# Patient Record
Sex: FEMALE | Race: WHITE | NOT HISPANIC OR LATINO | Employment: OTHER | URBAN - METROPOLITAN AREA
[De-identification: names, ages, dates, MRNs, and addresses within clinical notes are randomized per-mention and may not be internally consistent; named-entity substitution may affect disease eponyms.]

---

## 2024-01-05 ENCOUNTER — HOSPITAL ENCOUNTER (EMERGENCY)
Facility: HOSPITAL | Age: 84
Discharge: HOME | End: 2024-01-05
Attending: EMERGENCY MEDICINE
Payer: MEDICARE

## 2024-01-05 ENCOUNTER — APPOINTMENT (OUTPATIENT)
Dept: CARDIOLOGY | Facility: HOSPITAL | Age: 84
End: 2024-01-05
Payer: MEDICARE

## 2024-01-05 ENCOUNTER — APPOINTMENT (OUTPATIENT)
Dept: RADIOLOGY | Facility: HOSPITAL | Age: 84
End: 2024-01-05
Payer: MEDICARE

## 2024-01-05 VITALS
WEIGHT: 111.88 LBS | HEART RATE: 84 BPM | HEIGHT: 67 IN | TEMPERATURE: 97.7 F | BODY MASS INDEX: 17.56 KG/M2 | SYSTOLIC BLOOD PRESSURE: 158 MMHG | OXYGEN SATURATION: 100 % | RESPIRATION RATE: 18 BRPM | DIASTOLIC BLOOD PRESSURE: 89 MMHG

## 2024-01-05 DIAGNOSIS — R07.81 RIB PAIN: Primary | ICD-10-CM

## 2024-01-05 LAB
ALBUMIN SERPL BCP-MCNC: 4.1 G/DL (ref 3.4–5)
ALP SERPL-CCNC: 83 U/L (ref 33–136)
ALT SERPL W P-5'-P-CCNC: 21 U/L (ref 7–45)
ANION GAP SERPL CALC-SCNC: 14 MMOL/L (ref 10–20)
AST SERPL W P-5'-P-CCNC: 22 U/L (ref 9–39)
BILIRUB SERPL-MCNC: 0.9 MG/DL (ref 0–1.2)
BUN SERPL-MCNC: 27 MG/DL (ref 6–23)
CALCIUM SERPL-MCNC: 9.3 MG/DL (ref 8.6–10.3)
CARDIAC TROPONIN I PNL SERPL HS: 6 NG/L (ref 0–13)
CHLORIDE SERPL-SCNC: 109 MMOL/L (ref 98–107)
CO2 SERPL-SCNC: 23 MMOL/L (ref 21–32)
CREAT SERPL-MCNC: 1.05 MG/DL (ref 0.5–1.05)
GFR SERPL CREATININE-BSD FRML MDRD: 52 ML/MIN/1.73M*2
GLUCOSE SERPL-MCNC: 88 MG/DL (ref 74–99)
INR PPP: 0.9 (ref 0.9–1.1)
POTASSIUM SERPL-SCNC: 3.9 MMOL/L (ref 3.5–5.3)
PROT SERPL-MCNC: 6.4 G/DL (ref 6.4–8.2)
PROTHROMBIN TIME: 10.5 SECONDS (ref 9.8–12.8)
SODIUM SERPL-SCNC: 142 MMOL/L (ref 136–145)

## 2024-01-05 PROCEDURE — 99284 EMERGENCY DEPT VISIT MOD MDM: CPT | Performed by: EMERGENCY MEDICINE

## 2024-01-05 PROCEDURE — 99283 EMERGENCY DEPT VISIT LOW MDM: CPT

## 2024-01-05 PROCEDURE — 71101 X-RAY EXAM UNILAT RIBS/CHEST: CPT | Mod: RT,FY

## 2024-01-05 PROCEDURE — 36415 COLL VENOUS BLD VENIPUNCTURE: CPT | Performed by: STUDENT IN AN ORGANIZED HEALTH CARE EDUCATION/TRAINING PROGRAM

## 2024-01-05 PROCEDURE — 2500000004 HC RX 250 GENERAL PHARMACY W/ HCPCS (ALT 636 FOR OP/ED): Performed by: STUDENT IN AN ORGANIZED HEALTH CARE EDUCATION/TRAINING PROGRAM

## 2024-01-05 PROCEDURE — 96372 THER/PROPH/DIAG INJ SC/IM: CPT | Performed by: STUDENT IN AN ORGANIZED HEALTH CARE EDUCATION/TRAINING PROGRAM

## 2024-01-05 PROCEDURE — 85610 PROTHROMBIN TIME: CPT | Performed by: STUDENT IN AN ORGANIZED HEALTH CARE EDUCATION/TRAINING PROGRAM

## 2024-01-05 PROCEDURE — 71101 X-RAY EXAM UNILAT RIBS/CHEST: CPT | Mod: RIGHT SIDE | Performed by: RADIOLOGY

## 2024-01-05 PROCEDURE — 80053 COMPREHEN METABOLIC PANEL: CPT | Performed by: STUDENT IN AN ORGANIZED HEALTH CARE EDUCATION/TRAINING PROGRAM

## 2024-01-05 PROCEDURE — 99285 EMERGENCY DEPT VISIT HI MDM: CPT | Performed by: EMERGENCY MEDICINE

## 2024-01-05 PROCEDURE — 93005 ELECTROCARDIOGRAM TRACING: CPT

## 2024-01-05 PROCEDURE — 84484 ASSAY OF TROPONIN QUANT: CPT | Performed by: STUDENT IN AN ORGANIZED HEALTH CARE EDUCATION/TRAINING PROGRAM

## 2024-01-05 PROCEDURE — 2500000005 HC RX 250 GENERAL PHARMACY W/O HCPCS: Performed by: STUDENT IN AN ORGANIZED HEALTH CARE EDUCATION/TRAINING PROGRAM

## 2024-01-05 RX ORDER — LIDOCAINE 50 MG/G
1 PATCH TOPICAL DAILY
Qty: 11 PATCH | Refills: 11 | Status: SHIPPED | OUTPATIENT
Start: 2024-01-05 | End: 2024-02-10 | Stop reason: HOSPADM

## 2024-01-05 RX ORDER — LIDOCAINE 560 MG/1
1 PATCH PERCUTANEOUS; TOPICAL; TRANSDERMAL DAILY
Status: DISCONTINUED | OUTPATIENT
Start: 2024-01-05 | End: 2024-01-05 | Stop reason: HOSPADM

## 2024-01-05 RX ORDER — KETOROLAC TROMETHAMINE 15 MG/ML
15 INJECTION, SOLUTION INTRAMUSCULAR; INTRAVENOUS ONCE
Status: COMPLETED | OUTPATIENT
Start: 2024-01-05 | End: 2024-01-05

## 2024-01-05 RX ADMIN — KETOROLAC TROMETHAMINE 15 MG: 15 INJECTION, SOLUTION INTRAMUSCULAR; INTRAVENOUS at 13:30

## 2024-01-05 RX ADMIN — LIDOCAINE 1 PATCH: 4 PATCH TOPICAL at 14:38

## 2024-01-05 ASSESSMENT — PAIN DESCRIPTION - ORIENTATION: ORIENTATION: RIGHT

## 2024-01-05 ASSESSMENT — PAIN - FUNCTIONAL ASSESSMENT
PAIN_FUNCTIONAL_ASSESSMENT: 0-10
PAIN_FUNCTIONAL_ASSESSMENT: 0-10

## 2024-01-05 ASSESSMENT — COLUMBIA-SUICIDE SEVERITY RATING SCALE - C-SSRS
1. IN THE PAST MONTH, HAVE YOU WISHED YOU WERE DEAD OR WISHED YOU COULD GO TO SLEEP AND NOT WAKE UP?: NO
2. HAVE YOU ACTUALLY HAD ANY THOUGHTS OF KILLING YOURSELF?: NO
6. HAVE YOU EVER DONE ANYTHING, STARTED TO DO ANYTHING, OR PREPARED TO DO ANYTHING TO END YOUR LIFE?: NO

## 2024-01-05 ASSESSMENT — PAIN DESCRIPTION - LOCATION
LOCATION: CHEST
LOCATION: RIB CAGE

## 2024-01-05 ASSESSMENT — PAIN SCALES - GENERAL
PAINLEVEL_OUTOF10: 8
PAINLEVEL_OUTOF10: 7

## 2024-01-05 ASSESSMENT — PAIN DESCRIPTION - FREQUENCY: FREQUENCY: CONSTANT/CONTINUOUS

## 2024-01-05 ASSESSMENT — LIFESTYLE VARIABLES
HAVE YOU EVER FELT YOU SHOULD CUT DOWN ON YOUR DRINKING: NO
EVER HAD A DRINK FIRST THING IN THE MORNING TO STEADY YOUR NERVES TO GET RID OF A HANGOVER: NO
HAVE PEOPLE ANNOYED YOU BY CRITICIZING YOUR DRINKING: NO
EVER FELT BAD OR GUILTY ABOUT YOUR DRINKING: NO
REASON UNABLE TO ASSESS: NO

## 2024-01-05 ASSESSMENT — PAIN DESCRIPTION - DESCRIPTORS: DESCRIPTORS: SHARP

## 2024-01-05 NOTE — ED PROVIDER NOTES
HPI   Chief Complaint   Patient presents with    Rib Injury     Right rib pain       This is a 83-year-old female no past medical history presents to the ED for right-sided rib pain, she reports that she bent over to adjust her TV, and hit her right rib on a object that was in front of the TV, this happened 4 days ago and has had pain from that area since then, lower white rib area, no dysuria, no chest pain or shortness of breath, there is pain on inspiration.  She denies any fever/chills or sore throat or rhinorrhea cough                          [place] Coma Scale Score: 15                  Patient History   Past Medical History:   Diagnosis Date    Anemia, unspecified 12/16/2020    Anemia    Anemia, unspecified 12/16/2020    Anemia    Essential (primary) hypertension 12/16/2020    Essential hypertension    Essential (primary) hypertension 12/16/2020    Essential hypertension    Gastro-esophageal reflux disease without esophagitis 12/09/2020    GERD (gastroesophageal reflux disease)    Personal history of other diseases of the nervous system and sense organs 10/26/2021    History of glaucoma     Past Surgical History:   Procedure Laterality Date    OTHER SURGICAL HISTORY  10/26/2021    Cataract surgery     No family history on file.  Social History     Tobacco Use    Smoking status: Not on file    Smokeless tobacco: Not on file   Substance Use Topics    Alcohol use: Not on file    Drug use: Not on file       Physical Exam   ED Triage Vitals [01/05/24 1122]   Temp Heart Rate Resp BP   36.5 °C (97.7 °F) 95 20 167/70      SpO2 Temp src Heart Rate Source Patient Position   100 % -- -- --      BP Location FiO2 (%)     -- --       Physical Exam  Constitutional:       Appearance: Normal appearance.   HENT:      Head: Normocephalic and atraumatic.      Mouth/Throat:      Mouth: Mucous membranes are moist.   Eyes:      Extraocular Movements: Extraocular movements intact.   Cardiovascular:      Rate and Rhythm: Normal rate  and regular rhythm.      Heart sounds: Normal heart sounds. No murmur heard.  Pulmonary:      Effort: Pulmonary effort is normal. No respiratory distress.      Breath sounds: Normal breath sounds. No wheezing.   Abdominal:      General: There is no distension.      Palpations: Abdomen is soft.      Tenderness: There is no abdominal tenderness. There is no guarding.   Musculoskeletal:      Right lower leg: No edema.      Left lower leg: No edema.      Comments: Significant tenderness on palpation of the right rib region around the 12th rib on the lateral side of the right chest.   Skin:     General: Skin is warm and dry.   Neurological:      General: No focal deficit present.      Mental Status: She is alert and oriented to person, place, and time.   Psychiatric:         Mood and Affect: Mood normal.         Behavior: Behavior normal.         ED Course & MDM   ED Course as of 01/05/24 1450   Fri Jan 05, 2024   1446 Nurses having difficulty getting CBC, patient does not want CBC done, believe that this is reasonable [VH]      ED Course User Index  [VH] Louis Rizo DO         Diagnoses as of 01/05/24 1450   Rib pain       Medical Decision Making  EKG interpreted by myself: Normal sinus rhythm ventricular rate 76, normal axis, QTc 436 ms, no acute injury pattern noted.    Patient presents to the ED brought in by self due to right-sided thoracic rib pain after trauma.  This was 4 days ago.  On palpation, patient does haveTenderness on the right chest.  Will obtain a cardiac workup, rib series x-ray, control pain with Toradol, Lidoderm patches.  There abuse x-ray not demonstrate any fractures.  The troponin is within normal limits, CMP grossly unremarkable.  Patient had some improvement of her pain, after this she will be discharged home with symptomatic management, NSAIDs, Lidoderm patches outpatient PCP follow-up    Discussed with the attending  Louis Rizo DO PGY-4  Emergency  Medicine        Procedure  Procedures     Louis Rizo DO  Resident  01/05/24 [id number]

## 2024-01-27 LAB
ATRIAL RATE: 76 BPM
P AXIS: 63 DEGREES
P OFFSET: 199 MS
P ONSET: 153 MS
PR INTERVAL: 128 MS
Q ONSET: 217 MS
QRS COUNT: 12 BEATS
QRS DURATION: 84 MS
QT INTERVAL: 388 MS
QTC CALCULATION(BAZETT): 436 MS
QTC FREDERICIA: 419 MS
R AXIS: 67 DEGREES
T AXIS: 69 DEGREES
T OFFSET: 411 MS
VENTRICULAR RATE: 76 BPM

## 2024-01-30 ENCOUNTER — HOSPITAL ENCOUNTER (INPATIENT)
Facility: HOSPITAL | Age: 84
LOS: 11 days | Discharge: NURSING, REHAB OR LONG-TERM CARE | DRG: 125 | End: 2024-02-10
Attending: EMERGENCY MEDICINE | Admitting: INTERNAL MEDICINE
Payer: MEDICARE

## 2024-01-30 ENCOUNTER — APPOINTMENT (OUTPATIENT)
Dept: RADIOLOGY | Facility: HOSPITAL | Age: 84
DRG: 125 | End: 2024-01-30
Payer: MEDICARE

## 2024-01-30 ENCOUNTER — APPOINTMENT (OUTPATIENT)
Dept: CARDIOLOGY | Facility: HOSPITAL | Age: 84
DRG: 125 | End: 2024-01-30
Payer: MEDICARE

## 2024-01-30 DIAGNOSIS — Z86.73 STROKE OCCURRING WITHIN LAST MONTH: ICD-10-CM

## 2024-01-30 DIAGNOSIS — R63.6 UNDERWEIGHT: ICD-10-CM

## 2024-01-30 DIAGNOSIS — H53.9 VISION CHANGES: Primary | ICD-10-CM

## 2024-01-30 DIAGNOSIS — I63.89 OTHER CEREBRAL INFARCTION (MULTI): ICD-10-CM

## 2024-01-30 DIAGNOSIS — R56.9 SEIZURE (MULTI): ICD-10-CM

## 2024-01-30 LAB
ALBUMIN SERPL BCP-MCNC: 3.9 G/DL (ref 3.4–5)
ALP SERPL-CCNC: 78 U/L (ref 33–136)
ALT SERPL W P-5'-P-CCNC: 17 U/L (ref 7–45)
ANION GAP SERPL CALC-SCNC: 12 MMOL/L (ref 10–20)
APTT PPP: 30 SECONDS (ref 27–38)
AST SERPL W P-5'-P-CCNC: 18 U/L (ref 9–39)
BASOPHILS # BLD AUTO: 0.05 X10*3/UL (ref 0–0.1)
BASOPHILS NFR BLD AUTO: 0.6 %
BILIRUB SERPL-MCNC: 0.9 MG/DL (ref 0–1.2)
BUN SERPL-MCNC: 21 MG/DL (ref 6–23)
CALCIUM SERPL-MCNC: 9.2 MG/DL (ref 8.6–10.3)
CARDIAC TROPONIN I PNL SERPL HS: 9 NG/L (ref 0–13)
CHLORIDE SERPL-SCNC: 110 MMOL/L (ref 98–107)
CHOLEST SERPL-MCNC: 136 MG/DL (ref 0–199)
CHOLESTEROL/HDL RATIO: 2.5
CO2 SERPL-SCNC: 23 MMOL/L (ref 21–32)
CREAT SERPL-MCNC: 1.16 MG/DL (ref 0.5–1.05)
EGFRCR SERPLBLD CKD-EPI 2021: 46 ML/MIN/1.73M*2
EOSINOPHIL # BLD AUTO: 0.19 X10*3/UL (ref 0–0.4)
EOSINOPHIL NFR BLD AUTO: 2.4 %
ERYTHROCYTE [DISTWIDTH] IN BLOOD BY AUTOMATED COUNT: 13.5 % (ref 11.5–14.5)
ERYTHROCYTE [SEDIMENTATION RATE] IN BLOOD BY WESTERGREN METHOD: 2 MM/H (ref 0–30)
GLUCOSE BLD MANUAL STRIP-MCNC: 90 MG/DL (ref 74–99)
GLUCOSE SERPL-MCNC: 100 MG/DL (ref 74–99)
HCT VFR BLD AUTO: 39.3 % (ref 36–46)
HDLC SERPL-MCNC: 54.7 MG/DL
HGB BLD-MCNC: 13.2 G/DL (ref 12–16)
IMM GRANULOCYTES # BLD AUTO: 0.02 X10*3/UL (ref 0–0.5)
IMM GRANULOCYTES NFR BLD AUTO: 0.2 % (ref 0–0.9)
INR PPP: 1 (ref 0.9–1.1)
LDLC SERPL CALC-MCNC: 61 MG/DL
LYMPHOCYTES # BLD AUTO: 1.17 X10*3/UL (ref 0.8–3)
LYMPHOCYTES NFR BLD AUTO: 14.5 %
MCH RBC QN AUTO: 29.3 PG (ref 26–34)
MCHC RBC AUTO-ENTMCNC: 33.6 G/DL (ref 32–36)
MCV RBC AUTO: 87 FL (ref 80–100)
MONOCYTES # BLD AUTO: 0.67 X10*3/UL (ref 0.05–0.8)
MONOCYTES NFR BLD AUTO: 8.3 %
NEUTROPHILS # BLD AUTO: 5.95 X10*3/UL (ref 1.6–5.5)
NEUTROPHILS NFR BLD AUTO: 74 %
NON HDL CHOLESTEROL: 81 MG/DL (ref 0–149)
NRBC BLD-RTO: 0 /100 WBCS (ref 0–0)
PLATELET # BLD AUTO: 263 X10*3/UL (ref 150–450)
POTASSIUM SERPL-SCNC: 3.8 MMOL/L (ref 3.5–5.3)
PROT SERPL-MCNC: 6.3 G/DL (ref 6.4–8.2)
PROTHROMBIN TIME: 11.3 SECONDS (ref 9.8–12.8)
RBC # BLD AUTO: 4.5 X10*6/UL (ref 4–5.2)
SODIUM SERPL-SCNC: 141 MMOL/L (ref 136–145)
TRIGL SERPL-MCNC: 101 MG/DL (ref 0–149)
VLDL: 20 MG/DL (ref 0–40)
WBC # BLD AUTO: 8.1 X10*3/UL (ref 4.4–11.3)

## 2024-01-30 PROCEDURE — 2500000004 HC RX 250 GENERAL PHARMACY W/ HCPCS (ALT 636 FOR OP/ED): Performed by: PHYSICIAN ASSISTANT

## 2024-01-30 PROCEDURE — 70450 CT HEAD/BRAIN W/O DYE: CPT

## 2024-01-30 PROCEDURE — 1100000001 HC PRIVATE ROOM DAILY

## 2024-01-30 PROCEDURE — 93010 ELECTROCARDIOGRAM REPORT: CPT | Performed by: EMERGENCY MEDICINE

## 2024-01-30 PROCEDURE — 99222 1ST HOSP IP/OBS MODERATE 55: CPT | Performed by: PSYCHIATRY & NEUROLOGY

## 2024-01-30 PROCEDURE — 70450 CT HEAD/BRAIN W/O DYE: CPT | Performed by: RADIOLOGY

## 2024-01-30 PROCEDURE — 84484 ASSAY OF TROPONIN QUANT: CPT | Performed by: EMERGENCY MEDICINE

## 2024-01-30 PROCEDURE — 82947 ASSAY GLUCOSE BLOOD QUANT: CPT

## 2024-01-30 PROCEDURE — 85730 THROMBOPLASTIN TIME PARTIAL: CPT | Performed by: EMERGENCY MEDICINE

## 2024-01-30 PROCEDURE — 84075 ASSAY ALKALINE PHOSPHATASE: CPT | Performed by: EMERGENCY MEDICINE

## 2024-01-30 PROCEDURE — 99285 EMERGENCY DEPT VISIT HI MDM: CPT | Performed by: EMERGENCY MEDICINE

## 2024-01-30 PROCEDURE — 36415 COLL VENOUS BLD VENIPUNCTURE: CPT | Performed by: EMERGENCY MEDICINE

## 2024-01-30 PROCEDURE — 85025 COMPLETE CBC W/AUTO DIFF WBC: CPT | Performed by: EMERGENCY MEDICINE

## 2024-01-30 PROCEDURE — 93005 ELECTROCARDIOGRAM TRACING: CPT

## 2024-01-30 PROCEDURE — 85610 PROTHROMBIN TIME: CPT | Performed by: EMERGENCY MEDICINE

## 2024-01-30 PROCEDURE — 85652 RBC SED RATE AUTOMATED: CPT | Performed by: EMERGENCY MEDICINE

## 2024-01-30 PROCEDURE — 80061 LIPID PANEL: CPT | Performed by: PHYSICIAN ASSISTANT

## 2024-01-30 PROCEDURE — 99222 1ST HOSP IP/OBS MODERATE 55: CPT | Performed by: PHYSICIAN ASSISTANT

## 2024-01-30 RX ORDER — HEPARIN SODIUM 5000 [USP'U]/ML
5000 INJECTION, SOLUTION INTRAVENOUS; SUBCUTANEOUS EVERY 8 HOURS
Status: DISCONTINUED | OUTPATIENT
Start: 2024-01-30 | End: 2024-02-10 | Stop reason: HOSPADM

## 2024-01-30 RX ORDER — ACETAMINOPHEN 500 MG
2000 TABLET ORAL DAILY
COMMUNITY

## 2024-01-30 RX ORDER — BISMUTH SUBSALICYLATE 262 MG
1 TABLET,CHEWABLE ORAL DAILY
COMMUNITY

## 2024-01-30 RX ORDER — POLYETHYLENE GLYCOL 3350 17 G/17G
17 POWDER, FOR SOLUTION ORAL DAILY
Status: DISCONTINUED | OUTPATIENT
Start: 2024-01-30 | End: 2024-02-10 | Stop reason: HOSPADM

## 2024-01-30 RX ORDER — SODIUM CHLORIDE 9 MG/ML
75 INJECTION, SOLUTION INTRAVENOUS ONCE
Status: COMPLETED | OUTPATIENT
Start: 2024-01-30 | End: 2024-01-30

## 2024-01-30 RX ORDER — MULTIVITAMIN/IRON/FOLIC ACID 18MG-0.4MG
1 TABLET ORAL DAILY
COMMUNITY

## 2024-01-30 RX ADMIN — SODIUM CHLORIDE 75 ML/HR: 9 INJECTION, SOLUTION INTRAVENOUS at 13:46

## 2024-01-30 RX ADMIN — HEPARIN SODIUM 5000 UNITS: 5000 INJECTION INTRAVENOUS; SUBCUTANEOUS at 22:24

## 2024-01-30 SDOH — SOCIAL STABILITY: SOCIAL INSECURITY: WERE YOU ABLE TO COMPLETE ALL THE BEHAVIORAL HEALTH SCREENINGS?: YES

## 2024-01-30 SDOH — SOCIAL STABILITY: SOCIAL INSECURITY: ABUSE: ADULT

## 2024-01-30 SDOH — SOCIAL STABILITY: SOCIAL INSECURITY: HAVE YOU HAD THOUGHTS OF HARMING ANYONE ELSE?: NO

## 2024-01-30 SDOH — SOCIAL STABILITY: SOCIAL INSECURITY: DO YOU FEEL UNSAFE GOING BACK TO THE PLACE WHERE YOU ARE LIVING?: NO

## 2024-01-30 SDOH — SOCIAL STABILITY: SOCIAL INSECURITY: ARE YOU OR HAVE YOU BEEN THREATENED OR ABUSED PHYSICALLY, EMOTIONALLY, OR SEXUALLY BY ANYONE?: NO

## 2024-01-30 SDOH — SOCIAL STABILITY: SOCIAL INSECURITY: DOES ANYONE TRY TO KEEP YOU FROM HAVING/CONTACTING OTHER FRIENDS OR DOING THINGS OUTSIDE YOUR HOME?: NO

## 2024-01-30 SDOH — SOCIAL STABILITY: SOCIAL INSECURITY: DO YOU FEEL ANYONE HAS EXPLOITED OR TAKEN ADVANTAGE OF YOU FINANCIALLY OR OF YOUR PERSONAL PROPERTY?: NO

## 2024-01-30 SDOH — SOCIAL STABILITY: SOCIAL INSECURITY: ARE THERE ANY APPARENT SIGNS OF INJURIES/BEHAVIORS THAT COULD BE RELATED TO ABUSE/NEGLECT?: NO

## 2024-01-30 SDOH — SOCIAL STABILITY: SOCIAL INSECURITY: HAS ANYONE EVER THREATENED TO HURT YOUR FAMILY OR YOUR PETS?: NO

## 2024-01-30 ASSESSMENT — ACTIVITIES OF DAILY LIVING (ADL)
HEARING - LEFT EAR: FUNCTIONAL
GROOMING: INDEPENDENT
ADEQUATE_TO_COMPLETE_ADL: YES
PATIENT'S MEMORY ADEQUATE TO SAFELY COMPLETE DAILY ACTIVITIES?: YES
BATHING: INDEPENDENT
TOILETING: INDEPENDENT
FEEDING YOURSELF: INDEPENDENT
JUDGMENT_ADEQUATE_SAFELY_COMPLETE_DAILY_ACTIVITIES: YES
HEARING - RIGHT EAR: FUNCTIONAL
WALKS IN HOME: INDEPENDENT
LACK_OF_TRANSPORTATION: NO
DRESSING YOURSELF: INDEPENDENT

## 2024-01-30 ASSESSMENT — LIFESTYLE VARIABLES
REASON UNABLE TO ASSESS: NO
EVER FELT BAD OR GUILTY ABOUT YOUR DRINKING: NO
HAVE PEOPLE ANNOYED YOU BY CRITICIZING YOUR DRINKING: NO
AUDIT-C TOTAL SCORE: 0
HOW OFTEN DO YOU HAVE 6 OR MORE DRINKS ON ONE OCCASION: NEVER
SKIP TO QUESTIONS 9-10: 1
EVER HAD A DRINK FIRST THING IN THE MORNING TO STEADY YOUR NERVES TO GET RID OF A HANGOVER: NO
AUDIT-C TOTAL SCORE: 0
HOW MANY STANDARD DRINKS CONTAINING ALCOHOL DO YOU HAVE ON A TYPICAL DAY: PATIENT DOES NOT DRINK
HAVE YOU EVER FELT YOU SHOULD CUT DOWN ON YOUR DRINKING: NO
HOW OFTEN DO YOU HAVE A DRINK CONTAINING ALCOHOL: NEVER

## 2024-01-30 ASSESSMENT — COGNITIVE AND FUNCTIONAL STATUS - GENERAL
PATIENT BASELINE BEDBOUND: NO
MOBILITY SCORE: 24
MOBILITY SCORE: 21
DAILY ACTIVITIY SCORE: 24
DAILY ACTIVITIY SCORE: 24
WALKING IN HOSPITAL ROOM: A LITTLE
CLIMB 3 TO 5 STEPS WITH RAILING: A LITTLE
MOVING TO AND FROM BED TO CHAIR: A LITTLE

## 2024-01-30 ASSESSMENT — ENCOUNTER SYMPTOMS
WEAKNESS: 0
JOINT SWELLING: 0
NUMBNESS: 0
ABDOMINAL PAIN: 0
HEADACHES: 0
WHEEZING: 0
NECK PAIN: 0
WOUND: 0
EYE REDNESS: 0
VOMITING: 0
SHORTNESS OF BREATH: 0
PALPITATIONS: 0
SINUS PRESSURE: 0
ABDOMINAL DISTENTION: 0
LIGHT-HEADEDNESS: 0
HEMATURIA: 0
NAUSEA: 0
ARTHRALGIAS: 0
DYSURIA: 0
EYE ITCHING: 0
FREQUENCY: 0
DIARRHEA: 0
COUGH: 0
SORE THROAT: 0
EYE PAIN: 0
DIZZINESS: 0
PHOTOPHOBIA: 0
FEVER: 0
CHILLS: 0
DIAPHORESIS: 0
CONFUSION: 0
CONSTIPATION: 0

## 2024-01-30 ASSESSMENT — COLUMBIA-SUICIDE SEVERITY RATING SCALE - C-SSRS
6. HAVE YOU EVER DONE ANYTHING, STARTED TO DO ANYTHING, OR PREPARED TO DO ANYTHING TO END YOUR LIFE?: NO
1. IN THE PAST MONTH, HAVE YOU WISHED YOU WERE DEAD OR WISHED YOU COULD GO TO SLEEP AND NOT WAKE UP?: NO
2. HAVE YOU ACTUALLY HAD ANY THOUGHTS OF KILLING YOURSELF?: NO

## 2024-01-30 ASSESSMENT — PATIENT HEALTH QUESTIONNAIRE - PHQ9
1. LITTLE INTEREST OR PLEASURE IN DOING THINGS: SEVERAL DAYS
2. FEELING DOWN, DEPRESSED OR HOPELESS: NOT AT ALL
SUM OF ALL RESPONSES TO PHQ9 QUESTIONS 1 & 2: 1

## 2024-01-30 ASSESSMENT — PAIN SCALES - GENERAL
PAINLEVEL_OUTOF10: 0 - NO PAIN
PAINLEVEL_OUTOF10: 0 - NO PAIN

## 2024-01-30 ASSESSMENT — PAIN - FUNCTIONAL ASSESSMENT
PAIN_FUNCTIONAL_ASSESSMENT: 0-10
PAIN_FUNCTIONAL_ASSESSMENT: 0-10

## 2024-01-30 NOTE — CONSULTS
Inpatient consult to Neurology  Consult performed by: Noemy Kumar DO  Consult ordered by: Amanda Méndez PA-C          Reason For Consult  Blurry vision    History Of Present Illness  Fernanda Hughes is a 83 y.o. 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 She is presenting with chief concern of blurry vision. Patient states this began around 1 week ago. She has noticed difficulty walking long distances due to imbalance. She states she lives alone and has no one to help with medical visits or medications. Of note, she was admitted last January twice to [facility] with similar symptoms of blurry vision, unsteady gate, dizziness while walking. During those admissions she was found to have a subtle focus in the L. Parietal lobe on MRI which could represent infarct, although did not explain her symptoms. They also did MRI of her orbits which showed subtle enhancement of the optic nerves which could represent optic neuritis. Paraneoplastic  panel, syphilis, NÉSTOR, ESR, CRP, B12, TSH were all negative. Her last ophthalmology office visit was on 2/14/23, she was seen by an optometrist Tamara Gamboa who recommend continued latanoprost, dorzolamide/timolol, brimonidine to treat glaucoma. Unclear exactly what tests were performed during that visit.     On this admission, vitals signs stable. She is afebrile at 36.1, hr 70-80 bpm, RR 18, -150, saturating 100% on room air. Labs showed CBC WNL, CMP with slightly elevated creatinine of 1.16 (baseline 1), otherwise unremarkable. ESR 2. LDL 61, , HDL 54.7. Trop 9. EKG NSR. Neurology was consulted for further stroke workup.          Past Medical History  She has a past medical history of Anemia, unspecified (12/16/2020), Anemia, unspecified (12/16/2020), Essential (primary) hypertension (12/16/2020), Essential (primary) hypertension (12/16/2020),  Gastro-esophageal reflux disease without esophagitis (12/09/2020), and Personal history of other diseases of the nervous system and sense organs (10/26/2021).    Surgical History  She has a past surgical history that includes Other surgical history (10/26/2021).     Social History  She reports that she has never smoked. She has never used smokeless tobacco. No history on file for alcohol use and drug use.    Family History  No family history on file.     Allergies  Patient has no known allergies.    Review of Systems  ROS negative aside from above HPI.      Physical Exam  Constitutional:       General: She is not in acute distress.     Appearance: Normal appearance. She is not ill-appearing.   HENT:      Head: Normocephalic and atraumatic. No contusion or laceration.      Mouth/Throat:      Mouth: Mucous membranes are moist.   Eyes:      General: Visual field deficit present.      Extraocular Movements: Extraocular movements intact.      Pupils: Pupils are equal, round, and reactive to light.   Cardiovascular:      Rate and Rhythm: Normal rate and regular rhythm.   Musculoskeletal:         General: No tenderness or signs of injury.      Cervical back: Normal range of motion. No tenderness.      Right lower leg: No edema.      Left lower leg: No edema.   Skin:     Coloration: Skin is not jaundiced or pale.      Findings: No rash or wound.   Neurological:      Mental Status: She is alert and oriented to person, place, and time. Mental status is at baseline.      Cranial Nerves: No dysarthria or facial asymmetry. CN II-XII intact.      Sensory: No sensory deficit.      Motor: No weakness.      Coordination: Finger-Nose-Finger Test and Heel to Shin Test normal.      Comments:  Visual field deficit superior bilateral symmetric   Psychiatric:         Speech: Speech normal.         Behavior: Behavior normal.            Judgment: Judgment normal.        Last Recorded Vitals  /74   Pulse 76   Temp 36.7 °C (98.1 °F)  (Temporal)   Resp 16   Wt 49 kg (108 lb)   SpO2 100%     Relevant Results  Results for orders placed or performed during the hospital encounter of 01/30/24 (from the past 24 hour(s))   ECG 12 lead   Result Value Ref Range    Ventricular Rate 91 BPM    Atrial Rate 91 BPM    MT Interval 276 ms    QRS Duration 86 ms    QT Interval 368 ms    QTC Calculation(Bazett) 452 ms    P Axis 103 degrees    R Axis 74 degrees    T Axis 73 degrees    QRS Count 15 beats    Q Onset 218 ms    P Onset 80 ms    P Offset 190 ms    T Offset 402 ms    QTC Fredericia 423 ms   CBC and Auto Differential   Result Value Ref Range    WBC 8.1 4.4 - 11.3 x10*3/uL    nRBC 0.0 0.0 - 0.0 /100 WBCs    RBC 4.50 4.00 - 5.20 x10*6/uL    Hemoglobin 13.2 12.0 - 16.0 g/dL    Hematocrit 39.3 36.0 - 46.0 %    MCV 87 80 - 100 fL    MCH 29.3 26.0 - 34.0 pg    MCHC 33.6 32.0 - 36.0 g/dL    RDW 13.5 11.5 - 14.5 %    Platelets 263 150 - 450 x10*3/uL    Neutrophils % 74.0 40.0 - 80.0 %    Immature Granulocytes %, Automated 0.2 0.0 - 0.9 %    Lymphocytes % 14.5 13.0 - 44.0 %    Monocytes % 8.3 2.0 - 10.0 %    Eosinophils % 2.4 0.0 - 6.0 %    Basophils % 0.6 0.0 - 2.0 %    Neutrophils Absolute 5.95 (H) 1.60 - 5.50 x10*3/uL    Immature Granulocytes Absolute, Automated 0.02 0.00 - 0.50 x10*3/uL    Lymphocytes Absolute 1.17 0.80 - 3.00 x10*3/uL    Monocytes Absolute 0.67 0.05 - 0.80 x10*3/uL    Eosinophils Absolute 0.19 0.00 - 0.40 x10*3/uL    Basophils Absolute 0.05 0.00 - 0.10 x10*3/uL   Comprehensive metabolic panel   Result Value Ref Range    Glucose 100 (H) 74 - 99 mg/dL    Sodium 141 136 - 145 mmol/L    Potassium 3.8 3.5 - 5.3 mmol/L    Chloride 110 (H) 98 - 107 mmol/L    Bicarbonate 23 21 - 32 mmol/L    Anion Gap 12 10 - 20 mmol/L    Urea Nitrogen 21 6 - 23 mg/dL    Creatinine 1.16 (H) 0.50 - 1.05 mg/dL    eGFR 46 (L) >60 mL/min/1.73m*2    Calcium 9.2 8.6 - 10.3 mg/dL    Albumin 3.9 3.4 - 5.0 g/dL    Alkaline Phosphatase 78 33 - 136 U/L    Total Protein 6.3 (L)  6.4 - 8.2 g/dL    AST 18 9 - 39 U/L    Bilirubin, Total 0.9 0.0 - 1.2 mg/dL    ALT 17 7 - 45 U/L   Troponin I, High Sensitivity   Result Value Ref Range    Troponin I, High Sensitivity 9 0 - 13 ng/L   Protime-INR   Result Value Ref Range    Protime 11.3 9.8 - 12.8 seconds    INR 1.0 0.9 - 1.1   APTT   Result Value Ref Range    aPTT 30 27 - 38 seconds   Sedimentation rate, automated   Result Value Ref Range    Sedimentation Rate 2 0 - 30 mm/h   Lipid panel   Result Value Ref Range    Cholesterol 136 0 - 199 mg/dL    HDL-Cholesterol 54.7 mg/dL    Cholesterol/HDL Ratio 2.5     LDL Calculated 61 <=99 mg/dL    VLDL 20 0 - 40 mg/dL    Triglycerides 101 0 - 149 mg/dL    Non HDL Cholesterol 81 0 - 149 mg/dL   POCT GLUCOSE   Result Value Ref Range    POCT Glucose 90 74 - 99 mg/dL     CT head wo IV contrast    Result Date: 1/30/2024  Interpreted By:  Michi Jackson, STUDY: CT HEAD WO IV CONTRAST;  1/30/2024 10:27 am   INDICATION: Signs/Symptoms:acute bilateral visual field defecits.   COMPARISON: CT head dated 09/16/2019.   ACCESSION NUMBER(S): [id number]   ORDERING CLINICIAN: LAILA GALLOWAY   TECHNIQUE: Noncontrast axial CT scan of head was performed.   FINDINGS: Parenchyma: There is no acute intracranial hemorrhage. The grey-white differentiation is intact. There is no mass effect or midline shift. Mild chronic small vessel ischemic disease suggested. Atherosclerotic calcification of the carotid siphons bilaterally. No CT evidence of sellar/suprasellar mass.   CSF Spaces: The ventricles, sulci and basal cisterns are within normal limits for age with generalized brain atrophy.   Extra-Axial Fluid: There is no extraaxial fluid collection.   Calvarium: The calvarium is unremarkable.   Paranasal sinuses: Secretions within a posterior right ethmoidal air cell.   Mastoids: Clear.   Orbits: Bilateral native lens extractions. The visualized orbits are otherwise unremarkable.   Soft tissues: Unremarkable.       No acute  intracranial hemorrhage, mass effect, or CT apparent acute infarct. No evidence of sellar/suprasellar mass on this routine CT assessment.   Mild chronic small vessel ischemic disease with generalized brain atrophy.   MACRO: None   Signed by: Michi Jackson 1/30/2024 10:58 AM Dictation workstation:   [id number]    ECG 12 lead    Result Date: 1/30/2024  Sinus rhythm with 1st degree AV block ST abnormality, possible digitalis effect Abnormal ECG When compared with ECG of 05-JAN-2024 13:18, MO interval has increased    ECG 12 lead    Result Date: 1/27/2024  Normal sinus rhythm Normal ECG When compared with ECG of 04-DEC-2020 09:07, No significant change was found Confirmed by Delta Brunner ([id number]) on 1/27/2024 8:47:20 PM    XR ribs right 2 views w chest pa or ap    Result Date: 1/5/2024  STUDY: Right Rib and Chest Radiographs; 01/05/2024 11:54 AM INDICATION: Right rib pain. COMPARISON: 12/4/2020 XR Chest. ACCESSION NUMBER(S): [id number] ORDERING CLINICIAN: OBED SANDERS TECHNIQUE:  Frontal chest and two view(s) (four images) of the right ribs. FINDINGS:  CARDIOMEDIASTINAL SILHOUETTE: Cardiomediastinal silhouette is normal in size and configuration.  LUNGS: Lungs are clear.  ABDOMEN: No remarkable upper abdominal findings. RIGHT RIBS: There is no acute rib fracture.  OTHER VISUALIZED BONES: No acute osseous changes.    No acute process. 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 She presents with chief complaint of blurry vision, difficulty ambulating due to unsteady gait. Physical exam reveals visual field changes in bilateral upper peripheral vision.     Bilateral upper visual field  "deficit  Unsteady gait  Hx glaucoma  ?Hx CVA L. Parietal lobe  Hx mild cognitive impairment  -continue telemetry  -MRI brain without contrast, MRI orbits to r/o stroke  -neurological assessment q4 hr  -PT/OT  -patient was prescribed multiple eyedrops for glaucoma within the past year. These do not appear on her medication reconciliation on admission and she states she takes no home medications. She does endorse poor follow up with ophthalmology. She will benefit from follow up    Above plan discussed with attending,    Noemy Kumar DO, PGY3    I saw and evaluated the patient.  I obtained the key portions of the history and examination.  I reviewed the residents/APNs note, discussed the patient and supervised treatment plan formulation.    Briefly, this is an 83 year old female presenting for evaluation of visual disturbances.  About 1 week ago, she developed blurry vision.  Specifically, she describes blurriness in the top half of her vision.  She denies any double vision, slurred speech, facial droop, weakness, or numbness.  She does note that her balance has been off over the past week.  She denies any falls.    Objective:  /79   Pulse 78   Temp 36.7 °C (98.1 °F) (Temporal)   Resp 15   Ht 1.702 m (5' 7\")   Wt 49 kg (108 lb)   SpO2 100%   BMI 16.92 kg/m²     Gen: NAD  Neuro:  --HIF: A&O X 3, repetition and naming intact  --CN:  PERRLA, EOMI, Bilateral superior VF cut, no visible facial asymmetry, facial sensation intact, no tongue or palatal deviation, SCM intact  --Motor: Moves all 4 extremities equally; no focal deficits  --Sensory: Intact to light touch, intact to pinprick  --Reflex: 2+ symmetric, toes down  --Cerebellum: FTN and HTS intact  --Gait: Deferred     CT Brain (I personally reviewed the images/tracings with the following interpretation)  No acute changes    ESR 2    Assessment:   Bilateral Superior Altitudinal Visual Field Defect  - ddx includes bioccipital infarct, PRES  - MRI Brain  - " if negative, recommend evaluation by Ophthalmology    Pedro Green MD  [facility]  Department of Neurology

## 2024-01-30 NOTE — ED PROVIDER NOTES
EMERGENCY DEPARTMENT ENCOUNTER      Pt Name: Fernanda Hughes  MRN: [id number]  Birthdate 1940  Date of evaluation: 1/30/2024  Provider: James Capone DO    CHIEF COMPLAINT       Chief Complaint   Patient presents with    Dizziness     Pt came from home due to dizziness. Pt states dizziness and vision changes started a week ago. Pt denies nausea, vomiting diarrhea or fevers. Pt denies chest pain at this time. Pt visible dizziness and unstable walking. Notfied charge RN and attending. No brain attack called at this time      HISTORY OF PRESENT ILLNESS    Fernanda Hughes is a 83 y.o. year old female who presents to the ER for vision changes.  She states that 1 week ago she had acute onset bilateral vision changes.  She is not able to see the top half of her vision in either eye.  The bottom half of her vision is blurry.  Does endorse gait changes, denies trauma, headache, presyncope or syncope, chest pain, dyspnea, hearing changes, smell changes, taste changes.  PMH none, PSH none, NKDA, denies history of alcohol tobacco or drug use, states she is vaccinated however she is not able to recall the last time she has seen a primary care physician, reports she has never been screened for anything.     PAST MEDICAL HISTORY     Past Medical History:   Diagnosis Date    Anemia, unspecified 12/16/2020    Anemia    Anemia, unspecified 12/16/2020    Anemia    Essential (primary) hypertension 12/16/2020    Essential hypertension    Essential (primary) hypertension 12/16/2020    Essential hypertension    Gastro-esophageal reflux disease without esophagitis 12/09/2020    GERD (gastroesophageal reflux disease)    Personal history of other diseases of the nervous system and sense organs 10/26/2021    History of glaucoma     CURRENT MEDICATIONS       Previous Medications    ASCORBIC ACID (VITAMIN C) 500 MG ER CAPSULE    Take 500 mg by mouth once daily.    B COMPLEX 0.4 MG TABLET    Take 1 tablet by mouth once daily.    CHOLECALCIFEROL  (VITAMIN D3) 50 MCG (2,000 UNIT) CAPSULE    Take 1 capsule (50 mcg) by mouth early in the morning..    LIDOCAINE (LIDODERM) 5 % PATCH    Place 1 patch over 12 hours on the skin once daily. Remove & discard patch within 12 hours or as directed by MD.    MULTIVITAMIN TABLET    Take 1 tablet by mouth once daily.     SURGICAL HISTORY       Past Surgical History:   Procedure Laterality Date    OTHER SURGICAL HISTORY  10/26/2021    Cataract surgery     ALLERGIES     Patient has no known allergies.  FAMILY HISTORY     No family history on file.  SOCIAL HISTORY       Social History     Tobacco Use    Smoking status: Never    Smokeless tobacco: Never   Substance Use Topics    Alcohol use: Not on file    Drug use: Not on file     PHYSICAL EXAM  (up to 7 for level 4, 8 or more for level 5)     ED Triage Vitals [01/30/24 0923]   Temperature Heart Rate Respirations BP   36.7 °C (98.1 °F) (!) 108 18 --      Pulse Ox Temp Source Heart Rate Source Patient Position   99 % Temporal Monitor Sitting      BP Location FiO2 (%)     Right arm --       Physical Exam  Vitals and nursing note reviewed.   Constitutional:       General: She is not in acute distress.     Appearance: Normal appearance. She is not ill-appearing.   HENT:      Head: Normocephalic and atraumatic. No contusion or laceration.      Jaw: No trismus or malocclusion.      Right Ear: External ear normal.      Left Ear: External ear normal.      Mouth/Throat:      Mouth: Mucous membranes are moist.      Pharynx: Oropharynx is clear.   Eyes:      General: Visual field deficit present.      Extraocular Movements: Extraocular movements intact.      Pupils: Pupils are equal, round, and reactive to light.   Neck:      Trachea: No tracheal deviation.   Cardiovascular:      Rate and Rhythm: Normal rate and regular rhythm.      Pulses: Normal pulses.   Pulmonary:      Effort: No respiratory distress.      Breath sounds: No wheezing, rhonchi or rales.   Chest:      Chest wall: No  tenderness.   Abdominal:      General: Abdomen is flat. There is no distension.      Palpations: Abdomen is soft. There is no mass.      Tenderness: There is no abdominal tenderness. There is no right CVA tenderness or left CVA tenderness.   Musculoskeletal:         General: No tenderness or signs of injury.      Cervical back: Normal range of motion. No tenderness.      Right lower leg: No edema.      Left lower leg: No edema.   Skin:     Coloration: Skin is not jaundiced or pale.      Findings: No rash or wound.   Neurological:      General: No focal deficit present.      Mental Status: She is alert and oriented to person, place, and time. Mental status is at baseline.      GCS: GCS eye subscore is 4. GCS verbal subscore is 5. GCS motor subscore is 6.      Cranial Nerves: No dysarthria or facial asymmetry.      Sensory: No sensory deficit.      Motor: No weakness.      Coordination: Romberg sign negative. Coordination normal. Finger-Nose-Finger Test and Heel to Shin Test normal. Rapid alternating movements normal.      Gait: Gait abnormal.      Comments: - dyscalculia, - dysmetria, - prosopagnosia, able to identify 3 objects, CN 3-12 intact. Visual field deficit superior bilateral symmetric   Psychiatric:         Speech: Speech normal.         Behavior: Behavior normal.         Thought Content: Thought content does not include homicidal or suicidal ideation.         Judgment: Judgment normal.        DIAGNOSTIC RESULTS   LABS:  Labs Reviewed   CBC WITH AUTO DIFFERENTIAL - Abnormal       Result Value    WBC 8.1      nRBC 0.0      RBC 4.50      Hemoglobin 13.2      Hematocrit 39.3      MCV 87      MCH 29.3      MCHC 33.6      RDW 13.5      Platelets 263      Neutrophils % 74.0      Immature Granulocytes %, Automated 0.2      Lymphocytes % 14.5      Monocytes % 8.3      Eosinophils % 2.4      Basophils % 0.6      Neutrophils Absolute 5.95 (*)     Immature Granulocytes Absolute, Automated 0.02      Lymphocytes Absolute  1.17      Monocytes Absolute 0.67      Eosinophils Absolute 0.19      Basophils Absolute 0.05     COMPREHENSIVE METABOLIC PANEL - Abnormal    Glucose 100 (*)     Sodium 141      Potassium 3.8      Chloride 110 (*)     Bicarbonate 23      Anion Gap 12      Urea Nitrogen 21      Creatinine 1.16 (*)     eGFR 46 (*)     Calcium 9.2      Albumin 3.9      Alkaline Phosphatase 78      Total Protein 6.3 (*)     AST 18      Bilirubin, Total 0.9      ALT 17     TROPONIN I, HIGH SENSITIVITY - Normal    Troponin I, High Sensitivity 9      Narrative:     Less than 99th percentile of normal range cutoff-  Female and children under 18 years old <14 ng/L; Male <21 ng/L: Negative  Repeat testing should be performed if clinically indicated.     Female and children under 18 years old 14-50 ng/L; Male 21-50 ng/L:  Consistent with possible cardiac damage and possible increased clinical   risk. Serial measurements may help to assess extent of myocardial damage.     >50 ng/L: Consistent with cardiac damage, increased clinical risk and  myocardial infarction. Serial measurements may help assess extent of   myocardial damage.      NOTE: Children less than 1 year old may have higher baseline troponin   levels and results should be interpreted in conjunction with the overall   clinical context.     NOTE: Troponin I testing is performed using a different   testing methodology at [facility] than at other   [facility]. Direct result comparisons should only   be made within the same method.   PROTIME-INR - Normal    Protime 11.3      INR 1.0     APTT - Normal    aPTT 30      Narrative:     The APTT is no longer used for monitoring Unfractionated Heparin Therapy. For monitoring Heparin Therapy, use the Heparin Assay.   SEDIMENTATION RATE, AUTOMATED - Normal    Sedimentation Rate 2     POCT GLUCOSE - Normal    POCT Glucose 90     LIPID PANEL    Cholesterol 136      HDL-Cholesterol 54.7      Cholesterol/HDL Ratio 2.5      LDL  Calculated 61      VLDL 20      Triglycerides 101      Non HDL Cholesterol 81     POCT GLUCOSE METER     All other labs were within normal range or not returned as of this dictation.  Imaging  CT head wo IV contrast   Final Result   No acute intracranial hemorrhage, mass effect, or CT apparent acute   infarct. No evidence of sellar/suprasellar mass on this routine CT   assessment.        Mild chronic small vessel ischemic disease with generalized brain   atrophy.        MACRO:   None        Signed by: Michi Jackson 1/30/2024 10:58 AM   Dictation workstation:   [id number]      MR brain wo IV contrast    (Results Pending)   MR orbit wo IV contrast    (Results Pending)      Procedure  Procedures  EMERGENCY DEPARTMENT COURSE/MDM:   Medical Decision Making  =================Attending note===============    The patient was seen by the resident/fellow.  I have personally performed a substantive portion of the encounter.  I have seen and examined the patient; agree with the workup, evaluation, MDM,   management and diagnosis.  The care plan has been discussed with the resident; I have reviewed the resident's note and agree with the documented findings.      This is a 83 y.o. female who presents to ER with 1 week of upper visual field loss and blurred vision in the lower visual fields.  She is also had some disequilibrium issues.  Is not a spinning dizziness.  She states when she stands up she feels like she cannot fall back.  No head injury.  No history of similar issues.  She denies any diabetes or hypertension or hyperlipidemia.  She has complete loss of her left upper visual field.  There is partial loss of her right upper visual field.  Peripheral visual fields are intact.  No ataxia with finger-to-nose or heel-to-shin.  When she stands up she stumbles backwards slightly.  Her NIH stroke score is only positive for the visual field issues.  Heart is regular.  Lungs are clear.  No carotid bruit.  No  distress.    Troponin negative.  Chemistry shows mildly elevated creatinine.  Chloride slightly elevated.  White count normal.  No anemia.  CT of the head had no acute findings.    Case is discussed with neurology and the patient is admitted for further evaluation.    EKG: Sinus rhythm with a ventricular rate of 91.  First-degree AV block [place] interval of 276.  QTc 452.  No ST changes.            ==========================================       Vitals:    Vitals:    01/30/24 1430 01/30/24 1500 01/30/24 1545 01/30/24 1600   BP:   173/79 160/82   Pulse: 76 78 78 80   Resp: 15 17 15 14   Temp:       TempSrc:       SpO2: 100% 100% 100% 99%   Weight:       Height:         Fernanda Hughes is a female 83 y.o. who presents to the ER for acute bilateral visual deficits. On arrival the patients vital signs were: Afebrile, Tachycardic, Normotensive, Regular RR, and Oxygenating well on room air. History obtained from: patient and Spouse.  Given acute visual field deficit, started 1 week ago stroke workup initiated.  Timeframe not consistent with brain attack, brain attack not called.    My independent interpretation of Labs:   CBC: No acute pathological leukocytosis, leukopenia, thrombocytosis, thrombocytopenia, or anemia  CMP: No acute electrolyte or hepatorenal abnormality  Troponin/BNP: No elevation of cardiac markers of stress or strain  PT/PTT/INR: No acute coagulopathy    My independent interpretation of Imaging: CT head shows no acute intracranial pathology    Diagnoses as of 01/30/24 1655   Vision changes       Consultant discussions: Discussed with Dr. Green, neurology, who recommended admission for stroke workup  Diagnostic testing considered but not performed: CT angio not indicated, NIH 2 for complete hemianopia Van negative   External Records Reviewed: I reviewed recent and relevant outside records including inpatient notes, outpatient records  Social Determinants Affecting Care: no PCP per patient  Prescription Drug  Consideration: None    Shared decision making for disposition  Patient and/or patient´s representative was counseled regarding labs, imaging, likely diagnosis. All questions were answered. Recommendation was made   for Admission given the need for further escalation of care to inpatient management. Patient agreed and was admitted in stable condition. Admitting team was notified of any pending labs or imaging to ensure continuity of care.     ED Medications administered this visit:    Medications   polyethylene glycol (Glycolax, Miralax) packet 17 g (17 g oral Not Given 1/30/24 1245)   heparin (porcine) injection 5,000 Units (5,000 Units subcutaneous Not Given 1/30/24 1346)   sodium chloride 0.9% infusion (75 mL/hr intravenous New Bag 1/30/24 1346)        Final Impression:   1. Vision changes          I reviewed the case with the attending ED physician. The attending ED physician agrees with the plan.   James Capone DO  PGY-2  Emergency Medicine      Please excuse any misspellings or unintended errors related to the Dragon speech recognition software used to dictate this note.       James Capone DO  Resident  01/30/24 4877

## 2024-01-30 NOTE — H&P
"History Of Present Illness  Fernanda Hughes is a 83 y.o. female with medical history of R breast cancer, mild vascular dementia, glaucoma, CVA (1/16/23) presenting to [facility] on 1/30/2024 from home with complaint of vision changes and dizziness. She stated it started about 1 week ago and the vision changes she describes as\" mist over my eyes\" and its constant and dizziness associated with it that comes and goes. She has also had feelings of being off balance stating when she's not moving it feels like she is falling backwards or moving to the side. 1 year ago she had a CVA and then was supposed to follow-up with neuro and ophthalmology due to MRI findings but she stated she never did that because she didn't know about it. She still states she drives but she is very cautious.  Denies recent fever/chills, cough, cold symptoms, chest pain, palpitations, lightheadedness, shortness of breath, abdominal pain, N/V/D, urinary symptoms or leg swelling      -1/16/23 to 1/18/23 admitted to [facility] under observation: acute CVA.   -Readmitted to [facility] on 1/19/23 to 1/23/23: unsteady gait/vision difficulties: CT of the brain completed which was negative for a stroke. Neurology saw you and ordered an MRI of the brain/orbit (eye). The MRI was negative for a stroke but did reveal changes to the optic nerve sheaths (membranes). Neurology recommended outpatient follow up (eye exam) with ophthalmology. Neurology also recommended follow up with vestibular therapy.    Past Medical History  Past Medical History:   Diagnosis Date    Anemia, unspecified 12/16/2020    Anemia    Anemia, unspecified 12/16/2020    Anemia    Essential (primary) hypertension 12/16/2020    Essential hypertension    Essential (primary) hypertension 12/16/2020    Essential hypertension    Gastro-esophageal reflux disease without esophagitis 12/09/2020    GERD (gastroesophageal reflux disease)    Personal history of other diseases of the nervous system " and sense organs 10/26/2021    History of glaucoma       Surgical History  Past Surgical History:   Procedure Laterality Date    OTHER SURGICAL HISTORY  10/26/2021    Cataract surgery        Social History  Social History     Tobacco Use    Smoking status: Never    Smokeless tobacco: Never        Family History  No family history on file.     Allergies  Patient has no known allergies.    Review of Systems   Constitutional:  Negative for chills, diaphoresis and fever.   HENT:  Negative for congestion, postnasal drip, sinus pressure and sore throat.    Eyes:  Positive for visual disturbance. Negative for photophobia, pain, redness and itching.   Respiratory:  Negative for cough, shortness of breath and wheezing.    Cardiovascular:  Negative for chest pain and palpitations.   Gastrointestinal:  Negative for abdominal distention, abdominal pain, constipation, diarrhea, nausea and vomiting.   Genitourinary:  Negative for dysuria, frequency, hematuria and urgency.   Musculoskeletal:  Negative for arthralgias, joint swelling and neck pain.   Skin:  Negative for rash and wound.   Neurological:  Negative for dizziness, syncope, weakness, light-headedness, numbness and headaches.   Psychiatric/Behavioral:  Negative for behavioral problems and confusion.      Physical Exam  Constitutional:       General: She is not in acute distress.  HENT:      Head: Normocephalic.      Nose: Nose normal.      Mouth/Throat:      Mouth: Mucous membranes are moist.   Eyes:      Extraocular Movements: Extraocular movements intact.      Conjunctiva/sclera: Conjunctivae normal.      Pupils: Pupils are equal, round, and reactive to light.      Comments: Bilateral visual field is blurry, more blurry looking up    Cardiovascular:      Rate and Rhythm: Normal rate and regular rhythm.      Pulses: Normal pulses.      Heart sounds: Normal heart sounds. No murmur heard.  Pulmonary:      Effort: Pulmonary effort is normal. No respiratory distress.       "Breath sounds: Normal breath sounds. No wheezing.   Abdominal:      General: Bowel sounds are normal. There is no distension.      Palpations: Abdomen is soft.      Tenderness: There is no abdominal tenderness.   Musculoskeletal:         General: Normal range of motion.      Right lower leg: No edema.      Left lower leg: No edema.   Skin:     General: Skin is warm and dry.      Findings: No rash.   Neurological:      General: No focal deficit present.      Mental Status: She is alert.   Psychiatric:         Mood and Affect: Mood normal.         Behavior: Behavior normal.       Last Recorded Vitals  Visit Vitals  Pulse (!) 108   Temp 36.7 °C (98.1 °F) (Temporal)   Resp 18   Ht 1.702 m (5' 7\")   Wt 49 kg (108 lb)   SpO2 99%   BMI 16.92 kg/m²   Smoking Status Never   BSA 1.52 m²        Relevant Results  Results for orders placed or performed during the hospital encounter of 01/30/24 (from the past 24 hour(s))   ECG 12 lead   Result Value Ref Range    Ventricular Rate 91 BPM    Atrial Rate 91 BPM    CT Interval 276 ms    QRS Duration 86 ms    QT Interval 368 ms    QTC Calculation(Bazett) 452 ms    P Axis 103 degrees    R Axis 74 degrees    T Axis 73 degrees    QRS Count 15 beats    Q Onset 218 ms    P Onset 80 ms    P Offset 190 ms    T Offset 402 ms    QTC Fredericia 423 ms   CBC and Auto Differential   Result Value Ref Range    WBC 8.1 4.4 - 11.3 x10*3/uL    nRBC 0.0 0.0 - 0.0 /100 WBCs    RBC 4.50 4.00 - 5.20 x10*6/uL    Hemoglobin 13.2 12.0 - 16.0 g/dL    Hematocrit 39.3 36.0 - 46.0 %    MCV 87 80 - 100 fL    MCH 29.3 26.0 - 34.0 pg    MCHC 33.6 32.0 - 36.0 g/dL    RDW 13.5 11.5 - 14.5 %    Platelets 263 150 - 450 x10*3/uL    Neutrophils % 74.0 40.0 - 80.0 %    Immature Granulocytes %, Automated 0.2 0.0 - 0.9 %    Lymphocytes % 14.5 13.0 - 44.0 %    Monocytes % 8.3 2.0 - 10.0 %    Eosinophils % 2.4 0.0 - 6.0 %    Basophils % 0.6 0.0 - 2.0 %    Neutrophils Absolute 5.95 (H) 1.60 - 5.50 x10*3/uL    Immature " Granulocytes Absolute, Automated 0.02 0.00 - 0.50 x10*3/uL    Lymphocytes Absolute 1.17 0.80 - 3.00 x10*3/uL    Monocytes Absolute 0.67 0.05 - 0.80 x10*3/uL    Eosinophils Absolute 0.19 0.00 - 0.40 x10*3/uL    Basophils Absolute 0.05 0.00 - 0.10 x10*3/uL   Comprehensive metabolic panel   Result Value Ref Range    Glucose 100 (H) 74 - 99 mg/dL    Sodium 141 136 - 145 mmol/L    Potassium 3.8 3.5 - 5.3 mmol/L    Chloride 110 (H) 98 - 107 mmol/L    Bicarbonate 23 21 - 32 mmol/L    Anion Gap 12 10 - 20 mmol/L    Urea Nitrogen 21 6 - 23 mg/dL    Creatinine 1.16 (H) 0.50 - 1.05 mg/dL    eGFR 46 (L) >60 mL/min/1.73m*2    Calcium 9.2 8.6 - 10.3 mg/dL    Albumin 3.9 3.4 - 5.0 g/dL    Alkaline Phosphatase 78 33 - 136 U/L    Total Protein 6.3 (L) 6.4 - 8.2 g/dL    AST 18 9 - 39 U/L    Bilirubin, Total 0.9 0.0 - 1.2 mg/dL    ALT 17 7 - 45 U/L   Troponin I, High Sensitivity   Result Value Ref Range    Troponin I, High Sensitivity 9 0 - 13 ng/L   Protime-INR   Result Value Ref Range    Protime 11.3 9.8 - 12.8 seconds    INR 1.0 0.9 - 1.1   APTT   Result Value Ref Range    aPTT 30 27 - 38 seconds   Sedimentation rate, automated   Result Value Ref Range    Sedimentation Rate 2 0 - 30 mm/h   POCT GLUCOSE   Result Value Ref Range    POCT Glucose 90 74 - 99 mg/dL      EKG:  Encounter Date: 01/30/24   ECG 12 lead   Result Value    Ventricular Rate 91    Atrial Rate 91    AK Interval 276    QRS Duration 86    QT Interval 368    QTC Calculation(Bazett) 452    P Axis 103    R Axis 74    T Axis 73    QRS Count 15    Q Onset 218    P Onset 80    P Offset 190    T Offset 402    QTC Fredericia 423    Narrative    Sinus rhythm with 1st degree AV block  ST abnormality, possible digitalis effect  Abnormal ECG  When compared with ECG of 05-JAN-2024 13:18,  AK interval has increased     Echo:  No results found for this or any previous visit.      Home Medications  Prior to Admission medications    Medication Sig Start Date End Date Taking?  Authorizing Provider   ascorbic acid (Vitamin C) 500 mg ER capsule Take 500 mg by mouth once daily.    Historical Provider, MD   b complex 0.4 mg tablet Take 1 tablet by mouth once daily.    Historical Provider, MD   cholecalciferol (Vitamin D3) 50 mcg (2,000 unit) capsule Take 1 capsule (50 mcg) by mouth early in the morning..    Historical Provider, MD   lidocaine (Lidoderm) 5 % patch Place 1 patch over 12 hours on the skin once daily. Remove & discard patch within 12 hours or as directed by MD.  Patient not taking: Reported on 1/30/2024 1/5/24   Louis Rizo,    multivitamin tablet Take 1 tablet by mouth once daily.    Historical Provider, MD       Medications  Scheduled medications    Continuous medications    PRN medications          Assessment/Plan   Principal Problem:    Vision changes  History of CVA  History of glaucoma    Plan:  - Admitted on tele with neuro on consult  - neuro checks  - MRI brain and orbits pending, see HPI for previous imaging results  - lipid panel pending  - concern about living alone and still driving; PT/OT for discharge planning; states no family or friends  - gentle IVF for Cr: 1.16  - no current home meds, only takes vitamins    VTE prophylaxis:   DVT prophylaxis: subcutaneous Heparin    Medication reconciliation to be completed when home medications are verified by pharmacy.   See additional orders for further plan of care.   Further evaluation and management per attending and consulting physicians.      Code Status  Full code    I spent 60 minutes in the professional and overall care of this patient.      Amanda Méndez PA-C  [facility]  Pager [phone]

## 2024-01-31 ENCOUNTER — APPOINTMENT (OUTPATIENT)
Dept: RADIOLOGY | Facility: HOSPITAL | Age: 84
DRG: 125 | End: 2024-01-31
Payer: MEDICARE

## 2024-01-31 LAB
ALBUMIN SERPL BCP-MCNC: 3.6 G/DL (ref 3.4–5)
ALP SERPL-CCNC: 77 U/L (ref 33–136)
ALT SERPL W P-5'-P-CCNC: 16 U/L (ref 7–45)
ANION GAP SERPL CALC-SCNC: 11 MMOL/L (ref 10–20)
ANION GAP SERPL CALC-SCNC: 12 MMOL/L (ref 10–20)
AST SERPL W P-5'-P-CCNC: 17 U/L (ref 9–39)
BILIRUB SERPL-MCNC: 0.8 MG/DL (ref 0–1.2)
BUN SERPL-MCNC: 17 MG/DL (ref 6–23)
BUN SERPL-MCNC: 20 MG/DL (ref 6–23)
CALCIUM SERPL-MCNC: 8.9 MG/DL (ref 8.6–10.3)
CALCIUM SERPL-MCNC: 9.2 MG/DL (ref 8.6–10.3)
CHLORIDE SERPL-SCNC: 109 MMOL/L (ref 98–107)
CHLORIDE SERPL-SCNC: 110 MMOL/L (ref 98–107)
CO2 SERPL-SCNC: 22 MMOL/L (ref 21–32)
CO2 SERPL-SCNC: 22 MMOL/L (ref 21–32)
CREAT SERPL-MCNC: 1.01 MG/DL (ref 0.5–1.05)
CREAT SERPL-MCNC: 1.02 MG/DL (ref 0.5–1.05)
EGFRCR SERPLBLD CKD-EPI 2021: 54 ML/MIN/1.73M*2
EGFRCR SERPLBLD CKD-EPI 2021: 55 ML/MIN/1.73M*2
ERYTHROCYTE [DISTWIDTH] IN BLOOD BY AUTOMATED COUNT: 13.5 % (ref 11.5–14.5)
ERYTHROCYTE [DISTWIDTH] IN BLOOD BY AUTOMATED COUNT: 13.5 % (ref 11.5–14.5)
GLUCOSE BLD MANUAL STRIP-MCNC: 111 MG/DL (ref 74–99)
GLUCOSE SERPL-MCNC: 103 MG/DL (ref 74–99)
GLUCOSE SERPL-MCNC: 97 MG/DL (ref 74–99)
HCT VFR BLD AUTO: 37.8 % (ref 36–46)
HCT VFR BLD AUTO: 38.3 % (ref 36–46)
HGB BLD-MCNC: 12.1 G/DL (ref 12–16)
HGB BLD-MCNC: 12.2 G/DL (ref 12–16)
HOLD SPECIMEN: NORMAL
HOLD SPECIMEN: NORMAL
LACTATE SERPL-SCNC: 1.3 MMOL/L (ref 0.4–2)
MCH RBC QN AUTO: 28.8 PG (ref 26–34)
MCH RBC QN AUTO: 29 PG (ref 26–34)
MCHC RBC AUTO-ENTMCNC: 31.6 G/DL (ref 32–36)
MCHC RBC AUTO-ENTMCNC: 32.3 G/DL (ref 32–36)
MCV RBC AUTO: 90 FL (ref 80–100)
MCV RBC AUTO: 91 FL (ref 80–100)
NRBC BLD-RTO: 0 /100 WBCS (ref 0–0)
NRBC BLD-RTO: 0 /100 WBCS (ref 0–0)
PLATELET # BLD AUTO: 235 X10*3/UL (ref 150–450)
PLATELET # BLD AUTO: 238 X10*3/UL (ref 150–450)
POTASSIUM SERPL-SCNC: 3.7 MMOL/L (ref 3.5–5.3)
POTASSIUM SERPL-SCNC: 4 MMOL/L (ref 3.5–5.3)
PROT SERPL-MCNC: 5.8 G/DL (ref 6.4–8.2)
RBC # BLD AUTO: 4.2 X10*6/UL (ref 4–5.2)
RBC # BLD AUTO: 4.21 X10*6/UL (ref 4–5.2)
SODIUM SERPL-SCNC: 139 MMOL/L (ref 136–145)
SODIUM SERPL-SCNC: 139 MMOL/L (ref 136–145)
WBC # BLD AUTO: 7.1 X10*3/UL (ref 4.4–11.3)
WBC # BLD AUTO: 8 X10*3/UL (ref 4.4–11.3)

## 2024-01-31 PROCEDURE — 70498 CT ANGIOGRAPHY NECK: CPT

## 2024-01-31 PROCEDURE — 70551 MRI BRAIN STEM W/O DYE: CPT

## 2024-01-31 PROCEDURE — 97165 OT EVAL LOW COMPLEX 30 MIN: CPT | Mod: GO

## 2024-01-31 PROCEDURE — 80048 BASIC METABOLIC PNL TOTAL CA: CPT | Mod: CCI

## 2024-01-31 PROCEDURE — 97161 PT EVAL LOW COMPLEX 20 MIN: CPT | Mod: GP

## 2024-01-31 PROCEDURE — 80053 COMPREHEN METABOLIC PANEL: CPT | Performed by: PHYSICIAN ASSISTANT

## 2024-01-31 PROCEDURE — 36415 COLL VENOUS BLD VENIPUNCTURE: CPT | Performed by: PHYSICIAN ASSISTANT

## 2024-01-31 PROCEDURE — 83605 ASSAY OF LACTIC ACID: CPT

## 2024-01-31 PROCEDURE — 85027 COMPLETE CBC AUTOMATED: CPT | Performed by: PHYSICIAN ASSISTANT

## 2024-01-31 PROCEDURE — 70540 MRI ORBIT/FACE/NECK W/O DYE: CPT | Performed by: RADIOLOGY

## 2024-01-31 PROCEDURE — 2500000004 HC RX 250 GENERAL PHARMACY W/ HCPCS (ALT 636 FOR OP/ED): Performed by: PHYSICIAN ASSISTANT

## 2024-01-31 PROCEDURE — 36415 COLL VENOUS BLD VENIPUNCTURE: CPT

## 2024-01-31 PROCEDURE — 70496 CT ANGIOGRAPHY HEAD: CPT | Performed by: RADIOLOGY

## 2024-01-31 PROCEDURE — 70551 MRI BRAIN STEM W/O DYE: CPT | Performed by: RADIOLOGY

## 2024-01-31 PROCEDURE — 85027 COMPLETE CBC AUTOMATED: CPT | Mod: 91

## 2024-01-31 PROCEDURE — 1200000002 HC GENERAL ROOM WITH TELEMETRY DAILY

## 2024-01-31 PROCEDURE — 70450 CT HEAD/BRAIN W/O DYE: CPT

## 2024-01-31 PROCEDURE — 70540 MRI ORBIT/FACE/NECK W/O DYE: CPT

## 2024-01-31 PROCEDURE — 82947 ASSAY GLUCOSE BLOOD QUANT: CPT

## 2024-01-31 PROCEDURE — 99232 SBSQ HOSP IP/OBS MODERATE 35: CPT | Performed by: PSYCHIATRY & NEUROLOGY

## 2024-01-31 PROCEDURE — 70450 CT HEAD/BRAIN W/O DYE: CPT | Performed by: RADIOLOGY

## 2024-01-31 PROCEDURE — 2550000001 HC RX 255 CONTRASTS: Performed by: INTERNAL MEDICINE

## 2024-01-31 PROCEDURE — 70498 CT ANGIOGRAPHY NECK: CPT | Performed by: RADIOLOGY

## 2024-01-31 RX ADMIN — IOHEXOL 70 ML: 350 INJECTION, SOLUTION INTRAVENOUS at 18:31

## 2024-01-31 RX ADMIN — HEPARIN SODIUM 5000 UNITS: 5000 INJECTION INTRAVENOUS; SUBCUTANEOUS at 05:59

## 2024-01-31 RX ADMIN — HEPARIN SODIUM 5000 UNITS: 5000 INJECTION INTRAVENOUS; SUBCUTANEOUS at 22:09

## 2024-01-31 RX ADMIN — POLYETHYLENE GLYCOL 3350 17 G: 17 POWDER, FOR SOLUTION ORAL at 09:37

## 2024-01-31 RX ADMIN — HEPARIN SODIUM 5000 UNITS: 5000 INJECTION INTRAVENOUS; SUBCUTANEOUS at 14:30

## 2024-01-31 SDOH — ECONOMIC STABILITY: FOOD INSECURITY: WITHIN THE PAST 12 MONTHS, YOU WORRIED THAT YOUR FOOD WOULD RUN OUT BEFORE YOU GOT MONEY TO BUY MORE.: NEVER TRUE

## 2024-01-31 SDOH — ECONOMIC STABILITY: INCOME INSECURITY: IN THE PAST 12 MONTHS, HAS THE ELECTRIC, GAS, OIL, OR WATER COMPANY THREATENED TO SHUT OFF SERVICE IN YOUR HOME?: NO

## 2024-01-31 SDOH — ECONOMIC STABILITY: FOOD INSECURITY: WITHIN THE PAST 12 MONTHS, THE FOOD YOU BOUGHT JUST DIDN'T LAST AND YOU DIDN'T HAVE MONEY TO GET MORE.: NEVER TRUE

## 2024-01-31 ASSESSMENT — COGNITIVE AND FUNCTIONAL STATUS - GENERAL
DRESSING REGULAR LOWER BODY CLOTHING: A LITTLE
WALKING IN HOSPITAL ROOM: A LITTLE
MOBILITY SCORE: 21
CLIMB 3 TO 5 STEPS WITH RAILING: A LITTLE
MOVING TO AND FROM BED TO CHAIR: A LITTLE
MOVING FROM LYING ON BACK TO SITTING ON SIDE OF FLAT BED WITH BEDRAILS: A LITTLE
STANDING UP FROM CHAIR USING ARMS: A LITTLE
TURNING FROM BACK TO SIDE WHILE IN FLAT BAD: A LITTLE
EATING MEALS: A LITTLE
HELP NEEDED FOR BATHING: A LITTLE
MOVING TO AND FROM BED TO CHAIR: A LITTLE
DRESSING REGULAR UPPER BODY CLOTHING: A LITTLE
TOILETING: A LITTLE
CLIMB 3 TO 5 STEPS WITH RAILING: A LITTLE
DAILY ACTIVITIY SCORE: 24
PERSONAL GROOMING: A LITTLE
WALKING IN HOSPITAL ROOM: A LITTLE
DAILY ACTIVITIY SCORE: 18
MOBILITY SCORE: 18

## 2024-01-31 ASSESSMENT — PAIN - FUNCTIONAL ASSESSMENT
PAIN_FUNCTIONAL_ASSESSMENT: 0-10

## 2024-01-31 ASSESSMENT — PAIN SCALES - GENERAL
PAINLEVEL_OUTOF10: 0 - NO PAIN

## 2024-01-31 NOTE — NURSING NOTE
1800- Brain attack called for altered mental status. Patient has been A+Ox3-4 during the shift with confusion to month only. Currently confused as to why she is here and how she got her. She thought that she came here because her boyfriend was here. She is also complaining of visual changes different from this morning but unable to explain. She just feels funny. Vitals done, blood sugar checked, and EKG obtained. Doctors at bedside.

## 2024-01-31 NOTE — PROGRESS NOTES
01/31/24 1518   Discharge Planning   Living Arrangements Alone   Support Systems Friends/neighbors   Assistance Needed n/a   Type of Residence Private residence   Home or Post Acute Services In home services   Type of Home Care Services Home OT;Home PT   Patient expects to be discharged to: Home with [facility]   Does the patient need discharge transport arranged? Yes   RoundTrip coordination needed? Yes   Patient Choice   Provider Choice list and CMS website (https://medicare.gov/care-compare#search) for post-acute Quality and Resource Measure Data were provided and reviewed with: Patient   Patient / Family choosing to utilize agency / facility established prior to hospitalization Yes     Met with patient at bedside. Patient lives at home alone in [place]. Patient states she does not have any family, but has friends and neighbors who are supportive. PCP is Michelle Klein. Patient states she is independent, no use of DME and drives. Therapy is recommending acute rehab for patient at d/c. List provided. Patient declines acute rehab and states she prefers to return home at d/c. Patient states she feels safe to return home. Patient is agreeable to [facility] and agency preference is [facility]. Will need referral/orders. Patient will need transport arranged at d/c.

## 2024-01-31 NOTE — CARE PLAN
The patient's goals for the shift include      The clinical goals for the shift include Patient to remain HDS during shift    No acute changes overnight. Patient rested/slept comfortably during shift. Patient did ambulate several times using a walker and stand-by assistance. Patient is forgetful at times, has blurry vision as well as dizziness. Bed alarm is on and patient educated on the importance of using the call light when needing assistance. Patient is currently on room air and has no current complaints of pain.

## 2024-01-31 NOTE — PROGRESS NOTES
Physical Therapy    Physical Therapy    Physical Therapy Evaluation & Treatment    Patient Name: Fernanda Hughes  MRN: [id number]  Today's Date: 1/31/2024   Time Calculation  Start Time: 1206  Stop Time: 1217  Time Calculation (min): 11 min    Assessment/Plan   PT Assessment  PT Assessment Results: Decreased strength, Decreased endurance, Impaired balance, Decreased mobility, Decreased safety awareness, Impaired vision  Rehab Prognosis: Good  End of Session Communication: Bedside nurse  Assessment Comment: Pt presents with decreased strength, balance, bed mobility, transfers and gait.  Pt requires 24 hour hands on assist for bed mobility, transfers and gait.  Pt would benefit from continued, high intensity physical therapy to maximize functional independence.  Pt is motivated and would be able to tolerate 3 hours of therapy, 5 days/wk.  PT will continue to follow pt during this hospital stay.  End of Session Patient Position: Up in chair, Alarm on  IP OR SWING BED PT PLAN  Inpatient or Swing Bed: Inpatient  PT Plan  Treatment/Interventions: Bed mobility, Transfer training, Gait training, Balance training, Strengthening, Endurance training, Therapeutic exercise, Therapeutic activity (Pt education)  PT Plan: Skilled PT  PT Frequency: 5 times per week  PT Discharge Recommendations: High intensity level of continued care  PT Recommended Transfer Status: Assist x1, Assistive device  PT - OK to Discharge:  OK to discharge from acute PT services to the next level of care when cleared by the medical team.    Current Problem:  Patient Active Problem List   Diagnosis    Vision changes       Subjective     General Visit Information:  General  Reason for Referral: Difficulty walking.  Pt admitted 1/30/24 with blurry vision and dizziness.  Referred By: Dr. Rosales  Co-Treatment: OT  Co-Treatment Reason: Co-treatment to maximize functional independence and for safety.  Prior to Session Communication: Bedside nurse  Patient Position  Received: Bed, 3 rail up  General Comment: Pt reports she gets dizzy and has blurry vision.    Home Living:  Home Living  Home Living Comments: Pt reports she lives alone in a house with no stairs to enter, ranch style home, tub/shower.    Prior Level of Function:  Prior Function Per Pt/Caregiver Report  Prior Function Comments: Pt was independent with gait, ADL's and IADL's.    Precautions:  Precautions  Medical Precautions: Fall precautions       Objective     Pain:  Pain Assessment  Pain Assessment: 0-10  Pain Score: 0 - No pain    Cognition:  Cognition  Overall Cognitive Status:  (Oriented to person, place and time)    General Assessments:         Sensation  Sensation Comment: Pt denies numbness and tingling  Strength  Strength Comments: B VAN's WFL           Static Sitting Balance  Static Sitting-Level of Assistance: Independent       Functional Assessments:     Bed Mobility  Bed Mobility:  (supine to sit supervision, pt dizzy during supine to sit. Dizziness persisted while seated the edge of the bed.)  Transfers  Transfer:  (suit<>stand mod assist with retropulsive loss of balance immediately upon standing, dizziness persiste.)  Ambulation/Gait Training  Ambulation/Gait Training Performed:  (20 feet with front wheeled walker, min assist, unsteady, intermittent mild ataxia.)          Extremity/Trunk Assessments:        RLE   RLE :  (R LE ROM WFL)  LLE   LLE :  (L LE ROM WFL)      Outcome Measures:  [facility] Basic Mobility  Turning from your back to your side while in a flat bed without using bedrails: A little  Moving from lying on your back to sitting on the side of a flat bed without using bedrails: A little  Moving to and from bed to chair (including a wheelchair): A little  Standing up from a chair using your arms (e.g. wheelchair or bedside chair): A little  To walk in hospital room: A little  Climbing 3-5 steps with railing: A little  Basic Mobility - Total Score: 18                             Goals:  Encounter Problems       Encounter Problems (Active)             PT Problem       PT Goal 1 (Progressing)       Start:  01/31/24    Expected End:  02/14/24       Pt able to perform bed mobility independently.           PT Goal 2 (Progressing)       Start:  01/31/24    Expected End:  02/14/24       Pt able to complete all transfers with CGA.            PT Goal 3 (Progressing)       Start:  01/31/24    Expected End:  02/14/24       Pt able to ambulate 100 feet with LRAD and CGA.                      Education Documentation  Precautions, taught by Marina Maya PT at 1/31/2024  2:26 PM.  Learner: Patient  Readiness: Acceptance  Method: Explanation  Response: Needs Reinforcement    Mobility Training, taught by Marina Maya, PT at 1/31/2024  2:26 PM.  Learner: Patient  Readiness: Acceptance  Method: Explanation  Response: Needs Reinforcement    Education Comments  No comments found.

## 2024-01-31 NOTE — PROGRESS NOTES
Occupational Therapy    Occupational Therapy    Evaluation    Patient Name: Fernanda Hughes  MRN: [id number]  Today's Date: 1/31/2024  Time Calculation  Start Time: 1205  Stop Time: 1216  Time Calculation (min): 11 min    Assessment  IP OT Assessment  OT Assessment:  (Pt. presents decreased balance impacting pt. ability to complete ADLs and mobility independently)  Prognosis: Good  Evaluation/Treatment Tolerance: Patient tolerated treatment well  End of Session Communication: Bedside nurse  End of Session Patient Position: Up in chair, Alarm on    Plan:  Treatment Interventions: ADL retraining, Functional transfer training, Visual perceptual retraining  OT Frequency: 5 times per week  OT Discharge Recommendations: High intensity level of continued care  OT Recommended Transfer Status: Minimal assist  OT - OK to Discharge: Yes (Next level of care when cleared by medical team)    Subjective   Per EMR: Fernanda Hughes is a 83 y.o. 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 She is presenting with chief concern of blurry vision. Patient states this began around 1 week ago. She has noticed difficulty walking long distances due to imbalance. She states she lives alone and has no one to help with medical visits or medications. Of note, she was admitted last January twice to [facility] with similar symptoms of blurry vision, unsteady gate, dizziness while walking. During those admissions she was found to have a subtle focus in the L. Parietal lobe on MRI which could represent infarct, although did not explain her symptoms. They also did MRI of her orbits which showed subtle enhancement of the optic nerves which could represent optic neuritis. Paraneoplastic  panel, syphilis, NÉSTOR, ESR, CRP, B12, TSH were all negative. Her last ophthalmology office visit was on 2/14/23, she was seen by an optometrist Tamara Gamboa who  recommend continued latanoprost, dorzolamide/timolol, brimonidine to treat glaucoma. Unclear exactly what tests were performed during that visit.      On this admission, vitals signs stable. She is afebrile at 36.1, hr 70-80 bpm, RR 18, -150, saturating 100% on room air. Labs showed CBC WNL, CMP with slightly elevated creatinine of 1.16 (baseline 1), otherwise unremarkable. ESR 2. LDL 61, , HDL 54.7. Trop 9. EKG NSR. Neurology was consulted for further stroke workup.            Past Medical History  She has a past medical history of Anemia, unspecified (12/16/2020), Anemia, unspecified (12/16/2020), Essential (primary) hypertension (12/16/2020), Essential (primary) hypertension (12/16/2020), Gastro-esophageal reflux disease without esophagitis (12/09/2020), and Personal history of other diseases of the nervous system and sense organs (10/26/2021).     Surgical History  She has a past surgical history that includes Other surgical history (10/26/2021).     Current Problem:  1. Vision changes            General:  General  Reason for Referral: ADLs, discharge planning  Referred By: Dr. Rosales  Family/Caregiver Present: No  Co-Treatment: PT  Co-Treatment Reason: Safety  Prior to Session Communication: Bedside nurse  Patient Position Received: Bed, 3 rail up, Alarm on    Precautions:  Medical Precautions: Fall precautions        Pain:  Pain Assessment  Pain Assessment: 0-10  Pain Score: 0 - No pain    Objective     Cognition:  Orientation Level: Oriented X4      Home Living:  Home Living Comments:  (Pt. lives alone in ranch home with tub shower. PLOF independent, drives. Owns ww, cane)     Prior Function:  Level of Tucker: Independent with ADLs and functional transfers        ADL:  Eating Assistance: Independent  Grooming Assistance: Stand by  LE Dressing Assistance: Stand by    Activity Tolerance:  Endurance: Endurance does not limit participation in activity    Bed Mobility/Transfers:   Bed  Mobility  Bed Mobility:  (Supine to sit SUP)  Transfers  Transfer:  (sit<>stand mod assist x 2 with LOB upon standing)    Ambulation/Gait Training:  Ambulation/Gait Training  Ambulation/Gait Training Performed:  (Pt. completes functional mobility with ww min assist for balance secondary to dizziness)    Sitting Balance:  Static Sitting Balance  Static Sitting-Balance Support: Bilateral upper extremity supported  Static Sitting-Level of Assistance: Close supervision  Static Sitting-Comment/Number of Minutes: Dizzy at EOB    Standing Balance:  Static Standing Balance  Static Standing-Balance Support: Bilateral upper extremity supported  Static Standing-Level of Assistance: Minimum assistance  Static Standing-Comment/Number of Minutes: Dizzy with standing    Vision: Vision - Basic Assessment  Current Vision:  (Pt. reports blurry vision bilateral eyes)   and Vision - Complex Assessment  Diplopia Assessment: WFL    Sensation:  Sensation Comment: Denies numbness      Perception:  Inattention/Neglect: Appears intact  Initiation: Appears intact  Motor Planning: Appears intact  Perseveration: Not present    Coordination:  Movements are Fluid and Coordinated: Yes     Hand Function:  Hand Function  Gross Grasp: Functional  Coordination: Functional    Extremities: RUE   RUE : Within Functional Limits and LUE   LUE: Within Functional Limits    Outcome Measures: [facility] Daily Activity  Putting on and taking off regular lower body clothing: A little  Bathing (including washing, rinsing, drying): A little  Putting on and taking off regular upper body clothing: A little  Toileting, which includes using toilet, bedpan or urinal: A little  Taking care of personal grooming such as brushing teeth: A little  Eating Meals: A little  Daily Activity - Total Score: 18         EDUCATION:  Education  Individual(s) Educated: Patient  Education Provided: Fall precautons, Risk and benefits of OT discussed with patient or other  Patient Response to  Education: Patient/Caregiver Verbalized Understanding of Information      Goals:   Encounter Problems       Encounter Problems (Active)       Balance       LTG - Patient will maintain balance to allow for safe mobility       Start:  01/31/24    Expected End:  02/14/24               Dressings Lower Extremities       STG - Patient to complete lower body dressing MOD I       Start:  01/31/24    Expected End:  02/14/24               Grooming       STG - Patient completes grooming MOD I       Start:  01/31/24    Expected End:  02/14/24            STG - Patient will tolerate standing 4-8 min       Start:  01/31/24    Expected End:  02/14/24               Transfers       STG - Patient will perform toilet transfer MOD I       Start:  01/31/24    Expected End:  02/14/24

## 2024-01-31 NOTE — PROGRESS NOTES
"Fernanda Hughes is a 83 y.o. female on day 1 of admission presenting with Vision changes.      Subjective   Patient seen and evaluated at bedside. She denies any new neurological complaints. However, she states she believes her vision has gotten worse. She describes her vision as \"more grainy\". She also endorses some discomfort when looking toward the ceiling, otherwise no eye pain. No acute events since admission.        Objective     Last Recorded Vitals  /79   Pulse 66   Temp 36.7 °C (98.1 °F)   Resp 18   Wt 54.2 kg (119 lb 6.4 oz)   SpO2 100%   Intake/Output last 3 Shifts:    Intake/Output Summary (Last 24 hours) at 1/31/2024 1435  Last data filed at 1/30/2024 1900  Gross per 24 hour   Intake 240 ml   Output --   Net 240 ml       Admission Weight  Weight: 49 kg (108 lb) (01/30/24 0923)    Daily Weight  01/31/24 : 54.2 kg (119 lb 6.4 oz)      Physical Exam:    Constitutional:       General: She is not in acute distress.     Appearance: Normal appearance. She is not ill-appearing.   HENT:      Head: Normocephalic and atraumatic. No contusion or laceration.      Mouth/Throat:      Mouth: Mucous membranes are moist.   Eyes:      General: Visual field deficit present. Normal conjunctiva.     Extraocular Movements: Extraocular movements intact.      Pupils: Pupils are equal, round, and reactive to light.   Neurological:      Mental Status: She is alert and oriented to person, place, and time. Mental status is at baseline.      Cranial Nerves: No dysarthria or facial asymmetry. CN II-XII intact.      Sensory: No sensory deficit.      Motor: No weakness.      Coordination: Finger-Nose-Finger Test and Heel to Shin Test normal.      Comments:  Visual field deficit superior bilateral symmetric   Psychiatric:         Speech: Speech normal.         Behavior: Behavior normal.            Judgment: Judgment normal.     Relevant Results  Scheduled medications  heparin (porcine), 5,000 Units, subcutaneous, " q8h  polyethylene glycol, 17 g, oral, Daily      Continuous medications     PRN medications       Results for orders placed or performed during the hospital encounter of 01/30/24 (from the past 24 hour(s))   CBC   Result Value Ref Range    WBC 7.1 4.4 - 11.3 x10*3/uL    nRBC 0.0 0.0 - 0.0 /100 WBCs    RBC 4.20 4.00 - 5.20 x10*6/uL    Hemoglobin 12.1 12.0 - 16.0 g/dL    Hematocrit 38.3 36.0 - 46.0 %    MCV 91 80 - 100 fL    MCH 28.8 26.0 - 34.0 pg    MCHC 31.6 (L) 32.0 - 36.0 g/dL    RDW 13.5 11.5 - 14.5 %    Platelets 235 150 - 450 x10*3/uL   Basic metabolic panel   Result Value Ref Range    Glucose 97 74 - 99 mg/dL    Sodium 139 136 - 145 mmol/L    Potassium 3.7 3.5 - 5.3 mmol/L    Chloride 109 (H) 98 - 107 mmol/L    Bicarbonate 22 21 - 32 mmol/L    Anion Gap 12 10 - 20 mmol/L    Urea Nitrogen 17 6 - 23 mg/dL    Creatinine 1.01 0.50 - 1.05 mg/dL    eGFR 55 (L) >60 mL/min/1.73m*2    Calcium 8.9 8.6 - 10.3 mg/dL             CT head wo IV contrast    Result Date: 1/30/2024  Interpreted By:  Michi Jackson, STUDY: CT HEAD WO IV CONTRAST;  1/30/2024 10:27 am   INDICATION: Signs/Symptoms:acute bilateral visual field defecits.   COMPARISON: CT head dated 09/16/2019.   ACCESSION NUMBER(S): [id number]   ORDERING CLINICIAN: LAILA GALLOWAY   TECHNIQUE: Noncontrast axial CT scan of head was performed.   FINDINGS: Parenchyma: There is no acute intracranial hemorrhage. The grey-white differentiation is intact. There is no mass effect or midline shift. Mild chronic small vessel ischemic disease suggested. Atherosclerotic calcification of the carotid siphons bilaterally. No CT evidence of sellar/suprasellar mass.   CSF Spaces: The ventricles, sulci and basal cisterns are within normal limits for age with generalized brain atrophy.   Extra-Axial Fluid: There is no extraaxial fluid collection.   Calvarium: The calvarium is unremarkable.   Paranasal sinuses: Secretions within a posterior right ethmoidal air cell.   Mastoids: Clear.    Orbits: Bilateral native lens extractions. The visualized orbits are otherwise unremarkable.   Soft tissues: Unremarkable.       No acute intracranial hemorrhage, mass effect, or CT apparent acute infarct. No evidence of sellar/suprasellar mass on this routine CT assessment.   Mild chronic small vessel ischemic disease with generalized brain atrophy.   MACRO: None   Signed by: Michi Jackson 1/30/2024 10:58 AM Dictation workstation:   [id number]    ECG 12 lead    Result Date: 1/30/2024  Sinus rhythm with 1st degree AV block ST abnormality, possible digitalis effect Abnormal ECG When compared with ECG of 05-JAN-2024 13:18, OK interval has increased    ECG 12 lead    Result Date: 1/27/2024  Normal sinus rhythm Normal ECG When compared with ECG of 04-DEC-2020 09:07, No significant change was found Confirmed by Delta Brunner ([id number]) on 1/27/2024 8:47:20 PM    XR ribs right 2 views w chest pa or ap    Result Date: 1/5/2024  STUDY: Right Rib and Chest Radiographs; 01/05/2024 11:54 AM INDICATION: Right rib pain. COMPARISON: 12/4/2020 XR Chest. ACCESSION NUMBER(S): [id number] ORDERING CLINICIAN: OBED SANDERS TECHNIQUE:  Frontal chest and two view(s) (four images) of the right ribs. FINDINGS:  CARDIOMEDIASTINAL SILHOUETTE: Cardiomediastinal silhouette is normal in size and configuration.  LUNGS: Lungs are clear.  ABDOMEN: No remarkable upper abdominal findings. RIGHT RIBS: There is no acute rib fracture.  OTHER VISUALIZED BONES: No acute osseous changes.    No acute process. Signed by Dank Morel MD       Fernanda Hughes is a 83 y.o. female on day 1 of admission presenting with Vision changes.  Assessment/Plan     Principal Problem:    Vision changes    This is a 82 yo female with past medical history of possible CVA L. Parietal lobe (1/2023), glaucoma, hypercholesterolemia, mild cognitive impairment. She presents with chief complaint of blurry vision, difficulty ambulating due to unsteady gait. Physical exam reveals  "visual field changes in bilateral superior peripheral vision.      Bilateral superior visual field deficit  Unsteady gait  Hx glaucoma  ?Hx CVA L. Parietal lobe  Hx mild cognitive impairment  -MRI brain without contrast, MRI orbits to r/o stroke pending  -continue telemetry  -neurological assessment q4 hr  -PT/OT  -if stroke is ruled out would benefit from ophthalmology     Above plan discussed with attending,    This note is created using voice recognition software. All efforts are made to minimize errors, if there are errors there due to transcription.    Noemy Kumar DO, PGY3    I saw and evaluated the patient.  I obtained the key portions of the history and examination.  I reviewed the residents/APNs note, discussed the patient and supervised treatment plan formulation.    Subjective:  No overnight events    Objective:  /79   Pulse 66   Temp 36.7 °C (98.1 °F)   Resp 18   Ht 1.702 m (5' 7\")   Wt 54.2 kg (119 lb 6.4 oz)   SpO2 100%   BMI 18.70 kg/m²     Gen: NAD  Neuro:  --HIF: A&O X 3, repetition and naming intact  --CN:  PERRLA, EOMI, Bilateral Superior VF cut - stable , no visible facial asymmetry, facial sensation intact, no tongue or palatal deviation, SCM intact  --Motor: Moves all 4 extremities equally; no focal deficits  --Sensory: Intact to light touch, intact to pinprick  --Reflex: 2+ symmetric, toes down  --Cerebellum: FTN and HTS intact  --Gait: Deferred     Assessment:   Bilateral Superior Altitudinal Visual Field Defect  - ddx includes strokes affecting both occipital lobes, PRES  - MRI Brain  - recommend Ophthalmology evaluation if MRI Brain is negative    Pedro Green MD  [facility]  Department of Neurology      "

## 2024-01-31 NOTE — NURSING NOTE
EOS- Patient had brain attack called for change in mental status and vision. Otherwise patient has had no complaints of pain. Patient anxious as she is confused as to what is going on. Patient calmed down when talked with about what the plan is. CT/CTA done waiting on results. Patient up in chair for a couple hours this afternoon. Safety maintained and call light within reach.

## 2024-01-31 NOTE — SIGNIFICANT EVENT
Rapid response was called at 1745 for altered mental status.  Earlier in the day patient was A&O x 4 however after a nap patient was disoriented did not know why she was in the hospital A&O x 1 (self).  Initial vital signs were temperature 36.6 °C, HR 96, RR 20, /68, SpO2 98% RA.  Patient denied CP, SOB, any notable changes, but just endorsed concern as to why she was disoriented.  Glucose was 111.  NIHSS 1 from visual field defects, present on admission (unchanged).  Labs drawn were CBC, CMP, lactate.  EKG was obtained showing normal sinus rhythm, without ischemic evidence.  CT head with and without contrast were ordered.  Rapid response was ended at 1800.  Dr. Gallardo was present for the entirety of the rapid response.    Solo Jurado M.D.  Internal Medicine PGY-1

## 2024-02-01 ENCOUNTER — APPOINTMENT (OUTPATIENT)
Dept: CARDIOLOGY | Facility: HOSPITAL | Age: 84
DRG: 125 | End: 2024-02-01
Payer: MEDICARE

## 2024-02-01 ENCOUNTER — APPOINTMENT (OUTPATIENT)
Dept: RADIOLOGY | Facility: HOSPITAL | Age: 84
DRG: 125 | End: 2024-02-01
Payer: MEDICARE

## 2024-02-01 LAB
ANION GAP SERPL CALC-SCNC: 11 MMOL/L (ref 10–20)
AORTIC VALVE PEAK VELOCITY: 1.61 M/S
ATRIAL RATE: 91 BPM
AV PEAK GRADIENT: 10.4 MMHG
AVA (PEAK VEL): 2.1 CM2
BUN SERPL-MCNC: 17 MG/DL (ref 6–23)
CALCIUM SERPL-MCNC: 9.2 MG/DL (ref 8.6–10.3)
CHLORIDE SERPL-SCNC: 108 MMOL/L (ref 98–107)
CO2 SERPL-SCNC: 25 MMOL/L (ref 21–32)
CREAT SERPL-MCNC: 0.96 MG/DL (ref 0.5–1.05)
EGFRCR SERPLBLD CKD-EPI 2021: 58 ML/MIN/1.73M*2
EJECTION FRACTION APICAL 4 CHAMBER: 62.5
EJECTION FRACTION: 69 %
ERYTHROCYTE [DISTWIDTH] IN BLOOD BY AUTOMATED COUNT: 13.5 % (ref 11.5–14.5)
GLUCOSE SERPL-MCNC: 92 MG/DL (ref 74–99)
HCT VFR BLD AUTO: 38.1 % (ref 36–46)
HGB BLD-MCNC: 12.4 G/DL (ref 12–16)
LEFT ATRIUM VOLUME AREA LENGTH INDEX BSA: 9.4 ML/M2
LEFT VENTRICLE INTERNAL DIMENSION DIASTOLE: 3.2 CM (ref 3.5–6)
LEFT VENTRICULAR OUTFLOW TRACT DIAMETER: 1.7 CM
MCH RBC QN AUTO: 29.4 PG (ref 26–34)
MCHC RBC AUTO-ENTMCNC: 32.5 G/DL (ref 32–36)
MCV RBC AUTO: 90 FL (ref 80–100)
MITRAL VALVE E/A RATIO: 0.57
NRBC BLD-RTO: 0 /100 WBCS (ref 0–0)
P AXIS: 103 DEGREES
P OFFSET: 190 MS
P ONSET: 80 MS
PLATELET # BLD AUTO: 235 X10*3/UL (ref 150–450)
POTASSIUM SERPL-SCNC: 3.8 MMOL/L (ref 3.5–5.3)
PR INTERVAL: 120 MS
Q ONSET: 218 MS
QRS COUNT: 15 BEATS
QRS DURATION: 86 MS
QT INTERVAL: 368 MS
QTC CALCULATION(BAZETT): 452 MS
QTC FREDERICIA: 423 MS
R AXIS: 74 DEGREES
RBC # BLD AUTO: 4.22 X10*6/UL (ref 4–5.2)
RIGHT VENTRICLE FREE WALL PEAK S': 10.1 CM/S
RIGHT VENTRICLE PEAK SYSTOLIC PRESSURE: 26.8 MMHG
SODIUM SERPL-SCNC: 140 MMOL/L (ref 136–145)
T AXIS: 73 DEGREES
T OFFSET: 402 MS
TRICUSPID ANNULAR PLANE SYSTOLIC EXCURSION: 1.8 CM
VENTRICULAR RATE: 91 BPM
WBC # BLD AUTO: 6.1 X10*3/UL (ref 4.4–11.3)

## 2024-02-01 PROCEDURE — 36415 COLL VENOUS BLD VENIPUNCTURE: CPT | Performed by: PHYSICIAN ASSISTANT

## 2024-02-01 PROCEDURE — 70542 MRI ORBIT/FACE/NECK W/DYE: CPT

## 2024-02-01 PROCEDURE — 2550000001 HC RX 255 CONTRASTS: Performed by: INTERNAL MEDICINE

## 2024-02-01 PROCEDURE — 85027 COMPLETE CBC AUTOMATED: CPT | Performed by: PHYSICIAN ASSISTANT

## 2024-02-01 PROCEDURE — 99233 SBSQ HOSP IP/OBS HIGH 50: CPT | Performed by: PSYCHIATRY & NEUROLOGY

## 2024-02-01 PROCEDURE — 1200000002 HC GENERAL ROOM WITH TELEMETRY DAILY

## 2024-02-01 PROCEDURE — 93306 TTE W/DOPPLER COMPLETE: CPT

## 2024-02-01 PROCEDURE — 2500000004 HC RX 250 GENERAL PHARMACY W/ HCPCS (ALT 636 FOR OP/ED): Performed by: PHYSICIAN ASSISTANT

## 2024-02-01 PROCEDURE — 97116 GAIT TRAINING THERAPY: CPT | Mod: GP,CQ

## 2024-02-01 PROCEDURE — 70552 MRI BRAIN STEM W/DYE: CPT

## 2024-02-01 PROCEDURE — A9575 INJ GADOTERATE MEGLUMI 0.1ML: HCPCS | Performed by: INTERNAL MEDICINE

## 2024-02-01 PROCEDURE — 97530 THERAPEUTIC ACTIVITIES: CPT | Mod: GP,CQ

## 2024-02-01 PROCEDURE — 80048 BASIC METABOLIC PNL TOTAL CA: CPT | Performed by: PHYSICIAN ASSISTANT

## 2024-02-01 RX ORDER — LATANOPROST 50 UG/ML
1 SOLUTION/ DROPS OPHTHALMIC NIGHTLY
Status: DISCONTINUED | OUTPATIENT
Start: 2024-02-01 | End: 2024-02-10 | Stop reason: HOSPADM

## 2024-02-01 RX ORDER — GADOTERATE MEGLUMINE 376.9 MG/ML
10 INJECTION INTRAVENOUS
Status: COMPLETED | OUTPATIENT
Start: 2024-02-01 | End: 2024-02-01

## 2024-02-01 RX ADMIN — GADOTERATE MEGLUMINE 10 ML: 376.9 INJECTION INTRAVENOUS at 12:49

## 2024-02-01 RX ADMIN — HEPARIN SODIUM 5000 UNITS: 5000 INJECTION INTRAVENOUS; SUBCUTANEOUS at 14:26

## 2024-02-01 RX ADMIN — HEPARIN SODIUM 5000 UNITS: 5000 INJECTION INTRAVENOUS; SUBCUTANEOUS at 05:26

## 2024-02-01 RX ADMIN — HEPARIN SODIUM 5000 UNITS: 5000 INJECTION INTRAVENOUS; SUBCUTANEOUS at 21:11

## 2024-02-01 ASSESSMENT — COGNITIVE AND FUNCTIONAL STATUS - GENERAL
WALKING IN HOSPITAL ROOM: A LITTLE
CLIMB 3 TO 5 STEPS WITH RAILING: A LITTLE
MOBILITY SCORE: 20
CLIMB 3 TO 5 STEPS WITH RAILING: A LITTLE
MOBILITY SCORE: 22
WALKING IN HOSPITAL ROOM: A LITTLE
WALKING IN HOSPITAL ROOM: A LITTLE
MOVING TO AND FROM BED TO CHAIR: A LITTLE
DAILY ACTIVITIY SCORE: 24
DAILY ACTIVITIY SCORE: 24
STANDING UP FROM CHAIR USING ARMS: A LITTLE
MOVING TO AND FROM BED TO CHAIR: A LITTLE
CLIMB 3 TO 5 STEPS WITH RAILING: A LITTLE
STANDING UP FROM CHAIR USING ARMS: A LITTLE
MOBILITY SCORE: 20

## 2024-02-01 ASSESSMENT — PAIN - FUNCTIONAL ASSESSMENT: PAIN_FUNCTIONAL_ASSESSMENT: 0-10

## 2024-02-01 ASSESSMENT — PAIN SCALES - GENERAL
PAINLEVEL_OUTOF10: 0 - NO PAIN

## 2024-02-01 NOTE — PROGRESS NOTES
"Internal Medicine Progress Note    Patient Name: Fernanda Hughes          MRN: [id number]  Today's Date: February 1, 2024          Attending: Stef Rosales MD    Subjective:  Patient was seen and examined at bedside.    Review Of Systems:  GENERAL: No malaise or fevers.  CARDIOVASCULAR: Negative for chest pain, leg swelling  RESPIRATORY: Negative for shortness of breath, cough, wheezing  GI: No nausea, vomiting, or diarrhea  MUSCULOSKELETAL: Negative for joint pain or swelling    Objective:  Vitals:    01/31/24 2000 02/01/24 0000 02/01/24 0400 02/01/24 0800   BP: 165/83 159/78 151/77 125/80   BP Location: Left arm Right arm Right arm    Patient Position: Lying Sitting Sitting    Pulse: 92 91 95 84   Resp: 18 18 18 16   Temp: 36.7 °C (98.1 °F) 36.2 °C (97.2 °F) 36.5 °C (97.7 °F) 36.5 °C (97.7 °F)   TempSrc: Temporal Temporal Temporal    SpO2: 97% 98% 98% 98%   Weight:    54 kg (119 lb)   Height:    1.702 m (5' 7\")           Physical Exam:   General appearance: Well-appearing alert, in no acute distress   Eyes: Anicteric sclera. Pupils are equally round and reactive to light, vision loss and lower temporal quarter of the left thigh and upper nasal, upper and lower temporal quarters of the right eye  Lungs: Clear to auscultation, no wheezing or rhonchi  Heart: RRR without murmur, gallop, or rubs.   Abdomen: Soft, non-tender. Bowel sounds normal.  Extremities: No deformity, no edema  Neuro: Alert oriented x3, no focal deficit, finger-to-nose signs negative    Labs:  Results for orders placed or performed during the hospital encounter of 01/30/24 (from the past 24 hour(s))   POCT GLUCOSE   Result Value Ref Range    POCT Glucose 111 (H) 74 - 99 mg/dL   CBC   Result Value Ref Range    WBC 8.0 4.4 - 11.3 x10*3/uL    nRBC 0.0 0.0 - 0.0 /100 WBCs    RBC 4.21 4.00 - 5.20 x10*6/uL    Hemoglobin 12.2 12.0 - 16.0 g/dL    Hematocrit 37.8 36.0 - 46.0 %    MCV 90 80 - 100 fL    MCH 29.0 26.0 - 34.0 pg    MCHC 32.3 32.0 - 36.0 g/dL    RDW " 13.5 11.5 - 14.5 %    Platelets 238 150 - 450 x10*3/uL   Lactate   Result Value Ref Range    Lactate 1.3 0.4 - 2.0 mmol/L   Comprehensive metabolic panel   Result Value Ref Range    Glucose 103 (H) 74 - 99 mg/dL    Sodium 139 136 - 145 mmol/L    Potassium 4.0 3.5 - 5.3 mmol/L    Chloride 110 (H) 98 - 107 mmol/L    Bicarbonate 22 21 - 32 mmol/L    Anion Gap 11 10 - 20 mmol/L    Urea Nitrogen 20 6 - 23 mg/dL    Creatinine 1.02 0.50 - 1.05 mg/dL    eGFR 54 (L) >60 mL/min/1.73m*2    Calcium 9.2 8.6 - 10.3 mg/dL    Albumin 3.6 3.4 - 5.0 g/dL    Alkaline Phosphatase 77 33 - 136 U/L    Total Protein 5.8 (L) 6.4 - 8.2 g/dL    AST 17 9 - 39 U/L    Bilirubin, Total 0.8 0.0 - 1.2 mg/dL    ALT 16 7 - 45 U/L   SST TOP   Result Value Ref Range    Extra Tube Hold for add-ons.    PST Top   Result Value Ref Range    Extra Tube Hold for add-ons.    CBC   Result Value Ref Range    WBC 6.1 4.4 - 11.3 x10*3/uL    nRBC 0.0 0.0 - 0.0 /100 WBCs    RBC 4.22 4.00 - 5.20 x10*6/uL    Hemoglobin 12.4 12.0 - 16.0 g/dL    Hematocrit 38.1 36.0 - 46.0 %    MCV 90 80 - 100 fL    MCH 29.4 26.0 - 34.0 pg    MCHC 32.5 32.0 - 36.0 g/dL    RDW 13.5 11.5 - 14.5 %    Platelets 235 150 - 450 x10*3/uL   Basic metabolic panel   Result Value Ref Range    Glucose 92 74 - 99 mg/dL    Sodium 140 136 - 145 mmol/L    Potassium 3.8 3.5 - 5.3 mmol/L    Chloride 108 (H) 98 - 107 mmol/L    Bicarbonate 25 21 - 32 mmol/L    Anion Gap 11 10 - 20 mmol/L    Urea Nitrogen 17 6 - 23 mg/dL    Creatinine 0.96 0.50 - 1.05 mg/dL    eGFR 58 (L) >60 mL/min/1.73m*2    Calcium 9.2 8.6 - 10.3 mg/dL   Transthoracic Echo (TTE) Complete   Result Value Ref Range    BSA 1.6 m2       Medications:  Scheduled medications  heparin (porcine), 5,000 Units, subcutaneous, q8h  polyethylene glycol, 17 g, oral, Daily      Continuous medications     PRN medications      Assessment/Plan:  Principal Problem:    Vision changes  Brain MRI and orbit MRI did not show any acute process  Continue current  "medication measures  Patient is still complaining of vision loss, we will work on getting patient evaluated by ophthalmology and she might be transferred to [facility].    Discussed with patient, SYEDA Rosales MD   Date: 02/01/24  Time: 10:12 AM    This note was partially created using voice recognition software and is inherently subject to errors including those of syntax and \"sound-alike\" substitutions which may escape proofreading. In such instances, original meaning may be extrapolated by contextual derivation  "

## 2024-02-01 NOTE — CARE PLAN
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 The pt is having abnormalities with her visual fields. She is denying current headache or dizziness. She reports no acute issues with her visual acuity. Her visual deficits are as charted below with the black areas being where she cannot see. I had to go through the visual field exam multiple times to get her to describe where he visual deficits are. She did change her response at times.    Acuity: right eye: 20/70; left eye: 20/200; Both 20/70.  PERRLA B/L; no purulent drainage; EOMI B/L.

## 2024-02-01 NOTE — NURSING NOTE
Patient alert and oriented    0500    Patient alert and oriented x2 with confusion. Patient weak and needs assistance with ADLs and reminders to remain safe. Patient still having blurry vision. Walker at bedside for ambulation support. No c/o pain. No acute changes this shift. Patient currently in bed watching tv

## 2024-02-01 NOTE — NURSING NOTE
"Pt has been resting in bed, sat in chair as well, alarms in use. States her vision is still \"cut in half\" she only \"sees half of everything.\" No other deficits, states she does get dizzy when up, uses walker, slow steady gait with assist. Ptt will need to see an eye dr gomes admit, Dr Rosales did request bed [place], in meantime, Dr Luciano Quiros has been contacted by  and will come see pt in mean time if transfer does not happen. Pt has been updated, Call light in reach, alarms on, safety maintained  "

## 2024-02-01 NOTE — PROGRESS NOTES
Internal Medicine Progress Note    Patient Name: Fernanda Hughes          MRN: [id number]  Today's Date: January 31, 2024          Attending: Stef Rosales MD    Subjective:  Patient was seen and examined at bedside.    Review Of Systems:  GENERAL: No malaise or fevers.  CARDIOVASCULAR: Negative for chest pain, leg swelling  RESPIRATORY: Negative for shortness of breath, cough, wheezing  GI: No nausea, vomiting, or diarrhea  MUSCULOSKELETAL: Negative for joint pain or swelling, back pain or muscle pain  Neuro: Blurry vision    Objective:  Vitals:    01/31/24 0800 01/31/24 1200 01/31/24 1600 01/31/24 1800   BP: 148/76 136/79 169/79 150/68   BP Location:       Patient Position:       Pulse: 82 66 103 96   Resp: 18 18 18 20   Temp: 36.2 °C (97.2 °F) 36.7 °C (98.1 °F) 36.7 °C (98.1 °F) 36.6 °C (97.9 °F)   TempSrc:    Temporal   SpO2: 99% 100% 98% 98%   Weight:       Height:               Physical Exam:   General appearance: Well-appearing alert, in no acute distress and well-hydrated, well nourished  Skin: Skin color, texture, turgor normal, no suspicious rashes or lesions  Head: normal  Eyes: Anicteric sclera. Pupils are equally round and reactive to light  Ears: external ears normal  Nose/Sinuses: Negative  Oropharynx: Lips, mucosa, and tongue normal  Neck: Supple, no adenopathy  Lungs: Clear to auscultation, no wheezing or rhonchi  Heart: RRR without murmur, gallop, or rubs.  No ectopy  Abdomen: Soft, non-tender. Bowel sounds normal.  Extremities: No deformity, no edema  Neuro: Alert oriented x3, no focal deficit, finger-to-nose signs negative    Labs:  Results for orders placed or performed during the hospital encounter of 01/30/24 (from the past 24 hour(s))   CBC   Result Value Ref Range    WBC 7.1 4.4 - 11.3 x10*3/uL    nRBC 0.0 0.0 - 0.0 /100 WBCs    RBC 4.20 4.00 - 5.20 x10*6/uL    Hemoglobin 12.1 12.0 - 16.0 g/dL    Hematocrit 38.3 36.0 - 46.0 %    MCV 91 80 - 100 fL    MCH 28.8 26.0 - 34.0 pg    MCHC 31.6 (L) 32.0 -  36.0 g/dL    RDW 13.5 11.5 - 14.5 %    Platelets 235 150 - 450 x10*3/uL   Basic metabolic panel   Result Value Ref Range    Glucose 97 74 - 99 mg/dL    Sodium 139 136 - 145 mmol/L    Potassium 3.7 3.5 - 5.3 mmol/L    Chloride 109 (H) 98 - 107 mmol/L    Bicarbonate 22 21 - 32 mmol/L    Anion Gap 12 10 - 20 mmol/L    Urea Nitrogen 17 6 - 23 mg/dL    Creatinine 1.01 0.50 - 1.05 mg/dL    eGFR 55 (L) >60 mL/min/1.73m*2    Calcium 8.9 8.6 - 10.3 mg/dL   POCT GLUCOSE   Result Value Ref Range    POCT Glucose 111 (H) 74 - 99 mg/dL   CBC   Result Value Ref Range    WBC 8.0 4.4 - 11.3 x10*3/uL    nRBC 0.0 0.0 - 0.0 /100 WBCs    RBC 4.21 4.00 - 5.20 x10*6/uL    Hemoglobin 12.2 12.0 - 16.0 g/dL    Hematocrit 37.8 36.0 - 46.0 %    MCV 90 80 - 100 fL    MCH 29.0 26.0 - 34.0 pg    MCHC 32.3 32.0 - 36.0 g/dL    RDW 13.5 11.5 - 14.5 %    Platelets 238 150 - 450 x10*3/uL   Lactate   Result Value Ref Range    Lactate 1.3 0.4 - 2.0 mmol/L   Comprehensive metabolic panel   Result Value Ref Range    Glucose 103 (H) 74 - 99 mg/dL    Sodium 139 136 - 145 mmol/L    Potassium 4.0 3.5 - 5.3 mmol/L    Chloride 110 (H) 98 - 107 mmol/L    Bicarbonate 22 21 - 32 mmol/L    Anion Gap 11 10 - 20 mmol/L    Urea Nitrogen 20 6 - 23 mg/dL    Creatinine 1.02 0.50 - 1.05 mg/dL    eGFR 54 (L) >60 mL/min/1.73m*2    Calcium 9.2 8.6 - 10.3 mg/dL    Albumin 3.6 3.4 - 5.0 g/dL    Alkaline Phosphatase 77 33 - 136 U/L    Total Protein 5.8 (L) 6.4 - 8.2 g/dL    AST 17 9 - 39 U/L    Bilirubin, Total 0.8 0.0 - 1.2 mg/dL    ALT 16 7 - 45 U/L   SST TOP   Result Value Ref Range    Extra Tube Hold for add-ons.    PST Top   Result Value Ref Range    Extra Tube Hold for add-ons.        Medications:  Scheduled medications  heparin (porcine), 5,000 Units, subcutaneous, q8h  polyethylene glycol, 17 g, oral, Daily      Continuous medications     PRN medications      Assessment/Plan:  Principal Problem:    Vision changes  Continue current medications and measures  Waiting for  "brain MRI and updated MRI    Possible Stroke  Waiting for brain MRI  Will obtain Echo     Patient was evaluated in the morning, of chart patient had an IV response called due to altered mental status, she had CT scan brain attack and CT angiogram of the brain.    Discussed with patient, SYEDA Rosales MD   Date: 01/31/24  Time: 9:29 PM    This note was partially created using voice recognition software and is inherently subject to errors including those of syntax and \"sound-alike\" substitutions which may escape proofreading. In such instances, original meaning may be extrapolated by contextual derivation    "

## 2024-02-01 NOTE — PROGRESS NOTES
Fernanda Hughes is a 83 y.o. female on day 2 of admission presenting with Vision changes.      Subjective   Patient seen and examined at bedside. States her vision has worsened since yesterday. Denies eye pain. Continues to state the vision is blurry in the upper visual fields. No other neurologic deficits. No acute events since admission.       Objective     Last Recorded Vitals  /80   Pulse 92   Temp 36.3 °C (97.3 °F)   Resp 16   Wt 54 kg (119 lb)   SpO2 98%   Intake/Output last 3 Shifts:    Intake/Output Summary (Last 24 hours) at 2/1/2024 1442  Last data filed at 2/1/2024 1005  Gross per 24 hour   Intake 1080 ml   Output --   Net 1080 ml       Admission Weight  Weight: 49 kg (108 lb) (01/30/24 0923)    Daily Weight  02/01/24 : 54 kg (119 lb)      Physical Exam:    Constitutional:       General: She is not in acute distress.     Appearance: Normal appearance. She is not ill-appearing.   Eyes:      General: Visual field deficit present. Normal conjunctiva.     Extraocular Movements: Extraocular movements intact.      Pupils: Pupils are equal, round, and reactive to light.   Neurological:      Mental Status: She is alert and oriented to person, place, and time. Mental status is at baseline.      Cranial Nerves: No dysarthria or facial asymmetry. CN II-XII intact.      Sensory: No sensory deficit.      Motor: No weakness.      Coordination: Finger-Nose-Finger Test and Heel to Shin Test normal.      Comments:  Visual field deficit superior bilateral symmetric   Psychiatric:         Speech: Speech normal.         Behavior: Behavior normal.            Judgment: Judgment normal.     Relevant Results  Scheduled medications  heparin (porcine), 5,000 Units, subcutaneous, q8h  polyethylene glycol, 17 g, oral, Daily      Continuous medications     PRN medications       Results for orders placed or performed during the hospital encounter of 01/30/24 (from the past 24 hour(s))   POCT GLUCOSE   Result Value Ref Range     POCT Glucose 111 (H) 74 - 99 mg/dL   CBC   Result Value Ref Range    WBC 8.0 4.4 - 11.3 x10*3/uL    nRBC 0.0 0.0 - 0.0 /100 WBCs    RBC 4.21 4.00 - 5.20 x10*6/uL    Hemoglobin 12.2 12.0 - 16.0 g/dL    Hematocrit 37.8 36.0 - 46.0 %    MCV 90 80 - 100 fL    MCH 29.0 26.0 - 34.0 pg    MCHC 32.3 32.0 - 36.0 g/dL    RDW 13.5 11.5 - 14.5 %    Platelets 238 150 - 450 x10*3/uL   Lactate   Result Value Ref Range    Lactate 1.3 0.4 - 2.0 mmol/L   Comprehensive metabolic panel   Result Value Ref Range    Glucose 103 (H) 74 - 99 mg/dL    Sodium 139 136 - 145 mmol/L    Potassium 4.0 3.5 - 5.3 mmol/L    Chloride 110 (H) 98 - 107 mmol/L    Bicarbonate 22 21 - 32 mmol/L    Anion Gap 11 10 - 20 mmol/L    Urea Nitrogen 20 6 - 23 mg/dL    Creatinine 1.02 0.50 - 1.05 mg/dL    eGFR 54 (L) >60 mL/min/1.73m*2    Calcium 9.2 8.6 - 10.3 mg/dL    Albumin 3.6 3.4 - 5.0 g/dL    Alkaline Phosphatase 77 33 - 136 U/L    Total Protein 5.8 (L) 6.4 - 8.2 g/dL    AST 17 9 - 39 U/L    Bilirubin, Total 0.8 0.0 - 1.2 mg/dL    ALT 16 7 - 45 U/L   SST TOP   Result Value Ref Range    Extra Tube Hold for add-ons.    PST Top   Result Value Ref Range    Extra Tube Hold for add-ons.    CBC   Result Value Ref Range    WBC 6.1 4.4 - 11.3 x10*3/uL    nRBC 0.0 0.0 - 0.0 /100 WBCs    RBC 4.22 4.00 - 5.20 x10*6/uL    Hemoglobin 12.4 12.0 - 16.0 g/dL    Hematocrit 38.1 36.0 - 46.0 %    MCV 90 80 - 100 fL    MCH 29.4 26.0 - 34.0 pg    MCHC 32.5 32.0 - 36.0 g/dL    RDW 13.5 11.5 - 14.5 %    Platelets 235 150 - 450 x10*3/uL   Basic metabolic panel   Result Value Ref Range    Glucose 92 74 - 99 mg/dL    Sodium 140 136 - 145 mmol/L    Potassium 3.8 3.5 - 5.3 mmol/L    Chloride 108 (H) 98 - 107 mmol/L    Bicarbonate 25 21 - 32 mmol/L    Anion Gap 11 10 - 20 mmol/L    Urea Nitrogen 17 6 - 23 mg/dL    Creatinine 0.96 0.50 - 1.05 mg/dL    eGFR 58 (L) >60 mL/min/1.73m*2    Calcium 9.2 8.6 - 10.3 mg/dL   Transthoracic Echo (TTE) Complete   Result Value Ref Range    BSA 1.6 m2              MR brain w IV contrast    Result Date: 2/1/2024  Interpreted By:  Mariluz Joseph and Hofer Lindsay STUDY: MR ORBIT W IV CONTRAST; MR BRAIN W IV CONTRAST;  2/1/2024 1:01 pm   INDICATION: Signs/Symptoms:visual field deficits.   COMPARISON: MRI brain and MRI orbits without contrast 01/31/2024   ACCESSION NUMBER(S): [id number]; [id number]   ORDERING CLINICIAN: DONNIE GOODWIN   TECHNIQUE: T1, T2, and postcontrast images of the brain were obtained. High resolution coronal T1 and STIR images of the orbits were obtained. Following administration of 10 mL Dotarem gadolinium based intravenous contrast, post contrast T1 images of the brain and high resolution fat-suppressed axial and coronal T1 images of the orbits were acquired.   FINDINGS: CSF Spaces: The ventricles, sulci and basal cisterns are slightly prominent likely due to parenchymal volume loss.   Parenchyma: Scattered T2 hyperintensities throughout the white matter, nonspecific, but most likely secondary to chronic small-vessel ischemic changes. There is no focal parenchymal signal abnormality.  No abnormal parenchymal enhancement is identified. There is no mass effect or midline shift.   Orbits: There is no intraorbital mass.  The optic nerves and optic chiasm are unremarkable.  The extraocular muscles are unremarkable. The lacrimal glands are within normal limits.  Bilateral lens replacements   Paranasal Sinuses and Mastoids: Visualized paranasal sinuses and mastoid air cells are unremarkable.   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 Questionable right supraclinoid ICA aneurysm versus infundibulum is better characterized on previous CTA. 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       1.  No acute intracranial pathology within the brain. No lesion along the expected course of the optic apparatus. Moderate nonspecific white matter changes and parenchymal volume loss. 2.  Unremarkable postcontrast MRI of the  orbits. 3. 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 Questionable aneurysm versus outpouching arising from the right supraclinoid ICA again noted.   MACRO: None   Signed by: Mariluz Joseph 2/1/2024 2:24 PM Dictation workstation:   [id number]    MR orbit w IV contrast    Result Date: 2/1/2024  Interpreted By:  Mariluz Joseph,  and Nahum Diaz STUDY: MR ORBIT W IV CONTRAST; MR BRAIN W IV CONTRAST;  2/1/2024 1:01 pm   INDICATION: Signs/Symptoms:visual field deficits.   COMPARISON: MRI brain and MRI orbits without contrast 01/31/2024   ACCESSION NUMBER(S): [id number]; [id number]   ORDERING CLINICIAN: DONNIE GOODWIN   TECHNIQUE: T1, T2, and postcontrast images of the brain were obtained. High resolution coronal T1 and STIR images of the orbits were obtained. Following administration of 10 mL Dotarem gadolinium based intravenous contrast, post contrast T1 images of the brain and high resolution fat-suppressed axial and coronal T1 images of the orbits were acquired.   FINDINGS: CSF Spaces: The ventricles, sulci and basal cisterns are slightly prominent likely due to parenchymal volume loss.   Parenchyma: Scattered T2 hyperintensities throughout the white matter, nonspecific, but most likely secondary to chronic small-vessel ischemic changes. There is no focal parenchymal signal abnormality.  No abnormal parenchymal enhancement is identified. There is no mass effect or midline shift.   Orbits: There is no intraorbital mass.  The optic nerves and optic chiasm are unremarkable.  The extraocular muscles are unremarkable. The lacrimal glands are within normal limits.  Bilateral lens replacements   Paranasal Sinuses and Mastoids: Visualized paranasal sinuses and mastoid air cells are unremarkable.   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 Questionable right supraclinoid ICA aneurysm versus infundibulum is better characterized on previous CTA.  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       1.  No acute intracranial pathology within the brain. No lesion along the expected course of the optic apparatus. Moderate nonspecific white matter changes and parenchymal volume loss. 2.  Unremarkable postcontrast MRI of the orbits. 3. 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 Questionable aneurysm versus outpouching arising from the right supraclinoid ICA again noted.   MACRO: None   Signed by: Mariluz Joseph 2/1/2024 2:24 PM Dictation workstation:   [id number]    ECG 12 lead    Result Date: 2/1/2024  Sinus rhythm ST abnormality, possible digitalis effect Abnormal ECG When compared with ECG of 05-JAN-2024 13:18, MD interval has increased Confirmed by Yg Robertson ([id number]) on 2/1/2024 8:37:49 AM    MR brain wo IV contrast    Result Date: 2/1/2024  Interpreted By:  Lashaun Nixon, STUDY: MR BRAIN WO IV CONTRAST; MR ORBIT WO IV CONTRAST;  1/31/2024 11:36 pm   INDICATION: Signs/Symptoms:vision changes.   COMPARISON: None.   ACCESSION NUMBER(S): [id number]; [id number]   ORDERING CLINICIAN: DUSTIN ARAGON   TECHNIQUE: Diffusion, T2, FLAIR, and T1 weighted MR images of the brain were obtained.  High resolution coronal T1 and STIR images of the orbits were obtained.   FINDINGS: CSF Spaces: The ventricles, sulci and basal cisterns enlarged, concordant with parenchymal volume loss.   Parenchyma: There is no diffusion restriction abnormality to suggest acute infarct. Mild-to-moderate generalized parenchymal volume loss. Mild nonspecific white matter changes probably. Ventricular and the fernandez. No abnormal parenchymal enhancement is identified.  There is no mass effect or midline shift.   Orbits: Postsurgical changes of lenses. There is no intraorbital mass.  The optic nerves and optic chiasm are unremarkable.  The extraocular muscles are unremarkable.  The lacrimal glands are within normal limits.   Paranasal Sinuses and Mastoids:  Mild mucosal thickening of the air cells. Visualized paranasal sinuses and mastoid air cells are unremarkable.       1.  No restricted diffusion, mass effect or midline shift. 2. Moderate non-specific white changes and parenchymal volume loss. 3.  Unremarkable MRI of the orbits without abnormal signal mass identified..   MACRO: None   Signed by: Lashaun Nixon 2/1/2024 1:04 AM Dictation workstation:   [id number]    MR orbit wo IV contrast    Result Date: 2/1/2024  Interpreted By:  Lashaun Nixon, STUDY: MR BRAIN WO IV CONTRAST; MR ORBIT WO IV CONTRAST;  1/31/2024 11:36 pm   INDICATION: Signs/Symptoms:vision changes.   COMPARISON: None.   ACCESSION NUMBER(S): [id number]; [id number]   ORDERING CLINICIAN: DUSTIN ARAGON   TECHNIQUE: Diffusion, T2, FLAIR, and T1 weighted MR images of the brain were obtained.  High resolution coronal T1 and STIR images of the orbits were obtained.   FINDINGS: CSF Spaces: The ventricles, sulci and basal cisterns enlarged, concordant with parenchymal volume loss.   Parenchyma: There is no diffusion restriction abnormality to suggest acute infarct. Mild-to-moderate generalized parenchymal volume loss. Mild nonspecific white matter changes probably. Ventricular and the fernandez. No abnormal parenchymal enhancement is identified.  There is no mass effect or midline shift.   Orbits: Postsurgical changes of lenses. There is no intraorbital mass.  The optic nerves and optic chiasm are unremarkable.  The extraocular muscles are unremarkable.  The lacrimal glands are within normal limits.   Paranasal Sinuses and Mastoids: Mild mucosal thickening of the air cells. Visualized paranasal sinuses and mastoid air cells are unremarkable.       1.  No restricted diffusion, mass effect or midline shift. 2. Moderate non-specific white changes and parenchymal volume loss. 3.  Unremarkable MRI of the orbits without abnormal signal mass identified..   MACRO: None   Signed by: Lashaun Nixon 2/1/2024 1:04 AM  Dictation workstation:   [id number]    CT angio head and neck w and wo IV contrast    Result Date: 1/31/2024  Interpreted By:  Leandra Ornelas, STUDY: CT ANGIO HEAD AND NECK W AND WO IV CONTRAST;  1/31/2024 6:43 pm   INDICATION: Signs/Symptoms:Vision changes r/o CVA.   COMPARISON: Noncontrast CT head from 01/31/2024. MR angiogram of head and neck from 01/10/2019   ACCESSION NUMBER(S): [id number]   ORDERING CLINICIAN: FELIPE ZAZUETA   TECHNIQUE: Unenhanced CT images of the head were obtained. Subsequently, contrast was administered intravenously and axial images of the head and neck were acquired.  Coronal, sagittal, and 3-D reconstructions were provided for review.   FINDINGS:     CTA HEAD FINDINGS:   Anterior circulation: The bilateral intracranial internal carotid arteries, bilateral carotid terminals, bilateral proximal anterior and middle cerebral arteries are normal. There is a 2 mm outpouching arising from the communicating segment of the right internal carotid artery which may reflect an infundibulum, a definite posterior communicating artery is not seen arising from this region.   Posterior circulation: Bilateral intracranial vertebral arteries, vertebrobasilar junction, basilar artery and proximal posterior cerebral arteries are normal.   CTA NECK FINDINGS:   Right carotid vessels: The common carotid artery is normal. The carotid bifurcation is normal. The internal carotid artery in the neck is normal. There is 0% stenosis  .   Left carotid vessels: The common carotid artery is normal. The carotid bifurcation is normal. The internal carotid artery in the neck is normal. There is 0% stenosis  .   Vertebral vessels:  The visualized segments of the cervical vertebral arteries are normal in caliber.   There is bilateral apical pleural thickening and scarring. There are multilevel degenerative changes of the cervical spine. A prominent posterior disc osteophyte complex is noted at C4-C5.       No evidence of  major vessel cut off or hemodynamically significant stenosis on CT angiogram of head and neck.   Tiny 2 mm outpouching arising from the posterior aspect of communicating segment of right internal carotid artery probably reflects an infundibulum although a definite posterior communicating artery is not seen arising from this region. The possibility of a small aneurysm can not be entirely excluded.   MACRO: None   Signed by: Leandra Ornelas 1/31/2024 7:07 PM Dictation workstation:   [id number]    CT head wo IV contrast    Result Date: 1/31/2024  Interpreted By:  Marina Mullins, STUDY: CT HEAD WO IV CONTRAST;  1/31/2024 6:18 pm   INDICATION: Signs/Symptoms:AMS.   COMPARISON: 01/30/2024   ACCESSION NUMBER(S): [id number]   ORDERING CLINICIAN: KEEGAN CROOK   TECHNIQUE: Axial noncontrast CT images of the head.   FINDINGS: BRAIN PARENCHYMA:  Gray-white matter interfaces are preserved. No mass effect or midline shift. There is fullness of the pituitary, which fills the sella, however, does not extend above the sella.   HEMORRHAGE: No acute intracranial hemorrhage. VENTRICLES and EXTRA-AXIAL SPACES: The ventricles and sulci are within normal limits in size for brain volume. No abnormal extraaxial fluid collection. EXTRACRANIAL SOFT TISSUES: Within normal limits. PARANASAL SINUSES/MASTOIDS:  The visualized paranasal sinuses and mastoid air cells are aerated. CALVARIUM: No depressed skull fracture. No destructive osseous lesion.   OTHER FINDINGS: None.       No acute intracranial abnormality.     MACRO: None   Signed by: Marina Mullins 1/31/2024 6:29 PM Dictation workstation:   [id number]    CT head wo IV contrast    Result Date: 1/30/2024  Interpreted By:  Michi Jackson, STUDY: CT HEAD WO IV CONTRAST;  1/30/2024 10:27 am   INDICATION: Signs/Symptoms:acute bilateral visual field defecits.   COMPARISON: CT head dated 09/16/2019.   ACCESSION NUMBER(S): [id number]   ORDERING CLINICIAN: LAILA GALLOWAY   TECHNIQUE: Noncontrast  axial CT scan of head was performed.   FINDINGS: Parenchyma: There is no acute intracranial hemorrhage. The grey-white differentiation is intact. There is no mass effect or midline shift. Mild chronic small vessel ischemic disease suggested. Atherosclerotic calcification of the carotid siphons bilaterally. No CT evidence of sellar/suprasellar mass.   CSF Spaces: The ventricles, sulci and basal cisterns are within normal limits for age with generalized brain atrophy.   Extra-Axial Fluid: There is no extraaxial fluid collection.   Calvarium: The calvarium is unremarkable.   Paranasal sinuses: Secretions within a posterior right ethmoidal air cell.   Mastoids: Clear.   Orbits: Bilateral native lens extractions. The visualized orbits are otherwise unremarkable.   Soft tissues: Unremarkable.       No acute intracranial hemorrhage, mass effect, or CT apparent acute infarct. No evidence of sellar/suprasellar mass on this routine CT assessment.   Mild chronic small vessel ischemic disease with generalized brain atrophy.   MACRO: None   Signed by: Michi Jackson 1/30/2024 10:58 AM Dictation workstation:   [id number]    ECG 12 lead    Result Date: 1/27/2024  Normal sinus rhythm Normal ECG When compared with ECG of 04-DEC-2020 09:07, No significant change was found Confirmed by Delta Brunner ([id number]) on 1/27/2024 8:47:20 PM    XR ribs right 2 views w chest pa or ap    Result Date: 1/5/2024  STUDY: Right Rib and Chest Radiographs; 01/05/2024 11:54 AM INDICATION: Right rib pain. COMPARISON: 12/4/2020 XR Chest. ACCESSION NUMBER(S): [id number] ORDERING CLINICIAN: OBED SANDERS TECHNIQUE:  Frontal chest and two view(s) (four images) of the right ribs. FINDINGS:  CARDIOMEDIASTINAL SILHOUETTE: Cardiomediastinal silhouette is normal in size and configuration.  LUNGS: Lungs are clear.  ABDOMEN: No remarkable upper abdominal findings. RIGHT RIBS: There is no acute rib fracture.  OTHER VISUALIZED BONES: No acute osseous  "changes.    No acute process. Signed by Dank Morel MD       Fernanda Hughes is a 83 y.o. female on day 2 of admission presenting with Vision changes.  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 She presents with chief complaint of blurry vision, difficulty ambulating due to unsteady gait. Physical exam reveals visual field changes in bilateral superior peripheral vision. MRI brain, MRI orbit without contrast showed no acute findings, moderate white matter changes/volume loss. MRI brain/orbit with contrast without acute findings. 3 mm aneurysm of L. 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     Bilateral superior visual field deficit  Unsteady gait  Hx glaucoma  ?Hx CVA L. Parietal lobe  Hx mild cognitive impairment  -continue telemetry  -neurological assessment q4 hr  -PT/OT  -TTE pending  -transfer to [facility] [place] for ophthalmology consultation.    Above plan was discussed with attending,    This note is created using voice recognition software. All efforts are made to minimize errors, if there are errors there due to transcription.    Noemy Kumar DO, PGY3    I saw and evaluated the patient.  I obtained the key portions of the history and examination.  I reviewed the residents/APNs note, discussed the patient and supervised treatment plan formulation.    Subjective:  No overnight events    Objective:  /80   Pulse 92   Temp 36.3 °C (97.3 °F)   Resp 16   Ht 1.702 m (5' 7\")   Wt 54 kg (119 lb)   SpO2 98%   BMI 18.64 kg/m²     Gen: NAD  Neuro:  --HIF: A&O X 3, repetition and naming intact  --CN:  PERRLA, EOMI, Bilateral Superior VF cut - stable , no visible facial asymmetry, facial sensation intact, no tongue or palatal deviation, SCM intact  --Motor: Moves all 4 extremities equally; no focal deficits  --Sensory: Intact to light touch, " intact to pinprick  --Reflex: 2+ symmetric, toes down  --Cerebellum: FTN and HTS intact  --Gait: Deferred     MRI Brain and Orbits with and without contrast (I personally reviewed the images/tracings with the following interpretation)  No evidence of acute stroke  No evidence of mass lesion  No evidence of PRES  No evidence of optic neuritis    Assessment   Bilateral Superior Altitudinal Visual Field Defect  - reviewed MRI brain and orbits - unremarkable; ESR nl  - recommend Ophthalmology evaluation    Pedro Green MD  [facility]  Department of Neurology

## 2024-02-01 NOTE — PROGRESS NOTES
Physical Therapy    Physical Therapy Treatment    Patient Name: Fernanda Hughes  MRN: [id number]  Today's Date: 2/1/2024  Time Calculation  Start Time: 1340  Stop Time: 1405  Time Calculation (min): 25 min       Assessment/Plan   PT Assessment  PT Assessment Results: Decreased strength, Decreased endurance, Impaired balance, Decreased mobility  Rehab Prognosis: Good  End of Session Communication: Bedside nurse  End of Session Patient Position: Up in chair, Alarm on  PT Plan  Inpatient/Swing Bed or Outpatient: Inpatient  PT Plan  Treatment/Interventions: Bed mobility  PT Plan: Skilled PT  PT Frequency: 5 times per week  PT Discharge Recommendations: High intensity level of continued care  PT Recommended Transfer Status: Contact guard, Assistive device       02/01/24 1340   PT  Visit   PT Received On 02/01/24   Response to Previous Treatment Patient with no complaints from previous session.   General   Prior to Session Communication Bedside nurse   Patient Position Received Bed, 3 rail up;Alarm on   Preferred Learning Style verbal;visual   General Comment patient reports dizziness with movement secondary to vision issues.  patient stayes with eyes both open she can see everything but its not clear snd she feels like she is contantly trying to focus.   Precautions   Medical Precautions Fall precautions   Pain Assessment   Pain Assessment 0-10   Pain Score 0 - No pain   Cognition   Overall Cognitive Status WFL   Orientation Level Oriented X4   Dynamic Sitting Balance   Dynamic Sitting-Balance Support No upper extremity supported   Dynamic Sitting-Balance Reaching across midline   Dynamic Sitting-Comments with both eyes open, able to see everything though jose nd out of focus.  when L eye closed, only able to see to R past midline though clear. (opposite when R closed)   Bed Mobility   Bed Mobility Yes   Bed Mobility 1   Bed Mobility 1 Supine to sitting   Level of Assistance 1 Close supervision   Bed Mobility 2   Bed Mobility   2 Sitting to supine   Level of Assistance 2 Close supervision   Ambulation/Gait Training   Ambulation/Gait Training Performed Yes   Ambulation/Gait Training 1   Surface 1 Level tile   Device 1 Rolling walker   Gait Support Devices Gait belt   Assistance 1 Contact guard   Quality of Gait 1 Decreased step length   Comments/Distance (ft) 1 250' slight LOB x1 though able to correct.   Transfers   Transfer Yes   Transfer 1   Transfer From 1 Bed to   Transfer to 1 Stand   Technique 1 Sit to stand;Stand to sit   Transfer Device 1 Walker;Gait belt   Transfer Level of Assistance 1 Close supervision   Activity Tolerance   Endurance Endurance does not limit participation in activity   PT Assessment   PT Assessment Results Decreased strength;Decreased endurance;Impaired balance;Decreased mobility   Rehab Prognosis Good   End of Session Communication Bedside nurse   End of Session Patient Position Up in chair;Alarm on   Outpatient Education   Individual(s) Educated Patient   Education Provided Fall Risk   PT Plan   Inpatient/Swing Bed or Outpatient Inpatient   PT Plan   Treatment/Interventions Bed mobility   PT Plan Skilled PT   PT Frequency 5 times per week   PT Discharge Recommendations High intensity level of continued care   PT Recommended Transfer Status Contact guard;Assistive device     Outcome Measures:  [facility] Basic Mobility  Turning from your back to your side while in a flat bed without using bedrails: None  Moving from lying on your back to sitting on the side of a flat bed without using bedrails: None  Moving to and from bed to chair (including a wheelchair): A little  Standing up from a chair using your arms (e.g. wheelchair or bedside chair): A little  To walk in hospital room: A little  Climbing 3-5 steps with railing: A little  Basic Mobility - Total Score: 20        EDUCATION:  Outpatient Education  Individual(s) Educated: Patient  Education Provided: Fall Risk  Education Documentation  Precautions, taught by  Dank Baez PTA at 2/1/2024  2:15 PM.  Learner: Patient  Readiness: Acceptance  Method: Explanation  Response: Verbalizes Understanding, Demonstrated Understanding    Mobility Training, taught by Dank Baez PTA at 2/1/2024  2:15 PM.  Learner: Patient  Readiness: Acceptance  Method: Explanation  Response: Verbalizes Understanding, Demonstrated Understanding    GOALS:  Encounter Problems       Encounter Problems (Active)       Balance       LTG - Patient will maintain balance to allow for safe mobility (Progressing)       Start:  01/31/24    Expected End:  02/14/24               PT Problem       PT Goal 1 (Progressing)       Start:  01/31/24    Expected End:  02/14/24       Pt able to perform bed mobility independently.           PT Goal 2 (Progressing)       Start:  01/31/24    Expected End:  02/14/24       Pt able to complete all transfers with CGA.            PT Goal 3 (Progressing)       Start:  01/31/24    Expected End:  02/14/24       Pt able to ambulate 100 feet with LRAD and CGA.              Transfers       STG - Patient will perform toilet transfer MOD I (Progressing)       Start:  01/31/24    Expected End:  02/14/24

## 2024-02-02 ENCOUNTER — HOSPITAL ENCOUNTER (INPATIENT)
Dept: NEUROLOGY | Facility: HOSPITAL | Age: 84
Discharge: HOME | DRG: 125 | End: 2024-02-02
Payer: MEDICARE

## 2024-02-02 ENCOUNTER — APPOINTMENT (OUTPATIENT)
Dept: CARDIOLOGY | Facility: HOSPITAL | Age: 84
DRG: 125 | End: 2024-02-02
Payer: MEDICARE

## 2024-02-02 ENCOUNTER — APPOINTMENT (OUTPATIENT)
Dept: RADIOLOGY | Facility: HOSPITAL | Age: 84
DRG: 125 | End: 2024-02-02
Payer: MEDICARE

## 2024-02-02 LAB
ALBUMIN SERPL BCP-MCNC: 3.3 G/DL (ref 3.4–5)
ALBUMIN SERPL BCP-MCNC: 3.9 G/DL (ref 3.4–5)
ALP SERPL-CCNC: 68 U/L (ref 33–136)
ALP SERPL-CCNC: 80 U/L (ref 33–136)
ALT SERPL W P-5'-P-CCNC: 17 U/L (ref 7–45)
ALT SERPL W P-5'-P-CCNC: 18 U/L (ref 7–45)
ANION GAP BLDA CALCULATED.4IONS-SCNC: 19 MMO/L (ref 10–25)
ANION GAP SERPL CALC-SCNC: 14 MMOL/L (ref 10–20)
ANION GAP SERPL CALC-SCNC: 23 MMOL/L (ref 10–20)
ANION GAP SERPL CALC-SCNC: NORMAL MMOL/L
APPARATUS: ABNORMAL
APPEARANCE UR: CLEAR
AST SERPL W P-5'-P-CCNC: 19 U/L (ref 9–39)
AST SERPL W P-5'-P-CCNC: 21 U/L (ref 9–39)
BASE EXCESS BLDA CALC-SCNC: -11.8 MMOL/L (ref -2–3)
BILIRUB SERPL-MCNC: 0.6 MG/DL (ref 0–1.2)
BILIRUB SERPL-MCNC: 0.6 MG/DL (ref 0–1.2)
BILIRUB UR STRIP.AUTO-MCNC: NEGATIVE MG/DL
BODY TEMPERATURE: ABNORMAL
BUN SERPL-MCNC: 21 MG/DL (ref 6–23)
BUN SERPL-MCNC: 24 MG/DL (ref 6–23)
BUN SERPL-MCNC: NORMAL MG/DL
CA-I BLDA-SCNC: 1.3 MMOL/L (ref 1.1–1.33)
CALCIUM SERPL-MCNC: 8.8 MG/DL (ref 8.6–10.3)
CALCIUM SERPL-MCNC: 9.2 MG/DL (ref 8.6–10.3)
CALCIUM SERPL-MCNC: NORMAL MG/DL
CARDIAC TROPONIN I PNL SERPL HS: 6 NG/L (ref 0–13)
CHLORIDE BLDA-SCNC: 108 MMOL/L (ref 98–107)
CHLORIDE SERPL-SCNC: 108 MMOL/L (ref 98–107)
CHLORIDE SERPL-SCNC: 109 MMOL/L (ref 98–107)
CHLORIDE SERPL-SCNC: NORMAL MMOL/L
CK SERPL-CCNC: 38 U/L (ref 0–215)
CO2 SERPL-SCNC: 14 MMOL/L (ref 21–32)
CO2 SERPL-SCNC: 20 MMOL/L (ref 21–32)
CO2 SERPL-SCNC: NORMAL MMOL/L
COLOR UR: ABNORMAL
CREAT SERPL-MCNC: 0.94 MG/DL (ref 0.5–1.05)
CREAT SERPL-MCNC: 1.08 MG/DL (ref 0.5–1.05)
CREAT SERPL-MCNC: NORMAL MG/DL
CRITICAL CALL TIME: 154
CRITICAL CALLED BY: ABNORMAL
CRITICAL CALLED TO: ABNORMAL
CRITICAL READ BACK: ABNORMAL
CRP SERPL-MCNC: 0.36 MG/DL
EGFRCR SERPLBLD CKD-EPI 2021: 50 ML/MIN/1.73M*2
EGFRCR SERPLBLD CKD-EPI 2021: 60 ML/MIN/1.73M*2
EGFRCR SERPLBLD CKD-EPI 2021: NORMAL ML/MIN/{1.73_M2}
ERYTHROCYTE [DISTWIDTH] IN BLOOD BY AUTOMATED COUNT: 13.5 % (ref 11.5–14.5)
ERYTHROCYTE [DISTWIDTH] IN BLOOD BY AUTOMATED COUNT: 13.5 % (ref 11.5–14.5)
ERYTHROCYTE [SEDIMENTATION RATE] IN BLOOD BY WESTERGREN METHOD: 5 MM/H (ref 0–30)
EST. AVERAGE GLUCOSE BLD GHB EST-MCNC: 105 MG/DL
FLOW: 3 LPM
GLUCOSE BLD MANUAL STRIP-MCNC: 121 MG/DL (ref 74–99)
GLUCOSE BLD MANUAL STRIP-MCNC: 130 MG/DL (ref 74–99)
GLUCOSE BLD MANUAL STRIP-MCNC: 84 MG/DL (ref 74–99)
GLUCOSE BLD MANUAL STRIP-MCNC: 94 MG/DL (ref 74–99)
GLUCOSE BLD MANUAL STRIP-MCNC: 96 MG/DL (ref 74–99)
GLUCOSE BLDA-MCNC: 123 MG/DL (ref 74–99)
GLUCOSE SERPL-MCNC: 100 MG/DL (ref 74–99)
GLUCOSE SERPL-MCNC: 124 MG/DL (ref 74–99)
GLUCOSE SERPL-MCNC: NORMAL MG/DL
GLUCOSE UR STRIP.AUTO-MCNC: NEGATIVE MG/DL
HBA1C MFR BLD: 5.3 %
HCO3 BLDA-SCNC: 14.6 MMOL/L (ref 22–26)
HCT VFR BLD AUTO: 36.9 % (ref 36–46)
HCT VFR BLD AUTO: 40.1 % (ref 36–46)
HCT VFR BLD EST: 40 % (ref 36–46)
HGB BLD-MCNC: 11.6 G/DL (ref 12–16)
HGB BLD-MCNC: 12.9 G/DL (ref 12–16)
HGB BLDA-MCNC: 13.2 G/DL (ref 12–16)
HOLD SPECIMEN: NORMAL
INHALED O2 CONCENTRATION: 32 %
INR PPP: 1 (ref 0.9–1.1)
KETONES UR STRIP.AUTO-MCNC: NEGATIVE MG/DL
LACTATE BLDA-SCNC: 9.8 MMOL/L (ref 0.4–2)
LACTATE SERPL-SCNC: 14.5 MMOL/L (ref 0.4–2)
LACTATE SERPL-SCNC: 2.2 MMOL/L (ref 0.4–2)
LACTATE SERPL-SCNC: NORMAL MMOL/L
LEUKOCYTE ESTERASE UR QL STRIP.AUTO: NEGATIVE
MAGNESIUM SERPL-MCNC: 1.98 MG/DL (ref 1.6–2.4)
MCH RBC QN AUTO: 29.1 PG (ref 26–34)
MCH RBC QN AUTO: 29.7 PG (ref 26–34)
MCHC RBC AUTO-ENTMCNC: 31.4 G/DL (ref 32–36)
MCHC RBC AUTO-ENTMCNC: 32.2 G/DL (ref 32–36)
MCV RBC AUTO: 92 FL (ref 80–100)
MCV RBC AUTO: 93 FL (ref 80–100)
NITRITE UR QL STRIP.AUTO: NEGATIVE
NRBC BLD-RTO: 0 /100 WBCS (ref 0–0)
NRBC BLD-RTO: 0 /100 WBCS (ref 0–0)
OXYHGB MFR BLDA: 96.9 % (ref 94–98)
PCO2 BLDA: 34 MM HG (ref 38–42)
PH BLDA: 7.24 PH (ref 7.38–7.42)
PH UR STRIP.AUTO: 5 [PH]
PLATELET # BLD AUTO: 188 X10*3/UL (ref 150–450)
PLATELET # BLD AUTO: 255 X10*3/UL (ref 150–450)
PO2 BLDA: 119 MM HG (ref 85–95)
POTASSIUM BLDA-SCNC: 3.7 MMOL/L (ref 3.5–5.3)
POTASSIUM SERPL-SCNC: 3.5 MMOL/L (ref 3.5–5.3)
POTASSIUM SERPL-SCNC: 3.8 MMOL/L (ref 3.5–5.3)
POTASSIUM SERPL-SCNC: NORMAL MMOL/L
PROLACTIN SERPL-MCNC: 12.8 UG/L (ref 3–20)
PROT SERPL-MCNC: 5.5 G/DL (ref 6.4–8.2)
PROT SERPL-MCNC: 5.9 G/DL (ref 6.4–8.2)
PROT UR STRIP.AUTO-MCNC: NEGATIVE MG/DL
PROTHROMBIN TIME: 11.1 SECONDS (ref 9.8–12.8)
RBC # BLD AUTO: 3.99 X10*6/UL (ref 4–5.2)
RBC # BLD AUTO: 4.34 X10*6/UL (ref 4–5.2)
RBC # UR STRIP.AUTO: ABNORMAL /UL
RBC #/AREA URNS AUTO: NORMAL /HPF
SAO2 % BLDA: 99 % (ref 94–100)
SODIUM BLDA-SCNC: 138 MMOL/L (ref 136–145)
SODIUM SERPL-SCNC: 139 MMOL/L (ref 136–145)
SODIUM SERPL-SCNC: 141 MMOL/L (ref 136–145)
SODIUM SERPL-SCNC: NORMAL MMOL/L
SP GR UR STRIP.AUTO: 1.01
T4 FREE SERPL-MCNC: 1.17 NG/DL (ref 0.61–1.12)
TSH SERPL-ACNC: 4.29 MIU/L (ref 0.44–3.98)
UROBILINOGEN UR STRIP.AUTO-MCNC: <2 MG/DL
WBC # BLD AUTO: 11.1 X10*3/UL (ref 4.4–11.3)
WBC # BLD AUTO: 9.7 X10*3/UL (ref 4.4–11.3)
WBC #/AREA URNS AUTO: NORMAL /HPF

## 2024-02-02 PROCEDURE — 36415 COLL VENOUS BLD VENIPUNCTURE: CPT

## 2024-02-02 PROCEDURE — 84132 ASSAY OF SERUM POTASSIUM: CPT

## 2024-02-02 PROCEDURE — 83605 ASSAY OF LACTIC ACID: CPT | Mod: 91,MUE

## 2024-02-02 PROCEDURE — 85027 COMPLETE CBC AUTOMATED: CPT

## 2024-02-02 PROCEDURE — 83735 ASSAY OF MAGNESIUM: CPT

## 2024-02-02 PROCEDURE — 2500000004 HC RX 250 GENERAL PHARMACY W/ HCPCS (ALT 636 FOR OP/ED)

## 2024-02-02 PROCEDURE — 99233 SBSQ HOSP IP/OBS HIGH 50: CPT | Performed by: PSYCHIATRY & NEUROLOGY

## 2024-02-02 PROCEDURE — 81001 URINALYSIS AUTO W/SCOPE: CPT

## 2024-02-02 PROCEDURE — 83605 ASSAY OF LACTIC ACID: CPT

## 2024-02-02 PROCEDURE — 71045 X-RAY EXAM CHEST 1 VIEW: CPT

## 2024-02-02 PROCEDURE — 84443 ASSAY THYROID STIM HORMONE: CPT

## 2024-02-02 PROCEDURE — 2060000001 HC INTERMEDIATE ICU ROOM DAILY

## 2024-02-02 PROCEDURE — 70450 CT HEAD/BRAIN W/O DYE: CPT | Performed by: RADIOLOGY

## 2024-02-02 PROCEDURE — 71045 X-RAY EXAM CHEST 1 VIEW: CPT | Performed by: RADIOLOGY

## 2024-02-02 PROCEDURE — 70450 CT HEAD/BRAIN W/O DYE: CPT

## 2024-02-02 PROCEDURE — 2500000001 HC RX 250 WO HCPCS SELF ADMINISTERED DRUGS (ALT 637 FOR MEDICARE OP)

## 2024-02-02 PROCEDURE — 93005 ELECTROCARDIOGRAM TRACING: CPT

## 2024-02-02 PROCEDURE — 85610 PROTHROMBIN TIME: CPT

## 2024-02-02 PROCEDURE — 2500000005 HC RX 250 GENERAL PHARMACY W/O HCPCS

## 2024-02-02 PROCEDURE — 84484 ASSAY OF TROPONIN QUANT: CPT

## 2024-02-02 PROCEDURE — 36600 WITHDRAWAL OF ARTERIAL BLOOD: CPT

## 2024-02-02 PROCEDURE — 82947 ASSAY GLUCOSE BLOOD QUANT: CPT | Mod: 91

## 2024-02-02 PROCEDURE — 84439 ASSAY OF FREE THYROXINE: CPT

## 2024-02-02 PROCEDURE — 86140 C-REACTIVE PROTEIN: CPT | Performed by: INTERNAL MEDICINE

## 2024-02-02 PROCEDURE — 84146 ASSAY OF PROLACTIN: CPT | Mod: STJLAB

## 2024-02-02 PROCEDURE — 82947 ASSAY GLUCOSE BLOOD QUANT: CPT

## 2024-02-02 PROCEDURE — 93010 ELECTROCARDIOGRAM REPORT: CPT | Performed by: INTERNAL MEDICINE

## 2024-02-02 PROCEDURE — 83036 HEMOGLOBIN GLYCOSYLATED A1C: CPT | Mod: STJLAB

## 2024-02-02 PROCEDURE — 85652 RBC SED RATE AUTOMATED: CPT | Performed by: INTERNAL MEDICINE

## 2024-02-02 PROCEDURE — 99291 CRITICAL CARE FIRST HOUR: CPT

## 2024-02-02 PROCEDURE — 82550 ASSAY OF CK (CPK): CPT

## 2024-02-02 PROCEDURE — 85027 COMPLETE CBC AUTOMATED: CPT | Mod: 91

## 2024-02-02 RX ORDER — LEVETIRACETAM 10 MG/ML
1000 INJECTION INTRAVASCULAR
Status: COMPLETED | OUTPATIENT
Start: 2024-02-02 | End: 2024-02-02

## 2024-02-02 RX ORDER — POTASSIUM CHLORIDE 1.5 G/1.58G
40 POWDER, FOR SOLUTION ORAL ONCE
Status: COMPLETED | OUTPATIENT
Start: 2024-02-02 | End: 2024-02-02

## 2024-02-02 RX ORDER — DEXTROSE MONOHYDRATE 100 MG/ML
0.3 INJECTION, SOLUTION INTRAVENOUS ONCE AS NEEDED
Status: DISCONTINUED | OUTPATIENT
Start: 2024-02-02 | End: 2024-02-10 | Stop reason: HOSPADM

## 2024-02-02 RX ORDER — LORAZEPAM 2 MG/ML
1 INJECTION INTRAMUSCULAR ONCE
Status: DISCONTINUED | OUTPATIENT
Start: 2024-02-02 | End: 2024-02-02

## 2024-02-02 RX ORDER — DEXTROSE 50 % IN WATER (D50W) INTRAVENOUS SYRINGE
25
Status: DISCONTINUED | OUTPATIENT
Start: 2024-02-02 | End: 2024-02-10 | Stop reason: HOSPADM

## 2024-02-02 RX ORDER — LABETALOL HYDROCHLORIDE 5 MG/ML
10 INJECTION, SOLUTION INTRAVENOUS ONCE
Status: COMPLETED | OUTPATIENT
Start: 2024-02-02 | End: 2024-02-02

## 2024-02-02 RX ORDER — LEVETIRACETAM 5 MG/ML
500 INJECTION INTRAVASCULAR EVERY 12 HOURS
Status: DISCONTINUED | OUTPATIENT
Start: 2024-02-02 | End: 2024-02-03

## 2024-02-02 RX ORDER — LEVETIRACETAM 500 MG/1
2000 TABLET ORAL ONCE
Status: DISCONTINUED | OUTPATIENT
Start: 2024-02-02 | End: 2024-02-02

## 2024-02-02 RX ADMIN — HEPARIN SODIUM 5000 UNITS: 5000 INJECTION INTRAVENOUS; SUBCUTANEOUS at 15:43

## 2024-02-02 RX ADMIN — LATANOPROST 1 DROP: 50 SOLUTION OPHTHALMIC at 22:29

## 2024-02-02 RX ADMIN — LABETALOL HYDROCHLORIDE 10 MG: 5 INJECTION, SOLUTION INTRAVENOUS at 03:11

## 2024-02-02 RX ADMIN — LEVETIRACETAM 500 MG: 5 INJECTION INTRAVENOUS at 15:43

## 2024-02-02 RX ADMIN — POLYETHYLENE GLYCOL 3350 17 G: 17 POWDER, FOR SOLUTION ORAL at 08:54

## 2024-02-02 RX ADMIN — HEPARIN SODIUM 5000 UNITS: 5000 INJECTION INTRAVENOUS; SUBCUTANEOUS at 05:18

## 2024-02-02 RX ADMIN — Medication: at 03:00

## 2024-02-02 RX ADMIN — HEPARIN SODIUM 5000 UNITS: 5000 INJECTION INTRAVENOUS; SUBCUTANEOUS at 21:18

## 2024-02-02 RX ADMIN — SODIUM CHLORIDE, POTASSIUM CHLORIDE, SODIUM LACTATE AND CALCIUM CHLORIDE 1000 ML: 600; 310; 30; 20 INJECTION, SOLUTION INTRAVENOUS at 03:15

## 2024-02-02 RX ADMIN — LEVETIRACETAM 1000 MG: 10 INJECTION INTRAVENOUS at 03:21

## 2024-02-02 RX ADMIN — LEVETIRACETAM 1000 MG: 10 INJECTION INTRAVENOUS at 02:45

## 2024-02-02 RX ADMIN — POTASSIUM CHLORIDE 40 MEQ: 1.5 POWDER, FOR SOLUTION ORAL at 08:54

## 2024-02-02 ASSESSMENT — PAIN SCALES - PAIN ASSESSMENT IN ADVANCED DEMENTIA (PAINAD)
TOTALSCORE: 2
CONSOLABILITY: DISTRACTED OR REASSURED BY VOICE/TOUCH
BODYLANGUAGE: TENSE, DISTRESSED PACING, FIDGETING
NEGVOCALIZATION: OCCASIONAL MOAN/GROAN, LOW SPEECH, NEGATIVE/DISAPPROVING QUALITY
NEGVOCALIZATION: OCCASIONAL MOAN/GROAN, LOW SPEECH, NEGATIVE/DISAPPROVING QUALITY
TOTALSCORE: 3
BREATHING: NORMAL
BREATHING: NORMAL
BODYLANGUAGE: RELAXED
FACIALEXPRESSION: SMILING OR INEXPRESSIVE
CONSOLABILITY: DISTRACTED OR REASSURED BY VOICE/TOUCH
FACIALEXPRESSION: SMILING OR INEXPRESSIVE

## 2024-02-02 ASSESSMENT — COGNITIVE AND FUNCTIONAL STATUS - GENERAL
MOVING FROM LYING ON BACK TO SITTING ON SIDE OF FLAT BED WITH BEDRAILS: A LITTLE
TOILETING: A LITTLE
HELP NEEDED FOR BATHING: A LITTLE
MOVING TO AND FROM BED TO CHAIR: A LITTLE
EATING MEALS: A LITTLE
CLIMB 3 TO 5 STEPS WITH RAILING: A LITTLE
WALKING IN HOSPITAL ROOM: A LITTLE
PERSONAL GROOMING: A LITTLE
DRESSING REGULAR LOWER BODY CLOTHING: A LITTLE
STANDING UP FROM CHAIR USING ARMS: A LITTLE
DRESSING REGULAR UPPER BODY CLOTHING: A LITTLE
TURNING FROM BACK TO SIDE WHILE IN FLAT BAD: A LITTLE
DAILY ACTIVITIY SCORE: 18
MOBILITY SCORE: 18

## 2024-02-02 ASSESSMENT — PAIN - FUNCTIONAL ASSESSMENT
PAIN_FUNCTIONAL_ASSESSMENT: PAINAD (PAIN ASSESSMENT IN ADVANCED DEMENTIA SCALE)
PAIN_FUNCTIONAL_ASSESSMENT: PAINAD (PAIN ASSESSMENT IN ADVANCED DEMENTIA SCALE)
PAIN_FUNCTIONAL_ASSESSMENT: 0-10
PAIN_FUNCTIONAL_ASSESSMENT: CPOT (CRITICAL CARE PAIN OBSERVATION TOOL)

## 2024-02-02 ASSESSMENT — PAIN SCALES - GENERAL
PAINLEVEL_OUTOF10: 0 - NO PAIN

## 2024-02-02 NOTE — SIGNIFICANT EVENT
"Preliminary EEG Report    The first 30 minutes of this vEEG are indicative of a mild diffuse encephalopathy.    This EEG was read up until 13:12 on 02/02/24.     The final impression will be available tomorrow under Chart Review in the Media tab.   To discontinue video EEG, place \"Discontinue Continuous VEEG\" order.     Aleja Head MD  Epilepsy Fellow [id number]     "

## 2024-02-02 NOTE — PROGRESS NOTES
"1230 Met with patient and \"POA\" friend Deepali Sotelo, [phone] in her room.  Friend and her son will care for her dog while she is not home. Deepali is purchasing a Life Alert. Patient is now agreeable to gong to an [facility]. She wants the  [facility]. [facility] notified to send referral. Will need an update PT eval after ICU stay.  EEG being done today.  Rapid called today for symptoms of a stroke. Transferred to ICU. Will continue to monitor home going needs.     Graciela Santo RN      "

## 2024-02-02 NOTE — PROGRESS NOTES
Physical Therapy                 Therapy Communication Note    Patient Name: Fernanda Hughes  MRN: [id number]  Today's Date: 2/2/2024     Discipline: Physical Therapy    Missed Visit Reason: Missed Visit Reason: Other (Comment) (patient transferred to the ICU for stroke like symptoms.  will confer with PT about the need for a RE=eval.)    Missed Time: Attempt    Comment:

## 2024-02-02 NOTE — CONSULTS
"Nutrition Assessment Note  Nutrition Assessment      Reason for Assessment  Reason for Assessment: Dietitian discretion  Nutrition Note:  Fernanda Hughes is a 83 y.o. female presenting from home on own, 1/30, with vision changes. Length of stay complicated by altered mental status and tachycardiac 2/2 with RRT and transfer to ICU; Neurology involved in pt care and EEG pending. Pt evaluated by Ophthalmology 2/1, finding end stage glaucoma.    Past Medical History   has a past medical history of Anemia, unspecified (12/16/2020), Anemia, unspecified (12/16/2020), Essential (primary) hypertension (12/16/2020), Essential (primary) hypertension (12/16/2020), Gastro-esophageal reflux disease without esophagitis (12/09/2020), and Personal history of other diseases of the nervous system and sense organs (10/26/2021).  Surgical History   has a past surgical history that includes Other surgical history (10/26/2021).     Food and Nutrient History  Energy Intake: Fair 50-75 %  Food and Nutrient History: Pt from home alone. Attempted to obtain diet history however pt very anxious \"I can't see anything!\"  Vitamin/Herbal Supplement Use: home meds include vit C, D3, MVI, B complex       Current Diet: Adult diet Regular  Food allergies: NKFA. has No Known Allergies.    GI assessment: Pt reports constipation. No BM though 1/30 admit.  Oral Problems: no  Mobility: due to vision losses    Pain Assessment: PAINAD  Pain Score: 0 - No pain    Vitals: Blood pressure 146/65, pulse 88, temperature 36.7 °C (98.1 °F), temperature source Temporal, resp. rate 17, height 1.702 m (5' 7\"), weight 50.2 kg (110 lb 10.7 oz), SpO2 98 %.    Recent Lab Results:  Lab Results   Component Value Date    GLUCOSE 100 (H) 02/02/2024    CALCIUM 8.8 02/02/2024     02/02/2024    K 3.5 02/02/2024    CO2 20 (L) 02/02/2024     (H) 02/02/2024    BUN 21 02/02/2024    CREATININE 0.94 02/02/2024     Lab Results   Component Value Date    HGBA1C 5.3 02/02/2024    " HGBA1C 4.9 01/18/2023     Results from last 7 days   Lab Units 02/02/24  1535 02/02/24  1157 02/02/24  0743 02/02/24  0450 02/02/24  0144 01/31/24  1743 01/30/24  1038   POCT GLUCOSE mg/dL 121* 84 94 96 130* 111* 90       Medications reviewed:  heparin (porcine), 5,000 Units, subcutaneous, q8h  latanoprost, 1 drop, Both Eyes, Nightly  levETIRAcetam, 500 mg, intravenous, q12h  oxygen, , inhalation, Continuous - 02/gases  polyethylene glycol, 17 g, oral, Daily             Height: 170.2cm IBW 61.4kg   Admission Weight 51.1kg  BMI 17.6kg/m2 indicative of underweight.  Weight history/ % weight change:   2/2023: 52.4kg    Significant Weight Loss: no  Interpretation of Weight Loss:  N/A                                                                   Estimated Energy Needs  Total Energy Estimated Needs (kCal):  (2373-0547 (25-30kcal/kg))    Estimated Protein Needs  Total Protein Estimated Needs (g):  (51-61g (1-1.2g/kg of 51.1kg))    Estimated Fluid Needs  Total Fluid Estimated Needs (mL):  (1mL/kcal/d or as per physician)         Nutrition Focused Physical Findings: deferred as pt anxious about vision changes.   Subcutaneous Fat Loss  Orbital Fat Pads: Defer    Muscle Wasting  Temporalis: Defer    Edema  Edema: none         Physical Findings (Nutrition Deficiency/Toxicity)  Skin: Negative (intact yet appears fragile)       Nutrition Diagnosis   Malnutrition Diagnosis  Patient has Malnutrition Diagnosis: No    Patient has Nutrition Diagnosis: Yes  Nutrition Diagnosis 1: Underweight  Diagnosis Status (1): New  Related to (1): baseline  As Evidenced by (1): BMI 17kg/m2 with weight relatively stable x1 yr.                                            Nutrition Interventions/Recommendations   Nutrition Prescription  Individualized Nutrition Prescription Provided for : healthful diet    Food and/or Nutrient Delivery Interventions  Meals and Snacks: General healthful diet  Goal: >50% of meals                 DIET: Continue with  regular diet. Please assist pt with meal ordering and tray set up. ECC milkshakes added PRN (247kcal and 5g pro/4 fl oz)     SUPPLEMENT: Magic Cup 2x/d for additional 580kcal and 18g pro.     MEDS: No BM through admit; consider addition of colace.         Medical Food Supplement: Modified food  Goal: Magic cup 2x/d                                       Coordination of Nutrition Care by a Nutrition Professional  Collaboration and Referral of Nutrition Care: Collaboration by nutrition professional with other providers  Goal: SYEDA Aguilera    Education Documentation:  2/2: Met with pt at bedside; pt very anxious and difficult time directing.       Nutrition Monitoring and Evaluation   Food and Nutrient Related History            Amount of Food: Estimated amout of food, Medical food intake  Criteria: >50% of meals and >75% of ONS                             Anthropometrics: Body Composition/Growth/Weight History  Weight: Measured weight  Criteria: daily weight                   Biochemical Data, Medical Tests and Procedures  Electrolyte and Renal Panel: Magnesium, Phosphorus, Potassium, Sodium  Criteria: lytes WNR                        Nutrition Focused Physical Findings                      Skin: Other (Comment)  Criteria: promote skin integrity              Follow Up  Time Spent (min): 45 minutes  Last Date of Nutrition Visit: 02/02/24  Nutrition Follow-Up Needed?: 3-8 days  Follow up Comment: ks, watch po

## 2024-02-02 NOTE — SIGNIFICANT EVENT
"Update from Dr. Quiros (ophthalmology)    Spoke with ophthalmologist Dr. Quiros at 14:00, he was unable to place formal consult note due to issues with epic access.  Per Dr. Quiros, patient has had a longstanding history of glaucoma for several years now and had been seeing an ophthalmologist for which she was supposed to be taking eyedrops for glaucoma regularly.  Patient had not been taking eyedrops, stating that \"she did not think that it was important\".  Patient was initially thought to be transferred to [facility] for ophthalmology evaluation, however Dr. Quiros was able to see the patient last night.  He notes that patient has end-stage glaucoma with likely no improvement.  He notes that per his examination, patient lost all vision except in his peripheral fields (right side of right eye, left side of left eye).  He noted that when he dilated the pupils, optic nerves both appear cupped bilaterally suggestive of end-stage bilateral glaucoma.  He notes that other differential could be possible giant cell arteritis, however he does not think that this is the case.  Patient has end-stage glaucoma and will likely have loss of vision from the rest of her life and will not be able to live independently per Dr. Quiros.  He recommends that patient be started on glaucoma eyedrops (latanoprost) and follow-up with  glaucoma specialist in the outpatient setting.  No need for ophthalmologic evaluation or transfer at this time.    Spoke with neurology, recommend every 2 hours neurochecks at this time and continue video EEG.  Will transfer to [facility] at this time for continued video EEG.    Addy Chang, DO  Internal Medicine, PGY-3   "

## 2024-02-02 NOTE — SIGNIFICANT EVENT
Patient's emergency contact Deepali Jaramillo (a friend as the patient does not have any family) was notified by this provider to updated her on the change in the patient's condition. All her questions were answered. We are to let her know if there is any further changes and if she needs to come into the hospital.

## 2024-02-02 NOTE — PROGRESS NOTES
Fernanda Hughes is a 83 y.o. female on day 3 of admission presenting with Vision changes.      Subjective   Patient seen and examined at bedside. Appears more somnolent and unable to open her eyes for examination initially. On reassessment, confused trying to get out of bed. Overnight, RR called for tachycardia to 130s and unresponsiveness. NIH 25 at the time. Labs remarkable for elevated lactate of 14.5. She treated for seizure wth 2g keppra. Today she states she feels off but is unsure how to explain. She does endorse some eye pain on opening.        Objective     Last Recorded Vitals  /64   Pulse 82   Temp 36.3 °C (97.3 °F) (Temporal)   Resp 14   Wt 50.2 kg (110 lb 10.7 oz)   SpO2 95%   Intake/Output last 3 Shifts:    Intake/Output Summary (Last 24 hours) at 2/2/2024 1006  Last data filed at 2/1/2024 1500  Gross per 24 hour   Intake 120 ml   Output --   Net 120 ml       Admission Weight  Weight: 49 kg (108 lb) (01/30/24 0923)    Daily Weight  02/02/24 : 50.2 kg (110 lb 10.7 oz)      Physical Exam:    Constitutional:       General: She is not in acute distress. Appears fatigued.   Eyes:      General: Visual field deficit present. Normal conjunctiva.     Extraocular Movements: Extraocular movements intact  Neurological:      Mental Status: She is alert and oriented to person, place. Not oriented to time. Not at baseline mental status.     Cranial Nerves: No dysarthria or facial asymmetry.      Sensory: No sensory deficit.      Motor: No weakness.      Coordination: Finger-Nose-Finger Test and Heel to Shin Test normal.      Comments:  Unable to accurately assess visual field as patient was not complying. Able to name objects and repeat phrases.        Relevant Results  Scheduled medications  heparin (porcine), 5,000 Units, subcutaneous, q8h  latanoprost, 1 drop, Both Eyes, Nightly  levETIRAcetam, 500 mg, intravenous, q12h  oxygen, , inhalation, Continuous - 02/gases  polyethylene glycol, 17 g, oral,  Daily  potassium chloride, 40 mEq, oral, Once      Continuous medications     PRN medications  PRN medications: dextrose 10 % in water (D10W), dextrose, glucagon     Results for orders placed or performed during the hospital encounter of 01/30/24 (from the past 24 hour(s))   POCT GLUCOSE   Result Value Ref Range    POCT Glucose 130 (H) 74 - 99 mg/dL   Blood Gas Arterial Full Panel   Result Value Ref Range    POCT pH, Arterial 7.24 (LL) 7.38 - 7.42 pH    POCT pCO2, Arterial 34 (L) 38 - 42 mm Hg    POCT pO2, Arterial 119 (H) 85 - 95 mm Hg    POCT SO2, Arterial 99 94 - 100 %    POCT Oxy Hemoglobin, Arterial 96.9 94.0 - 98.0 %    POCT Hematocrit Calculated, Arterial 40.0 36.0 - 46.0 %    POCT Sodium, Arterial 138 136 - 145 mmol/L    POCT Potassium, Arterial 3.7 3.5 - 5.3 mmol/L    POCT Chloride, Arterial 108 (H) 98 - 107 mmol/L    POCT Ionized Calcium, Arterial 1.30 1.10 - 1.33 mmol/L    POCT Glucose, Arterial 123 (H) 74 - 99 mg/dL    POCT Lactate, Arterial 9.8 (HH) 0.4 - 2.0 mmol/L    POCT Base Excess, Arterial -11.8 (L) -2.0 - 3.0 mmol/L    POCT HCO3 Calculated, Arterial 14.6 (L) 22.0 - 26.0 mmol/L    POCT Hemoglobin, Arterial 13.2 12.0 - 16.0 g/dL    POCT Anion Gap, Arterial 19 10 - 25 mmo/L    Patient Temperature      FiO2 32 %    Apparatus CANNULA     Flow 3.0 LPM    Critical Called By RT MT     Critical Called To NP RENZO     Critical Call Time 154.0000     Critical Read Back Y    CBC   Result Value Ref Range    WBC 11.1 4.4 - 11.3 x10*3/uL    nRBC 0.0 0.0 - 0.0 /100 WBCs    RBC 4.34 4.00 - 5.20 x10*6/uL    Hemoglobin 12.9 12.0 - 16.0 g/dL    Hematocrit 40.1 36.0 - 46.0 %    MCV 92 80 - 100 fL    MCH 29.7 26.0 - 34.0 pg    MCHC 32.2 32.0 - 36.0 g/dL    RDW 13.5 11.5 - 14.5 %    Platelets 255 150 - 450 x10*3/uL   Comprehensive Metabolic Panel   Result Value Ref Range    Glucose 124 (H) 74 - 99 mg/dL    Sodium 141 136 - 145 mmol/L    Potassium 3.8 3.5 - 5.3 mmol/L    Chloride 108 (H) 98 - 107 mmol/L    Bicarbonate 14  (L) 21 - 32 mmol/L    Anion Gap 23 (H) 10 - 20 mmol/L    Urea Nitrogen 24 (H) 6 - 23 mg/dL    Creatinine 1.08 (H) 0.50 - 1.05 mg/dL    eGFR 50 (L) >60 mL/min/1.73m*2    Calcium 9.2 8.6 - 10.3 mg/dL    Albumin 3.9 3.4 - 5.0 g/dL    Alkaline Phosphatase 80 33 - 136 U/L    Total Protein 5.9 (L) 6.4 - 8.2 g/dL    AST 19 9 - 39 U/L    Bilirubin, Total 0.6 0.0 - 1.2 mg/dL    ALT 18 7 - 45 U/L   Troponin I, High Sensitivity   Result Value Ref Range    Troponin I, High Sensitivity 6 0 - 13 ng/L   Protime-INR   Result Value Ref Range    Protime 11.1 9.8 - 12.8 seconds    INR 1.0 0.9 - 1.1   Magnesium   Result Value Ref Range    Magnesium 1.98 1.60 - 2.40 mg/dL   Lactate   Result Value Ref Range    Lactate     Creatine Kinase   Result Value Ref Range    Creatine Kinase 38 0 - 215 U/L   Hemoglobin A1c   Result Value Ref Range    Hemoglobin A1C 5.3 see below %    Estimated Average Glucose 105 Not Established mg/dL   TSH with reflex to Free T4 if abnormal   Result Value Ref Range    Thyroid Stimulating Hormone 4.29 (H) 0.44 - 3.98 mIU/L   Thyroxine, Free   Result Value Ref Range    Thyroxine, Free 1.17 (H) 0.61 - 1.12 ng/dL   Basic metabolic panel   Result Value Ref Range    Glucose      Sodium      Potassium      Chloride      Bicarbonate      Anion Gap      Urea Nitrogen      Creatinine      eGFR      Calcium     Lactate   Result Value Ref Range    Lactate 14.5 (HH) 0.4 - 2.0 mmol/L   ECG 12 Lead   Result Value Ref Range    Ventricular Rate 105 BPM    Atrial Rate 105 BPM    OK Interval 116 ms    QRS Duration 86 ms    QT Interval 328 ms    QTC Calculation(Bazett) 433 ms    P Axis 51 degrees    R Axis 70 degrees    T Axis 74 degrees    QRS Count 18 beats    Q Onset 216 ms    P Onset 158 ms    P Offset 205 ms    T Offset 380 ms    QTC Fredericia 395 ms   Prolactin   Result Value Ref Range    Prolactin 12.8 3.0 - 20.0 ug/L   POCT GLUCOSE   Result Value Ref Range    POCT Glucose 96 74 - 99 mg/dL   CBC   Result Value Ref Range     WBC 9.7 4.4 - 11.3 x10*3/uL    nRBC 0.0 0.0 - 0.0 /100 WBCs    RBC 3.99 (L) 4.00 - 5.20 x10*6/uL    Hemoglobin 11.6 (L) 12.0 - 16.0 g/dL    Hematocrit 36.9 36.0 - 46.0 %    MCV 93 80 - 100 fL    MCH 29.1 26.0 - 34.0 pg    MCHC 31.4 (L) 32.0 - 36.0 g/dL    RDW 13.5 11.5 - 14.5 %    Platelets 188 150 - 450 x10*3/uL   Lactate   Result Value Ref Range    Lactate 2.2 (H) 0.4 - 2.0 mmol/L   Comprehensive Metabolic Panel   Result Value Ref Range    Glucose 100 (H) 74 - 99 mg/dL    Sodium 139 136 - 145 mmol/L    Potassium 3.5 3.5 - 5.3 mmol/L    Chloride 109 (H) 98 - 107 mmol/L    Bicarbonate 20 (L) 21 - 32 mmol/L    Anion Gap 14 10 - 20 mmol/L    Urea Nitrogen 21 6 - 23 mg/dL    Creatinine 0.94 0.50 - 1.05 mg/dL    eGFR 60 (L) >60 mL/min/1.73m*2    Calcium 8.8 8.6 - 10.3 mg/dL    Albumin 3.3 (L) 3.4 - 5.0 g/dL    Alkaline Phosphatase 68 33 - 136 U/L    Total Protein 5.5 (L) 6.4 - 8.2 g/dL    AST 21 9 - 39 U/L    Bilirubin, Total 0.6 0.0 - 1.2 mg/dL    ALT 17 7 - 45 U/L   POCT GLUCOSE   Result Value Ref Range    POCT Glucose 94 74 - 99 mg/dL   Urinalysis with Reflex Culture and Microscopic   Result Value Ref Range    Color, Urine Straw Straw, Yellow    Appearance, Urine Clear Clear    Specific Gravity, Urine 1.010 1.005 - 1.035    pH, Urine 5.0 5.0, 5.5, 6.0, 6.5, 7.0, 7.5, 8.0    Protein, Urine NEGATIVE NEGATIVE mg/dL    Glucose, Urine NEGATIVE NEGATIVE mg/dL    Blood, Urine SMALL (1+) (A) NEGATIVE    Ketones, Urine NEGATIVE NEGATIVE mg/dL    Bilirubin, Urine NEGATIVE NEGATIVE    Urobilinogen, Urine <2.0 <2.0 mg/dL    Nitrite, Urine NEGATIVE NEGATIVE    Leukocyte Esterase, Urine NEGATIVE NEGATIVE   Urinalysis Microscopic   Result Value Ref Range    WBC, Urine NONE 1-5, NONE /HPF    RBC, Urine NONE NONE, 1-2, 3-5 /HPF             CT brain attack head wo IV contrast    Result Date: 2/2/2024  Interpreted By:  Marina Mullins, STUDY: CT BRAIN ATTACK HEAD WO IV CONTRAST;  2/2/2024 2:03 am   INDICATION: Signs/Symptoms:Change in  mental status.   COMPARISON: 01/31/2024 and MRI brain 02/01/2024   ACCESSION NUMBER(S): [id number]   ORDERING CLINICIAN: ROSARIO PAUL   TECHNIQUE: Axial noncontrast CT images of the head.   FINDINGS: BRAIN PARENCHYMA:  Gray-white matter interfaces are preserved. No mass effect or midline shift.   HEMORRHAGE: No acute intracranial hemorrhage. VENTRICLES and EXTRA-AXIAL SPACES: The ventricles and sulci are within normal limits in size for brain volume. No abnormal extraaxial fluid collection. EXTRACRANIAL SOFT TISSUES: Within normal limits. PARANASAL SINUSES/MASTOIDS:  The visualized paranasal sinuses and mastoid air cells are aerated. CALVARIUM: No depressed skull fracture. No destructive osseous lesion.   OTHER FINDINGS: None.       No acute intracranial abnormality.     MACRO: None   Signed by: Marnia Mullins 2/2/2024 2:08 AM Dictation workstation:   [id number]    Transthoracic Echo (TTE) Complete    Result Date: 2/1/2024            [facility] [address] [facility] [address]    Tel [phone] Fax [phone] TRANSTHORACIC ECHOCARDIOGRAM REPORT  Patient Name:      BABS KUMAR          Nicolette Physician:    [id number] Delta Brunner MD Study Date:        2/1/2024             Ordering Provider:    [id number] JAMEEL PYLE MRN/PID:           [id number]             Fellow: Accession#:        [id number]         Nurse:                Jie Srivastava RN Date of Birth/Age: 1940 / 83 years Sonographer:          Melba Caballero RDCS Gender:            F                    Additional Staff: Height:            170.18 cm            Admit Date:           1/30/2024 Weight:            53.98 kg             Admission Status:     Inpatient -                                                               Routine BSA:               1.62 m2              Department Location:  [facility] Echo Lab Blood Pressure: 165 /83 mmHg Study Type:    TRANSTHORACIC ECHO (TTE)  COMPLETE Diagnosis/ICD: Other cerebral infarction-I63.89 Indication:    Stroke CPT Codes:     Echo Complete w Full [id number] Patient History: Pertinent History: TIA. Vision changes. Previous echocardiogram 01- at                    [facility]. Study Detail: The following Echo studies were performed: 2D, M-Mode, Doppler and               color flow. Agitated saline used as a contrast agent for               intraseptal flow evaluation.  PHYSICIAN INTERPRETATION: Left Ventricle: Left ventricular systolic function is normal, with an estimated ejection fraction of 65-70%. There are no regional wall motion abnormalities. The left ventricular cavity size is decreased. There is left ventricular concentric remodeling. Spectral Doppler shows an impaired relaxation pattern of left ventricular diastolic filling. Left Atrium: The left atrium is normal in size. A bubble study using agitated saline was performed. Bubble study is negative. Right Ventricle: The right ventricle is normal in size. There is normal right ventricular global systolic function. Right Atrium: The right atrium is normal in size. Aortic Valve: The aortic valve is trileaflet. There is mild aortic valve cusp calcification. There is mild aortic valve thickening. There is mild to moderate aortic valve regurgitation. The peak instantaneous gradient of the aortic valve is 10.4 mmHg. Mitral Valve: The mitral valve is mildly thickened. There is trace mitral valve regurgitation. Tricuspid Valve: The tricuspid valve is abnormal. There is mild thickening of the tricuspid valve leaflets. There is trace to mild tricuspid regurgitation. The Doppler estimated RVSP is within normal limits at 26.8 mmHg. Pulmonic Valve: The pulmonic valve is not well visualized. There is trace pulmonic valve regurgitation. Pericardium: There is a trivial pericardial effusion. Aorta: The aortic root is normal. There is no dilatation of the ascending aorta. There is no dilatation of the  aortic root. There is plaque visualized in the ascending aorta. Systemic Veins: The inferior vena cava size appears small. There is IVC inspiratory collapse greater than 50%. In comparison to the previous echocardiogram(s): Prior examinations are available and were reviewed for comparison purposes. Compared to previous [facility] study from 1/17/2023, similar findings. Agitated saline study was reported to be negative at that time as well.  CONCLUSIONS:  1. Left ventricular systolic function is normal with a 65-70% estimated ejection fraction.  2. Spectral Doppler shows an impaired relaxation pattern of left ventricular diastolic filling.  3. Left ventricular cavity size is decreased.  4. Mild to moderate aortic valve regurgitation.  5. RVSP within normal limits.  6. There is plaque visualized in the ascending aorta.  7. Negative agitated saline study. QUANTITATIVE DATA SUMMARY: 2D MEASUREMENTS:                          Normal Ranges: LAs:           2.30 cm   (2.7-4.0cm) IVSd:          1.13 cm   (0.6-1.1cm) LVPWd:         1.10 cm   (0.6-1.1cm) LVIDd:         3.20 cm   (3.9-5.9cm) LVIDs:         1.90 cm LV Mass Index: 65.7 g/m2 LV % FS        40.6 % LA VOLUME:                               Normal Ranges: LA Vol A4C:        13.0 ml    (22+/-6mL/m2) LA Vol A2C:        17.0 ml LA Vol BP:         15.2 ml LA Vol Index A4C:  8.0ml/m2 LA Vol Index A2C:  10.5 ml/m2 LA Vol Index BP:   9.4 ml/m2 LA Area A4C:       8.4 cm2 LA Area A2C:       9.8 cm2 LA Major Axis A4C: 4.6 cm LA Major Axis A2C: 4.8 cm LA Volume Index:   10.5 ml/m2 M-MODE MEASUREMENTS:                  Normal Ranges: Ao Root: 2.90 cm (2.0-3.7cm) AORTA MEASUREMENTS:                      Normal Ranges: Ao Sinus, d: 2.90 cm (2.1-3.5cm) Ao STJ, d:   2.50 cm (1.7-3.4cm) Asc Ao, d:   3.20 cm (2.1-3.4cm) LV SYSTOLIC FUNCTION BY 2D PLANIMETRY (MOD):                     Normal Ranges: EF-A4C View: 62.5 % (>=55%) EF-A2C View: 70.6 % EF-Biplane:  68.8 % LV DIASTOLIC FUNCTION:                         Normal Ranges: MV Peak E:    0.53 m/s (0.7-1.2 m/s) MV Peak A:    0.92 m/s (0.42-0.7 m/s) E/A Ratio:    0.57     (1.0-2.2) MV lateral e' 0.05 m/s MV medial e'  0.05 m/s MITRAL VALVE:                      Normal Ranges: MV Vmax:    0.85 m/s (<=1.3m/s) MV peak P.9 mmHg (<5mmHg) MV mean P.0 mmHg (<48mmHg) MV DT:      195 msec (150-240msec) AORTIC VALVE:                          Normal Ranges: AoV Vmax:      1.61 m/s  (<=1.7m/s) AoV Peak PG:   10.4 mmHg (<20mmHg) LVOT Max Flaco:  1.49 m/s  (<=1.1m/s) LVOT VTI:      24.80 cm LVOT Diameter: 1.70 cm   (1.8-2.4cm) AoV Area,Vmax: 2.10 cm2  (2.5-4.5cm2)  RIGHT VENTRICLE: RV Basal 2.60 cm RV Mid   2.70 cm RV Major 6.2 cm TAPSE:   17.5 mm RV s'    0.10 m/s TRICUSPID VALVE/RVSP:                             Normal Ranges: Peak TR Velocity: 2.44 m/s RV Syst Pressure: 26.8 mmHg (< 30mmHg) PULMONIC VALVE:                         Normal Ranges: PV Accel Time: 87 msec  (>120ms) PV Max Flaco:    1.0 m/s  (0.6-0.9m/s) PV Max PG:     3.8 mmHg  [id number] Delta Brunner MD Electronically signed on 2024 at 7:04:41 PM  ** Final **     MR brain w IV contrast    Result Date: 2024  Interpreted By:  Mariluz Joseph and Hofer Lindsay STUDY: MR ORBIT W IV CONTRAST; MR BRAIN W IV CONTRAST;  2024 1:01 pm   INDICATION: Signs/Symptoms:visual field deficits.   COMPARISON: MRI brain and MRI orbits without contrast 2024   ACCESSION NUMBER(S): [id number]; [id number]   ORDERING CLINICIAN: DONNIE BUDDY   TECHNIQUE: T1, T2, and postcontrast images of the brain were obtained. High resolution coronal T1 and STIR images of the orbits were obtained. Following administration of 10 mL Dotarem gadolinium based intravenous contrast, post contrast T1 images of the brain and high resolution fat-suppressed axial and coronal T1 images of the orbits were acquired.   FINDINGS: CSF Spaces: The ventricles, sulci and basal cisterns are slightly prominent likely due to  parenchymal volume loss.   Parenchyma: Scattered T2 hyperintensities throughout the white matter, nonspecific, but most likely secondary to chronic small-vessel ischemic changes. There is no focal parenchymal signal abnormality.  No abnormal parenchymal enhancement is identified. There is no mass effect or midline shift.   Orbits: There is no intraorbital mass.  The optic nerves and optic chiasm are unremarkable.  The extraocular muscles are unremarkable. The lacrimal glands are within normal limits.  Bilateral lens replacements   Paranasal Sinuses and Mastoids: Visualized paranasal sinuses and mastoid air cells are unremarkable.   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 Questionable right supraclinoid ICA aneurysm versus infundibulum is better characterized on previous CTA. 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       1.  No acute intracranial pathology within the brain. No lesion along the expected course of the optic apparatus. Moderate nonspecific white matter changes and parenchymal volume loss. 2.  Unremarkable postcontrast MRI of the orbits. 3. 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 Questionable aneurysm versus outpouching arising from the right supraclinoid ICA again noted.   MACRO: None   Signed by: Mariluz Joseph 2/1/2024 2:24 PM Dictation workstation:   [id number]    MR orbit w IV contrast    Result Date: 2/1/2024  Interpreted By:  Mariluz Joseph and Hofer Lindsay STUDY: MR ORBIT W IV CONTRAST; MR BRAIN W IV CONTRAST;  2/1/2024 1:01 pm   INDICATION: Signs/Symptoms:visual field deficits.   COMPARISON: MRI brain and MRI orbits without contrast 01/31/2024   ACCESSION NUMBER(S): [id number]; [id number]   ORDERING CLINICIAN: DONNIE GOODWIN   TECHNIQUE: T1, T2, and postcontrast images of the brain were obtained. High resolution coronal T1 and STIR images of the orbits were obtained. Following administration of 10 mL Dotarem  gadolinium based intravenous contrast, post contrast T1 images of the brain and high resolution fat-suppressed axial and coronal T1 images of the orbits were acquired.   FINDINGS: CSF Spaces: The ventricles, sulci and basal cisterns are slightly prominent likely due to parenchymal volume loss.   Parenchyma: Scattered T2 hyperintensities throughout the white matter, nonspecific, but most likely secondary to chronic small-vessel ischemic changes. There is no focal parenchymal signal abnormality.  No abnormal parenchymal enhancement is identified. There is no mass effect or midline shift.   Orbits: There is no intraorbital mass.  The optic nerves and optic chiasm are unremarkable.  The extraocular muscles are unremarkable. The lacrimal glands are within normal limits.  Bilateral lens replacements   Paranasal Sinuses and Mastoids: Visualized paranasal sinuses and mastoid air cells are unremarkable.   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 Questionable right supraclinoid ICA aneurysm versus infundibulum is better characterized on previous CTA. 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       1.  No acute intracranial pathology within the brain. No lesion along the expected course of the optic apparatus. Moderate nonspecific white matter changes and parenchymal volume loss. 2.  Unremarkable postcontrast MRI of the orbits. 3. 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 Questionable aneurysm versus outpouching arising from the right supraclinoid ICA again noted.   MACRO: None   Signed by: Mariluz Joseph 2/1/2024 2:24 PM Dictation workstation:   [id number]        Fernanda Hughes is a 83 y.o. female on day 3 of admission presenting with Vision changes.  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 She presents with chief complaint of blurry vision, difficulty ambulating due to unsteady gait. Physical exam reveals visual field changes in bilateral superior peripheral vision. MRI brain, MRI orbit without contrast showed no acute findings, moderate white matter changes/volume loss. MRI brain/orbit with contrast without acute findings. 3 mm aneurysm of L. 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 On morning of 2/2 patient found to be unresponsive with tachycardia and hypertension. Concern for seizure given elevated lactate of 14.5.      New-onset seizure  Bilateral superior visual field deficit  Hx glaucoma  ?Hx CVA L. Parietal lobe  Hx mild cognitive impairment  -continue telemetry  -neurological assessment q2 hr  -seizure precautions  -continue keppra 500 mg BID  -vEEG  -PT/OT  -ophthalmology reportedly dilated patient's eyes yesterday, stating she has \"end stage glaucoma\". Awaiting recommendations on plan for transfer    Above plan discussed with attending,    This note is created using voice recognition software. All efforts are made to minimize errors, if there are errors there due to transcription.    Noemy Kumar DO, PGY3      I saw and evaluated the patient.  I obtained the key portions of the history and examination.  I reviewed the residents/APNs note, discussed the patient and supervised treatment plan formulation.    Subjective:  Reviewed overnight events  Episode of unresponsiveness last night followed by return to baseline  Elevated lactic acid noted on workup which downtrended on it's own.    Objective:  Gen: NAD  Neuro:  --HIF: A&O X 2, repetition and naming intact  --CN:  PERRLA, EOMI, Bilateral Superior VF cut - stable , no visible facial asymmetry, facial sensation intact, no tongue or palatal deviation, SCM intact  --Motor: Moves all 4 extremities equally; no focal deficits  --Sensory: Intact to light touch, intact to pinprick  --Reflex: 2+ symmetric, toes " down  --Cerebellum: FTN and HTS intact  --Gait: Deferred     CT Brain (I personally reviewed the images/tracings with the following interpretation)  Unchanged; no acute changes    Asssessment:   Episode of unresponsiveness  - concern for seizure  - continue Keppra 500 mg BID for now  - cEEG    2.  Bilateral superior altitudinal Visual Field Defect  - apparently, she was seen by Ophthalmology yesterday - no note was left  - discussed case with Dr. Bean - their team will follow up    Pedro Green MD  [facility]  Department of Neurology

## 2024-02-02 NOTE — NURSING NOTE
Patient heart monitor started reading as sinus tachycardia with her heart rate going into the 150's at this time. I went to check on the patient immediately and noticed she was unresponsive and called a brain attack. Patient BP was elevated as well. Patient had EKG, ABG's and lab work done and then she was taken directly to CT. Patient was taken directly from CT to ICU where bed-side report was given.

## 2024-02-02 NOTE — NURSING NOTE
Dr Quiros here examining pt, he dilated pt eyes to look at the back of her eyes. States he will contact Dr Rosales when he is done examining pt. Oncoming nurse Anya vazquez.

## 2024-02-02 NOTE — NURSING NOTE
Dr. Luciano Quiros just did and eye exam on this patient that included dilation of the eyes and when he finished the exam verbalized to me that she has end stage glaucoma and asked that I notify Dr. Rosales and or the NP covering. I did message Dr. Rosales on secure chat and I also put a message out to [employer] via [employer].

## 2024-02-02 NOTE — CONSULTS
Reason For Consult  Unresponsive, altered mentation at rapid response    History Of Present Illness  Fernanda Hughes is a 83 y.o. 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 She is presenting with chief concern of blurry vision. Patient states this began around 1 week ago. She has noticed difficulty walking long distances due to imbalance. She states she lives alone and has no one to help with medical visits or medications. On the side note, she was admitted last January twice to [facility] with similar symptoms of blurry vision, unsteady gate, and dizziness while walking. During those admissions she was found to have a subtle focus in the L. Parietal lobe on MRI which could represent infarct, although did not explain her symptoms. They also did MRI of her orbits which showed subtle enhancement of the optic nerves which could represent optic neuritis. Paraneoplastic panel, syphilis, NÉSTOR, ESR, CRP, B12, TSH were all negative. Her last ophthalmology office visit was on 2/14/23, she was seen by an optometrist Tamara Gamboa who recommend continued Latanoprost, Dorzolamide/Timolol, Brimonidine to treat glaucoma.     Rapid response called on this patient in the early morning of 2/2/24 due to unresponsiveness and altered mental status. Brain attack was called which showed no acute intracranial abnormality. Throughout this hospital admission, she has had multiple CT scans and MRIs, all of them showing no abnormalities. During the rapid, vitals showed /94, , glucose 111, breathing 95% oxygen on room air. There was concern for seizure so rainbow labs ordered, CXR, as well as prolactin level. Lactate 9.4. Neurology was contacted, advised for Keppra load of 2grams, followed by 500mg BID. Upon exam, she is more alert than she was at the rapid. AO x3, she can answer questions, however, it is difficult to  "understand what she is saying. Denies chest pain, abdominal pain, nausea, vomiting, cough, headache, or any recent sick contacts     Past Medical History  She has a past medical history of Anemia, unspecified (12/16/2020), Anemia, unspecified (12/16/2020), Essential (primary) hypertension (12/16/2020), Essential (primary) hypertension (12/16/2020), Gastro-esophageal reflux disease without esophagitis (12/09/2020), and Personal history of other diseases of the nervous system and sense organs (10/26/2021).    Surgical History  She has a past surgical history that includes Other surgical history (10/26/2021).     Social History  She reports that she has never smoked. She has never used smokeless tobacco. No history on file for alcohol use and drug use.    Family History  No family history on file.     Allergies  Patient has no known allergies.    Review of Systems  ROS negative unless stated otherwise in HPI     Physical Exam  General appearance: Well-appearing alert, in no acute distress and well-hydrated, well nourished  Skin: Skin color, texture, turgor normal, no suspicious rashes or lesions  Head: normal  Eyes: Anicteric sclera. Pupils are equally round and reactive to light  Ears: external ears normal  Nose/Sinuses: Negative  Oropharynx: Lips, mucosa, and tongue normal  Neck: Supple, no adenopathy  Lungs: Clear to auscultation, no wheezing or rhonchi  Heart: RRR without murmur, gallop, or rubs.  No ectopy  Abdomen: Soft, non-tender. Bowel sounds normal.  Extremities: No deformity, no edema  Neuro: Alert oriented x3, no focal deficit, finger-to-nose signs negative     Last Recorded Vitals  Blood pressure 170/75, pulse 87, temperature 37 °C (98.6 °F), temperature source Temporal, resp. rate 16, height 1.702 m (5' 7\"), weight 54 kg (119 lb), SpO2 98 %.    Relevant Results  Results for orders placed or performed during the hospital encounter of 01/30/24 (from the past 24 hour(s))   CBC   Result Value Ref Range    WBC " 6.1 4.4 - 11.3 x10*3/uL    nRBC 0.0 0.0 - 0.0 /100 WBCs    RBC 4.22 4.00 - 5.20 x10*6/uL    Hemoglobin 12.4 12.0 - 16.0 g/dL    Hematocrit 38.1 36.0 - 46.0 %    MCV 90 80 - 100 fL    MCH 29.4 26.0 - 34.0 pg    MCHC 32.5 32.0 - 36.0 g/dL    RDW 13.5 11.5 - 14.5 %    Platelets 235 150 - 450 x10*3/uL   Basic metabolic panel   Result Value Ref Range    Glucose 92 74 - 99 mg/dL    Sodium 140 136 - 145 mmol/L    Potassium 3.8 3.5 - 5.3 mmol/L    Chloride 108 (H) 98 - 107 mmol/L    Bicarbonate 25 21 - 32 mmol/L    Anion Gap 11 10 - 20 mmol/L    Urea Nitrogen 17 6 - 23 mg/dL    Creatinine 0.96 0.50 - 1.05 mg/dL    eGFR 58 (L) >60 mL/min/1.73m*2    Calcium 9.2 8.6 - 10.3 mg/dL   Transthoracic Echo (TTE) Complete   Result Value Ref Range    AV pk marquita 1.61 m/s    LV biplane EF 69 %    LVOT diam 1.70 cm    MV E/A ratio 0.57     Tricuspid annular plane systolic excursion 1.8 cm    LA vol index A/L 9.4 ml/m2    RV free wall pk S' 10.10 cm/s    RVSP 26.8 mmHg    LVIDd 3.20 cm    Aortic Valve Area by Continuity of Peak Velocity 2.10 cm2    AV pk grad 10.4 mmHg    LV A4C EF 62.5    POCT GLUCOSE   Result Value Ref Range    POCT Glucose 130 (H) 74 - 99 mg/dL   Blood Gas Arterial Full Panel   Result Value Ref Range    POCT pH, Arterial 7.24 (LL) 7.38 - 7.42 pH    POCT pCO2, Arterial 34 (L) 38 - 42 mm Hg    POCT pO2, Arterial 119 (H) 85 - 95 mm Hg    POCT SO2, Arterial 99 94 - 100 %    POCT Oxy Hemoglobin, Arterial 96.9 94.0 - 98.0 %    POCT Hematocrit Calculated, Arterial 40.0 36.0 - 46.0 %    POCT Sodium, Arterial 138 136 - 145 mmol/L    POCT Potassium, Arterial 3.7 3.5 - 5.3 mmol/L    POCT Chloride, Arterial 108 (H) 98 - 107 mmol/L    POCT Ionized Calcium, Arterial 1.30 1.10 - 1.33 mmol/L    POCT Glucose, Arterial 123 (H) 74 - 99 mg/dL    POCT Lactate, Arterial 9.8 (HH) 0.4 - 2.0 mmol/L    POCT Base Excess, Arterial -11.8 (L) -2.0 - 3.0 mmol/L    POCT HCO3 Calculated, Arterial 14.6 (L) 22.0 - 26.0 mmol/L    POCT Hemoglobin, Arterial  13.2 12.0 - 16.0 g/dL    POCT Anion Gap, Arterial 19 10 - 25 mmo/L    Patient Temperature      FiO2 32 %    Apparatus CANNULA     Flow 3.0 LPM    Critical Called By RT MT     Critical Called To NP RENZO     Critical Call Time 154.0000     Critical Read Back Y    CBC   Result Value Ref Range    WBC 11.1 4.4 - 11.3 x10*3/uL    nRBC 0.0 0.0 - 0.0 /100 WBCs    RBC 4.34 4.00 - 5.20 x10*6/uL    Hemoglobin 12.9 12.0 - 16.0 g/dL    Hematocrit 40.1 36.0 - 46.0 %    MCV 92 80 - 100 fL    MCH 29.7 26.0 - 34.0 pg    MCHC 32.2 32.0 - 36.0 g/dL    RDW 13.5 11.5 - 14.5 %    Platelets 255 150 - 450 x10*3/uL   Comprehensive Metabolic Panel   Result Value Ref Range    Glucose 124 (H) 74 - 99 mg/dL    Sodium 141 136 - 145 mmol/L    Potassium 3.8 3.5 - 5.3 mmol/L    Chloride 108 (H) 98 - 107 mmol/L    Bicarbonate 14 (L) 21 - 32 mmol/L    Anion Gap 23 (H) 10 - 20 mmol/L    Urea Nitrogen 24 (H) 6 - 23 mg/dL    Creatinine 1.08 (H) 0.50 - 1.05 mg/dL    eGFR 50 (L) >60 mL/min/1.73m*2    Calcium 9.2 8.6 - 10.3 mg/dL    Albumin 3.9 3.4 - 5.0 g/dL    Alkaline Phosphatase 80 33 - 136 U/L    Total Protein 5.9 (L) 6.4 - 8.2 g/dL    AST 19 9 - 39 U/L    Bilirubin, Total 0.6 0.0 - 1.2 mg/dL    ALT 18 7 - 45 U/L   Troponin I, High Sensitivity   Result Value Ref Range    Troponin I, High Sensitivity 6 0 - 13 ng/L   Protime-INR   Result Value Ref Range    Protime 11.1 9.8 - 12.8 seconds    INR 1.0 0.9 - 1.1   Magnesium   Result Value Ref Range    Magnesium 1.98 1.60 - 2.40 mg/dL   Lactate   Result Value Ref Range    Lactate 9.4 (HH) 0.4 - 2.0 mmol/L   Creatine Kinase   Result Value Ref Range    Creatine Kinase 38 0 - 215 U/L     XR chest 1 view    Result Date: 2/2/2024  Interpreted By:  Abi Varela, STUDY: XR CHEST 1 VIEW;  2/2/2024 2:23 am   INDICATION: Signs/Symptoms:sob   COMPARISON: Rib series 01/05/2024   ACCESSION NUMBER(S): [id number]   ORDERING CLINICIAN: AVA NEWSOME   TECHNIQUE: Portable upright frontal view of the chest was obtained .    FINDINGS: Monitoring wires and radiopaque densities are overlying the patient.   The cardiomediastinal silhouette is within normal limits.   No focal consolidation, pleural effusion or pneumothorax.         1.  No radiographic evidence of acute cardiopulmonary process.       MACRO: None.   Signed by: Abi Varela 2/2/2024 2:35 AM Dictation workstation:   [id number]    CT brain attack head wo IV contrast    Result Date: 2/2/2024  Interpreted By:  Marina Mullins, STUDY: CT BRAIN ATTACK HEAD WO IV CONTRAST;  2/2/2024 2:03 am   INDICATION: Signs/Symptoms:Change in mental status.   COMPARISON: 01/31/2024 and MRI brain 02/01/2024   ACCESSION NUMBER(S): [id number]   ORDERING CLINICIAN: ROSARIO PAUL   TECHNIQUE: Axial noncontrast CT images of the head.   FINDINGS: BRAIN PARENCHYMA:  Gray-white matter interfaces are preserved. No mass effect or midline shift.   HEMORRHAGE: No acute intracranial hemorrhage. VENTRICLES and EXTRA-AXIAL SPACES: The ventricles and sulci are within normal limits in size for brain volume. No abnormal extraaxial fluid collection. EXTRACRANIAL SOFT TISSUES: Within normal limits. PARANASAL SINUSES/MASTOIDS:  The visualized paranasal sinuses and mastoid air cells are aerated. CALVARIUM: No depressed skull fracture. No destructive osseous lesion.   OTHER FINDINGS: None.       No acute intracranial abnormality.     MACRO: None   Signed by: Marina Mullins 2/2/2024 2:08 AM Dictation workstation:   [id number]    Transthoracic Echo (TTE) Complete    Result Date: 2/1/2024            [facility] [place] [address] [address]    Tel [phone] Fax [phone] TRANSTHORACIC ECHOCARDIOGRAM REPORT  Patient Name:      BABS Will Physician:    [id number] Delta Brunner MD Study Date:        2/1/2024             Ordering Provider:    [id number] JAMEEL PYLE MRN/PID:           [id number]             Fellow:  Accession#:        [id number]         Nurse:                Jie Srivastava RN Date of Birth/Age: 1940 / 83 years Sonographer:          Melba Caballero RDCS Gender:            F                    Additional Staff: Height:            170.18 cm            Admit Date:           1/30/2024 Weight:            53.98 kg             Admission Status:     Inpatient -                                                               Routine BSA:               1.62 m2              Department Location:  [facility] Echo Lab Blood Pressure: 165 /83 mmHg Study Type:    TRANSTHORACIC ECHO (TTE) COMPLETE Diagnosis/ICD: Other cerebral infarction-I63.89 Indication:    Stroke CPT Codes:     Echo Complete w Full Doppler-[id number] Patient History: Pertinent History: TIA. Vision changes. Previous echocardiogram 01- at                    [facility]. Study Detail: The following Echo studies were performed: 2D, M-Mode, Doppler and               color flow. Agitated saline used as a contrast agent for               intraseptal flow evaluation.  PHYSICIAN INTERPRETATION: Left Ventricle: Left ventricular systolic function is normal, with an estimated ejection fraction of 65-70%. There are no regional wall motion abnormalities. The left ventricular cavity size is decreased. There is left ventricular concentric remodeling. Spectral Doppler shows an impaired relaxation pattern of left ventricular diastolic filling. Left Atrium: The left atrium is normal in size. A bubble study using agitated saline was performed. Bubble study is negative. Right Ventricle: The right ventricle is normal in size. There is normal right ventricular global systolic function. Right Atrium: The right atrium is normal in size. Aortic Valve: The aortic valve is trileaflet. There is mild aortic valve cusp calcification. There is mild aortic valve thickening. There is mild to moderate aortic valve regurgitation. The peak instantaneous gradient of the aortic valve is 10.4 mmHg. Mitral  Valve: The mitral valve is mildly thickened. There is trace mitral valve regurgitation. Tricuspid Valve: The tricuspid valve is abnormal. There is mild thickening of the tricuspid valve leaflets. There is trace to mild tricuspid regurgitation. The Doppler estimated RVSP is within normal limits at 26.8 mmHg. Pulmonic Valve: The pulmonic valve is not well visualized. There is trace pulmonic valve regurgitation. Pericardium: There is a trivial pericardial effusion. Aorta: The aortic root is normal. There is no dilatation of the ascending aorta. There is no dilatation of the aortic root. There is plaque visualized in the ascending aorta. Systemic Veins: The inferior vena cava size appears small. There is IVC inspiratory collapse greater than 50%. In comparison to the previous echocardiogram(s): Prior examinations are available and were reviewed for comparison purposes. Compared to previous OSH study from 1/17/2023, similar findings. Agitated saline study was reported to be negative at that time as well.  CONCLUSIONS:  1. Left ventricular systolic function is normal with a 65-70% estimated ejection fraction.  2. Spectral Doppler shows an impaired relaxation pattern of left ventricular diastolic filling.  3. Left ventricular cavity size is decreased.  4. Mild to moderate aortic valve regurgitation.  5. RVSP within normal limits.  6. There is plaque visualized in the ascending aorta.  7. Negative agitated saline study. QUANTITATIVE DATA SUMMARY: 2D MEASUREMENTS:                          Normal Ranges: LAs:           2.30 cm   (2.7-4.0cm) IVSd:          1.13 cm   (0.6-1.1cm) LVPWd:         1.10 cm   (0.6-1.1cm) LVIDd:         3.20 cm   (3.9-5.9cm) LVIDs:         1.90 cm LV Mass Index: 65.7 g/m2 LV % FS        40.6 % LA VOLUME:                               Normal Ranges: LA Vol A4C:        13.0 ml    (22+/-6mL/m2) LA Vol A2C:        17.0 ml LA Vol BP:         15.2 ml LA Vol Index A4C:  8.0ml/m2 LA Vol Index A2C:  10.5 ml/m2  LA Vol Index BP:   9.4 ml/m2 LA Area A4C:       8.4 cm2 LA Area A2C:       9.8 cm2 LA Major Axis A4C: 4.6 cm LA Major Axis A2C: 4.8 cm LA Volume Index:   10.5 ml/m2 M-MODE MEASUREMENTS:                  Normal Ranges: Ao Root: 2.90 cm (2.0-3.7cm) AORTA MEASUREMENTS:                      Normal Ranges: Ao Sinus, d: 2.90 cm (2.1-3.5cm) Ao STJ, d:   2.50 cm (1.7-3.4cm) Asc Ao, d:   3.20 cm (2.1-3.4cm) LV SYSTOLIC FUNCTION BY 2D PLANIMETRY (MOD):                     Normal Ranges: EF-A4C View: 62.5 % (>=55%) EF-A2C View: 70.6 % EF-Biplane:  68.8 % LV DIASTOLIC FUNCTION:                        Normal Ranges: MV Peak E:    0.53 m/s (0.7-1.2 m/s) MV Peak A:    0.92 m/s (0.42-0.7 m/s) E/A Ratio:    0.57     (1.0-2.2) MV lateral e' 0.05 m/s MV medial e'  0.05 m/s MITRAL VALVE:                      Normal Ranges: MV Vmax:    0.85 m/s (<=1.3m/s) MV peak P.9 mmHg (<5mmHg) MV mean P.0 mmHg (<48mmHg) MV DT:      195 msec (150-240msec) AORTIC VALVE:                          Normal Ranges: AoV Vmax:      1.61 m/s  (<=1.7m/s) AoV Peak PG:   10.4 mmHg (<20mmHg) LVOT Max Flaco:  1.49 m/s  (<=1.1m/s) LVOT VTI:      24.80 cm LVOT Diameter: 1.70 cm   (1.8-2.4cm) AoV Area,Vmax: 2.10 cm2  (2.5-4.5cm2)  RIGHT VENTRICLE: RV Basal 2.60 cm RV Mid   2.70 cm RV Major 6.2 cm TAPSE:   17.5 mm RV s'    0.10 m/s TRICUSPID VALVE/RVSP:                             Normal Ranges: Peak TR Velocity: 2.44 m/s RV Syst Pressure: 26.8 mmHg (< 30mmHg) PULMONIC VALVE:                         Normal Ranges: PV Accel Time: 87 msec  (>120ms) PV Max Flaco:    1.0 m/s  (0.6-0.9m/s) PV Max PG:     3.8 mmHg  [id number] Delta Brunner MD Electronically signed on 2024 at 7:04:41 PM  ** Final **     MR brain w IV contrast    Result Date: 2024  Interpreted By:  Mariluz Joseph,  and Nahum Diaz STUDY: MR ORBIT W IV CONTRAST; MR BRAIN W IV CONTRAST;  2024 1:01 pm   INDICATION: Signs/Symptoms:visual field deficits.   COMPARISON: MRI brain and MRI orbits without  contrast 01/31/2024   ACCESSION NUMBER(S): [id number]; [id number]   ORDERING CLINICIAN: DONNIE GOODWIN   TECHNIQUE: T1, T2, and postcontrast images of the brain were obtained. High resolution coronal T1 and STIR images of the orbits were obtained. Following administration of 10 mL Dotarem gadolinium based intravenous contrast, post contrast T1 images of the brain and high resolution fat-suppressed axial and coronal T1 images of the orbits were acquired.   FINDINGS: CSF Spaces: The ventricles, sulci and basal cisterns are slightly prominent likely due to parenchymal volume loss.   Parenchyma: Scattered T2 hyperintensities throughout the white matter, nonspecific, but most likely secondary to chronic small-vessel ischemic changes. There is no focal parenchymal signal abnormality.  No abnormal parenchymal enhancement is identified. There is no mass effect or midline shift.   Orbits: There is no intraorbital mass.  The optic nerves and optic chiasm are unremarkable.  The extraocular muscles are unremarkable. The lacrimal glands are within normal limits.  Bilateral lens replacements   Paranasal Sinuses and Mastoids: Visualized paranasal sinuses and mastoid air cells are unremarkable.   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 Questionable right supraclinoid ICA aneurysm versus infundibulum is better characterized on previous CTA. 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       1.  No acute intracranial pathology within the brain. No lesion along the expected course of the optic apparatus. Moderate nonspecific white matter changes and parenchymal volume loss. 2.  Unremarkable postcontrast MRI of the orbits. 3. 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 Questionable aneurysm versus outpouching arising from the right supraclinoid ICA again noted.   MACRO: None   Signed by: Mariluz Joseph 2/1/2024 2:24 PM Dictation workstation:    [id number]    MR orbit w IV contrast    Result Date: 2/1/2024  Interpreted By:  Mariluz Joseph and Hofer Lindsay STUDY: MR ORBIT W IV CONTRAST; MR BRAIN W IV CONTRAST;  2/1/2024 1:01 pm   INDICATION: Signs/Symptoms:visual field deficits.   COMPARISON: MRI brain and MRI orbits without contrast 01/31/2024   ACCESSION NUMBER(S): [id number]; [id number]   ORDERING CLINICIAN: DONNIE GOODWIN   TECHNIQUE: T1, T2, and postcontrast images of the brain were obtained. High resolution coronal T1 and STIR images of the orbits were obtained. Following administration of 10 mL Dotarem gadolinium based intravenous contrast, post contrast T1 images of the brain and high resolution fat-suppressed axial and coronal T1 images of the orbits were acquired.   FINDINGS: CSF Spaces: The ventricles, sulci and basal cisterns are slightly prominent likely due to parenchymal volume loss.   Parenchyma: Scattered T2 hyperintensities throughout the white matter, nonspecific, but most likely secondary to chronic small-vessel ischemic changes. There is no focal parenchymal signal abnormality.  No abnormal parenchymal enhancement is identified. There is no mass effect or midline shift.   Orbits: There is no intraorbital mass.  The optic nerves and optic chiasm are unremarkable.  The extraocular muscles are unremarkable. The lacrimal glands are within normal limits.  Bilateral lens replacements   Paranasal Sinuses and Mastoids: Visualized paranasal sinuses and mastoid air cells are unremarkable.   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 Questionable right supraclinoid ICA aneurysm versus infundibulum is better characterized on previous CTA. 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       1.  No acute intracranial pathology within the brain. No lesion along the expected course of the optic apparatus. Moderate nonspecific white matter changes and parenchymal volume loss. 2.  Unremarkable postcontrast MRI of  the orbits. 3. 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 Questionable aneurysm versus outpouching arising from the right supraclinoid ICA again noted.   MACRO: None   Signed by: Mariluz Joseph 2/1/2024 2:24 PM Dictation workstation:   [id number]    ECG 12 lead    Result Date: 2/1/2024  Sinus rhythm ST abnormality, possible digitalis effect Abnormal ECG When compared with ECG of 05-JAN-2024 13:18, OR interval has increased Confirmed by Yg Robertson ([id number]) on 2/1/2024 8:37:49 AM    MR brain wo IV contrast    Result Date: 2/1/2024  Interpreted By:  Lashaun Nixon, STUDY: MR BRAIN WO IV CONTRAST; MR ORBIT WO IV CONTRAST;  1/31/2024 11:36 pm   INDICATION: Signs/Symptoms:vision changes.   COMPARISON: None.   ACCESSION NUMBER(S): [id number]; [id number]   ORDERING CLINICIAN: DUSTIN ARAGON   TECHNIQUE: Diffusion, T2, FLAIR, and T1 weighted MR images of the brain were obtained.  High resolution coronal T1 and STIR images of the orbits were obtained.   FINDINGS: CSF Spaces: The ventricles, sulci and basal cisterns enlarged, concordant with parenchymal volume loss.   Parenchyma: There is no diffusion restriction abnormality to suggest acute infarct. Mild-to-moderate generalized parenchymal volume loss. Mild nonspecific white matter changes probably. Ventricular and the fernandez. No abnormal parenchymal enhancement is identified.  There is no mass effect or midline shift.   Orbits: Postsurgical changes of lenses. There is no intraorbital mass.  The optic nerves and optic chiasm are unremarkable.  The extraocular muscles are unremarkable.  The lacrimal glands are within normal limits.   Paranasal Sinuses and Mastoids: Mild mucosal thickening of the air cells. Visualized paranasal sinuses and mastoid air cells are unremarkable.       1.  No restricted diffusion, mass effect or midline shift. 2. Moderate non-specific white changes and parenchymal volume loss. 3.  Unremarkable MRI of the orbits  without abnormal signal mass identified..   MACRO: None   Signed by: Lashaun Nixon 2/1/2024 1:04 AM Dictation workstation:   [id number]    MR orbit wo IV contrast    Result Date: 2/1/2024  Interpreted By:  Lashaun Nixon, STUDY: MR BRAIN WO IV CONTRAST; MR ORBIT WO IV CONTRAST;  1/31/2024 11:36 pm   INDICATION: Signs/Symptoms:vision changes.   COMPARISON: None.   ACCESSION NUMBER(S): [id number]; [id number]   ORDERING CLINICIAN: DUSTIN ARAGON   TECHNIQUE: Diffusion, T2, FLAIR, and T1 weighted MR images of the brain were obtained.  High resolution coronal T1 and STIR images of the orbits were obtained.   FINDINGS: CSF Spaces: The ventricles, sulci and basal cisterns enlarged, concordant with parenchymal volume loss.   Parenchyma: There is no diffusion restriction abnormality to suggest acute infarct. Mild-to-moderate generalized parenchymal volume loss. Mild nonspecific white matter changes probably. Ventricular and the fernandez. No abnormal parenchymal enhancement is identified.  There is no mass effect or midline shift.   Orbits: Postsurgical changes of lenses. There is no intraorbital mass.  The optic nerves and optic chiasm are unremarkable.  The extraocular muscles are unremarkable.  The lacrimal glands are within normal limits.   Paranasal Sinuses and Mastoids: Mild mucosal thickening of the air cells. Visualized paranasal sinuses and mastoid air cells are unremarkable.       1.  No restricted diffusion, mass effect or midline shift. 2. Moderate non-specific white changes and parenchymal volume loss. 3.  Unremarkable MRI of the orbits without abnormal signal mass identified..   MACRO: None   Signed by: Lashaun Nixon 2/1/2024 1:04 AM Dictation workstation:   [id number]    CT angio head and neck w and wo IV contrast    Result Date: 1/31/2024  Interpreted By:  Leandra Ornelas, STUDY: CT ANGIO HEAD AND NECK W AND WO IV CONTRAST;  1/31/2024 6:43 pm   INDICATION: Signs/Symptoms:Vision changes r/o CVA.    COMPARISON: Noncontrast CT head from 01/31/2024. MR angiogram of head and neck from 01/10/2019   ACCESSION NUMBER(S): [id number]   ORDERING CLINICIAN: FELIPE ZAZUETA   TECHNIQUE: Unenhanced CT images of the head were obtained. Subsequently, contrast was administered intravenously and axial images of the head and neck were acquired.  Coronal, sagittal, and 3-D reconstructions were provided for review.   FINDINGS:     CTA HEAD FINDINGS:   Anterior circulation: The bilateral intracranial internal carotid arteries, bilateral carotid terminals, bilateral proximal anterior and middle cerebral arteries are normal. There is a 2 mm outpouching arising from the communicating segment of the right internal carotid artery which may reflect an infundibulum, a definite posterior communicating artery is not seen arising from this region.   Posterior circulation: Bilateral intracranial vertebral arteries, vertebrobasilar junction, basilar artery and proximal posterior cerebral arteries are normal.   CTA NECK FINDINGS:   Right carotid vessels: The common carotid artery is normal. The carotid bifurcation is normal. The internal carotid artery in the neck is normal. There is 0% stenosis  .   Left carotid vessels: The common carotid artery is normal. The carotid bifurcation is normal. The internal carotid artery in the neck is normal. There is 0% stenosis  .   Vertebral vessels:  The visualized segments of the cervical vertebral arteries are normal in caliber.   There is bilateral apical pleural thickening and scarring. There are multilevel degenerative changes of the cervical spine. A prominent posterior disc osteophyte complex is noted at C4-C5.       No evidence of major vessel cut off or hemodynamically significant stenosis on CT angiogram of head and neck.   Tiny 2 mm outpouching arising from the posterior aspect of communicating segment of right internal carotid artery probably reflects an infundibulum although a definite  posterior communicating artery is not seen arising from this region. The possibility of a small aneurysm can not be entirely excluded.   MACRO: None   Signed by: Leandra Ornelas 1/31/2024 7:07 PM Dictation workstation:   [id number]    CT head wo IV contrast    Result Date: 1/31/2024  Interpreted By:  Marina Mullins, STUDY: CT HEAD WO IV CONTRAST;  1/31/2024 6:18 pm   INDICATION: Signs/Symptoms:AMS.   COMPARISON: 01/30/2024   ACCESSION NUMBER(S): [id number]   ORDERING CLINICIAN: KEEGAN CROOK   TECHNIQUE: Axial noncontrast CT images of the head.   FINDINGS: BRAIN PARENCHYMA:  Gray-white matter interfaces are preserved. No mass effect or midline shift. There is fullness of the pituitary, which fills the sella, however, does not extend above the sella.   HEMORRHAGE: No acute intracranial hemorrhage. VENTRICLES and EXTRA-AXIAL SPACES: The ventricles and sulci are within normal limits in size for brain volume. No abnormal extraaxial fluid collection. EXTRACRANIAL SOFT TISSUES: Within normal limits. PARANASAL SINUSES/MASTOIDS:  The visualized paranasal sinuses and mastoid air cells are aerated. CALVARIUM: No depressed skull fracture. No destructive osseous lesion.   OTHER FINDINGS: None.       No acute intracranial abnormality.     MACRO: None   Signed by: Marina Mullins 1/31/2024 6:29 PM Dictation workstation:   [id number]    CT head wo IV contrast    Result Date: 1/30/2024  Interpreted By:  Michi Jackson, STUDY: CT HEAD WO IV CONTRAST;  1/30/2024 10:27 am   INDICATION: Signs/Symptoms:acute bilateral visual field defecits.   COMPARISON: CT head dated 09/16/2019.   ACCESSION NUMBER(S): [id number]   ORDERING CLINICIAN: LAILA GALLOWAY   TECHNIQUE: Noncontrast axial CT scan of head was performed.   FINDINGS: Parenchyma: There is no acute intracranial hemorrhage. The grey-white differentiation is intact. There is no mass effect or midline shift. Mild chronic small vessel ischemic disease suggested. Atherosclerotic  calcification of the carotid siphons bilaterally. No CT evidence of sellar/suprasellar mass.   CSF Spaces: The ventricles, sulci and basal cisterns are within normal limits for age with generalized brain atrophy.   Extra-Axial Fluid: There is no extraaxial fluid collection.   Calvarium: The calvarium is unremarkable.   Paranasal sinuses: Secretions within a posterior right ethmoidal air cell.   Mastoids: Clear.   Orbits: Bilateral native lens extractions. The visualized orbits are otherwise unremarkable.   Soft tissues: Unremarkable.       No acute intracranial hemorrhage, mass effect, or CT apparent acute infarct. No evidence of sellar/suprasellar mass on this routine CT assessment.   Mild chronic small vessel ischemic disease with generalized brain atrophy.   MACRO: None   Signed by: Michi Jackson 1/30/2024 10:58 AM Dictation workstation:   [id number]    ECG 12 lead    Result Date: 1/27/2024  Normal sinus rhythm Normal ECG When compared with ECG of 04-DEC-2020 09:07, No significant change was found Confirmed by Delta Brunner ([id number]) on 1/27/2024 8:47:20 PM    XR ribs right 2 views w chest pa or ap    Result Date: 1/5/2024  STUDY: Right Rib and Chest Radiographs; 01/05/2024 11:54 AM INDICATION: Right rib pain. COMPARISON: 12/4/2020 XR Chest. ACCESSION NUMBER(S): [id number] ORDERING CLINICIAN: OBED SANDERS TECHNIQUE:  Frontal chest and two view(s) (four images) of the right ribs. FINDINGS:  CARDIOMEDIASTINAL SILHOUETTE: Cardiomediastinal silhouette is normal in size and configuration.  LUNGS: Lungs are clear.  ABDOMEN: No remarkable upper abdominal findings. RIGHT RIBS: There is no acute rib fracture.  OTHER VISUALIZED BONES: No acute osseous changes.    No acute process. 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   CT Brain attack with no acute abnormality  Altered mentation during rapid, possible post-ictal  Keppra loaded with 2g, continue 500mg BID  vEEG ordered  Neuro checks every hour  History of End stage glaucoma  Continue home glaucoma medications    Cardiovascular:   Monitor hemodynamics, Keep MAP > 65   Echo 1/31/24 showing normal LVSF with EF 65-70%  EKG at rapid showing sinus tach with occasional PVCs  Labetalol 10mg given for tachycardia, one time dose    Respiratory:   CXR ordered during rapid response, negative  Keep goal SpO2 > 92%    Gastrointestinal:   No acute issues, no evidence of transaminitis  Continue Miralax 17g daily  Diet: Regular    Renal/Genitourinary:   ABG showing metabolic acidosis, likely from elevated lactate  Kidney function normal until rapid, now PEMA, likely pre-renal  Lactate 9.4  BUN 24/Cr 1.08  I L IVF LR given, anticipate improvement in both lactate and creatinine  I&O per ICU protocol.     FEN: Electrolytes: Replete per ICU protocol    Hematologic:   Hb/Hct stable.   Transfuse for Hb < 7  DVT ppx: Heparin    Endocrine:   Blood glucose goal <180.   No acute issues    Infectious Disease:   No signs of active infection, no leukocytosis  Not currently on antibiotics      Lines: PIV    Code Status: Full     Disposition: 83 year old female admitted to the ICU for altered mentation and unresponsiveness. Anticipate 1 midnight in the ICU      To be staffed with Dr. Bean,   Charles Muller MD  Internal Medicine, PGY-3

## 2024-02-02 NOTE — CARE PLAN
The patient's goals for the shift include  none stated    The clinical goals for the shift include See plan of care    Over the shift, the patient did not make progress toward the following goals. Barriers to progression include impulsiveness/anxious. Recommendations to address these barriers include frequent reminders to use call light for all assistance to prevent falls/injuries while inpatient and explain before performing interventions.

## 2024-02-02 NOTE — SIGNIFICANT EVENT
BAT/rapid response was called at approximately 0140 by bedside nurse due to lack of responsiveness and altered mental status.  The patient was up walking and talking at approximately midnight, but then there was a brief period of tachycardia with rates in the 130s for about 20-30 seconds.  Bedside nurse entered the room and the patient was difficult to arouse and called rapid response.  Initial vitals: /94, , SpO2 95% on room air.  The patient was placed on 3 L cannula.  On exam, the patient is not arousable to verbal or noxious stimuli, heart was tachycardic but without audible murmurs or rubs, lungs significant for coarse breath sounds throughout, abdomen was soft without guarding, no swelling in the lower extremities.  NIHSS score of 25, but difficult to assess. CBC, CMP, troponin, lactate, prolactin, CK, PT/INR, ABG, EKG, CT head, CXR all ordered.  CBC was unremarkable without leukocytosis or anemia.  CMP showed glucose 124, bicarb 14, anion gap 23, BUN 24, creatinine 1.08, total protein 5.9.  Troponin negative.  Lactate 9.4.  CK 38.  PT/INR WNL.  ABG on NC showed pH 7.24, pCO2 34, pO2 119.  EKG showed sinus tachycardia with occasional PVCs, rate 105, AR interval 116, QTc 423.  CT head showed no acute intracranial abnormality.  CXR showed no radiographic evidence of acute cardiopulmonary process.  1 L LR ordered.  Neurology, Dr. Green, was contacted and recommended Keppra loading.  Critical care, Dr. Bean, was contacted regarding possible transfer for the patient to ICU for every 1 hr neurochecks, and she accepted the patient.  After head scan, patient became more alert and responsive.  Patient remained lethargic and seemingly postictal, but was able to appropriate follow commands and answer orientation questions.  Repeat NIHSS 5.  Prolactin still pending.    Dr. Washington present for entirety of rapid response.  -Michi Ty, DO PGY-1

## 2024-02-02 NOTE — PROGRESS NOTES
Spiritual Care Visit    Clinical Encounter Type  Visited With: Patient  Routine Visit: Introduction  Continue Visiting: No                                            Taxonomy  Intended Effects: Promote sense of peace, Preserve dignity and respect, Helping someone feel comforted, Hilaria affirmation  Methods: Offer spiritual/Gnosticism support  Interventions: Share words of hope and inspiration, [place]     was a supportive presence.   prayed at the patient's request.

## 2024-02-03 LAB
ANION GAP SERPL CALC-SCNC: 12 MMOL/L (ref 10–20)
ATRIAL RATE: 80 BPM
BUN SERPL-MCNC: 17 MG/DL (ref 6–23)
CALCIUM SERPL-MCNC: 8.9 MG/DL (ref 8.6–10.3)
CHLORIDE SERPL-SCNC: 109 MMOL/L (ref 98–107)
CO2 SERPL-SCNC: 24 MMOL/L (ref 21–32)
CREAT SERPL-MCNC: 0.88 MG/DL (ref 0.5–1.05)
EGFRCR SERPLBLD CKD-EPI 2021: 64 ML/MIN/1.73M*2
ERYTHROCYTE [DISTWIDTH] IN BLOOD BY AUTOMATED COUNT: 13.9 % (ref 11.5–14.5)
GLUCOSE BLD MANUAL STRIP-MCNC: 108 MG/DL (ref 74–99)
GLUCOSE SERPL-MCNC: 90 MG/DL (ref 74–99)
HCT VFR BLD AUTO: 37.3 % (ref 36–46)
HGB BLD-MCNC: 12.4 G/DL (ref 12–16)
MCH RBC QN AUTO: 30.1 PG (ref 26–34)
MCHC RBC AUTO-ENTMCNC: 33.2 G/DL (ref 32–36)
MCV RBC AUTO: 91 FL (ref 80–100)
NRBC BLD-RTO: 0 /100 WBCS (ref 0–0)
P AXIS: 59 DEGREES
P OFFSET: 201 MS
P ONSET: 157 MS
PLATELET # BLD AUTO: 215 X10*3/UL (ref 150–450)
POTASSIUM SERPL-SCNC: 3.5 MMOL/L (ref 3.5–5.3)
PR INTERVAL: 120 MS
Q ONSET: 217 MS
QRS COUNT: 13 BEATS
QRS DURATION: 86 MS
QT INTERVAL: 382 MS
QTC CALCULATION(BAZETT): 440 MS
QTC FREDERICIA: 420 MS
R AXIS: 77 DEGREES
RBC # BLD AUTO: 4.12 X10*6/UL (ref 4–5.2)
SODIUM SERPL-SCNC: 141 MMOL/L (ref 136–145)
T AXIS: 74 DEGREES
T OFFSET: 408 MS
VENTRICULAR RATE: 80 BPM
WBC # BLD AUTO: 6.8 X10*3/UL (ref 4.4–11.3)

## 2024-02-03 PROCEDURE — 99232 SBSQ HOSP IP/OBS MODERATE 35: CPT | Performed by: PSYCHIATRY & NEUROLOGY

## 2024-02-03 PROCEDURE — 2500000001 HC RX 250 WO HCPCS SELF ADMINISTERED DRUGS (ALT 637 FOR MEDICARE OP): Performed by: NURSE PRACTITIONER

## 2024-02-03 PROCEDURE — 82947 ASSAY GLUCOSE BLOOD QUANT: CPT

## 2024-02-03 PROCEDURE — 80048 BASIC METABOLIC PNL TOTAL CA: CPT

## 2024-02-03 PROCEDURE — 2500000004 HC RX 250 GENERAL PHARMACY W/ HCPCS (ALT 636 FOR OP/ED)

## 2024-02-03 PROCEDURE — 85027 COMPLETE CBC AUTOMATED: CPT

## 2024-02-03 PROCEDURE — 36415 COLL VENOUS BLD VENIPUNCTURE: CPT

## 2024-02-03 PROCEDURE — 95720 EEG PHY/QHP EA INCR W/VEEG: CPT | Performed by: PSYCHIATRY & NEUROLOGY

## 2024-02-03 PROCEDURE — 95700 EEG CONT REC W/VID EEG TECH: CPT

## 2024-02-03 PROCEDURE — 95714 VEEG EA 12-26 HR UNMNTR: CPT

## 2024-02-03 PROCEDURE — 2500000001 HC RX 250 WO HCPCS SELF ADMINISTERED DRUGS (ALT 637 FOR MEDICARE OP)

## 2024-02-03 PROCEDURE — 2060000001 HC INTERMEDIATE ICU ROOM DAILY

## 2024-02-03 RX ORDER — LEVETIRACETAM 500 MG/1
500 TABLET ORAL 2 TIMES DAILY
Status: DISCONTINUED | OUTPATIENT
Start: 2024-02-03 | End: 2024-02-10 | Stop reason: HOSPADM

## 2024-02-03 RX ADMIN — HEPARIN SODIUM 5000 UNITS: 5000 INJECTION INTRAVENOUS; SUBCUTANEOUS at 15:38

## 2024-02-03 RX ADMIN — LEVETIRACETAM 500 MG: 5 INJECTION INTRAVENOUS at 15:38

## 2024-02-03 RX ADMIN — LEVETIRACETAM 500 MG: 5 INJECTION INTRAVENOUS at 01:55

## 2024-02-03 RX ADMIN — HEPARIN SODIUM 5000 UNITS: 5000 INJECTION INTRAVENOUS; SUBCUTANEOUS at 21:47

## 2024-02-03 RX ADMIN — LEVETIRACETAM 500 MG: 500 TABLET, FILM COATED ORAL at 21:45

## 2024-02-03 RX ADMIN — LATANOPROST 1 DROP: 50 SOLUTION OPHTHALMIC at 21:47

## 2024-02-03 RX ADMIN — HEPARIN SODIUM 5000 UNITS: 5000 INJECTION INTRAVENOUS; SUBCUTANEOUS at 05:59

## 2024-02-03 ASSESSMENT — PAIN SCALES - GENERAL
PAINLEVEL_OUTOF10: 0 - NO PAIN

## 2024-02-03 ASSESSMENT — PAIN - FUNCTIONAL ASSESSMENT
PAIN_FUNCTIONAL_ASSESSMENT: 0-10

## 2024-02-03 NOTE — PROGRESS NOTES
"Fernanda Hughes is a 83 y.o. female on day 4 of admission presenting with Vision changes.      Subjective   No overnight events    Denies headaches  No seizure like episodes       Objective     Last Recorded Vitals  Blood pressure 154/75, pulse 90, temperature 36.3 °C (97.3 °F), temperature source Temporal, resp. rate 20, height 1.702 m (5' 7\"), weight 51.5 kg (113 lb 8.6 oz), SpO2 99 %.    Physical Exam  Neurological Exam    Gen: NAD  Neuro:  --HIF: A&O X 3, repetition and naming intact  --CN:  PERRLA, EOMI, Bilaeral superior VF defect, no visible facial asymmetry, facial sensation intact, no tongue or palatal deviation, SCM intact  --Motor: Moves all 4 extremities equally; no focal deficits  --Sensory: Intact to light touch, intact to pinprick  --Reflex: 2+ symmetric, toes down  --Cerebellum: FTN and HTS intact  --Gait: Deferred     Relevant Results        NIH Stroke Scale  1A. Level of Consciousness: Alert, Keenly Responsive  1B. Ask Month and Age: Both Questions Right  1C. Blink Eyes & Squeeze Hands: Performs Both Tasks  2. Best Gaze: Normal  3. Visual: Partial Hemianopia  4. Facial Palsy: Normal Symmetrical Movements  5A. Motor - Left Arm: No Drift  5B. Motor - Right Arm: No Drift  6A. Motor - Left Leg: No Drift  6B. Motor - Right Leg: No Drift  7. Limb Ataxia: Absent  8. Sensory Loss: Normal  9. Best Language: No Aphasia  10. Dysarthria: Normal  11. Extinction and Inattention: No Abnormality  NIH Stroke Scale: 1           [place] Coma Scale  Best Eye Response: Spontaneous  Best Verbal Response: Confused  Best Motor Response: Follows commands  Nicolasa Coma Scale Score: 14           cEEG:  Mild encephalopathy; no epileptiform discharges                  Assessment/Plan    Unresponsive Episode  - ddx includes seizure  - will continue Keppra 500 mg BID for now  - if no further events, will wean off as an outpatient    2.  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

## 2024-02-03 NOTE — CARE PLAN
The patient's goals for the shift include  patient will be free from falls    The clinical goals for the shift include neuro status stays intact      Problem: Pain  Goal: My pain/discomfort is manageable  Outcome: Progressing     Problem: Psychosocial Needs  Goal: Demonstrates ability to cope with hospitalization/illness  Outcome: Progressing  Goal: Collaborate with me, my family, and caregiver to identify my specific goals  Outcome: Progressing     Problem: Discharge Barriers  Goal: My discharge needs are met  Outcome: Progressing     Problem: Fall/Injury  Goal: Verbalize understanding of personal risk factors for fall in the hospital  Outcome: Progressing  Goal: Verbalize understanding of risk factor reduction measures to prevent injury from fall in the home  Outcome: Progressing  Goal: Use assistive devices by end of the shift  Outcome: Progressing  Goal: Pace activities to prevent fatigue by end of the shift  Outcome: Progressing     Problem: Med Adherence  Goal: CONSISTENTLY TAKE MEDICATIONS AS PRESCRIBED  Outcome: Progressing     Problem: Dressings Lower Extremities  Goal: STG - Patient to complete lower body dressing MOD I  Outcome: Progressing     Problem: Grooming  Goal: STG - Patient completes grooming MOD I  Outcome: Progressing  Goal: STG - Patient will tolerate standing 4-8 min  Outcome: Progressing     Problem: Skin  Goal: Prevent/manage excess moisture  Outcome: Progressing  Flowsheets (Taken 2/3/2024 0601)  Prevent/manage excess moisture: Moisturize dry skin  Goal: Prevent/minimize sheer/friction injuries  Outcome: Progressing  Flowsheets (Taken 2/3/2024 0601)  Prevent/minimize sheer/friction injuries: Use pull sheet  Goal: Promote/optimize nutrition  Outcome: Progressing  Flowsheets (Taken 2/3/2024 0601)  Promote/optimize nutrition: Consume > 50% meals/supplements

## 2024-02-03 NOTE — NURSING NOTE
Patient pulled EEG wires off on night shift.  Night shift nurse Nicolasa placed call to on call EEG tech.

## 2024-02-03 NOTE — CARE PLAN
Spoke with patient's POA Deepali and updated on plan of care. She is aware  that we need PT/OT to reevaluate patient still.  all questions answered.  informed her that case management will be involved with discharge planning and will be speaking with her because she is the POA.

## 2024-02-03 NOTE — CARE PLAN
The patient's goals for the shift include  to go home    The clinical goals for the shift include To be free from seizures by 1900 on 2/3/24.

## 2024-02-04 LAB
ANION GAP SERPL CALC-SCNC: 11 MMOL/L (ref 10–20)
BUN SERPL-MCNC: 17 MG/DL (ref 6–23)
CALCIUM SERPL-MCNC: 9.3 MG/DL (ref 8.6–10.3)
CHLORIDE SERPL-SCNC: 107 MMOL/L (ref 98–107)
CO2 SERPL-SCNC: 26 MMOL/L (ref 21–32)
CREAT SERPL-MCNC: 0.85 MG/DL (ref 0.5–1.05)
EGFRCR SERPLBLD CKD-EPI 2021: 67 ML/MIN/1.73M*2
ERYTHROCYTE [DISTWIDTH] IN BLOOD BY AUTOMATED COUNT: 13.9 % (ref 11.5–14.5)
GLUCOSE SERPL-MCNC: 89 MG/DL (ref 74–99)
HCT VFR BLD AUTO: 36.8 % (ref 36–46)
HGB BLD-MCNC: 11.6 G/DL (ref 12–16)
MCH RBC QN AUTO: 29 PG (ref 26–34)
MCHC RBC AUTO-ENTMCNC: 31.5 G/DL (ref 32–36)
MCV RBC AUTO: 92 FL (ref 80–100)
NRBC BLD-RTO: 0 /100 WBCS (ref 0–0)
PLATELET # BLD AUTO: 200 X10*3/UL (ref 150–450)
POTASSIUM SERPL-SCNC: 3.8 MMOL/L (ref 3.5–5.3)
RBC # BLD AUTO: 4 X10*6/UL (ref 4–5.2)
SODIUM SERPL-SCNC: 140 MMOL/L (ref 136–145)
WBC # BLD AUTO: 6.4 X10*3/UL (ref 4.4–11.3)

## 2024-02-04 PROCEDURE — 85027 COMPLETE CBC AUTOMATED: CPT

## 2024-02-04 PROCEDURE — 2500000001 HC RX 250 WO HCPCS SELF ADMINISTERED DRUGS (ALT 637 FOR MEDICARE OP): Performed by: NURSE PRACTITIONER

## 2024-02-04 PROCEDURE — 2500000004 HC RX 250 GENERAL PHARMACY W/ HCPCS (ALT 636 FOR OP/ED)

## 2024-02-04 PROCEDURE — 80048 BASIC METABOLIC PNL TOTAL CA: CPT

## 2024-02-04 PROCEDURE — 36415 COLL VENOUS BLD VENIPUNCTURE: CPT

## 2024-02-04 PROCEDURE — 1200000002 HC GENERAL ROOM WITH TELEMETRY DAILY

## 2024-02-04 RX ADMIN — LEVETIRACETAM 500 MG: 500 TABLET, FILM COATED ORAL at 08:46

## 2024-02-04 RX ADMIN — POLYETHYLENE GLYCOL 3350 17 G: 17 POWDER, FOR SOLUTION ORAL at 08:46

## 2024-02-04 RX ADMIN — HEPARIN SODIUM 5000 UNITS: 5000 INJECTION INTRAVENOUS; SUBCUTANEOUS at 06:10

## 2024-02-04 RX ADMIN — HEPARIN SODIUM 5000 UNITS: 5000 INJECTION INTRAVENOUS; SUBCUTANEOUS at 23:11

## 2024-02-04 RX ADMIN — HEPARIN SODIUM 5000 UNITS: 5000 INJECTION INTRAVENOUS; SUBCUTANEOUS at 15:44

## 2024-02-04 RX ADMIN — LATANOPROST 1 DROP: 50 SOLUTION OPHTHALMIC at 20:00

## 2024-02-04 RX ADMIN — LEVETIRACETAM 500 MG: 500 TABLET, FILM COATED ORAL at 20:00

## 2024-02-04 ASSESSMENT — COGNITIVE AND FUNCTIONAL STATUS - GENERAL
MOBILITY SCORE: 24
DAILY ACTIVITIY SCORE: 24

## 2024-02-04 ASSESSMENT — PAIN SCALES - GENERAL
PAINLEVEL_OUTOF10: 0 - NO PAIN

## 2024-02-04 ASSESSMENT — PAIN - FUNCTIONAL ASSESSMENT
PAIN_FUNCTIONAL_ASSESSMENT: 0-10

## 2024-02-04 NOTE — CARE PLAN
The patient's goals for the shift include  'i dont know  The clinical goals for the shift include to be hds and not fall by 0700 0n 2/4/23

## 2024-02-04 NOTE — CARE PLAN
The patient's goals for the shift include      The clinical goals for the shift include To be free from seizures by 1900 on 2/3/24.    Problem: Pain  Goal: My pain/discomfort is manageable  Outcome: Progressing     Problem: Psychosocial Needs  Goal: Demonstrates ability to cope with hospitalization/illness  Outcome: Progressing  Goal: Collaborate with me, my family, and caregiver to identify my specific goals  Outcome: Progressing     Problem: Discharge Barriers  Goal: My discharge needs are met  Outcome: Progressing     Problem: Fall/Injury  Goal: Verbalize understanding of personal risk factors for fall in the hospital  Outcome: Progressing  Goal: Verbalize understanding of risk factor reduction measures to prevent injury from fall in the home  Outcome: Progressing  Goal: Use assistive devices by end of the shift  Outcome: Progressing  Goal: Pace activities to prevent fatigue by end of the shift  Outcome: Progressing

## 2024-02-04 NOTE — NURSING NOTE
Patient remains impulsive and non-compliant in ones care.  Patient removed EEG wires at change shift.  Patient does not use call light appropriately; Patient reeducated multiple times to use call light and wait for assistance for any oob transfers.  Patient remains in stable condition, VSS, NSR tele and is a good candidate for transfer to [facility].  Spoke to Dr. Hirsch at change of [facility] about downgrade, he agreed and will pass it on to [facility] team.  Safety measures in place, call light in reach, bed alarm activated.  POC followed to providers orders.

## 2024-02-04 NOTE — PROGRESS NOTES
"Fernanda Hughes is a 83 y.o. female on day 4 of admission presenting with Vision changes.      Subjective   No cp/sob       Objective         Current Facility-Administered Medications:     dextrose 10 % in water (D10W) infusion, 0.3 g/kg/hr, intravenous, Once PRN, ELLEN Doyle    dextrose 50 % injection 25 g, 25 g, intravenous, q15 min PRN, MORRIS Doyle-CNP    glucagon (Glucagen) injection 1 mg, 1 mg, intramuscular, q15 min PRN, MORRIS Doyle-CNP    heparin (porcine) injection 5,000 Units, 5,000 Units, subcutaneous, q8h, ELLEN Doyle, 5,000 Units at 02/03/24 1538    latanoprost (Xalatan) 0.005 % ophthalmic solution 1 drop, 1 drop, Both Eyes, Nightly, ELLEN Doyle, 1 drop at 02/02/24 2229    levETIRAcetam (Keppra) tablet 500 mg, 500 mg, oral, BID, ELLEN Zamarripa    oxygen (O2) therapy, , inhalation, Continuous - 02/gases, ELLEN Doyle, Start at 02/02/24 0300    polyethylene glycol (Glycolax, Miralax) packet 17 g, 17 g, oral, Daily, ELLEN Doyle, 17 g at 02/02/24 0854       Physical Exam  HENT:      Head: Normocephalic.      Mouth/Throat:      Pharynx: Oropharynx is clear.   Eyes:      Conjunctiva/sclera: Conjunctivae normal.   Cardiovascular:      Rate and Rhythm: Regular rhythm.   Pulmonary:      Breath sounds: Normal breath sounds.   Abdominal:      General: Bowel sounds are normal.      Palpations: Abdomen is soft.   Musculoskeletal:         General: Normal range of motion.   Skin:     General: Skin is warm and dry.   Neurological:      General: No focal deficit present.      Mental Status: She is alert.   Psychiatric:         Behavior: Behavior normal.           Last Recorded Vitals  Blood pressure 140/72, pulse 80, temperature 36.5 °C (97.7 °F), temperature source Temporal, resp. rate 21, height 1.702 m (5' 7\"), weight 51.5 kg (113 lb 8.6 oz), SpO2 98 %.  Intake/Output last 3 Shifts:  I/O last 3 completed shifts:  In: 540 " (10.5 mL/kg) [P.O.:440; IV Piggyback:100]  Out: 950 (18.4 mL/kg) [Urine:950 (0.5 mL/kg/hr)]  Weight: 51.5 kg     Labs:       Results for orders placed or performed during the hospital encounter of 01/30/24 (from the past 24 hour(s))   CBC   Result Value Ref Range    WBC 6.8 4.4 - 11.3 x10*3/uL    nRBC 0.0 0.0 - 0.0 /100 WBCs    RBC 4.12 4.00 - 5.20 x10*6/uL    Hemoglobin 12.4 12.0 - 16.0 g/dL    Hematocrit 37.3 36.0 - 46.0 %    MCV 91 80 - 100 fL    MCH 30.1 26.0 - 34.0 pg    MCHC 33.2 32.0 - 36.0 g/dL    RDW 13.9 11.5 - 14.5 %    Platelets 215 150 - 450 x10*3/uL   Basic metabolic panel   Result Value Ref Range    Glucose 90 74 - 99 mg/dL    Sodium 141 136 - 145 mmol/L    Potassium 3.5 3.5 - 5.3 mmol/L    Chloride 109 (H) 98 - 107 mmol/L    Bicarbonate 24 21 - 32 mmol/L    Anion Gap 12 10 - 20 mmol/L    Urea Nitrogen 17 6 - 23 mg/dL    Creatinine 0.88 0.50 - 1.05 mg/dL    eGFR 64 >60 mL/min/1.73m*2    Calcium 8.9 8.6 - 10.3 mg/dL              Assessment/Plan   Principal Problem:    Vision changes  Unresponsive episode        PLAN: Pt is slowly improving, med list and labs reviewed, for now c/w current Rx, follow closely.       Matt Hirsch MD

## 2024-02-04 NOTE — NURSING NOTE
THE PT IS AWAKE; VERBALLY ORIENTED X3; IMPULSIVE. ASKING FOR A PHONE NUMBER FOR HER BOYFRIIEND AND/OR . UNSURE . THOUGHTS ARE EVER CHANGING. FOLLOWS COMMANDS. HAS A POOR ATTENTION SPAN. IS FIDGETY AND RESTLESS. IMPULSIVE GETTING OOB FOR THE BSC.   VERY UNSTEADY ON HER FEET. BED ALARM ON. DENIES HAVING ANY PAIN OR DISCOMFORT. BECOMES ARGUMENTATIVE AT TIMES DENYING SHE'S GETTING UP WITHOUT USING HER CALL LIGHT. DENIES ANY PAIN OR BURNING UPON URINATION. C/O FREQUENCY. DENIES ANY CP, SOB, HA , NAUSEA OR N/T X4 EXT. MOVING ALL EXT PURPOSEFULLY. PICKING AT A SCAB ON HER RT SHIN. C/O SEROSANG DRAINAGE. A MEPILEX WAS APPLIED. AFTER CLEANSING WITH NS.   SHE IS NOTED TO TAKE OFF HER EKG LEADS. IS UNAWARE OF WHY SHE IS DOING IT  OR WHY SHE IS HERE. EKG-NSR. BP SL ELLEVATED WHEN AGITATED. SAFETY DISCUSSED AND THE PT WAS REMINDED TO USE HER CALL LIGHT.   0100- THE PT SLEPT FOR 1 1/2 HRS. CONTINUES TO BE IMPULSIVE CLIMBING OOB, TAKING HER EKG LEADS OFF OR GETTING TANGLED IN HER SCDS. ESCORTED TO THE BSC X1 ASSIST Q 2 HR MINIMUM. CONTINUES TO DENY ANY PAIN OR DISCOMFORT. REMAINS PLEASANTLY CONFUSED. FORGETFUL.  0700- the pt continues to impulsively get up  to void all night, sleeping for only short spurts of trime. Stated she felt good this am. Wants to get home as soon as possible.  No seizure activity or distress observed. observed. Disoriented to time, and situation t/o the night. Very forgetful which she readily admits to. From last nights behavior it will be very difficult for the pt to comply with continuous eeg monitoring restrictions.

## 2024-02-04 NOTE — PROGRESS NOTES
"Fernanda Hughes is a 83 y.o. female on day 5 of admission presenting with Vision changes.    Subjective   No cp       Objective         Current Facility-Administered Medications:     dextrose 10 % in water (D10W) infusion, 0.3 g/kg/hr, intravenous, Once PRN, MORRIS Doyle-CNP    dextrose 50 % injection 25 g, 25 g, intravenous, q15 min PRN, Shayy Jones, APRN-CNP    glucagon (Glucagen) injection 1 mg, 1 mg, intramuscular, q15 min PRN, Shayy Jones, APRN-CNP    heparin (porcine) injection 5,000 Units, 5,000 Units, subcutaneous, q8h, Shayy Jones, APRN-CNP, 5,000 Units at 02/04/24 0610    latanoprost (Xalatan) 0.005 % ophthalmic solution 1 drop, 1 drop, Both Eyes, Nightly, Shayy Jones, APRN-CNP, 1 drop at 02/03/24 2147    levETIRAcetam (Keppra) tablet 500 mg, 500 mg, oral, BID, Enid Escalera, APRN-CNP, 500 mg at 02/04/24 0846    oxygen (O2) therapy, , inhalation, Continuous - 02/gases, Shayy Jones APRN-CNP, Start at 02/02/24 0300    polyethylene glycol (Glycolax, Miralax) packet 17 g, 17 g, oral, Daily, Shayy Jones, APRN-CNP, 17 g at 02/04/24 0846       Physical Exam  HENT:      Head: Normocephalic.   Eyes:      Conjunctiva/sclera: Conjunctivae normal.   Cardiovascular:      Rate and Rhythm: Regular rhythm.   Pulmonary:      Breath sounds: Normal breath sounds.   Abdominal:      General: Bowel sounds are normal.      Palpations: Abdomen is soft.   Musculoskeletal:         General: Normal range of motion.   Skin:     General: Skin is warm and dry.   Neurological:      General: No focal deficit present.      Mental Status: She is alert.   Psychiatric:         Behavior: Behavior normal.           Last Recorded Vitals  Blood pressure 111/62, pulse 85, temperature 36.5 °C (97.7 °F), temperature source Temporal, resp. rate 20, height 1.702 m (5' 7\"), weight 50.8 kg (111 lb 15.9 oz), SpO2 96 %.  Intake/Output last 3 Shifts:  I/O last 3 completed shifts:  In: 270 (5.3 mL/kg) [P.O.:270]  Out: 1600 " (31.5 mL/kg) [Urine:1600 (0.9 mL/kg/hr)]  Weight: 50.8 kg     Labs:       Results for orders placed or performed during the hospital encounter of 01/30/24 (from the past 24 hour(s))   POCT GLUCOSE   Result Value Ref Range    POCT Glucose 108 (H) 74 - 99 mg/dL   CBC   Result Value Ref Range    WBC 6.4 4.4 - 11.3 x10*3/uL    nRBC 0.0 0.0 - 0.0 /100 WBCs    RBC 4.00 4.00 - 5.20 x10*6/uL    Hemoglobin 11.6 (L) 12.0 - 16.0 g/dL    Hematocrit 36.8 36.0 - 46.0 %    MCV 92 80 - 100 fL    MCH 29.0 26.0 - 34.0 pg    MCHC 31.5 (L) 32.0 - 36.0 g/dL    RDW 13.9 11.5 - 14.5 %    Platelets 200 150 - 450 x10*3/uL   Basic metabolic panel   Result Value Ref Range    Glucose 89 74 - 99 mg/dL    Sodium 140 136 - 145 mmol/L    Potassium 3.8 3.5 - 5.3 mmol/L    Chloride 107 98 - 107 mmol/L    Bicarbonate 26 21 - 32 mmol/L    Anion Gap 11 10 - 20 mmol/L    Urea Nitrogen 17 6 - 23 mg/dL    Creatinine 0.85 0.50 - 1.05 mg/dL    eGFR 67 >60 mL/min/1.73m*2    Calcium 9.3 8.6 - 10.3 mg/dL              Assessment/Plan   Principal Problem:    Vision changes  ? TIA  Mild cognitive decline  Anemia mild      PLAN: Pt was evaluated by Neurology and Ophthalmology, suspected to have end stage glaucoma, for now c/w supportive care, follow closely.            Matt Hirsch MD

## 2024-02-05 LAB
ANION GAP SERPL CALC-SCNC: 12 MMOL/L (ref 10–20)
BUN SERPL-MCNC: 16 MG/DL (ref 6–23)
CALCIUM SERPL-MCNC: 9.3 MG/DL (ref 8.6–10.3)
CHLORIDE SERPL-SCNC: 105 MMOL/L (ref 98–107)
CO2 SERPL-SCNC: 27 MMOL/L (ref 21–32)
CREAT SERPL-MCNC: 0.85 MG/DL (ref 0.5–1.05)
EGFRCR SERPLBLD CKD-EPI 2021: 67 ML/MIN/1.73M*2
ERYTHROCYTE [DISTWIDTH] IN BLOOD BY AUTOMATED COUNT: 13.8 % (ref 11.5–14.5)
GLUCOSE SERPL-MCNC: 88 MG/DL (ref 74–99)
HCT VFR BLD AUTO: 37 % (ref 36–46)
HGB BLD-MCNC: 12.1 G/DL (ref 12–16)
MCH RBC QN AUTO: 29.6 PG (ref 26–34)
MCHC RBC AUTO-ENTMCNC: 32.7 G/DL (ref 32–36)
MCV RBC AUTO: 91 FL (ref 80–100)
NRBC BLD-RTO: 0 /100 WBCS (ref 0–0)
PLATELET # BLD AUTO: 217 X10*3/UL (ref 150–450)
POTASSIUM SERPL-SCNC: 3.9 MMOL/L (ref 3.5–5.3)
RBC # BLD AUTO: 4.09 X10*6/UL (ref 4–5.2)
SODIUM SERPL-SCNC: 140 MMOL/L (ref 136–145)
WBC # BLD AUTO: 5.6 X10*3/UL (ref 4.4–11.3)

## 2024-02-05 PROCEDURE — 97110 THERAPEUTIC EXERCISES: CPT | Mod: GP,CQ

## 2024-02-05 PROCEDURE — 97168 OT RE-EVAL EST PLAN CARE: CPT | Mod: GO

## 2024-02-05 PROCEDURE — 97116 GAIT TRAINING THERAPY: CPT | Mod: GP,CQ

## 2024-02-05 PROCEDURE — 85027 COMPLETE CBC AUTOMATED: CPT

## 2024-02-05 PROCEDURE — 2500000004 HC RX 250 GENERAL PHARMACY W/ HCPCS (ALT 636 FOR OP/ED)

## 2024-02-05 PROCEDURE — 1200000002 HC GENERAL ROOM WITH TELEMETRY DAILY

## 2024-02-05 PROCEDURE — 36415 COLL VENOUS BLD VENIPUNCTURE: CPT

## 2024-02-05 PROCEDURE — 2500000001 HC RX 250 WO HCPCS SELF ADMINISTERED DRUGS (ALT 637 FOR MEDICARE OP): Performed by: NURSE PRACTITIONER

## 2024-02-05 PROCEDURE — 80048 BASIC METABOLIC PNL TOTAL CA: CPT

## 2024-02-05 PROCEDURE — 2500000005 HC RX 250 GENERAL PHARMACY W/O HCPCS

## 2024-02-05 RX ORDER — LEVETIRACETAM 500 MG/1
500 TABLET ORAL 2 TIMES DAILY
Qty: 60 TABLET | Refills: 0
Start: 2024-02-05 | End: 2024-03-06

## 2024-02-05 RX ADMIN — LATANOPROST 1 DROP: 50 SOLUTION OPHTHALMIC at 21:31

## 2024-02-05 RX ADMIN — LEVETIRACETAM 500 MG: 500 TABLET, FILM COATED ORAL at 08:45

## 2024-02-05 RX ADMIN — HEPARIN SODIUM 5000 UNITS: 5000 INJECTION INTRAVENOUS; SUBCUTANEOUS at 22:48

## 2024-02-05 RX ADMIN — HEPARIN SODIUM 5000 UNITS: 5000 INJECTION INTRAVENOUS; SUBCUTANEOUS at 13:33

## 2024-02-05 RX ADMIN — HEPARIN SODIUM 5000 UNITS: 5000 INJECTION INTRAVENOUS; SUBCUTANEOUS at 06:08

## 2024-02-05 RX ADMIN — Medication 2 L/MIN: at 08:00

## 2024-02-05 RX ADMIN — LEVETIRACETAM 500 MG: 500 TABLET, FILM COATED ORAL at 21:31

## 2024-02-05 ASSESSMENT — PAIN SCALES - GENERAL
PAINLEVEL_OUTOF10: 0 - NO PAIN

## 2024-02-05 ASSESSMENT — COGNITIVE AND FUNCTIONAL STATUS - GENERAL
DRESSING REGULAR LOWER BODY CLOTHING: A LITTLE
DAILY ACTIVITIY SCORE: 18
MOVING TO AND FROM BED TO CHAIR: A LITTLE
PERSONAL GROOMING: A LITTLE
DRESSING REGULAR LOWER BODY CLOTHING: A LITTLE
CLIMB 3 TO 5 STEPS WITH RAILING: A LOT
WALKING IN HOSPITAL ROOM: A LOT
MOVING FROM LYING ON BACK TO SITTING ON SIDE OF FLAT BED WITH BEDRAILS: A LITTLE
PERSONAL GROOMING: A LITTLE
EATING MEALS: A LITTLE
WALKING IN HOSPITAL ROOM: A LOT
HELP NEEDED FOR BATHING: A LITTLE
MOBILITY SCORE: 16
DRESSING REGULAR UPPER BODY CLOTHING: A LITTLE
CLIMB 3 TO 5 STEPS WITH RAILING: A LOT
TOILETING: A LITTLE
MOVING FROM LYING ON BACK TO SITTING ON SIDE OF FLAT BED WITH BEDRAILS: A LITTLE
TURNING FROM BACK TO SIDE WHILE IN FLAT BAD: A LITTLE
DAILY ACTIVITIY SCORE: 18
DRESSING REGULAR UPPER BODY CLOTHING: A LITTLE
DAILY ACTIVITIY SCORE: 24
CLIMB 3 TO 5 STEPS WITH RAILING: A LOT
MOVING TO AND FROM BED TO CHAIR: A LITTLE
STANDING UP FROM CHAIR USING ARMS: A LITTLE
MOBILITY SCORE: 16
STANDING UP FROM CHAIR USING ARMS: A LITTLE
WALKING IN HOSPITAL ROOM: A LOT
STANDING UP FROM CHAIR USING ARMS: A LITTLE
MOBILITY SCORE: 16
TOILETING: A LITTLE
EATING MEALS: A LITTLE
TURNING FROM BACK TO SIDE WHILE IN FLAT BAD: A LITTLE
MOVING TO AND FROM BED TO CHAIR: A LITTLE
MOVING FROM LYING ON BACK TO SITTING ON SIDE OF FLAT BED WITH BEDRAILS: A LITTLE
HELP NEEDED FOR BATHING: A LITTLE
TURNING FROM BACK TO SIDE WHILE IN FLAT BAD: A LITTLE

## 2024-02-05 ASSESSMENT — ACTIVITIES OF DAILY LIVING (ADL): ADL_ASSISTANCE: INDEPENDENT

## 2024-02-05 ASSESSMENT — PAIN - FUNCTIONAL ASSESSMENT
PAIN_FUNCTIONAL_ASSESSMENT: 0-10

## 2024-02-05 NOTE — CARE PLAN
Problem: Pain  Goal: My pain/discomfort is manageable  Outcome: Progressing     Problem: Psychosocial Needs  Goal: Demonstrates ability to cope with hospitalization/illness  Outcome: Progressing  Goal: Collaborate with me, my family, and caregiver to identify my specific goals  Outcome: Progressing     Problem: Discharge Barriers  Goal: My discharge needs are met  Outcome: Progressing     Problem: Fall/Injury  Goal: Verbalize understanding of personal risk factors for fall in the hospital  Outcome: Progressing  Goal: Verbalize understanding of risk factor reduction measures to prevent injury from fall in the home  Outcome: Progressing  Goal: Use assistive devices by end of the shift  Outcome: Progressing  Goal: Pace activities to prevent fatigue by end of the shift  Outcome: Progressing     Problem: Med Adherence  Goal: CONSISTENTLY TAKE MEDICATIONS AS PRESCRIBED  Outcome: Progressing     Problem: Med Adherence  Goal: CONSISTENTLY TAKE MEDICATIONS AS PRESCRIBED  Outcome: Progressing     Problem: Dressings Lower Extremities  Goal: STG - Patient to complete lower body dressing MOD I  Outcome: Progressing     Problem: Grooming  Goal: STG - Patient completes grooming MOD I  Outcome: Progressing  Goal: STG - Patient will tolerate standing 4-8 min  Outcome: Progressing     Problem: Skin  Goal: Prevent/manage excess moisture  Outcome: Progressing  Goal: Prevent/minimize sheer/friction injuries  Outcome: Progressing  Goal: Promote/optimize nutrition  Outcome: Progressing     Problem: Hypertension  Goal: I will maintain blood pressure under 120/80 with therapeutic intervention and maintain optimal cardiovascular and renal function  Outcome: Progressing

## 2024-02-05 NOTE — NURSING NOTE
0101 - Report called by Hiwot from CCU.  Will bring pt. In a bed.    0120 - Pt. Here from CCU.  Belongings at bedside.  Eye drops returned to [employer].  Tele applied.  VS obtained.  Pt. Denies pain.  Neuro assessment performed.  Only deficit is her vision changes.  Unable to assess pupils at this time as pt. Expressed sensitivity to light.  Sclera red.  Pt. Denies itching or rubbing of eyes.    EOS note:  No new neuro changes.  Pt. Up frequently to BR with assistance, however, does not call appropriately.  Bed alarm on/audible.   discharge pending PT-OT re-eval.

## 2024-02-05 NOTE — PROGRESS NOTES
Occupational Therapy    Occupational Therapy    Re-Evaluation    Patient Name: Fernanda Hughes  MRN: [id number]  Today's Date: 2/5/2024  Time Calculation  Start Time: 0933  Stop Time: 0948  Time Calculation (min): 15 min    Assessment  IP OT Assessment  OT Assessment:  (Pt. presents with decreased balance and vision impacting pt. ability to complete ADLs and mobility independently.)  Prognosis: Fair  Evaluation/Treatment Tolerance: Patient tolerated treatment well  End of Session Communication: Bedside nurse  End of Session Patient Position: Up in chair, Alarm on    Plan:  Treatment Interventions: ADL retraining, Functional transfer training, Visual perceptual retraining  OT Frequency: 5 times per week  OT Discharge Recommendations: High intensity level of continued care  OT Recommended Transfer Status: Assist of 1  OT - OK to Discharge: Yes (Next level of care when cleared by medical team)    Subjective     Per EMR: 2/2 Rapid response was called at approximately 0140 by bedside nurse due to lack of responsiveness and altered mental status.  The patient was up walking and talking at approximately midnight, but then there was a brief period of tachycardia with rates in the 130s for about 20-30 seconds.  Bedside nurse entered the room and the patient was difficult to arouse and called rapid response.  Initial vitals: /94, , SpO2 95% on room air.  The patient was placed on 3 L cannula.  On exam, the patient is not arousable to verbal or noxious stimuli, heart was tachycardic but without audible murmurs or rubs, lungs significant for coarse breath sounds throughout, abdomen was soft without guarding, no swelling in the lower extremities.  NIHSS score of 25, but difficult to assess. CBC, CMP, troponin, lactate, prolactin, CK, PT/INR, ABG, EKG, CT head, CXR all ordered.  CBC was unremarkable without leukocytosis or anemia.  CMP showed glucose 124, bicarb 14, anion gap 23, BUN 24, creatinine 1.08, total protein 5.9.   "Troponin negative.  Lactate 9.4.  CK 38.  PT/INR WNL.  ABG on NC showed pH 7.24, pCO2 34, pO2 119.  EKG showed sinus tachycardia with occasional PVCs, rate 105, TX interval 116, QTc 423.  CT head showed no acute intracranial abnormality.  CXR showed no radiographic evidence of acute cardiopulmonary process.  1 L LR ordered.  Neurology, Dr. Green, was contacted and recommended Keppra loading.  Critical care, Dr. Bean, was contacted regarding possible transfer for the patient to ICU for every 1 hr neurochecks, and she accepted the patient.  After head scan, patient became more alert and responsive.  Patient remained lethargic and seemingly postictal, but was able to appropriate follow commands and answer orientation questions.  Repeat NIHSS 5.  Prolactin still pending.    Update from Dr. Quiros (ophthalmology)     Spoke with ophthalmologist Dr. Quiros at 14:00, he was unable to place formal consult note due to issues with epic access.  Per Dr. Quiros, patient has had a longstanding history of glaucoma for several years now and had been seeing an ophthalmologist for which she was supposed to be taking eyedrops for glaucoma regularly.  Patient had not been taking eyedrops, stating that \"she did not think that it was important\".  Patient was initially thought to be transferred [place] [facility] [place], however Dr. Quiros was able to see the patient last night.  He notes that patient has end-stage glaucoma with likely no improvement.  He notes that per his examination, patient lost all vision except in his peripheral fields (right side of right eye, left side of left eye).  He noted that when he dilated the pupils, optic nerves both appear cupped bilaterally suggestive of end-stage bilateral glaucoma.  He notes that other differential could be possible giant cell arteritis, however he does not think that this is the case.  Patient has end-stage glaucoma and will likely have loss of vision from the rest of " her life and will not be able to live independently per Dr. Quiros.  He recommends that patient be started on glaucoma eyedrops (latanoprost) and follow-up with  glaucoma specialist in the outpatient setting.  No need for ophthalmologic evaluation or transfer at this time.     Current Problem:  1. Vision changes  Transthoracic Echo (TTE) Complete    Transthoracic Echo (TTE) Complete    EEG      2. Stroke occurring within last month  Transthoracic Echo (TTE) Complete    Transthoracic Echo (TTE) Complete      3. Other cerebral infarction (CMS/HCC)  Transthoracic Echo (TTE) Complete      4. Seizure (CMS/HCC)  levETIRAcetam (Keppra) 500 mg tablet      5. Underweight  Oral nutritional supplements    CANCELED: Oral nutritional supplements          General:  General  Reason for Referral: ADLs, discharge planning  Referred By: Dr. Rosales  Family/Caregiver Present: No  Co-Treatment: PT  Co-Treatment Reason: Safety  Prior to Session Communication: Bedside nurse  Patient Position Received: Bed, 3 rail up, Alarm on    Precautions:  Hearing/Visual Limitations:  (end stage glaucoma)  Medical Precautions: Fall precautions      Pain:  Pain Assessment  Pain Assessment: 0-10  Pain Score: 0 - No pain    Objective     Cognition:  Orientation Level: Oriented X4        Home Living:  Home Living Comments:  (Pt. lives alone in ranch home with tub shower. PLOF independent, drives. Owns ww, cane)     Prior Function:  Level of [place]: Independent with ADLs and functional transfers  ADL Assistance: Independent  Homemaking Assistance: Independent  Ambulatory Assistance: Independent    ADL:  Eating Assistance: Independent  Eating Deficit: Setup  Grooming Assistance: Minimal  Grooming Deficit: Steadying, Setup, Supervision/safety, Increased time to complete  LE Dressing Assistance: Minimal    Activity Tolerance:  Endurance: Endurance does not limit participation in activity    Bed Mobility/Transfers:   Bed Mobility  Bed Mobility:  (Supine  to sit SUP)  Bed Mobility 1  Bed Mobility 1:  (supine to sit CGA)  Transfers  Transfer:  (sit <>stand 2 trials; min assist with LOB upon first stance; pt. complains of dizziness with standing; pivot transfer from bed to chair min assist with gait belt in place)    Ambulation/Gait Training:  Ambulation/Gait Training  Ambulation/Gait Training Performed:  (Pt. completes functional mobility with ww min assist for balance secondary to dizziness)    Sitting Balance:  Static Sitting Balance  Static Sitting-Balance Support: Bilateral upper extremity supported  Static Sitting-Level of Assistance: Contact guard  Static Sitting-Comment/Number of Minutes: Dizzy at EOB    Standing Balance:  Static Standing Balance  Static Standing-Balance Support: Bilateral upper extremity supported  Static Standing-Level of Assistance: Minimum assistance  Static Standing-Comment/Number of Minutes: Dizzy with standing    Vision: Vision - Basic Assessment  Current Vision:  (end stage glaucoma; limited vision)   and Vision - Complex Assessment  Diplopia Assessment: WFL    Sensation:  Sensation Comment: denies numbness      Perception:  Inattention/Neglect: Appears intact  Initiation: Appears intact  Motor Planning: Appears intact  Perseveration: Not present    Coordination:  Movements are Fluid and Coordinated: Yes     Hand Function:  Hand Function  Gross Grasp: Functional  Coordination: Functional    Extremities: RUE   RUE : Within Functional Limits and LUE   LUE: Within Functional Limits    Outcome Measures: [facility] Daily Activity  Putting on and taking off regular lower body clothing: A little  Bathing (including washing, rinsing, drying): A little  Putting on and taking off regular upper body clothing: A little  Toileting, which includes using toilet, bedpan or urinal: A little  Taking care of personal grooming such as brushing teeth: A little  Eating Meals: A little  Daily Activity - Total Score: 18      EDUCATION:  Education  Individual(s)  Educated: Patient  Education Provided: Fall precautons, Risk and benefits of OT discussed with patient or other, POC discussed and agreed upon  Patient Response to Education: Patient/Caregiver Verbalized Understanding of Information      Goals:   Encounter Problems       Encounter Problems (Active)             Dressings Lower Extremities       STG - Patient to complete lower body dressing MOD I (Progressing)       Start:  01/31/24    Expected End:  02/06/24               Grooming       STG - Patient completes grooming MOD I (Progressing)       Start:  01/31/24    Expected End:  02/06/24            STG - Patient will tolerate standing 4-8 min (Progressing)       Start:  01/31/24    Expected End:  02/06/24                     Transfers       STG - Patient will perform toilet transfer MOD I (Progressing)       Start:  01/31/24    Expected End:  02/06/24

## 2024-02-05 NOTE — NURSING NOTE
Resumed care of patient from 3, RN. Patient in bed awake A/O Trip. Bed locked In lowest position, bed alarm active and audible, call bell and possesions within reach. Assessment and vitals completed and documented per order. Medications administered per order.            EOS: Pt turned independently this shift in bed and slept intermittently. Patient given medications per orders, safety maintained, call light and possessions within reach at all times, bed alarm active and audible throughout shift. Patient remained A/O 3 throughout shift.

## 2024-02-05 NOTE — PROGRESS NOTES
"Internal Medicine Progress Note    Patient Name: Fernanda Hughes          MRN: [id number]  Today's Date: February 5, 2024          Attending: Stef Rosales MD    Subjective:  Patient was seen and examined at bedside.    Review Of Systems:  GENERAL: No malaise or fevers.  CARDIOVASCULAR: Negative for chest pain  RESPIRATORY: Negative for shortness of breath  GI: No nausea, vomiting, or diarrhea    Objective:  Vitals:    02/05/24 0120 02/05/24 0400 02/05/24 0600 02/05/24 0800   BP: 144/87 168/83  163/87   BP Location:       Patient Position:       Pulse: 89 87  88   Resp: 17 16  18   Temp: 36.4 °C (97.5 °F) 36.2 °C (97.2 °F)  36.3 °C (97.3 °F)   TempSrc: Temporal Temporal     SpO2: 99% 94%  100%   Weight: 50.6 kg (111 lb 8.8 oz)  50.6 kg (111 lb 8.8 oz)    Height: 1.702 m (5' 7\")          Physical Exam:   General appearance: Well-appearing alert  Lungs: Clear to auscultation, no wheezing or rhonchi  Heart: RRR without murmur, gallop, or rubs.   Abdomen: Soft, non-tender. Bowel sounds normal.  Extremities: No deformity, no edema  Neuro: Alert oriented x3, no focal deficit    Labs:  Results for orders placed or performed during the hospital encounter of 01/30/24 (from the past 24 hour(s))   CBC   Result Value Ref Range    WBC 5.6 4.4 - 11.3 x10*3/uL    nRBC 0.0 0.0 - 0.0 /100 WBCs    RBC 4.09 4.00 - 5.20 x10*6/uL    Hemoglobin 12.1 12.0 - 16.0 g/dL    Hematocrit 37.0 36.0 - 46.0 %    MCV 91 80 - 100 fL    MCH 29.6 26.0 - 34.0 pg    MCHC 32.7 32.0 - 36.0 g/dL    RDW 13.8 11.5 - 14.5 %    Platelets 217 150 - 450 x10*3/uL   Basic metabolic panel   Result Value Ref Range    Glucose 88 74 - 99 mg/dL    Sodium 140 136 - 145 mmol/L    Potassium 3.9 3.5 - 5.3 mmol/L    Chloride 105 98 - 107 mmol/L    Bicarbonate 27 21 - 32 mmol/L    Anion Gap 12 10 - 20 mmol/L    Urea Nitrogen 16 6 - 23 mg/dL    Creatinine 0.85 0.50 - 1.05 mg/dL    eGFR 67 >60 mL/min/1.73m*2    Calcium 9.3 8.6 - 10.3 mg/dL       Medications:  Scheduled " "medications  heparin (porcine), 5,000 Units, subcutaneous, q8h  latanoprost, 1 drop, Both Eyes, Nightly  levETIRAcetam, 500 mg, oral, BID  oxygen, , inhalation, Continuous - 02/gases  polyethylene glycol, 17 g, oral, Daily      Continuous medications     PRN medications      Assessment/Plan:  Principal Problem:    Vision changes  End-stage Glaucoma  Seizure    Continue Keppra  Continue Latanoprost eye drop    Discussed with patient, RN    Stef Rosales MD   Date: 02/05/24  Time: 2:29 PM    This note was partially created using voice recognition software and is inherently subject to errors including those of syntax and \"sound-alike\" substitutions which may escape proofreading. In such instances, original meaning may be extrapolated by contextual derivation  "

## 2024-02-05 NOTE — PROGRESS NOTES
Spoke with patient's friend/POA Deepali. Deepali confirmed that  [facility] is [facility]. Updated by OT that recommendation is acute rehab. Awaiting updated therapy notes for [facility] to review. Pt will need precert if accepted.     1309-  [facility] can accept pending precert. Message sent to the direct precert team to start precert for  [facility].     Karen Gabriel RN

## 2024-02-05 NOTE — PROGRESS NOTES
Physical Therapy    Physical Therapy Treatment    Patient Name: Fernanda Hughes  MRN: [id number]  Today's Date: 2/5/2024  Time Calculation  Start Time: 1030  Stop Time: 1055  Time Calculation (min): 25 min       Assessment/Plan   PT Assessment  PT Assessment Results: Decreased strength, Decreased endurance, Impaired balance, Decreased mobility  Rehab Prognosis: Good  End of Session Communication: Bedside nurse  Assessment Comment: patient significantly more unsteady this date.  unable to safe;y walk short distance in a straight line with several LOB needing hands on assist to correct.  patient may be safer using a walker at this time for improved overall balance.  End of Session Patient Position: Up in chair, Alarm on  PT Plan  Inpatient/Swing Bed or Outpatient: Inpatient       02/05/24 1030   PT  Visit   PT Received On 02/05/24   Response to Previous Treatment Patient with no complaints from previous session.   General   Prior to Session Communication Bedside nurse   Patient Position Received Alarm on;Up in chair   Preferred Learning Style verbal;visual   General Comment patient reports no ctual dizziness though having difficulty processing what she thinks she sees.    focus is in and out and visial fielf is limited.   Precautions   Medical Precautions Fall precautions   Pain Assessment   Pain Assessment 0-10   Pain Score 0 - No pain   Cognition   Overall Cognitive Status WFL   Therapeutic Exercise   Therapeutic Exercise Performed Yes   Therapeutic Exercise Activity 1 ankle pump x15   Therapeutic Exercise Activity 2 resisted foot press x15   Therapeutic Exercise Activity 3 heel slides x15   Therapeutic Exercise Activity 4 hip ABD x15   Therapeutic Exercise Activity 5 pillow sqieeze x15   Ambulation/Gait Training   Ambulation/Gait Training Performed Yes   Ambulation/Gait Training 1   Surface 1 Level tile   Device 1 Rolling walker   Gait Support Devices Gait belt   Assistance 1 Minimum assistance;Loss of balance  (Comment);Hand held assistance;Moderate assistance  (x3 significant LOB with mod assist to correct.)   Quality of Gait 1 NBOS;Inconsistent stride length;Scissoring   Transfers   Transfer Yes   Transfer 1   Transfer From 1 Chair with arms to   Transfer to 1 Stand   Technique 1 Stand to sit;Sit to stand   Transfer Device 1 Gait belt   Transfer Level of Assistance 1 Contact guard   Activity Tolerance   Endurance Endurance does not limit participation in activity   Early Mobility/Exercise Safety Screen Proceed with mobilization - No exclusion criteria met   PT Assessment   PT Assessment Results Decreased strength;Decreased endurance;Impaired balance;Decreased mobility   Rehab Prognosis Good   End of Session Communication Bedside nurse   Assessment Comment patient significantly more unsteady this date.  unable to safe;y walk short distance in a straight line with several LOB needing hands on assist to correct.  patient may be safer using a walker at this time for improved overall balance.   End of Session Patient Position Up in chair;Alarm on   Outpatient Education   Individual(s) Educated Patient   Education Provided Fall Risk   PT Plan   Inpatient/Swing Bed or Outpatient Inpatient   PT Plan   Treatment/Interventions Bed mobility   PT Plan Skilled PT   PT Frequency 5 times per week   PT Discharge Recommendations High intensity level of continued care   PT Recommended Transfer Status Assist x1;Assistive device     Outcome Measures:  [facility] Basic Mobility  Turning from your back to your side while in a flat bed without using bedrails: A little  Moving from lying on your back to sitting on the side of a flat bed without using bedrails: A little  Moving to and from bed to chair (including a wheelchair): A little  Standing up from a chair using your arms (e.g. wheelchair or bedside chair): A little  To walk in hospital room: A lot  Climbing 3-5 steps with railing: A lot  Basic Mobility - Total Score: 16        ICU Mobility  Screen  Early Mobility/Exercise Safety Screen: Proceed with mobilization - No exclusion criteria met  E = Exercise and Early Mobility  Early Mobility/Exercise Safety Screen: Proceed with mobilization - No exclusion criteria met        EDUCATION:  Outpatient Education  Individual(s) Educated: Patient  Education Provided: Fall Risk  Education Documentation  Mobility Training, taught by Dank Baez PTA at 2/5/2024 11:03 AM.  Learner: Patient  Readiness: Acceptance  Method: Explanation  Response: Verbalizes Understanding, Needs Reinforcement    Education Comments  No comments found.        GOALS:  Encounter Problems       Encounter Problems (Active)       Balance       LTG - Patient will maintain balance to allow for safe mobility (Progressing)       Start:  01/31/24    Expected End:  02/06/24               PT Problem       PT Goal 1 (Progressing)       Start:  01/31/24    Expected End:  02/06/24       Pt able to perform bed mobility independently.           PT Goal 2 (Progressing)       Start:  01/31/24    Expected End:  02/06/24       Pt able to complete all transfers with CGA.            PT Goal 3 (Progressing)       Start:  01/31/24    Expected End:  02/06/24       Pt able to ambulate 100 feet with LRAD and CGA.              Safety       LTG - Patient will adhere to hip precautions during ADL's and transfers       Start:  02/04/24    Expected End:  02/06/24            LTG - Patient will demonstrate safety requirements appropriate to situation/environment       Start:  02/04/24    Expected End:  02/06/24            LTG - Patient will utilize safety techniques       Start:  02/04/24    Expected End:  02/06/24            STG - Patient uses call light consistently to request assistance with transfers       Start:  02/04/24    Expected End:  02/06/24            STG - Patient uses gait belt during all transfers       Start:  02/04/24    Expected End:  02/06/24               Transfers       STG - Patient will perform  toilet transfer MOD I (Progressing)       Start:  01/31/24    Expected End:  02/06/24

## 2024-02-06 LAB
ANION GAP SERPL CALC-SCNC: 11 MMOL/L (ref 10–20)
BUN SERPL-MCNC: 20 MG/DL (ref 6–23)
CALCIUM SERPL-MCNC: 9.9 MG/DL (ref 8.6–10.3)
CHLORIDE SERPL-SCNC: 104 MMOL/L (ref 98–107)
CO2 SERPL-SCNC: 28 MMOL/L (ref 21–32)
CREAT SERPL-MCNC: 0.94 MG/DL (ref 0.5–1.05)
EGFRCR SERPLBLD CKD-EPI 2021: 60 ML/MIN/1.73M*2
ERYTHROCYTE [DISTWIDTH] IN BLOOD BY AUTOMATED COUNT: 14 % (ref 11.5–14.5)
GLUCOSE SERPL-MCNC: 104 MG/DL (ref 74–99)
HCT VFR BLD AUTO: 41.4 % (ref 36–46)
HGB BLD-MCNC: 13.1 G/DL (ref 12–16)
MCH RBC QN AUTO: 28.9 PG (ref 26–34)
MCHC RBC AUTO-ENTMCNC: 31.6 G/DL (ref 32–36)
MCV RBC AUTO: 91 FL (ref 80–100)
NRBC BLD-RTO: 0 /100 WBCS (ref 0–0)
PLATELET # BLD AUTO: 270 X10*3/UL (ref 150–450)
POTASSIUM SERPL-SCNC: 4.2 MMOL/L (ref 3.5–5.3)
RBC # BLD AUTO: 4.53 X10*6/UL (ref 4–5.2)
SODIUM SERPL-SCNC: 139 MMOL/L (ref 136–145)
WBC # BLD AUTO: 6.4 X10*3/UL (ref 4.4–11.3)

## 2024-02-06 PROCEDURE — 80048 BASIC METABOLIC PNL TOTAL CA: CPT

## 2024-02-06 PROCEDURE — 2500000004 HC RX 250 GENERAL PHARMACY W/ HCPCS (ALT 636 FOR OP/ED)

## 2024-02-06 PROCEDURE — 97530 THERAPEUTIC ACTIVITIES: CPT | Mod: GP,CQ

## 2024-02-06 PROCEDURE — 97116 GAIT TRAINING THERAPY: CPT | Mod: GP,CQ

## 2024-02-06 PROCEDURE — 36415 COLL VENOUS BLD VENIPUNCTURE: CPT

## 2024-02-06 PROCEDURE — 2500000001 HC RX 250 WO HCPCS SELF ADMINISTERED DRUGS (ALT 637 FOR MEDICARE OP): Performed by: NURSE PRACTITIONER

## 2024-02-06 PROCEDURE — 85027 COMPLETE CBC AUTOMATED: CPT

## 2024-02-06 PROCEDURE — 1200000002 HC GENERAL ROOM WITH TELEMETRY DAILY

## 2024-02-06 RX ADMIN — LEVETIRACETAM 500 MG: 500 TABLET, FILM COATED ORAL at 08:56

## 2024-02-06 RX ADMIN — HEPARIN SODIUM 5000 UNITS: 5000 INJECTION INTRAVENOUS; SUBCUTANEOUS at 21:35

## 2024-02-06 RX ADMIN — HEPARIN SODIUM 5000 UNITS: 5000 INJECTION INTRAVENOUS; SUBCUTANEOUS at 13:50

## 2024-02-06 RX ADMIN — HEPARIN SODIUM 5000 UNITS: 5000 INJECTION INTRAVENOUS; SUBCUTANEOUS at 05:51

## 2024-02-06 RX ADMIN — LEVETIRACETAM 500 MG: 500 TABLET, FILM COATED ORAL at 21:35

## 2024-02-06 ASSESSMENT — COGNITIVE AND FUNCTIONAL STATUS - GENERAL
DRESSING REGULAR LOWER BODY CLOTHING: A LITTLE
EATING MEALS: A LITTLE
PERSONAL GROOMING: A LITTLE
STANDING UP FROM CHAIR USING ARMS: A LITTLE
STANDING UP FROM CHAIR USING ARMS: A LITTLE
DRESSING REGULAR UPPER BODY CLOTHING: A LITTLE
CLIMB 3 TO 5 STEPS WITH RAILING: A LOT
TURNING FROM BACK TO SIDE WHILE IN FLAT BAD: A LITTLE
MOBILITY SCORE: 17
CLIMB 3 TO 5 STEPS WITH RAILING: A LOT
MOVING TO AND FROM BED TO CHAIR: A LITTLE
MOVING TO AND FROM BED TO CHAIR: A LITTLE
MOVING FROM LYING ON BACK TO SITTING ON SIDE OF FLAT BED WITH BEDRAILS: A LITTLE
MOBILITY SCORE: 17
HELP NEEDED FOR BATHING: A LITTLE
TURNING FROM BACK TO SIDE WHILE IN FLAT BAD: A LITTLE
DAILY ACTIVITIY SCORE: 18
WALKING IN HOSPITAL ROOM: A LITTLE
TOILETING: A LITTLE
MOVING FROM LYING ON BACK TO SITTING ON SIDE OF FLAT BED WITH BEDRAILS: A LITTLE
WALKING IN HOSPITAL ROOM: A LITTLE

## 2024-02-06 ASSESSMENT — PAIN - FUNCTIONAL ASSESSMENT
PAIN_FUNCTIONAL_ASSESSMENT: 0-10

## 2024-02-06 ASSESSMENT — PAIN SCALES - GENERAL
PAINLEVEL_OUTOF10: 0 - NO PAIN

## 2024-02-06 ASSESSMENT — ACTIVITIES OF DAILY LIVING (ADL): EFFECT OF PAIN ON DAILY ACTIVITIES: .

## 2024-02-06 NOTE — PROGRESS NOTES
Physical Therapy    Physical Therapy    Physical Therapy Treatment    Patient Name: Fernanda Hughes  MRN: [id number]  Today's Date: 2/6/2024  Time Calculation  Start Time: 0804  Stop Time: 0829  Time Calculation (min): 25 min        02/06/24 0804   PT  Visit   PT Received On 02/06/24   Response to Previous Treatment Patient with no complaints from previous session.   General   Prior to Session Communication Bedside nurse   Patient Position Received Bed, 3 rail up;Alarm on   General Comment Pt agreeable to therapy and cleared for participation.   Precautions   Medical Precautions Fall precautions   Pain Assessment   Pain Assessment 0-10   Pain Score 0 - No pain   Effect of Pain on Daily Activities .   Cognition   Orientation Level Disoriented to situation;Disoriented to time   Therapeutic Exercise   Therapeutic Exercise Performed Yes   Therapeutic Exercise Activity 1 AP/LAQ x10   Balance/Neuromuscular Re-Education   Balance/Neuromuscular Re-Education Activity Performed Yes   Balance/Neuromuscular Re-Education Activity 1 static and dynamic standing balance activities. CGA during static stand, 2 retro LOB noted, Jose to steady. min>CGA during dynamic standing activities with pt leaning torso against sink. no LOB noted.   Bed Mobility   Bed Mobility Yes   Bed Mobility 1   Bed Mobility 1 Supine to sitting   Level of Assistance 1 Contact guard   Bed Mobility Comments 1 dizzy upon siting EOB, pt instructed in slow positional changes   Ambulation/Gait Training   Ambulation/Gait Training Performed Yes   Ambulation/Gait Training 1   Surface 1 Level tile   Device 1 Rolling walker   Gait Support Devices Gait belt   Assistance 1 Minimum assistance;Minimal verbal cues;Minimal tactile cues   Quality of Gait 1 NBOS;Inconsistent stride length   Comments/Distance (ft) 1 5', 12'x2, 15'x2- unsteady, pt keeps closing eyes despite cues to remain open, unsafe to continue ambulation at this time.   Transfers   Transfer Yes   Transfer 1    Transfer From 1 Bed to   Transfer to 1 Chair with arms   Technique 1 Sit to stand;Stand to sit   Transfer Device 1 Gait belt   Transfer Level of Assistance 1 Contact guard;Minimum assistance   Trials/Comments 1 CGA >Jose d/t multiple LOB's during 5' to chair. Pt declines/refuses WW at this time   Activity Tolerance   Endurance Endurance does not limit participation in activity   PT Assessment   PT Assessment Results Decreased strength;Decreased endurance;Impaired balance;Decreased mobility   Rehab Prognosis Good   End of Session Communication Bedside nurse   Assessment Comment Pt is very unsteady, unable to keep eyes open throughout decreasing safety. Unsafe to continue ambulation or standing activities at this time.   End of Session Patient Position Up in chair;Alarm on       Outcome Measures:  [facility] Basic Mobility  Turning from your back to your side while in a flat bed without using bedrails: A little  Moving from lying on your back to sitting on the side of a flat bed without using bedrails: A little  Moving to and from bed to chair (including a wheelchair): A little  Standing up from a chair using your arms (e.g. wheelchair or bedside chair): A little  To walk in hospital room: A little  Climbing 3-5 steps with railing: A lot  Basic Mobility - Total Score: 17                             EDUCATION:  Outpatient Education  Individual(s) Educated: Patient  Education Provided: Fall Risk  Education Documentation  Precautions, taught by Danette Obrien PTA at 2/6/2024  8:37 AM.  Learner: Patient  Readiness: Acceptance  Method: Explanation  Response: Verbalizes Understanding, Needs Reinforcement, Demonstrated Understanding    Mobility Training, taught by Danette Obrien PTA at 2/6/2024  8:37 AM.  Learner: Patient  Readiness: Acceptance  Method: Explanation  Response: Verbalizes Understanding, Needs Reinforcement, Demonstrated Understanding    Education Comments  No comments found.        GOALS:  Encounter Problems        Encounter Problems (Active)       Balance       LTG - Patient will maintain balance to allow for safe mobility (Progressing)       Start:  01/31/24    Expected End:  02/06/24               PT Problem       PT Goal 1 (Progressing)       Start:  01/31/24    Expected End:  02/06/24       Pt able to perform bed mobility independently.           PT Goal 2 (Progressing)       Start:  01/31/24    Expected End:  02/06/24       Pt able to complete all transfers with CGA.            PT Goal 3 (Progressing)       Start:  01/31/24    Expected End:  02/06/24       Pt able to ambulate 100 feet with LRAD and CGA.              Safety       LTG - Patient will adhere to hip precautions during ADL's and transfers       Start:  02/04/24    Expected End:  02/06/24            LTG - Patient will demonstrate safety requirements appropriate to situation/environment       Start:  02/04/24    Expected End:  02/06/24            LTG - Patient will utilize safety techniques       Start:  02/04/24    Expected End:  02/06/24            STG - Patient uses call light consistently to request assistance with transfers       Start:  02/04/24    Expected End:  02/06/24            STG - Patient uses gait belt during all transfers       Start:  02/04/24    Expected End:  02/06/24               Transfers       STG - Patient will perform toilet transfer MOD I (Progressing)       Start:  01/31/24    Expected End:  02/06/24

## 2024-02-06 NOTE — DOCUMENTATION CLARIFICATION NOTE
"    PATIENT:               BABS KUMAR  ACCT #:                  [id number]  MRN:                       [id number]  :                       1940  ADMIT DATE:       2024 9:16 AM  DISCH DATE:  RESPONDING PROVIDER #:        [id number]          PROVIDER RESPONSE TEXT:    Acute Kidney Injury is ruled out    CDI QUERY TEXT:    [facility]_CV PEMA    Instruction:    Based on your assessment of the patient and the clinical information, please provide the requested documentation by clicking on the appropriate radio button and enter any additional information if prompted.    Question: Please clarify the diagnosis of Acute Kidney Injury    When answering this query, please exercise your independent professional judgment. The fact that a question is being asked, does not imply that any particular answer is desired or expected.    The patient's clinical indicators include:  Clinical Information:  83 yr old female presenting w/visual changes and dizziness.  Admitted with end stage Glaucoma and s/p new onset Seizure.    Documented Diagnosis:  CCM note  0332 AM states \"Kidney function normal until rapid, now PEMA, likely pre-renal\".    Clinical Indicators and Documentation:  -Vital Signs: HR 92, RR 16 RA, /73 on  MN.  -Baseline Creatinine: 1.0  -SCr lab values: 1.16, 1.01, 1.02, 0.96, 1.08, 0.94, 0.88, 0.85, 0.85 from  to .  -Urine Output: 450 mL, total on  with some urinary incontinence  -Electrolyte lab values: Na 141, 139.  K 3.8, 3.5.  , 109.  Ca 9.2, 8.8 day of rapid response .  -Nephrology consult: NA    Treatment: Daily lab monitoring of kidney function.  I/Os.  LR IVF x1L.  NS IVF x1L.    Risk Factors: Elderly female w/hx of HTN, no CKD.  Options provided:  -- Acute Kidney Injury is ruled out  -- Acute Kidney Injury ruled in as evidenced by, Please specify additional information below  -- Other - I will add my own diagnosis  -- Refer to Clinical Documentation Reviewer    Query created by: Cornelius, " Kati on 2/5/2024 3:34 PM      Electronically signed by:  ANN MCDANIEL MD 2/6/2024 4:06 PM

## 2024-02-06 NOTE — CARE PLAN
The patient's goals for the shift include      The clinical goals for the shift include see poc      Problem: Pain  Goal: My pain/discomfort is manageable  Outcome: Progressing     Problem: Psychosocial Needs  Goal: Demonstrates ability to cope with hospitalization/illness  Outcome: Progressing  Goal: Collaborate with me, my family, and caregiver to identify my specific goals  Outcome: Progressing     Problem: Discharge Barriers  Goal: My discharge needs are met  Outcome: Progressing     Problem: Fall/Injury  Goal: Verbalize understanding of personal risk factors for fall in the hospital  Outcome: Progressing  Goal: Verbalize understanding of risk factor reduction measures to prevent injury from fall in the home  Outcome: Progressing  Goal: Use assistive devices by end of the shift  Outcome: Met  Goal: Pace activities to prevent fatigue by end of the shift  Outcome: Met     Problem: Med Adherence  Goal: CONSISTENTLY TAKE MEDICATIONS AS PRESCRIBED  Outcome: Progressing     Problem: Dressings Lower Extremities  Goal: STG - Patient to complete lower body dressing MOD I  Outcome: Progressing     Problem: Grooming  Goal: STG - Patient completes grooming MOD I  Outcome: Progressing  Goal: STG - Patient will tolerate standing 4-8 min  Outcome: Progressing     Problem: Skin  Goal: Prevent/manage excess moisture  Outcome: Progressing  Goal: Prevent/minimize sheer/friction injuries  Outcome: Progressing  Goal: Promote/optimize nutrition  Outcome: Progressing     Problem: Hypertension  Goal: I will maintain blood pressure under 120/80 with therapeutic intervention and maintain optimal cardiovascular and renal function  Outcome: Progressing

## 2024-02-07 PROCEDURE — 2500000001 HC RX 250 WO HCPCS SELF ADMINISTERED DRUGS (ALT 637 FOR MEDICARE OP)

## 2024-02-07 PROCEDURE — 2500000001 HC RX 250 WO HCPCS SELF ADMINISTERED DRUGS (ALT 637 FOR MEDICARE OP): Performed by: NURSE PRACTITIONER

## 2024-02-07 PROCEDURE — 97530 THERAPEUTIC ACTIVITIES: CPT | Mod: GO,CO

## 2024-02-07 PROCEDURE — 97112 NEUROMUSCULAR REEDUCATION: CPT | Mod: GP,CQ

## 2024-02-07 PROCEDURE — 97116 GAIT TRAINING THERAPY: CPT | Mod: GP,CQ

## 2024-02-07 PROCEDURE — 1200000002 HC GENERAL ROOM WITH TELEMETRY DAILY

## 2024-02-07 PROCEDURE — 2500000004 HC RX 250 GENERAL PHARMACY W/ HCPCS (ALT 636 FOR OP/ED)

## 2024-02-07 RX ADMIN — LATANOPROST 1 DROP: 50 SOLUTION OPHTHALMIC at 21:07

## 2024-02-07 RX ADMIN — LEVETIRACETAM 500 MG: 500 TABLET, FILM COATED ORAL at 09:09

## 2024-02-07 RX ADMIN — HEPARIN SODIUM 5000 UNITS: 5000 INJECTION INTRAVENOUS; SUBCUTANEOUS at 21:07

## 2024-02-07 RX ADMIN — LEVETIRACETAM 500 MG: 500 TABLET, FILM COATED ORAL at 21:07

## 2024-02-07 RX ADMIN — HEPARIN SODIUM 5000 UNITS: 5000 INJECTION INTRAVENOUS; SUBCUTANEOUS at 06:28

## 2024-02-07 RX ADMIN — HEPARIN SODIUM 5000 UNITS: 5000 INJECTION INTRAVENOUS; SUBCUTANEOUS at 14:31

## 2024-02-07 RX ADMIN — POLYETHYLENE GLYCOL 3350 17 G: 17 POWDER, FOR SOLUTION ORAL at 09:09

## 2024-02-07 ASSESSMENT — PAIN - FUNCTIONAL ASSESSMENT
PAIN_FUNCTIONAL_ASSESSMENT: 0-10

## 2024-02-07 ASSESSMENT — COGNITIVE AND FUNCTIONAL STATUS - GENERAL
CLIMB 3 TO 5 STEPS WITH RAILING: A LOT
MOVING TO AND FROM BED TO CHAIR: A LITTLE
WALKING IN HOSPITAL ROOM: A LITTLE
DRESSING REGULAR LOWER BODY CLOTHING: A LITTLE
HELP NEEDED FOR BATHING: A LITTLE
STANDING UP FROM CHAIR USING ARMS: A LITTLE
DRESSING REGULAR UPPER BODY CLOTHING: A LITTLE
PERSONAL GROOMING: A LITTLE
MOBILITY SCORE: 17
HELP NEEDED FOR BATHING: A LITTLE
TURNING FROM BACK TO SIDE WHILE IN FLAT BAD: A LITTLE
DAILY ACTIVITIY SCORE: 18
MOVING FROM LYING ON BACK TO SITTING ON SIDE OF FLAT BED WITH BEDRAILS: A LITTLE
PERSONAL GROOMING: A LITTLE
DRESSING REGULAR LOWER BODY CLOTHING: A LITTLE
EATING MEALS: A LITTLE
DRESSING REGULAR UPPER BODY CLOTHING: A LITTLE
EATING MEALS: A LITTLE
TOILETING: A LITTLE
TOILETING: A LITTLE
DAILY ACTIVITIY SCORE: 18

## 2024-02-07 ASSESSMENT — PAIN SCALES - GENERAL
PAINLEVEL_OUTOF10: 0 - NO PAIN

## 2024-02-07 ASSESSMENT — ACTIVITIES OF DAILY LIVING (ADL): EFFECT OF PAIN ON DAILY ACTIVITIES: .

## 2024-02-07 NOTE — CARE PLAN
The patient's goals for the shift include      The clinical goals for the shift include see care plan      Problem: Pain  Goal: My pain/discomfort is manageable  Outcome: Progressing     Problem: Psychosocial Needs  Goal: Demonstrates ability to cope with hospitalization/illness  Outcome: Progressing  Goal: Collaborate with me, my family, and caregiver to identify my specific goals  Outcome: Progressing     Problem: Discharge Barriers  Goal: My discharge needs are met  Outcome: Progressing     Problem: Fall/Injury  Goal: Verbalize understanding of personal risk factors for fall in the hospital  Outcome: Progressing  Goal: Verbalize understanding of risk factor reduction measures to prevent injury from fall in the home  Outcome: Progressing  Goal: Not fall by end of shift  Outcome: Met  Goal: Be free from injury by end of the shift  Outcome: Met  Goal: Use assistive devices by end of the shift  Outcome: Met  Goal: Pace activities to prevent fatigue by end of the shift  Outcome: Met     Problem: Med Adherence  Goal: CONSISTENTLY TAKE MEDICATIONS AS PRESCRIBED  Outcome: Progressing     Problem: Dressings Lower Extremities  Goal: STG - Patient to complete lower body dressing MOD I  Outcome: Progressing     Problem: Grooming  Goal: STG - Patient completes grooming MOD I  Outcome: Progressing  Goal: STG - Patient will tolerate standing 4-8 min  Outcome: Progressing     Problem: Skin  Goal: Prevent/manage excess moisture  Outcome: Progressing  Goal: Prevent/minimize sheer/friction injuries  Outcome: Progressing  Goal: Promote/optimize nutrition  Outcome: Progressing     Problem: Hypertension  Goal: I will maintain blood pressure under 120/80 with therapeutic intervention and maintain optimal cardiovascular and renal function  Outcome: Progressing     Problem: Pain - Adult  Goal: Verbalizes/displays adequate comfort level or baseline comfort level  Outcome: Progressing     Problem: Safety - Adult  Goal: Free from fall  injury  Outcome: Progressing     Problem: Discharge Planning  Goal: Discharge to home or other facility with appropriate resources  Outcome: Progressing     Problem: Chronic Conditions and Co-morbidities  Goal: Patient's chronic conditions and co-morbidity symptoms are monitored and maintained or improved  Outcome: Progressing

## 2024-02-07 NOTE — NURSING NOTE
Patient alert and oriented with confusion. Patient needs assistance with all ADLs. Patient has short term memory loss and blurry vision. Patient not aware of limitations and needs supervision to remain safe. Patient weak and ambulates with staff assist and walker. No c/o pain. No acute changes this shift

## 2024-02-07 NOTE — PROGRESS NOTES
"=Internal Medicine Progress Note    Patient Name: Fernanda Hughes          MRN: [id number]  Today's Date: February 7, 2024          Attending: Stef Rosales MD    Subjective:  Patient was seen and examined at bedside.    Review Of Systems:  GENERAL: No malaise or fevers.  CARDIOVASCULAR: Negative for chest pain  RESPIRATORY: Negative for shortness of breath  GI: No nausea, vomiting, or diarrhea  Musculoskeletal: no pain or edema in lower extremities    Objective:  Vitals:    02/07/24 0400 02/07/24 0600 02/07/24 0749 02/07/24 1600   BP: 145/82  139/82 121/75   BP Location: Right arm  Left arm    Patient Position: Sitting  Sitting    Pulse: 81  94 95   Resp: 18  18 16   Temp: 36.5 °C (97.7 °F)  36.5 °C (97.7 °F) 36.6 °C (97.9 °F)   TempSrc: Temporal  Temporal Temporal   SpO2: 97%  97% 98%   Weight:  50.4 kg (111 lb 1.8 oz)     Height:           Physical Exam:   General appearance: Well-appearing alert  Lungs: Clear to auscultation, no wheezing or rhonchi  Heart: RRR without murmur, gallop, or rubs.   Abdomen: Soft, non-tender. Bowel sounds normal.  Neuro: Alert, no focal deficit    Labs:  No results found for this or any previous visit (from the past 24 hour(s)).      Medications:  Scheduled medications  heparin (porcine), 5,000 Units, subcutaneous, q8h  latanoprost, 1 drop, Both Eyes, Nightly  levETIRAcetam, 500 mg, oral, BID  oxygen, , inhalation, Continuous - 02/gases  polyethylene glycol, 17 g, oral, Daily      Continuous medications     PRN medications      Assessment/Plan:  Principal Problem:  Vision changes  End-stage Glaucoma  Seizure    Continue Keppra  Continue Latanoprost eye drop  Discharge plan to acute rehab, still waiting for pre-CERT    Discussed with patient, RN    Stef Rosales MD   Date: 02/07/24  Time: 6:47 PM    This note was partially created using voice recognition software and is inherently subject to errors including those of syntax and \"sound-alike\" substitutions which may escape proofreading. In " such instances, original meaning may be extrapolated by contextual derivation

## 2024-02-07 NOTE — PROGRESS NOTES
Received message from direct precert team with P2P information for  [facility]. Relayed information to NP. Awaiting outcome of P2P- must be called by noon today. Notified  [facility] that patient may need an expedited appeal pending outcome of P2P and updated patient at bedside. Discussed backup plan with patient and provided SNF list. Patient asked this TCC to call her friend/POA Deepali. Spoke with Deepali over the phone and sent SNF list to Deepali's smart phone. Pending backup SNF choices.     1133- Per PA, physician from insurance company to call for P2P between 12-4pm today.     1157- Notified by PA that insurance still denying acute rehab after the P2P but MD at insurance says they will approve SNF. Notified  [facility] to start the expedited appeal. Patient and pt's friend/POA Deepali have SNF list, care transitions team to follow up for choices.     Karen Gabriel RN

## 2024-02-07 NOTE — NURSING NOTE
EOS: Pt. Stable throughout this shift and was able to have an uneventful day. Pt. Endorsed dizziness when ambulating this shift and blurred vision throughout this shift. Pt. Was able to ambulate to the bathroom this shift with staff assistance. Pt. Neuro exams remained unchanged throughout this shift. Pt. Was able to rest intermittently. Pt. Had no c/o pain and no acute changes. Pt. Remained on RA. Pt. Currently resting in bed at this time. Call light within reach. Bed alarm on. Bed In lowest position. Seizure pads and precautions maintained throughout this shift.

## 2024-02-07 NOTE — PROGRESS NOTES
=Internal Medicine Progress Note    Patient Name: Fernanda Hughes          MRN: [id number]  Today's Date: February 6, 2024          Attending: Stef Rosales MD    Subjective:  Patient was seen and examined at bedside.    Review Of Systems:  GENERAL: No malaise or fevers.  CARDIOVASCULAR: Negative for chest pain  RESPIRATORY: Negative for shortness of breath  GI: No nausea, vomiting, or diarrhea    Objective:  Vitals:    02/06/24 0400 02/06/24 0553 02/06/24 0800 02/06/24 1600   BP: 162/78  (!) 143/93 140/85   BP Location: Left arm  Left arm Left arm   Patient Position: Lying  Sitting Lying   Pulse: 99  93 90   Resp: 18  18 18   Temp: 36.8 °C (98.2 °F)  36.3 °C (97.3 °F) 37.1 °C (98.8 °F)   TempSrc: Temporal  Temporal Temporal   SpO2: 95%  99% 97%   Weight:  50.5 kg (111 lb 5.3 oz)     Height:           Physical Exam:   General appearance: Well-appearing alert  Lungs: Clear to auscultation, no wheezing or rhonchi  Heart: RRR without murmur, gallop, or rubs.   Abdomen: Soft, non-tender. Bowel sounds normal.  Neuro: Alert oriented x3, no focal deficit    Labs:  Results for orders placed or performed during the hospital encounter of 01/30/24 (from the past 24 hour(s))   CBC   Result Value Ref Range    WBC 6.4 4.4 - 11.3 x10*3/uL    nRBC 0.0 0.0 - 0.0 /100 WBCs    RBC 4.53 4.00 - 5.20 x10*6/uL    Hemoglobin 13.1 12.0 - 16.0 g/dL    Hematocrit 41.4 36.0 - 46.0 %    MCV 91 80 - 100 fL    MCH 28.9 26.0 - 34.0 pg    MCHC 31.6 (L) 32.0 - 36.0 g/dL    RDW 14.0 11.5 - 14.5 %    Platelets 270 150 - 450 x10*3/uL   Basic metabolic panel   Result Value Ref Range    Glucose 104 (H) 74 - 99 mg/dL    Sodium 139 136 - 145 mmol/L    Potassium 4.2 3.5 - 5.3 mmol/L    Chloride 104 98 - 107 mmol/L    Bicarbonate 28 21 - 32 mmol/L    Anion Gap 11 10 - 20 mmol/L    Urea Nitrogen 20 6 - 23 mg/dL    Creatinine 0.94 0.50 - 1.05 mg/dL    eGFR 60 (L) >60 mL/min/1.73m*2    Calcium 9.9 8.6 - 10.3 mg/dL       Medications:  Scheduled medications  heparin  "(porcine), 5,000 Units, subcutaneous, q8h  latanoprost, 1 drop, Both Eyes, Nightly  levETIRAcetam, 500 mg, oral, BID  oxygen, , inhalation, Continuous - 02/gases  polyethylene glycol, 17 g, oral, Daily      Continuous medications     PRN medications      Assessment/Plan:  Principal Problem:    Vision changes  End-stage Glaucoma  Seizure    Continue Keppra  Continue Latanoprost eye drop  Discharge plan to acute rehab, waiting for pre-CERT    Discussed with patient, RN    Stef Rosales MD   Date: 02/06/24  Time: 7:55 PM    This note was partially created using voice recognition software and is inherently subject to errors including those of syntax and \"sound-alike\" substitutions which may escape proofreading. In such instances, original meaning may be extrapolated by contextual derivation  "

## 2024-02-07 NOTE — PROGRESS NOTES
Physical Therapy    Physical Therapy    Physical Therapy Treatment    Patient Name: Fernanda Hughes  MRN: [id number]  Today's Date: 2/7/2024  Time Calculation  Start Time: 0804  Stop Time: 0829  Time Calculation (min): 25 min        02/07/24 0804   PT  Visit   PT Received On 02/07/24   Response to Previous Treatment Patient with no complaints from previous session.   General   Prior to Session Communication Bedside nurse   Patient Position Received Bed, 3 rail up;Alarm on   General Comment Pt agreeable to therapy and cleared for participation   Precautions   Medical Precautions Fall precautions   Pain Assessment   Pain Assessment 0-10   Pain Score 0 - No pain   Effect of Pain on Daily Activities .   Cognition   Overall Cognitive Status WFL   Orientation Level Disoriented to place;Disoriented to situation   Balance/Neuromuscular Re-Education   Balance/Neuromuscular Re-Education Activity Performed Yes   Balance/Neuromuscular Re-Education Activity 1 static and dynamic standing balance activities. Intermittent unilateral/B UE support. initial CGA, 2 retro LOB noted, minAx1 to steady.   Bed Mobility   Bed Mobility Yes   Bed Mobility 1   Bed Mobility 1 Supine to sitting   Level of Assistance 1 Contact guard   Bed Mobility Comments 1 dizzy upon sitting EOB, resolves with increased time sitting   Ambulation/Gait Training   Ambulation/Gait Training Performed Yes   Ambulation/Gait Training 1   Surface 1 Level tile   Device 1 Rolling walker   Gait Support Devices Gait belt   Assistance 1 Contact guard;Minimum assistance   Quality of Gait 1 NBOS;Diminished heel strike;Inconsistent stride length   Comments/Distance (ft) 1 12'x2, 60'x2- Mild unsteadiness noted d/t NBOS, cues to increase step width with fair follow through. Pt c/o dizziness during ambulation, seated rest breaks to resolve. Unsafe to be up alone.   Transfers   Transfer Yes   Transfer 1   Transfer From 1 Bed to   Transfer to 1 Chair with arms   Technique 1 Sit to  stand;Stand to sit   Transfer Device 1 Walker;Gait belt   Transfer Level of Assistance 1 Contact guard   Trials/Comments 1 x5 consecutive STS for increased strength, stability and safety with functional transfers. x2 from bed level, 1 LOB noted, minAx1 to steady. Max cues for hand placement and sequencing with WW d/t not familiar with WW prior to hospital visit   Activity Tolerance   Endurance Endurance does not limit participation in activity   Early Mobility/Exercise Safety Screen Proceed with mobilization - No exclusion criteria met   PT Assessment   PT Assessment Results Decreased strength;Decreased endurance;Impaired balance;Decreased mobility   Rehab Prognosis Good   End of Session Communication Bedside nurse   Assessment Comment Mild unsteadiness this session. CGA/[id number] rest breaks to resolve. Unsafe to be up alone at this time.   End of Session Patient Position Up in chair;Alarm on       Outcome Measures:  [facility] Basic Mobility  Turning from your back to your side while in a flat bed without using bedrails: A little  Moving from lying on your back to sitting on the side of a flat bed without using bedrails: A little  Moving to and from bed to chair (including a wheelchair): A little  Standing up from a chair using your arms (e.g. wheelchair or bedside chair): A little  To walk in hospital room: A little  Climbing 3-5 steps with railing: A lot  Basic Mobility - Total Score: 17        ICU Mobility Screen  Early Mobility/Exercise Safety Screen: Proceed with mobilization - No exclusion criteria met  E = Exercise and Early Mobility  Early Mobility/Exercise Safety Screen: Proceed with mobilization - No exclusion criteria met                 EDUCATION:  Outpatient Education  Individual(s) Educated: Patient  Education Provided: Fall Risk  Education Documentation  Precautions, taught by Danette Obrien PTA at 2/7/2024  8:37 AM.  Learner: Patient  Readiness: Acceptance  Method: Explanation,  Demonstration  Response: Verbalizes Understanding, Demonstrated Understanding, Needs Reinforcement    Mobility Training, taught by Danette Obrien PTA at 2/7/2024  8:37 AM.  Learner: Patient  Readiness: Acceptance  Method: Explanation, Demonstration  Response: Verbalizes Understanding, Demonstrated Understanding, Needs Reinforcement    Education Comments  No comments found.        GOALS:  Encounter Problems       Encounter Problems (Active)       Balance       LTG - Patient will maintain balance to allow for safe mobility (Progressing)       Start:  01/31/24    Expected End:  02/06/24               PT Problem       PT Goal 1 (Progressing)       Start:  01/31/24    Expected End:  02/06/24       Pt able to perform bed mobility independently.           PT Goal 2 (Progressing)       Start:  01/31/24    Expected End:  02/06/24       Pt able to complete all transfers with CGA.            PT Goal 3 (Progressing)       Start:  01/31/24    Expected End:  02/06/24       Pt able to ambulate 100 feet with LRAD and CGA.              Pain - Adult          Safety       LTG - Patient will adhere to hip precautions during ADL's and transfers       Start:  02/04/24    Expected End:  02/06/24            LTG - Patient will demonstrate safety requirements appropriate to situation/environment       Start:  02/04/24    Expected End:  02/06/24            LTG - Patient will utilize safety techniques       Start:  02/04/24    Expected End:  02/06/24            STG - Patient uses call light consistently to request assistance with transfers       Start:  02/04/24    Expected End:  02/06/24            STG - Patient uses gait belt during all transfers       Start:  02/04/24    Expected End:  02/06/24               Transfers       STG - Patient will perform toilet transfer MOD I (Progressing)       Start:  01/31/24    Expected End:  02/06/24

## 2024-02-07 NOTE — PROGRESS NOTES
Occupational Therapy    OT Treatment    Patient Name: Fernanda Hughes  MRN: [id number]  Today's Date: 2/7/2024  Time Calculation  Start Time: 1445  Stop Time: 1458  Time Calculation (min): 13 min         Assessment:  OT Assessment: Pt. presents decreased balance impacting pt. ability to complete ADLs and mobility independently  Prognosis: Fair  Evaluation/Treatment Tolerance: Patient tolerated treatment well  End of Session Communication: Bedside nurse  End of Session Patient Position: Up in chair, Alarm on  OT Assessment Results: Decreased ADL status, Visual deficit, Decreased functional mobility  Prognosis: Fair  Evaluation/Treatment Tolerance: Patient tolerated treatment well    Plan:  Treatment Interventions: Endurance training, Functional transfer training  OT Frequency: 5 times per week  OT Discharge Recommendations: High intensity level of continued care  Treatment Interventions: Endurance training, Functional transfer training  Subjective     Current Problem:  Patient Active Problem List   Diagnosis    Vision changes       General:  OT Received On: 02/07/24  Reason for Referral: impaired ADL's, visual deficit  Missed Visit: Yes  Missed Visit Reason: Patient in a medical procedure  Prior to Session Communication: Bedside nurse  Patient Position Received: Bed, 3 rail up, Alarm on  Preferred Learning Style: verbal, visual  General Comment: Pt agreeable to therapy and cleared for participation    Vital Signs:       Pain:  Pain Assessment  Pain Assessment: 0-10  Pain Score: 0 - No pain  Objective      Activities of Daily Living:       UE Bathing  UE Bathing Comments: Pt declined ADL's having alreay washed up today and just went to the bathroom                Functional Standing Tolerance:  Functional Standing Tolerance Comments: Pt ambulates approximately 90 feet at CGA with ww support . Assist due to visual deficits    Bed Mobility/Transfers: Bed Mobility  Bed Mobility:  (SBA to get to EOB)  Transfers  Transfer:  (STS  at [facility]                     Outcome Measures:[facility] Daily Activity  Putting on and taking off regular lower body clothing: A little  Bathing (including washing, rinsing, drying): A little  Putting on and taking off regular upper body clothing: A little  Toileting, which includes using toilet, bedpan or urinal: A little  Taking care of personal grooming such as brushing teeth: A little  Eating Meals: A little  Daily Activity - Total Score: 18        EDUCATION:  Education  Individual(s) Educated: Patient  Education Provided: Fall precautons  Patient Response to Education: Patient/Caregiver Verbalized Understanding of Information  Education Comment: Pt w ould benefit from continued reinforcement    Goals:  Encounter Problems       Encounter Problems (Active)       Balance       LTG - Patient will maintain balance to allow for safe mobility (Progressing)       Start:  01/31/24    Expected End:  02/06/24               Dressings Lower Extremities       STG - Patient to complete lower body dressing MOD I (Progressing)       Start:  01/31/24    Expected End:  02/06/24               Grooming       STG - Patient completes grooming MOD I (Progressing)       Start:  01/31/24    Expected End:  02/06/24            STG - Patient will tolerate standing 4-8 min (Progressing)       Start:  01/31/24    Expected End:  02/06/24               Safety       LTG - Patient will adhere to hip precautions during ADL's and transfers       Start:  02/04/24    Expected End:  02/06/24            LTG - Patient will demonstrate safety requirements appropriate to situation/environment       Start:  02/04/24    Expected End:  02/06/24            LTG - Patient will utilize safety techniques       Start:  02/04/24    Expected End:  02/06/24            STG - Patient uses call light consistently to request assistance with transfers       Start:  02/04/24    Expected End:  02/06/24            STG - Patient uses gait belt during all transfers       Start:   02/04/24    Expected End:  02/06/24               Transfers       STG - Patient will perform toilet transfer MOD I (Progressing)       Start:  01/31/24    Expected End:  02/06/24

## 2024-02-08 PROCEDURE — 2500000005 HC RX 250 GENERAL PHARMACY W/O HCPCS

## 2024-02-08 PROCEDURE — 1200000002 HC GENERAL ROOM WITH TELEMETRY DAILY

## 2024-02-08 PROCEDURE — 2500000001 HC RX 250 WO HCPCS SELF ADMINISTERED DRUGS (ALT 637 FOR MEDICARE OP): Performed by: NURSE PRACTITIONER

## 2024-02-08 PROCEDURE — 2500000004 HC RX 250 GENERAL PHARMACY W/ HCPCS (ALT 636 FOR OP/ED)

## 2024-02-08 PROCEDURE — 97116 GAIT TRAINING THERAPY: CPT | Mod: GP,CQ

## 2024-02-08 PROCEDURE — 97112 NEUROMUSCULAR REEDUCATION: CPT | Mod: GP,CQ

## 2024-02-08 RX ADMIN — LEVETIRACETAM 500 MG: 500 TABLET, FILM COATED ORAL at 08:46

## 2024-02-08 RX ADMIN — LATANOPROST 1 DROP: 50 SOLUTION OPHTHALMIC at 20:05

## 2024-02-08 RX ADMIN — HEPARIN SODIUM 5000 UNITS: 5000 INJECTION INTRAVENOUS; SUBCUTANEOUS at 05:25

## 2024-02-08 RX ADMIN — Medication: at 08:00

## 2024-02-08 RX ADMIN — HEPARIN SODIUM 5000 UNITS: 5000 INJECTION INTRAVENOUS; SUBCUTANEOUS at 14:55

## 2024-02-08 RX ADMIN — HEPARIN SODIUM 5000 UNITS: 5000 INJECTION INTRAVENOUS; SUBCUTANEOUS at 21:55

## 2024-02-08 RX ADMIN — LEVETIRACETAM 500 MG: 500 TABLET, FILM COATED ORAL at 20:05

## 2024-02-08 ASSESSMENT — COGNITIVE AND FUNCTIONAL STATUS - GENERAL
DRESSING REGULAR UPPER BODY CLOTHING: A LITTLE
DAILY ACTIVITIY SCORE: 18
DRESSING REGULAR LOWER BODY CLOTHING: A LITTLE
TOILETING: A LITTLE
MOVING TO AND FROM BED TO CHAIR: A LITTLE
EATING MEALS: A LITTLE
CLIMB 3 TO 5 STEPS WITH RAILING: A LOT
TURNING FROM BACK TO SIDE WHILE IN FLAT BAD: A LITTLE
HELP NEEDED FOR BATHING: A LITTLE
MOBILITY SCORE: 17
MOVING FROM LYING ON BACK TO SITTING ON SIDE OF FLAT BED WITH BEDRAILS: A LITTLE
DRESSING REGULAR LOWER BODY CLOTHING: A LITTLE
DAILY ACTIVITIY SCORE: 18
PERSONAL GROOMING: A LITTLE
MOVING TO AND FROM BED TO CHAIR: A LITTLE
TURNING FROM BACK TO SIDE WHILE IN FLAT BAD: A LITTLE
CLIMB 3 TO 5 STEPS WITH RAILING: A LOT
TOILETING: A LITTLE
STANDING UP FROM CHAIR USING ARMS: A LITTLE
WALKING IN HOSPITAL ROOM: A LITTLE
WALKING IN HOSPITAL ROOM: A LITTLE
MOVING FROM LYING ON BACK TO SITTING ON SIDE OF FLAT BED WITH BEDRAILS: A LITTLE
WALKING IN HOSPITAL ROOM: A LITTLE
CLIMB 3 TO 5 STEPS WITH RAILING: A LOT
TURNING FROM BACK TO SIDE WHILE IN FLAT BAD: A LITTLE
HELP NEEDED FOR BATHING: A LITTLE
MOBILITY SCORE: 17
MOBILITY SCORE: 17
DRESSING REGULAR UPPER BODY CLOTHING: A LITTLE
STANDING UP FROM CHAIR USING ARMS: A LITTLE
MOVING FROM LYING ON BACK TO SITTING ON SIDE OF FLAT BED WITH BEDRAILS: A LITTLE
PERSONAL GROOMING: A LITTLE
MOVING TO AND FROM BED TO CHAIR: A LITTLE
EATING MEALS: A LITTLE
STANDING UP FROM CHAIR USING ARMS: A LITTLE

## 2024-02-08 ASSESSMENT — PAIN - FUNCTIONAL ASSESSMENT
PAIN_FUNCTIONAL_ASSESSMENT: 0-10
PAIN_FUNCTIONAL_ASSESSMENT: 0-10

## 2024-02-08 ASSESSMENT — PAIN SCALES - GENERAL
PAINLEVEL_OUTOF10: 0 - NO PAIN

## 2024-02-08 ASSESSMENT — ACTIVITIES OF DAILY LIVING (ADL): EFFECT OF PAIN ON DAILY ACTIVITIES: .

## 2024-02-08 NOTE — PROGRESS NOTES
"Physical Therapy    Physical Therapy    Physical Therapy Treatment    Patient Name: Fernanda Hughes  MRN: [id number]  Today's Date: 2/8/2024  Time Calculation  Start Time: 0804  Stop Time: 0829  Time Calculation (min): 25 min        02/08/24 0804   PT  Visit   PT Received On 02/08/24   Response to Previous Treatment Patient with no complaints from previous session.   General   Prior to Session Communication Bedside nurse   Patient Position Received Bed, 3 rail up;Alarm on   General Comment Pt cleared for participation and agreeable to tx.   Precautions   Medical Precautions Fall precautions   Precautions Comment end stage glaucoma   Pain Assessment   Pain Assessment 0-10   Pain Score 0 - No pain   Effect of Pain on Daily Activities .   Cognition   Orientation Level Disoriented to situation   Balance/Neuromuscular Re-Education   Balance/Neuromuscular Re-Education Activity Performed Yes   Balance/Neuromuscular Re-Education Activity 1 static sitting and standing balance, SBA/CGA when standing. Dynamic standing activites with B UE/unilateral and unsupported. Multiple LOB's noted with unilateral support and unsupported. CGA/Jose to steady.   Bed Mobility   Bed Mobility Yes   Bed Mobility 1   Bed Mobility 1 Supine to sitting   Level of Assistance 1 Contact guard;Close supervision   Bed Mobility Comments 1 lightheaded upin sitting EOB. resolves with increased time sitting.   Ambulation/Gait Training   Ambulation/Gait Training Performed Yes   Ambulation/Gait Training 1   Surface 1 Level tile   Device 1 Rolling walker   Gait Support Devices Gait belt   Assistance 1 Contact guard;Minimum assistance   Quality of Gait 1 NBOS   Comments/Distance (ft) 1 60'x2, 15'x2- declines further ambulation this date d/t not \"being able to see\"  Max cues to stay close to WW for increaseed safety and stability, fair follow through. Min LOB noted.   Transfers   Transfer Yes   Transfer 1   Transfer From 1 Bed to   Transfer to 1 Stand   Technique 1 Sit " to stand;Stand to sit   Transfer Device 1 Walker;Gait belt   Transfer Level of Assistance 1 Contact guard   Trials/Comments 1 Multiple STS transfers performed, mod cues for hand placement, sequencing and safety, poor>fair follow radha, pt continues to attempt to pull up on ww.  5 consecutive transfers for increased strength and stability with funcitonal mobility.   Activity Tolerance   Endurance Tolerates 30 min exercise with multiple rests   PT Assessment   PT Assessment Results Decreased strength;Decreased endurance;Impaired balance;Decreased mobility   Rehab Prognosis Good   End of Session Communication Bedside nurse   Assessment Comment Mild unsteadiness throughout, CGA/minAx1 d/t multiple LOB's. mod cues for safety throughout, fair follow through.   End of Session Patient Position Up in chair;Alarm on       Outcome Measures:  [facility] Basic Mobility  Turning from your back to your side while in a flat bed without using bedrails: A little  Moving from lying on your back to sitting on the side of a flat bed without using bedrails: A little  Moving to and from bed to chair (including a wheelchair): A little  Standing up from a chair using your arms (e.g. wheelchair or bedside chair): A little  To walk in hospital room: A little  Climbing 3-5 steps with railing: A lot  Basic Mobility - Total Score: 17                             EDUCATION:  Outpatient Education  Individual(s) Educated: Patient  Education Provided: Fall Risk  Education Documentation  Precautions, taught by Danette Obrien PTA at 2/8/2024  9:09 AM.  Learner: Patient  Readiness: Acceptance  Method: Explanation, Demonstration  Response: Verbalizes Understanding, Demonstrated Understanding, Needs Reinforcement    Mobility Training, taught by Danette Obrien PTA at 2/8/2024  9:09 AM.  Learner: Patient  Readiness: Acceptance  Method: Explanation, Demonstration  Response: Verbalizes Understanding, Demonstrated Understanding, Needs Reinforcement    Education  Comments  No comments found.        GOALS:  Encounter Problems       Encounter Problems (Active)       Balance       LTG - Patient will maintain balance to allow for safe mobility (Progressing)       Start:  01/31/24    Expected End:  02/06/24               PT Problem       PT Goal 1 (Progressing)       Start:  01/31/24    Expected End:  02/06/24       Pt able to perform bed mobility independently.           PT Goal 2 (Progressing)       Start:  01/31/24    Expected End:  02/06/24       Pt able to complete all transfers with CGA.            PT Goal 3 (Progressing)       Start:  01/31/24    Expected End:  02/06/24       Pt able to ambulate 100 feet with LRAD and CGA.              Pain - Adult          Safety       LTG - Patient will adhere to hip precautions during ADL's and transfers       Start:  02/04/24    Expected End:  02/06/24            LTG - Patient will demonstrate safety requirements appropriate to situation/environment       Start:  02/04/24    Expected End:  02/06/24            LTG - Patient will utilize safety techniques       Start:  02/04/24    Expected End:  02/06/24            STG - Patient uses call light consistently to request assistance with transfers       Start:  02/04/24    Expected End:  02/06/24            STG - Patient uses gait belt during all transfers       Start:  02/04/24    Expected End:  02/06/24               Transfers       STG - Patient will perform toilet transfer MOD I (Progressing)       Start:  01/31/24    Expected End:  02/06/24

## 2024-02-08 NOTE — NURSING NOTE
EOS: Pt. Stable throughout this shift and was able to have an uneventful day. Pt. Had no c/o pain and no acute changes. Pt. Neuros remained unchanged throughout this shift. Pt. Does still endorse blurred vision bilat. As charted. Pt. Still endorsing mild dizziness when standing and ambulating. Pt. Was able to ambulate to the bathroom this shift with staff assistance. Pt. Able to reposition independently for comfort this shift. Pt. Remained on RA with no c/o SOB. Seizure precautions in place throughout this shift. Pt. Currently awaiting insurance acceptance to placement facility on discharge. Pt. Resting in bed at this time. Call light within reach. Bed alarm on. Bed in lowest position.

## 2024-02-08 NOTE — NURSING NOTE
Patient asking this nurse why she is here, explained to patient reason why and patient does not remember the MD rounding this am. Patient eating dinner at this time and calm

## 2024-02-08 NOTE — PROGRESS NOTES
Pending precert still.   02/08/24 2826   Discharge Planning   Patient expects to be [facility]   Patient Choice   Provider Choice list and CMS website (https://medicare.gov/care-compare#search) for post-acute Quality and Resource Measure Data were provided and reviewed with: Patient   Patient / Family choosing to utilize agency / facility established prior to hospitalization No

## 2024-02-08 NOTE — PROGRESS NOTES
"=Internal Medicine Progress Note    Patient Name: Fernanda Hughes          MRN: [id number]  Today's Date: February 8, 2024          Attending: Stef Rosales MD    Subjective:  Patient was seen and examined at bedside.    Review Of Systems:  GENERAL: No malaise or fevers.  CARDIOVASCULAR: Negative for chest pain  RESPIRATORY: Negative for shortness of breath  GI: No nausea, vomiting, or diarrhea    Objective:  Vitals:    02/07/24 2000 02/08/24 0553 02/08/24 0603 02/08/24 0800   BP: 148/76   133/76   BP Location: Left arm   Right arm   Patient Position: Lying   Lying   Pulse: 96   91   Resp: 18   18   Temp: 37 °C (98.6 °F)   36.3 °C (97.3 °F)   TempSrc: Temporal   Temporal   SpO2: 98%   97%   Weight:  50.4 kg (111 lb 3.2 oz) 50.5 kg (111 lb 6.4 oz)    Height:           Physical Exam:   General appearance: Well-appearing alert  Lungs: Clear to auscultation, no wheezing or rhonchi  Heart: RRR without murmur  Abdomen: Soft, non-tender. Bowel sounds normal.  Neuro: Alert, no focal deficit    Labs:  No results found for this or any previous visit (from the past 24 hour(s)).      Medications:  Scheduled medications  heparin (porcine), 5,000 Units, subcutaneous, q8h  latanoprost, 1 drop, Both Eyes, Nightly  levETIRAcetam, 500 mg, oral, BID  oxygen, , inhalation, Continuous - 02/gases  polyethylene glycol, 17 g, oral, Daily      Continuous medications     PRN medications      Assessment/Plan:  Principal Problem:  Vision changes  End-stage Glaucoma  Seizure    Continue Keppra  Continue Latanoprost eye drop  Still waiting for pre-CERT    Discussed with patient, RN    Stef Rosales MD   Date: 02/08/24  Time: 5:08 PM    This note was partially created using voice recognition software and is inherently subject to errors including those of syntax and \"sound-alike\" substitutions which may escape proofreading. In such instances, original meaning may be extrapolated by contextual derivation  "

## 2024-02-09 PROCEDURE — 97535 SELF CARE MNGMENT TRAINING: CPT | Mod: GO | Performed by: OCCUPATIONAL THERAPIST

## 2024-02-09 PROCEDURE — 1100000001 HC PRIVATE ROOM DAILY

## 2024-02-09 PROCEDURE — 2500000005 HC RX 250 GENERAL PHARMACY W/O HCPCS

## 2024-02-09 PROCEDURE — 2500000001 HC RX 250 WO HCPCS SELF ADMINISTERED DRUGS (ALT 637 FOR MEDICARE OP): Performed by: NURSE PRACTITIONER

## 2024-02-09 PROCEDURE — 2500000004 HC RX 250 GENERAL PHARMACY W/ HCPCS (ALT 636 FOR OP/ED)

## 2024-02-09 RX ADMIN — HEPARIN SODIUM 5000 UNITS: 5000 INJECTION INTRAVENOUS; SUBCUTANEOUS at 05:34

## 2024-02-09 RX ADMIN — HEPARIN SODIUM 5000 UNITS: 5000 INJECTION INTRAVENOUS; SUBCUTANEOUS at 13:12

## 2024-02-09 RX ADMIN — LEVETIRACETAM 500 MG: 500 TABLET, FILM COATED ORAL at 21:20

## 2024-02-09 RX ADMIN — HEPARIN SODIUM 5000 UNITS: 5000 INJECTION INTRAVENOUS; SUBCUTANEOUS at 21:21

## 2024-02-09 RX ADMIN — LATANOPROST 1 DROP: 50 SOLUTION OPHTHALMIC at 21:21

## 2024-02-09 RX ADMIN — LEVETIRACETAM 500 MG: 500 TABLET, FILM COATED ORAL at 08:24

## 2024-02-09 RX ADMIN — Medication 2 L/MIN: at 08:00

## 2024-02-09 ASSESSMENT — COGNITIVE AND FUNCTIONAL STATUS - GENERAL
TURNING FROM BACK TO SIDE WHILE IN FLAT BAD: A LITTLE
DRESSING REGULAR UPPER BODY CLOTHING: A LITTLE
WALKING IN HOSPITAL ROOM: A LITTLE
HELP NEEDED FOR BATHING: A LITTLE
MOBILITY SCORE: 17
STANDING UP FROM CHAIR USING ARMS: A LITTLE
HELP NEEDED FOR BATHING: A LITTLE
DRESSING REGULAR UPPER BODY CLOTHING: A LITTLE
MOBILITY SCORE: 17
PERSONAL GROOMING: A LITTLE
TURNING FROM BACK TO SIDE WHILE IN FLAT BAD: A LITTLE
DAILY ACTIVITIY SCORE: 18
MOVING TO AND FROM BED TO CHAIR: A LITTLE
CLIMB 3 TO 5 STEPS WITH RAILING: A LOT
STANDING UP FROM CHAIR USING ARMS: A LITTLE
MOVING FROM LYING ON BACK TO SITTING ON SIDE OF FLAT BED WITH BEDRAILS: A LITTLE
WALKING IN HOSPITAL ROOM: A LITTLE
MOVING TO AND FROM BED TO CHAIR: A LITTLE
EATING MEALS: A LITTLE
DRESSING REGULAR LOWER BODY CLOTHING: A LITTLE
DAILY ACTIVITIY SCORE: 18
CLIMB 3 TO 5 STEPS WITH RAILING: A LITTLE
PERSONAL GROOMING: A LITTLE
TOILETING: A LITTLE
WALKING IN HOSPITAL ROOM: A LITTLE
MOVING FROM LYING ON BACK TO SITTING ON SIDE OF FLAT BED WITH BEDRAILS: A LITTLE
MOVING TO AND FROM BED TO CHAIR: A LITTLE
DRESSING REGULAR UPPER BODY CLOTHING: A LITTLE
DRESSING REGULAR LOWER BODY CLOTHING: A LITTLE
PERSONAL GROOMING: A LITTLE
MOVING FROM LYING ON BACK TO SITTING ON SIDE OF FLAT BED WITH BEDRAILS: A LITTLE
EATING MEALS: A LITTLE
TURNING FROM BACK TO SIDE WHILE IN FLAT BAD: A LITTLE
EATING MEALS: A LITTLE
CLIMB 3 TO 5 STEPS WITH RAILING: A LOT
HELP NEEDED FOR BATHING: A LITTLE
DRESSING REGULAR LOWER BODY CLOTHING: A LITTLE
TOILETING: A LITTLE
STANDING UP FROM CHAIR USING ARMS: A LITTLE
TOILETING: A LITTLE
DAILY ACTIVITIY SCORE: 18
MOBILITY SCORE: 18

## 2024-02-09 ASSESSMENT — PAIN SCALES - GENERAL
PAINLEVEL_OUTOF10: 0 - NO PAIN

## 2024-02-09 ASSESSMENT — PAIN - FUNCTIONAL ASSESSMENT
PAIN_FUNCTIONAL_ASSESSMENT: 0-10
PAIN_FUNCTIONAL_ASSESSMENT: 0-10

## 2024-02-09 ASSESSMENT — ACTIVITIES OF DAILY LIVING (ADL): HOME_MANAGEMENT_TIME_ENTRY: 18

## 2024-02-09 NOTE — PROGRESS NOTES
Spoke with Deepali about SNF choice as back up to IRF if expedited appeal for IRF is denied.  Deepali requests SNF lists to be sent to her for [facility] and also requests that SNF referral be sent to [facility] as she is considering that SNF.  Later rcvd notification from [facility] that they can accept pt for admission pending bed availability.      4:05PM Later met with [facility] who states that appeal has been approved and we have auth to admit to  Acute Rehab.  Unable to reach  Acute.  EVIE Benitez

## 2024-02-09 NOTE — PROGRESS NOTES
"=Internal Medicine Progress Note    Patient Name: Fernanda Hughes          MRN: [id number]  Today's Date: February 9, 2024          Attending: Stef Rosales MD    Subjective:  Patient was seen and examined at bedside.    Review Of Systems:  GENERAL: Patient still complaining of blurry vision  CARDIOVASCULAR: Negative for chest pain  RESPIRATORY: Negative for shortness of breath  GI: No nausea, vomiting, or diarrhea    Objective:  Vitals:    02/08/24 0603 02/08/24 0800 02/08/24 2000 02/09/24 0400   BP:  133/76 149/73 140/73   BP Location:  Right arm Right arm Right arm   Patient Position:  Lying Lying Lying   Pulse:  91 102 106   Resp:  18 16 18   Temp:  36.3 °C (97.3 °F) 37.3 °C (99.1 °F) 36.3 °C (97.3 °F)   TempSrc:  Temporal Temporal Temporal   SpO2:  97% 96% 95%   Weight: 50.5 kg (111 lb 6.4 oz)      Height:           Physical Exam:   General appearance: Well-appearing alert  Lungs: Clear to auscultation  Heart: RRR without murmur  Abdomen: Soft, non-tender. Bowel sounds normal.    Medications:  Scheduled medications  heparin (porcine), 5,000 Units, subcutaneous, q8h  latanoprost, 1 drop, Both Eyes, Nightly  levETIRAcetam, 500 mg, oral, BID  oxygen, , inhalation, Continuous - 02/gases  polyethylene glycol, 17 g, oral, Daily      Continuous medications     PRN medications      Assessment/Plan:  Principal Problem:  Vision changes  End-stage Glaucoma  Seizure    Continue current medications and measures  Still waiting for pre-CERT to discharge patient to SNF    Discussed with patient, RN    Stef Rosales MD   Date: 02/09/24  Time: 6:23 AM    This note was partially created using voice recognition software and is inherently subject to errors including those of syntax and \"sound-alike\" substitutions which may escape proofreading. In such instances, original meaning may be extrapolated by contextual derivation  "

## 2024-02-09 NOTE — PROGRESS NOTES
Occupational Therapy    OT Treatment    Patient Name: Fernanda Hughes  MRN: [id number]  Today's Date: 2/9/2024  Time Calculation  Start Time: 1418  Stop Time: 1436  Time Calculation (min): 18 min         Assessment:  OT Assessment: Patient limited with self care and mobility due to decreased vision.  Patient described this therapist as not being able to see head when standing in front of patient.  Patient not safe to return to prior level of living.  Will benefit from moderate to high level of intensity.  Prognosis: Fair  Evaluation/Treatment Tolerance: Patient tolerated treatment well  Medical Staff Made Aware: Yes  End of Session Communication: Bedside nurse  End of Session Patient Position: Bed, 3 rail up, Alarm on  OT Assessment Results: Decreased ADL status, Decreased endurance, Decreased functional mobility  Prognosis: Fair  Evaluation/Treatment Tolerance: Patient tolerated treatment well  Medical Staff Made Aware: Yes    Plan:  Treatment Interventions: ADL retraining, Functional transfer training, Endurance training, Patient/family training  OT Frequency: 5 times per week  OT Discharge Recommendations: Moderate intensity level of continued care, High intensity level of continued care  Equipment Recommended upon Discharge: Wheeled walker  OT Recommended Transfer Status: Assist of 1  OT - OK to Discharge: Yes (to next level of care when medically cleared by physician)  Treatment Interventions: ADL retraining, Functional transfer training, Endurance training, Patient/family training  Subjective     Current Problem:  Patient Active Problem List   Diagnosis    Vision changes       General:  OT Received On: 02/09/24  Reason for Referral: impaired ADLs  Referred By: Dr. Rosales  Prior to Session Communication: Bedside nurse  Patient Position Received: Bed, 3 rail up    Vital Signs:       Pain:  Pain Assessment  Pain Assessment: 0-10  Pain Score: 0 - No pain  Objective      Activities of Daily Living:                LE  Dressing  LE Dressing: Yes  Sock Level of Assistance: Setup  LE Dressing Where Assessed: Edge of bed  LE Dressing Comments: Patient used hands to feel to put sock on correctly due to low vision              Bed Mobility/Transfers: Bed Mobility  Bed Mobility: Yes  Bed Mobility 1  Bed Mobility 1: Supine to sitting  Level of Assistance 1: Distant supervision  Bed Mobility 2  Bed Mobility  2: Sitting to supine  Level of Assistance 2: Distant supervision  Transfers  Transfer: No              Outcome Measures:[facility] Daily Activity  Putting on and taking off regular lower body clothing: A little  Bathing (including washing, rinsing, drying): A little  Putting on and taking off regular upper body clothing: A little  Toileting, which includes using toilet, bedpan or urinal: A little  Taking care of personal grooming such as brushing teeth: A little  Eating Meals: A little  Daily Activity - Total Score: 18    EDUCATION:  Education  Individual(s) Educated: Patient  Education Provided: Fall precautons  Plan of Care Discussed and Agreed Upon: yes  Patient Response to Education: Patient/Caregiver Verbalized Understanding of Information  Education Comment: Pt w ould benefit from continued reinforcement    Goals:  Encounter Problems       Encounter Problems (Active)       Balance       LTG - Patient will maintain balance to allow for safe mobility (Progressing)       Start:  01/31/24    Expected End:  02/10/24               Dressings Lower Extremities       STG - Patient to complete lower body dressing MOD I (Progressing)       Start:  01/31/24    Expected End:  02/10/24               Grooming       STG - Patient completes grooming MOD I (Progressing)       Start:  01/31/24    Expected End:  02/10/24            STG - Patient will tolerate standing 4-8 min (Progressing)       Start:  01/31/24    Expected End:  02/10/24               Safety       LTG - Patient will adhere to hip precautions during ADL's and transfers       Start:   02/04/24    Expected End:  02/10/24            LTG - Patient will demonstrate safety requirements appropriate to situation/environment       Start:  02/04/24    Expected End:  02/10/24            LTG - Patient will utilize safety techniques       Start:  02/04/24    Expected End:  02/10/24            STG - Patient uses call light consistently to request assistance with transfers       Start:  02/04/24    Expected End:  02/10/24            STG - Patient uses gait belt during all transfers       Start:  02/04/24    Expected End:  02/10/24               Transfers       STG - Patient will perform toilet transfer MOD I (Progressing)       Start:  01/31/24    Expected End:  02/10/24

## 2024-02-09 NOTE — PROGRESS NOTES
02/09/24 1641   Discharge Planning   Home or Post Acute Services Post acute facilities (Rehab/SNF/etc)   Type of Post Acute Facility Services Rehab   Patient expects to be discharged to: [facility]   Does the patient need discharge transport arranged? Yes   RoundTrip coordination needed? Yes     Received message at 4:05pm from direct precert team that denial was overturned with expedited appeal for [facility]. Attempted to call liaison at [facility] to notify and to ask if patient can admit at that time. Received a call back from liaison at 4:15pm. [facility] can accept patient at noon tomorrow. Will need to arranged transport at notify patient's POA Deepali.

## 2024-02-09 NOTE — CARE PLAN
Problem: Pain  Goal: My pain/discomfort is manageable  Outcome: Progressing     Problem: Psychosocial Needs  Goal: Demonstrates ability to cope with hospitalization/illness  Outcome: Progressing  Goal: Collaborate with me, my family, and caregiver to identify my specific goals  Outcome: Progressing     Problem: Discharge Barriers  Goal: My discharge needs are met  Outcome: Progressing     Problem: Fall/Injury  Goal: Verbalize understanding of personal risk factors for fall in the hospital  Outcome: Progressing  Goal: Verbalize understanding of risk factor reduction measures to prevent injury from fall in the home  Outcome: Progressing     Problem: Med Adherence  Goal: CONSISTENTLY TAKE MEDICATIONS AS PRESCRIBED  Outcome: Progressing     Problem: Dressings Lower Extremities  Goal: STG - Patient to complete lower body dressing MOD I  Outcome: Progressing     Problem: Grooming  Goal: STG - Patient completes grooming MOD I  Outcome: Progressing  Goal: STG - Patient will tolerate standing 4-8 min  Outcome: Progressing     Problem: Skin  Goal: Prevent/manage excess moisture  Outcome: Progressing  Flowsheets (Taken 2/9/2024 0729)  Prevent/manage excess moisture: Cleanse incontinence/protect with barrier cream  Goal: Prevent/minimize sheer/friction injuries  Outcome: Progressing  Flowsheets (Taken 2/9/2024 0729)  Prevent/minimize sheer/friction injuries: Use pull sheet  Goal: Promote/optimize nutrition  Outcome: Progressing  Flowsheets (Taken 2/9/2024 0729)  Promote/optimize nutrition: Monitor/record intake including meals     Problem: Hypertension  Goal: I will maintain blood pressure under 120/80 with therapeutic intervention and maintain optimal cardiovascular and renal function  Outcome: Progressing     Problem: Pain - Adult  Goal: Verbalizes/displays adequate comfort level or baseline comfort level  Outcome: Progressing     Problem: Safety - Adult  Goal: Free from fall injury  Outcome: Progressing     Problem:  Discharge Planning  Goal: Discharge to home or other facility with appropriate resources  Outcome: Progressing     Problem: Chronic Conditions and Co-morbidities  Goal: Patient's chronic conditions and co-morbidity symptoms are monitored and maintained or improved  Outcome: Progressing

## 2024-02-09 NOTE — CARE PLAN
Pt had an uneventful night. Remained -115's while awake to NSR while asleep on tele. VSS. Pt got up to bathroom with 1 assist. [id number] remain unchanged. Pt c/o still have vision problems, received eye drops per MAR. Pt safety maintained and call light within reach.

## 2024-02-09 NOTE — PROGRESS NOTES
"Nutrition Assessment Note - follow up note.  Nutrition Assessment         Nutrition Note:  Fernanda Hughes is a 83 y.o. female presenting from home on own, 1/30, with vision changes. Length of stay complicated by altered mental status and tachycardiac 2/2 with RRT and transfer to ICU; Neurology involved in pt care and EEG pending. Pt evaluated by Ophthalmology 2/1, finding end stage glaucoma.    2/9/24 follow up note: Pt awaiting pre-cert. Pt eating % of meals per meal documentation; however, pt reported not much of an appetite.     Past Medical History   has a past medical history of Anemia, unspecified (12/16/2020), Anemia, unspecified (12/16/2020), Essential (primary) hypertension (12/16/2020), Essential (primary) hypertension (12/16/2020), Gastro-esophageal reflux disease without esophagitis (12/09/2020), and Personal history of other diseases of the nervous system and sense organs (10/26/2021).  Surgical History   has a past surgical history that includes Other surgical history (10/26/2021).     Food and Nutrient History  Energy Intake: Fair 50-75 %  Food and Nutrient History: 2/2: Pt from home alone. Attempted to obtain diet history however pt very anxious \"I can't see anything!\"  2/9: pt reports eating 3 meals/d at home and currently not much of an appetite. (Meal intakes: % per meal documentation. 100% B, L and D 2/7; 2/6 100% B and L, 75% D.)  Vitamin/Herbal Supplement Use: home meds include vit C, D3, MVI, B complex. Boost 1x/day  chocolate.       Current Diet: Adult diet Regular  Food allergies: NKFA. has No Known Allergies.    GI assessment: last BM 2/7  Oral Problems: no  Mobility: due to vision losses    Pain Assessment: 0-10  Pain Score: 0 - No pain    Vitals: Blood pressure 146/80, pulse 101, temperature 36.9 °C (98.4 °F), temperature source Temporal, resp. rate 18, height 1.702 m (5' 7\"), weight 50.7 kg (111 lb 12.4 oz), SpO2 98 %.    Recent Lab Results:  Lab Results   Component Value Date "    GLUCOSE 104 (H) 02/06/2024    CALCIUM 9.9 02/06/2024     02/06/2024    K 4.2 02/06/2024    CO2 28 02/06/2024     02/06/2024    BUN 20 02/06/2024    CREATININE 0.94 02/06/2024     Lab Results   Component Value Date    HGBA1C 5.3 02/02/2024    HGBA1C 4.9 01/18/2023     Results from last 7 days   Lab Units 02/03/24 2057 02/02/24  1535   POCT GLUCOSE mg/dL 108* 121*       Medications reviewed:  heparin (porcine), 5,000 Units, subcutaneous, q8h  latanoprost, 1 drop, Both Eyes, Nightly  levETIRAcetam, 500 mg, oral, BID  oxygen, , inhalation, Continuous - 02/gases  polyethylene glycol, 17 g, oral, Daily             Height: 170.2cm IBW 61.4kg   Admission Weight 51.1kg  BMI 17.6kg/m2 indicative of underweight.  Weight history/ % weight change:   2/2023: 52.4kg    Significant Weight Loss: no  Interpretation of Weight Loss:  N/A    Pt reports UBW lower 120s.                      Weight Change  Weight History / % Weight Change: 2/2023: 52.4kg; 2/9/24 50.7kg.  Significant Weight Loss: No                                          Estimated Energy Needs  Total Energy Estimated Needs (kCal):  (7622-6873 (25-30kcal/kg))    Estimated Protein Needs  Total Protein Estimated Needs (g):  (51-61g (1-1.2g/kg of 51.1kg))    Estimated Fluid Needs  Total Fluid Estimated Needs (mL):  (1mL/kcal/d or as per physician)         Nutrition Focused Physical Findings: deferred as pt anxious about vision changes.   Subcutaneous Fat Loss  Orbital Fat Pads: Mild-Moderate (slight dark circles and slight hollowing)  Triceps: Mild-moderate (less than ample fat tissue)    Muscle Wasting  Temporalis: Mild-Moderate (slight depression)  Pectoralis (Clavicular Region): Severe (protruding prominent clavicle)  Quadriceps: Well nourished (well developed, well rounded)  Gastrocnemius: Well nourished (well developed bulbous muscle)    Edema  Edema: none         Physical Findings (Nutrition Deficiency/Toxicity)  Skin:  (pale, dry, warm, bruising)        Nutrition Diagnosis   Malnutrition Diagnosis  Patient has Malnutrition Diagnosis: Yes  Diagnosis Status: New  Malnutrition Diagnosis: Moderate malnutrition related to starvation  As Evidenced by: moderate muscle wasting and moderate subcutaneous fat loss.    Patient has Nutrition Diagnosis: Yes  Nutrition Diagnosis 1: Underweight  Diagnosis Status (1): Ongoing  Related to (1): baseline  As Evidenced by (1): BMI 17kg/m2 with weight relatively stable x1 yr.                                            Nutrition Interventions/Recommendations   Nutrition Prescription  Individualized Nutrition Prescription Provided for : Diet: continue w/regular diet as ordered.    Food and/or Nutrient Delivery Interventions  Meals and Snacks: General healthful diet  Goal: >50% of meals.          Medical Food Supplement: Commercial beverage  Goal: Will provide ECC milkshakes at lunch for 247kcal, 5g pro per 4oz serving. Will provide Ensure Plus HP chocolate at breakfast and dinner (350kcal, 20g pro per 8oz serving).                             Additional Interventions: Pt prefers Ensure over Magic Cups. Will d/c Magic Cups.         Coordination of Nutrition Care by a Nutrition Professional  Collaboration and Referral of Nutrition Care: Collaboration by nutrition professional with other providers  Goal: SYEDA Hanson    Education Documentation:  2/2: Met with pt at bedside; pt very anxious and difficult time directing.       Nutrition Monitoring and Evaluation   Food and Nutrient Related History            Amount of Food: Estimated amout of food  Criteria: >50% of meals and ONS.                             Anthropometrics: Body Composition/Growth/Weight History  Weight: Measured weight  Criteria: daily wt                   Biochemical Data, Medical Tests and Procedures  Electrolyte and Renal Panel: Magnesium, Phosphorus, Sodium, Potassium  Criteria: WNR                        Nutrition Focused Physical Findings                      Skin:  Other (Comment)  Criteria: promote skin integrity              Follow Up  Time Spent (min): 45 minutes  Last Date of Nutrition Visit: 02/09/24  Nutrition Follow-Up Needed?: 3-8 days  Follow up Comment: MICHA

## 2024-02-09 NOTE — NURSING NOTE
Resumed care of patient from SYEDA Humphreys. Patient in bed awake A/O 3. Bed locked In lowest position, bed alarm active and audible, call bell and possesions within reach. Assessment and vitals completed and documented per order. Medications administered per order.            EOS: Pt turned independently this shift in bed and slept intermittently. Patient given medications per orders, safety maintained, call light and possessions within reach at all times, bed alarm active and audible throughout shift. Patient remained A/O 3 throughout shift.

## 2024-02-10 VITALS
RESPIRATION RATE: 18 BRPM | HEIGHT: 67 IN | DIASTOLIC BLOOD PRESSURE: 79 MMHG | SYSTOLIC BLOOD PRESSURE: 141 MMHG | WEIGHT: 110.45 LBS | HEART RATE: 56 BPM | BODY MASS INDEX: 17.34 KG/M2 | OXYGEN SATURATION: 95 % | TEMPERATURE: 97.5 F

## 2024-02-10 PROCEDURE — 2500000004 HC RX 250 GENERAL PHARMACY W/ HCPCS (ALT 636 FOR OP/ED)

## 2024-02-10 PROCEDURE — 2500000001 HC RX 250 WO HCPCS SELF ADMINISTERED DRUGS (ALT 637 FOR MEDICARE OP): Performed by: NURSE PRACTITIONER

## 2024-02-10 RX ADMIN — LEVETIRACETAM 500 MG: 500 TABLET, FILM COATED ORAL at 08:52

## 2024-02-10 RX ADMIN — HEPARIN SODIUM 5000 UNITS: 5000 INJECTION INTRAVENOUS; SUBCUTANEOUS at 07:08

## 2024-02-10 NOTE — PROGRESS NOTES
Transport confirmed for 12pm to  [facility]. Nursing and facility aware. Called to update pt's POA Deepali. Deepali requesting updates from nursing- nursing notified.     Karen Gabriel RN

## 2024-02-10 NOTE — CARE PLAN
The patient's goals for the shift include      The clinical goals for the shift include see poc87      Problem: Pain  Goal: My pain/discomfort is manageable  Outcome: Progressing     Problem: Psychosocial Needs  Goal: Demonstrates ability to cope with hospitalization/illness  Outcome: Progressing  Goal: Collaborate with me, my family, and caregiver to identify my specific goals  Outcome: Progressing     Problem: Discharge Barriers  Goal: My discharge needs are met  Outcome: Progressing     Problem: Fall/Injury  Goal: Verbalize understanding of personal risk factors for fall in the hospital  Outcome: Progressing  Goal: Verbalize understanding of risk factor reduction measures to prevent injury from fall in the home  Outcome: Progressing     Problem: Med Adherence  Goal: CONSISTENTLY TAKE MEDICATIONS AS PRESCRIBED  Outcome: Progressing     Problem: Dressings Lower Extremities  Goal: STG - Patient to complete lower body dressing MOD I  Outcome: Progressing     Problem: Grooming  Goal: STG - Patient completes grooming MOD I  Outcome: Progressing  Goal: STG - Patient will tolerate standing 4-8 min  Outcome: Progressing     Problem: Skin  Goal: Prevent/manage excess moisture  Outcome: Progressing  Goal: Prevent/minimize sheer/friction injuries  Outcome: Progressing  Goal: Promote/optimize nutrition  Outcome: Progressing     Problem: Hypertension  Goal: I will maintain blood pressure under 120/80 with therapeutic intervention and maintain optimal cardiovascular and renal function  Outcome: Progressing     Problem: Pain - Adult  Goal: Verbalizes/displays adequate comfort level or baseline comfort level  Outcome: Progressing     Problem: Safety - Adult  Goal: Free from fall injury  Outcome: Progressing     Problem: Discharge Planning  Goal: Discharge to home or other facility with appropriate resources  Outcome: Progressing     Problem: Chronic Conditions and Co-morbidities  Goal: Patient's chronic conditions and  co-morbidity symptoms are monitored and maintained or improved  Outcome: Progressing

## 2024-02-10 NOTE — NURSING NOTE
Patient alert and oriented with forgetfulness and confusion. Patient weak ambulating and needs staff assistance and walker to complete ADLs. Patient not aware of safety limits and is impulsive at times. Vision remains blurry. No c/o pain. Patient up to the bedside commode this shift. No acute changes this shift. No seizure activity noted

## 2024-02-14 ENCOUNTER — DOCUMENTATION (OUTPATIENT)
Dept: HOME HEALTH SERVICES | Facility: HOME HEALTH | Age: 84
End: 2024-02-14
Payer: MEDICARE

## 2024-02-14 ENCOUNTER — HOME HEALTH ADMISSION (OUTPATIENT)
Dept: HOME HEALTH SERVICES | Facility: HOME HEALTH | Age: 84
End: 2024-02-14
Payer: MEDICARE

## 2024-02-14 NOTE — HH CARE COORDINATION
Home Care received a Referral for Nursing, Physical Therapy, Occupational Therapy, Speech Language Pathology, and Medical Social Work. We have processed the referral for a Start of Care on 2/17-2/18.     If you have any questions or concerns, please feel free to contact us at [phone]. Follow the prompts, enter your five digit zip code, and you will be directed to your care team on [facility].

## 2024-02-18 ENCOUNTER — HOME CARE VISIT (OUTPATIENT)
Dept: HOME HEALTH SERVICES | Facility: HOME HEALTH | Age: 84
End: 2024-02-18
Payer: MEDICARE

## 2024-02-18 PROCEDURE — 0023 HH SOC

## 2024-02-18 PROCEDURE — 1090000002 HH PPS REVENUE DEBIT

## 2024-02-18 PROCEDURE — G0299 HHS/HOSPICE OF RN EA 15 MIN: HCPCS | Mod: HHH

## 2024-02-18 PROCEDURE — 169592 NO-PAY CLAIM PROCEDURE

## 2024-02-18 PROCEDURE — 1090000001 HH PPS REVENUE CREDIT

## 2024-02-19 ENCOUNTER — HOME CARE VISIT (OUTPATIENT)
Dept: HOME HEALTH SERVICES | Facility: HOME HEALTH | Age: 84
End: 2024-02-19
Payer: MEDICARE

## 2024-02-19 VITALS
SYSTOLIC BLOOD PRESSURE: 90 MMHG | BODY MASS INDEX: 18.68 KG/M2 | HEIGHT: 67 IN | HEART RATE: 100 BPM | TEMPERATURE: 97 F | WEIGHT: 119 LBS | DIASTOLIC BLOOD PRESSURE: 56 MMHG | OXYGEN SATURATION: 98 %

## 2024-02-19 PROCEDURE — 1090000002 HH PPS REVENUE DEBIT

## 2024-02-19 PROCEDURE — G0152 HHCP-SERV OF OT,EA 15 MIN: HCPCS | Mod: HHH

## 2024-02-19 PROCEDURE — G0151 HHCP-SERV OF PT,EA 15 MIN: HCPCS | Mod: HHH

## 2024-02-19 PROCEDURE — 1090000001 HH PPS REVENUE CREDIT

## 2024-02-19 ASSESSMENT — ENCOUNTER SYMPTOMS
LOSS OF VISUAL FIELD: 1
DEPRESSION: 1
OCCASIONAL FEELINGS OF UNSTEADINESS: 0
BLURRED VISION: 1
DENIES PAIN: 1
CHANGE IN APPETITE: DECREASED
LAST BOWEL MOVEMENT: 66889
APPETITE LEVEL: POOR
DESCRIPTION OF MEMORY LOSS: SHORT TERM
LOSS OF SENSATION IN FEET: 0

## 2024-02-19 ASSESSMENT — PAIN SCALES - PAIN ASSESSMENT IN ADVANCED DEMENTIA (PAINAD)
TOTALSCORE: 0
BREATHING: 0
FACIALEXPRESSION: 0 - SMILING OR INEXPRESSIVE.
BODYLANGUAGE: 0 - RELAXED.
FACIALEXPRESSION: 0
CONSOLABILITY: 0 - NO NEED TO CONSOLE.
NEGVOCALIZATION: 0 - NONE.
NEGVOCALIZATION: 0
BODYLANGUAGE: 0
CONSOLABILITY: 0

## 2024-02-19 ASSESSMENT — ACTIVITIES OF DAILY LIVING (ADL)
OASIS_M1830: 03
ENTERING_EXITING_HOME: TOTAL DEPENDENCE

## 2024-02-19 NOTE — HOME HEALTH
Patient lives alone; has for a number of years.  Drastic new change in vision due to end stage glaucoma.  Went to hospital due to lack of focus; patient had lost balance, wobbling.  Went to ER; then to [facility].    Boyfriend will be arriving today, Adriana; will be taking her to opthamology once she schedules appointment.     Confusion, memory issues; not sure what she ate. not sure if she has been using her glaucoma medication, found on counter. No evidence that Keppra was picked up; patient not taking.    Lantanoprost drops found on kitchen counter.  Patient states she stopped using it long ago.  Multiple bottles in the refrigerator as well.    Acetazolamide was stopped per patient 500 mg   Previous Opthamologist.  Dr. JOSE ANGEL Belle    Medical alert pendent found on dining room table; instructed patient to wear at all times due to her visual deficits; I placed on patient.  Patient agreeable to wear.  DC packet from SNF placed in [facility] folder.   needed for private duty assistance, community resources.      Patient difficulty remembering previous hospital stay for stroke in Jan. 2023.  Has doucmentation of stroke from CVA in the home.  Needs to see PCP and Opthamology asap.  Needs caregiver asap.

## 2024-02-20 ENCOUNTER — HOME CARE VISIT (OUTPATIENT)
Dept: HOME HEALTH SERVICES | Facility: HOME HEALTH | Age: 84
End: 2024-02-20
Payer: MEDICARE

## 2024-02-20 PROCEDURE — 1090000001 HH PPS REVENUE CREDIT

## 2024-02-20 PROCEDURE — 1090000002 HH PPS REVENUE DEBIT

## 2024-02-20 SDOH — HEALTH STABILITY: PHYSICAL HEALTH: EXERCISE TYPE: BLE PER HEP

## 2024-02-20 SDOH — ECONOMIC STABILITY: HOUSING INSECURITY
HOME SAFETY: LIVES ALONE, RANCH HOME, BATHROOM HAS TUB SHOWER, NO DME OR GRAB BARS. HAS SO WHO CAN ASSIST WITH DRIVING AND APPTS.

## 2024-02-20 ASSESSMENT — ACTIVITIES OF DAILY LIVING (ADL)
TOILETING: INDEPENDENT
FEEDING ASSESSED: 1
CURRENT_FUNCTION: INDEPENDENT
AMBULATION ASSISTANCE: INDEPENDENT
DRESSING_LB_CURRENT_FUNCTION: INDEPENDENT
TOILETING: 1
PHYSICAL TRANSFERS ASSESSED: 1
FEEDING: INDEPENDENT
GROOMING ASSESSED: 1
GROOMING_CURRENT_FUNCTION: INDEPENDENT
AMBULATION ASSISTANCE: 1

## 2024-02-20 ASSESSMENT — ENCOUNTER SYMPTOMS
DENIES PAIN: 1
PERSON REPORTING PAIN: PATIENT
DENIES PAIN: 1
PERSON REPORTING PAIN: PATIENT

## 2024-02-21 ENCOUNTER — APPOINTMENT (OUTPATIENT)
Dept: HOME HEALTH SERVICES | Facility: HOME HEALTH | Age: 84
End: 2024-02-21
Payer: MEDICARE

## 2024-02-21 PROCEDURE — 1090000002 HH PPS REVENUE DEBIT

## 2024-02-21 PROCEDURE — 1090000001 HH PPS REVENUE CREDIT

## 2024-02-22 PROCEDURE — 1090000001 HH PPS REVENUE CREDIT

## 2024-02-22 PROCEDURE — 1090000002 HH PPS REVENUE DEBIT

## 2024-02-23 ENCOUNTER — HOME CARE VISIT (OUTPATIENT)
Dept: HOME HEALTH SERVICES | Facility: HOME HEALTH | Age: 84
End: 2024-02-23
Payer: MEDICARE

## 2024-02-23 VITALS
RESPIRATION RATE: 18 BRPM | DIASTOLIC BLOOD PRESSURE: 68 MMHG | OXYGEN SATURATION: 97 % | SYSTOLIC BLOOD PRESSURE: 102 MMHG | HEART RATE: 76 BPM

## 2024-02-23 PROCEDURE — 1090000001 HH PPS REVENUE CREDIT

## 2024-02-23 PROCEDURE — 1090000002 HH PPS REVENUE DEBIT

## 2024-02-23 PROCEDURE — G0300 HHS/HOSPICE OF LPN EA 15 MIN: HCPCS | Mod: HHH

## 2024-02-23 SDOH — ECONOMIC STABILITY: GENERAL

## 2024-02-23 ASSESSMENT — ACTIVITIES OF DAILY LIVING (ADL)
FEEDING ASSESSED: 1
BATHING ASSESSED: 1
BATHING_CURRENT_FUNCTION: INDEPENDENT
DRESSING_UB_CURRENT_FUNCTION: INDEPENDENT
DRESSING_LB_CURRENT_FUNCTION: INDEPENDENT
AMBULATION ASSISTANCE: 1
MONEY MANAGEMENT (EXPENSES/BILLS): INDEPENDENT
TOILETING: INDEPENDENT
TOILETING: 1
AMBULATION ASSISTANCE: INDEPENDENT

## 2024-02-23 ASSESSMENT — ENCOUNTER SYMPTOMS
LIMITED RANGE OF MOTION: 1
DEPRESSION: 0
APPETITE LEVEL: GOOD
DESCRIPTION OF MEMORY LOSS: SHORT TERM
OCCASIONAL FEELINGS OF UNSTEADINESS: 0
CHANGE IN APPETITE: UNCHANGED
LOSS OF SENSATION IN FEET: 0
MUSCLE WEAKNESS: 1
DENIES PAIN: 1
PERSON REPORTING PAIN: PATIENT

## 2024-02-23 NOTE — HOME HEALTH
Patient was seen for routine nursing visit. Patient is having significant congnition issues. Contacted her primary care physician to schedule appt. Informed that patient has been missing appointments or hasn't shown up. Asked nurse to notate possible referal for neurologist. Patient is unaware of previous hospital stay. Some medications did not have labels as far as drops for glaucoma. Followed up with eye drLeny who also hasnt seen patient in a while but was able to pull notes from colleague of what patient should be taking. Labeled bottles and notated for patient. Follow up next after primary visit. Patient also has visit with opthamology.

## 2024-02-24 PROCEDURE — 1090000001 HH PPS REVENUE CREDIT

## 2024-02-24 PROCEDURE — 1090000002 HH PPS REVENUE DEBIT

## 2024-02-25 PROCEDURE — 1090000002 HH PPS REVENUE DEBIT

## 2024-02-25 PROCEDURE — 1090000001 HH PPS REVENUE CREDIT

## 2024-02-26 PROCEDURE — 1090000001 HH PPS REVENUE CREDIT

## 2024-02-26 PROCEDURE — 1090000002 HH PPS REVENUE DEBIT

## 2024-02-27 PROCEDURE — 1090000002 HH PPS REVENUE DEBIT

## 2024-02-27 PROCEDURE — 1090000001 HH PPS REVENUE CREDIT

## 2024-02-28 ENCOUNTER — HOME CARE VISIT (OUTPATIENT)
Dept: HOME HEALTH SERVICES | Facility: HOME HEALTH | Age: 84
End: 2024-02-28
Payer: MEDICARE

## 2024-02-28 VITALS
DIASTOLIC BLOOD PRESSURE: 68 MMHG | SYSTOLIC BLOOD PRESSURE: 110 MMHG | HEART RATE: 97 BPM | OXYGEN SATURATION: 98 % | RESPIRATION RATE: 18 BRPM

## 2024-02-28 LAB
ATRIAL RATE: 105 BPM
P AXIS: 51 DEGREES
P OFFSET: 205 MS
P ONSET: 158 MS
PR INTERVAL: 116 MS
Q ONSET: 216 MS
QRS COUNT: 18 BEATS
QRS DURATION: 86 MS
QT INTERVAL: 328 MS
QTC CALCULATION(BAZETT): 433 MS
QTC FREDERICIA: 395 MS
R AXIS: 70 DEGREES
T AXIS: 74 DEGREES
T OFFSET: 380 MS
VENTRICULAR RATE: 105 BPM

## 2024-02-28 PROCEDURE — 1090000001 HH PPS REVENUE CREDIT

## 2024-02-28 PROCEDURE — 1090000002 HH PPS REVENUE DEBIT

## 2024-02-28 PROCEDURE — G0300 HHS/HOSPICE OF LPN EA 15 MIN: HCPCS | Mod: HHH

## 2024-02-28 SDOH — ECONOMIC STABILITY: GENERAL

## 2024-02-28 ASSESSMENT — ENCOUNTER SYMPTOMS
LOSS OF SENSATION IN FEET: 0
DENIES PAIN: 1
OCCASIONAL FEELINGS OF UNSTEADINESS: 0
BLURRED VISION: 1
MUSCLE WEAKNESS: 1
APPETITE LEVEL: GOOD
LIMITED RANGE OF MOTION: 1
DEPRESSION: 0
PERSON REPORTING PAIN: PATIENT
CHANGE IN APPETITE: UNCHANGED

## 2024-02-28 ASSESSMENT — ACTIVITIES OF DAILY LIVING (ADL)
AMBULATION ASSISTANCE: 1
TOILETING: 1
MONEY MANAGEMENT (EXPENSES/BILLS): INDEPENDENT
BATHING ASSESSED: 1
DRESSING_LB_CURRENT_FUNCTION: INDEPENDENT
TOILETING: INDEPENDENT
BATHING_CURRENT_FUNCTION: INDEPENDENT
AMBULATION ASSISTANCE: NON-AMBULATORY
FEEDING: INDEPENDENT
FEEDING ASSESSED: 1
DRESSING_UB_CURRENT_FUNCTION: INDEPENDENT

## 2024-02-28 NOTE — HOME HEALTH
Patient was seen for routine nursing visit. Patient is still having memory issues. Nurse practitioner present for visit. Patient has follow up with primary physician. Patient is still off balance instructed to use walker at all times. Patient instructed to increase hydration and nutrition.

## 2024-02-29 PROCEDURE — 1090000002 HH PPS REVENUE DEBIT

## 2024-02-29 PROCEDURE — 1090000001 HH PPS REVENUE CREDIT

## 2024-03-01 PROCEDURE — 1090000002 HH PPS REVENUE DEBIT

## 2024-03-01 PROCEDURE — 1090000001 HH PPS REVENUE CREDIT

## 2024-03-02 PROCEDURE — 1090000001 HH PPS REVENUE CREDIT

## 2024-03-02 PROCEDURE — 1090000002 HH PPS REVENUE DEBIT

## 2024-03-03 PROCEDURE — 1090000001 HH PPS REVENUE CREDIT

## 2024-03-03 PROCEDURE — 1090000002 HH PPS REVENUE DEBIT

## 2024-03-04 PROCEDURE — 1090000002 HH PPS REVENUE DEBIT

## 2024-03-04 PROCEDURE — 1090000001 HH PPS REVENUE CREDIT

## 2024-03-05 PROCEDURE — 1090000001 HH PPS REVENUE CREDIT

## 2024-03-05 PROCEDURE — 1090000002 HH PPS REVENUE DEBIT

## 2024-03-06 PROCEDURE — 1090000002 HH PPS REVENUE DEBIT

## 2024-03-06 PROCEDURE — 1090000001 HH PPS REVENUE CREDIT

## 2024-03-06 NOTE — DISCHARGE SUMMARY
Discharge Diagnosis  Vision changes  End-stage glaucoma  Seizure disorder    Discharge Meds     Your medication list        START taking these medications        Instructions Last Dose Given Next Dose Due   levETIRAcetam 500 mg tablet  Commonly known as: Keppra      Take 1 tablet (500 mg) by mouth 2 times a day.              CONTINUE taking these medications        Instructions Last Dose Given Next Dose Due   ASCORBIC ACID (VITAMIN C) ORAL           b complex 0.4 mg tablet           multivitamin tablet           Vitamin D3 50 mcg (2,000 unit) capsule  Generic drug: cholecalciferol                  STOP taking these medications      lidocaine 5 % patch  Commonly known as: Lidoderm                  Where to Get Your Medications        Information about where to get these medications is not yet available    Ask your nurse or doctor about these medications  levETIRAcetam 500 mg tablet         Test Results Pending At Discharge  Pending Labs       Order Current Status    Extra Urine Gray Tube Collected (02/02/24 8508)    Urinalysis with Reflex Culture and Microscopic In process            Hospital Course  Patient is an 83-year-old female admitted to the hospital with visual changes, and vision loss, initially patient was evaluated by neurology, she had CT scan and brain MRI, no acute stroke was seen, patient was evaluated by ophthalmology, she was diagnosed with end-stage glaucoma, she had MRI of the orbit, while in the hospital she was transferred to ICU due to unresponsiveness, then she was transferred back to regular medical floor, patient was discharged to acute rehab.    Discharge time 35 minutes    Pertinent Physical Exam At Time of Discharge  Physical Exam  Constitutional:       Appearance: Normal appearance. She is normal weight.   HENT:      Head: Normocephalic and atraumatic.      Mouth/Throat:      Mouth: Mucous membranes are moist.      Pharynx: Oropharynx is clear.   Eyes:      Extraocular Movements:  Extraocular movements intact.      Conjunctiva/sclera: Conjunctivae normal.   Cardiovascular:      Rate and Rhythm: Normal rate and regular rhythm.   Abdominal:      General: Abdomen is flat. Bowel sounds are normal.      Palpations: Abdomen is soft.   Neurological:      General: No focal deficit present.      Mental Status: She is alert. Mental status is at baseline.         Outpatient Follow-Up  Future Appointments   Date Time Provider Department [place]   3/8/2024 To Be Determined Dank Romero LPN [facility]   3/12/2024 To Be Determined Dank Romero LANDY [facility]   3/22/2024 To Be Determined Dank Romero LANDY [facility]   3/26/2024 To Be Determined Tomeka Lehman RN [facility]   6/6/2024 11:00 AM Pedro Green MD [id number] [place]         Stef Rosales MD

## 2024-03-07 PROCEDURE — 1090000001 HH PPS REVENUE CREDIT

## 2024-03-07 PROCEDURE — 1090000002 HH PPS REVENUE DEBIT

## 2024-03-08 PROCEDURE — 1090000002 HH PPS REVENUE DEBIT

## 2024-03-08 PROCEDURE — 1090000001 HH PPS REVENUE CREDIT

## 2024-03-09 ENCOUNTER — APPOINTMENT (OUTPATIENT)
Dept: HOME HEALTH SERVICES | Facility: HOME HEALTH | Age: 84
End: 2024-03-09
Payer: MEDICARE

## 2024-03-09 PROCEDURE — 1090000001 HH PPS REVENUE CREDIT

## 2024-03-09 PROCEDURE — 1090000002 HH PPS REVENUE DEBIT

## 2024-03-10 PROCEDURE — 1090000002 HH PPS REVENUE DEBIT

## 2024-03-10 PROCEDURE — 1090000001 HH PPS REVENUE CREDIT

## 2024-03-11 PROCEDURE — 1090000002 HH PPS REVENUE DEBIT

## 2024-03-11 PROCEDURE — 1090000001 HH PPS REVENUE CREDIT

## 2024-03-12 ENCOUNTER — HOME CARE VISIT (OUTPATIENT)
Dept: HOME HEALTH SERVICES | Facility: HOME HEALTH | Age: 84
End: 2024-03-12

## 2024-03-12 VITALS
HEART RATE: 74 BPM | OXYGEN SATURATION: 98 % | SYSTOLIC BLOOD PRESSURE: 112 MMHG | RESPIRATION RATE: 18 BRPM | DIASTOLIC BLOOD PRESSURE: 72 MMHG

## 2024-03-12 PROCEDURE — 1090000001 HH PPS REVENUE CREDIT

## 2024-03-12 PROCEDURE — G0300 HHS/HOSPICE OF LPN EA 15 MIN: HCPCS | Mod: HHH

## 2024-03-12 PROCEDURE — 1090000002 HH PPS REVENUE DEBIT

## 2024-03-12 SDOH — ECONOMIC STABILITY: GENERAL

## 2024-03-12 ASSESSMENT — ENCOUNTER SYMPTOMS
MUSCLE WEAKNESS: 1
DENIES PAIN: 1
OCCASIONAL FEELINGS OF UNSTEADINESS: 1
PERSON REPORTING PAIN: PATIENT
LOSS OF SENSATION IN FEET: 0
DEPRESSION: 0
APPETITE LEVEL: GOOD
LIMITED RANGE OF MOTION: 1
CHANGE IN APPETITE: UNCHANGED

## 2024-03-12 ASSESSMENT — ACTIVITIES OF DAILY LIVING (ADL)
FEEDING: INDEPENDENT
TOILETING: 1
AMBULATION ASSISTANCE: 1
DRESSING_UB_CURRENT_FUNCTION: INDEPENDENT
TOILETING: INDEPENDENT
BATHING ASSESSED: 1
MONEY MANAGEMENT (EXPENSES/BILLS): INDEPENDENT
DRESSING_LB_CURRENT_FUNCTION: INDEPENDENT
BATHING_CURRENT_FUNCTION: INDEPENDENT
FEEDING ASSESSED: 1
AMBULATION ASSISTANCE: INDEPENDENT

## 2024-03-12 NOTE — HOME HEALTH
Patient was seen for routine nursing visit. Patient is doing well at this time. Has gained 1.5 lbs since she last weighed herself. Patient is still not using walker as much as she should, off balance due to eyes. Has a follow up this week with her eye doctor. Patient stated right eye is getting worse. Instructed patient not to drive until her eyes are looked at.

## 2024-03-13 PROCEDURE — 1090000001 HH PPS REVENUE CREDIT

## 2024-03-13 PROCEDURE — 1090000002 HH PPS REVENUE DEBIT

## 2024-03-14 PROCEDURE — 1090000002 HH PPS REVENUE DEBIT

## 2024-03-14 PROCEDURE — 1090000001 HH PPS REVENUE CREDIT

## 2024-03-15 PROCEDURE — 1090000002 HH PPS REVENUE DEBIT

## 2024-03-15 PROCEDURE — 1090000001 HH PPS REVENUE CREDIT

## 2024-03-16 PROCEDURE — 1090000001 HH PPS REVENUE CREDIT

## 2024-03-16 PROCEDURE — 1090000002 HH PPS REVENUE DEBIT

## 2024-03-17 PROCEDURE — 1090000002 HH PPS REVENUE DEBIT

## 2024-03-17 PROCEDURE — 1090000001 HH PPS REVENUE CREDIT

## 2024-03-18 PROCEDURE — 1090000002 HH PPS REVENUE DEBIT

## 2024-03-18 PROCEDURE — 1090000001 HH PPS REVENUE CREDIT

## 2024-03-19 PROCEDURE — 1090000001 HH PPS REVENUE CREDIT

## 2024-03-19 PROCEDURE — 1090000002 HH PPS REVENUE DEBIT

## 2024-03-20 PROCEDURE — 1090000002 HH PPS REVENUE DEBIT

## 2024-03-20 PROCEDURE — 1090000001 HH PPS REVENUE CREDIT

## 2024-03-21 PROCEDURE — 1090000002 HH PPS REVENUE DEBIT

## 2024-03-21 PROCEDURE — 1090000001 HH PPS REVENUE CREDIT

## 2024-03-22 PROCEDURE — 1090000001 HH PPS REVENUE CREDIT

## 2024-03-22 PROCEDURE — 1090000002 HH PPS REVENUE DEBIT

## 2024-03-23 ENCOUNTER — HOME CARE VISIT (OUTPATIENT)
Dept: HOME HEALTH SERVICES | Facility: HOME HEALTH | Age: 84
End: 2024-03-23
Payer: MEDICARE

## 2024-03-23 PROCEDURE — 1090000002 HH PPS REVENUE DEBIT

## 2024-03-23 PROCEDURE — 1090000001 HH PPS REVENUE CREDIT

## 2024-03-24 PROCEDURE — 1090000002 HH PPS REVENUE DEBIT

## 2024-03-24 PROCEDURE — 1090000001 HH PPS REVENUE CREDIT

## 2024-03-25 PROCEDURE — 1090000002 HH PPS REVENUE DEBIT

## 2024-03-25 PROCEDURE — 1090000001 HH PPS REVENUE CREDIT

## 2024-03-26 PROCEDURE — 1090000002 HH PPS REVENUE DEBIT

## 2024-03-26 PROCEDURE — 1090000001 HH PPS REVENUE CREDIT

## 2024-03-27 PROCEDURE — 1090000002 HH PPS REVENUE DEBIT

## 2024-03-27 PROCEDURE — 1090000001 HH PPS REVENUE CREDIT

## 2024-03-28 PROCEDURE — 1090000002 HH PPS REVENUE DEBIT

## 2024-03-28 PROCEDURE — 1090000001 HH PPS REVENUE CREDIT

## 2024-03-29 ENCOUNTER — HOME CARE VISIT (OUTPATIENT)
Dept: HOME HEALTH SERVICES | Facility: HOME HEALTH | Age: 84
End: 2024-03-29
Payer: MEDICARE

## 2024-03-29 VITALS
OXYGEN SATURATION: 99 % | TEMPERATURE: 97.4 F | DIASTOLIC BLOOD PRESSURE: 60 MMHG | HEART RATE: 88 BPM | SYSTOLIC BLOOD PRESSURE: 118 MMHG | WEIGHT: 112 LBS | RESPIRATION RATE: 16 BRPM | BODY MASS INDEX: 17.54 KG/M2

## 2024-03-29 PROCEDURE — 1090000001 HH PPS REVENUE CREDIT

## 2024-03-29 PROCEDURE — G0299 HHS/HOSPICE OF RN EA 15 MIN: HCPCS | Mod: HHH

## 2024-03-29 PROCEDURE — 1090000003 HH PPS REVENUE ADJ

## 2024-03-29 PROCEDURE — 0023 HH SOC

## 2024-03-29 PROCEDURE — 1090000002 HH PPS REVENUE DEBIT

## 2024-03-29 ASSESSMENT — ACTIVITIES OF DAILY LIVING (ADL)
HOME_HEALTH_OASIS: 00
OASIS_M1830: 00

## 2024-03-29 ASSESSMENT — ENCOUNTER SYMPTOMS: DENIES PAIN: 1

## 2024-03-30 PROCEDURE — 1090000001 HH PPS REVENUE CREDIT

## 2024-03-30 PROCEDURE — 1090000002 HH PPS REVENUE DEBIT

## 2024-03-31 PROCEDURE — 1090000001 HH PPS REVENUE CREDIT

## 2024-03-31 PROCEDURE — 1090000002 HH PPS REVENUE DEBIT

## 2024-09-02 ENCOUNTER — APPOINTMENT (OUTPATIENT)
Dept: CARDIOLOGY | Facility: HOSPITAL | Age: 84
End: 2024-09-02
Payer: MEDICARE

## 2024-09-02 ENCOUNTER — APPOINTMENT (OUTPATIENT)
Dept: RADIOLOGY | Facility: HOSPITAL | Age: 84
End: 2024-09-02
Payer: MEDICARE

## 2024-09-02 ENCOUNTER — HOSPITAL ENCOUNTER (EMERGENCY)
Facility: HOSPITAL | Age: 84
Discharge: HOME | End: 2024-09-02
Attending: EMERGENCY MEDICINE
Payer: MEDICARE

## 2024-09-02 ENCOUNTER — HOSPITAL ENCOUNTER (OUTPATIENT)
Dept: CARDIOLOGY | Facility: HOSPITAL | Age: 84
Discharge: HOME | End: 2024-09-02
Payer: MEDICARE

## 2024-09-02 VITALS
BODY MASS INDEX: 18.05 KG/M2 | TEMPERATURE: 96.8 F | OXYGEN SATURATION: 100 % | DIASTOLIC BLOOD PRESSURE: 75 MMHG | RESPIRATION RATE: 16 BRPM | HEART RATE: 60 BPM | HEIGHT: 67 IN | WEIGHT: 115 LBS | SYSTOLIC BLOOD PRESSURE: 175 MMHG

## 2024-09-02 DIAGNOSIS — R42 LIGHTHEADEDNESS: Primary | ICD-10-CM

## 2024-09-02 LAB
ALBUMIN SERPL BCP-MCNC: 3.8 G/DL (ref 3.4–5)
ALP SERPL-CCNC: 55 U/L (ref 33–136)
ALT SERPL W P-5'-P-CCNC: 11 U/L (ref 7–45)
ANION GAP SERPL CALC-SCNC: 13 MMOL/L (ref 10–20)
AST SERPL W P-5'-P-CCNC: 19 U/L (ref 9–39)
BASOPHILS # BLD AUTO: 0.04 X10*3/UL (ref 0–0.1)
BASOPHILS NFR BLD AUTO: 0.6 %
BILIRUB SERPL-MCNC: 0.7 MG/DL (ref 0–1.2)
BUN SERPL-MCNC: 17 MG/DL (ref 6–23)
CALCIUM SERPL-MCNC: 9.3 MG/DL (ref 8.6–10.3)
CARDIAC TROPONIN I PNL SERPL HS: 3 NG/L (ref 0–13)
CARDIAC TROPONIN I PNL SERPL HS: 5 NG/L (ref 0–13)
CHLORIDE SERPL-SCNC: 104 MMOL/L (ref 98–107)
CO2 SERPL-SCNC: 23 MMOL/L (ref 21–32)
CREAT SERPL-MCNC: 0.93 MG/DL (ref 0.5–1.05)
EGFRCR SERPLBLD CKD-EPI 2021: 60 ML/MIN/1.73M*2
EOSINOPHIL # BLD AUTO: 0.25 X10*3/UL (ref 0–0.4)
EOSINOPHIL NFR BLD AUTO: 4.1 %
ERYTHROCYTE [DISTWIDTH] IN BLOOD BY AUTOMATED COUNT: 13.5 % (ref 11.5–14.5)
GLUCOSE SERPL-MCNC: 85 MG/DL (ref 74–99)
HCT VFR BLD AUTO: 41.2 % (ref 36–46)
HGB BLD-MCNC: 13.4 G/DL (ref 12–16)
IMM GRANULOCYTES # BLD AUTO: 0.02 X10*3/UL (ref 0–0.5)
IMM GRANULOCYTES NFR BLD AUTO: 0.3 % (ref 0–0.9)
LYMPHOCYTES # BLD AUTO: 1.64 X10*3/UL (ref 0.8–3)
LYMPHOCYTES NFR BLD AUTO: 26.6 %
MCH RBC QN AUTO: 29.8 PG (ref 26–34)
MCHC RBC AUTO-ENTMCNC: 32.5 G/DL (ref 32–36)
MCV RBC AUTO: 92 FL (ref 80–100)
MONOCYTES # BLD AUTO: 0.58 X10*3/UL (ref 0.05–0.8)
MONOCYTES NFR BLD AUTO: 9.4 %
NEUTROPHILS # BLD AUTO: 3.64 X10*3/UL (ref 1.6–5.5)
NEUTROPHILS NFR BLD AUTO: 59 %
NRBC BLD-RTO: 0 /100 WBCS (ref 0–0)
PLATELET # BLD AUTO: 199 X10*3/UL (ref 150–450)
POTASSIUM SERPL-SCNC: 4.9 MMOL/L (ref 3.5–5.3)
PROT SERPL-MCNC: 6.4 G/DL (ref 6.4–8.2)
RBC # BLD AUTO: 4.5 X10*6/UL (ref 4–5.2)
SARS-COV-2 RNA RESP QL NAA+PROBE: NOT DETECTED
SODIUM SERPL-SCNC: 135 MMOL/L (ref 136–145)
WBC # BLD AUTO: 6.2 X10*3/UL (ref 4.4–11.3)

## 2024-09-02 PROCEDURE — 36415 COLL VENOUS BLD VENIPUNCTURE: CPT

## 2024-09-02 PROCEDURE — 85025 COMPLETE CBC W/AUTO DIFF WBC: CPT

## 2024-09-02 PROCEDURE — 71045 X-RAY EXAM CHEST 1 VIEW: CPT

## 2024-09-02 PROCEDURE — 99285 EMERGENCY DEPT VISIT HI MDM: CPT | Mod: 25

## 2024-09-02 PROCEDURE — 84484 ASSAY OF TROPONIN QUANT: CPT

## 2024-09-02 PROCEDURE — 96361 HYDRATE IV INFUSION ADD-ON: CPT

## 2024-09-02 PROCEDURE — 70450 CT HEAD/BRAIN W/O DYE: CPT

## 2024-09-02 PROCEDURE — 84075 ASSAY ALKALINE PHOSPHATASE: CPT

## 2024-09-02 PROCEDURE — 96360 HYDRATION IV INFUSION INIT: CPT

## 2024-09-02 PROCEDURE — 93005 ELECTROCARDIOGRAM TRACING: CPT

## 2024-09-02 PROCEDURE — 70450 CT HEAD/BRAIN W/O DYE: CPT | Performed by: RADIOLOGY

## 2024-09-02 PROCEDURE — 87635 SARS-COV-2 COVID-19 AMP PRB: CPT

## 2024-09-02 PROCEDURE — 71045 X-RAY EXAM CHEST 1 VIEW: CPT | Performed by: RADIOLOGY

## 2024-09-02 PROCEDURE — 2500000004 HC RX 250 GENERAL PHARMACY W/ HCPCS (ALT 636 FOR OP/ED)

## 2024-09-02 RX ORDER — MECLIZINE HYDROCHLORIDE 25 MG/1
25 TABLET ORAL 4 TIMES DAILY
Qty: 28 TABLET | Refills: 0 | Status: SHIPPED | OUTPATIENT
Start: 2024-09-02 | End: 2024-09-09

## 2024-09-02 ASSESSMENT — COLUMBIA-SUICIDE SEVERITY RATING SCALE - C-SSRS
2. HAVE YOU ACTUALLY HAD ANY THOUGHTS OF KILLING YOURSELF?: NO
1. IN THE PAST MONTH, HAVE YOU WISHED YOU WERE DEAD OR WISHED YOU COULD GO TO SLEEP AND NOT WAKE UP?: NO
6. HAVE YOU EVER DONE ANYTHING, STARTED TO DO ANYTHING, OR PREPARED TO DO ANYTHING TO END YOUR LIFE?: NO

## 2024-09-02 ASSESSMENT — LIFESTYLE VARIABLES
TOTAL SCORE: 0
EVER HAD A DRINK FIRST THING IN THE MORNING TO STEADY YOUR NERVES TO GET RID OF A HANGOVER: NO
EVER FELT BAD OR GUILTY ABOUT YOUR DRINKING: NO
HAVE YOU EVER FELT YOU SHOULD CUT DOWN ON YOUR DRINKING: NO
HAVE PEOPLE ANNOYED YOU BY CRITICIZING YOUR DRINKING: NO

## 2024-09-02 ASSESSMENT — PAIN SCALES - GENERAL
PAINLEVEL_OUTOF10: 0 - NO PAIN
PAINLEVEL_OUTOF10: 0 - NO PAIN

## 2024-09-02 ASSESSMENT — PAIN - FUNCTIONAL ASSESSMENT: PAIN_FUNCTIONAL_ASSESSMENT: 0-10

## 2024-09-02 NOTE — ED PROVIDER NOTES
HPI   Chief Complaint   Patient presents with    Dizziness     On going for several weeks pt lives alone and POA stated that she called last night and said she has been feeling Dizzy       84-year-old female comes emergency with dizziness, she is here with her POA at bedside, does not have any reported medical history that she takes any pills for currently, says she has been feeling dizzy and swaying when at rest, and worsened on walking going on since yesterday morning.  She called her POA last night and they grew concerned about it this morning as it was persistent.  Initially patient said this had been going on for weeks however she then modified her story to saying that this just started yesterday morning.  She does not describe room spinning but says she is feeling more lightheaded and wobbling.  No chest pain, abdominal pain, shortness of breath or any other symptoms.      History provided by:  Patient          Patient History   Past Medical History:   Diagnosis Date    Anemia, unspecified 12/16/2020    Anemia    Anemia, unspecified 12/16/2020    Anemia    Essential (primary) hypertension 12/16/2020    Essential hypertension    Essential (primary) hypertension 12/16/2020    Essential hypertension    Gastro-esophageal reflux disease without esophagitis 12/09/2020    GERD (gastroesophageal reflux disease)    Personal history of other diseases of the nervous system and sense organs 10/26/2021    History of glaucoma     Past Surgical History:   Procedure Laterality Date    OTHER SURGICAL HISTORY  10/26/2021    Cataract surgery     No family history on file.  Social History     Tobacco Use    Smoking status: Never    Smokeless tobacco: Never   Substance Use Topics    Alcohol use: Never    Drug use: Never       Physical Exam   ED Triage Vitals [09/02/24 1140]   Temperature Heart Rate Respirations BP   36 °C (96.8 °F) 73 14 131/77      Pulse Ox Temp Source Heart Rate Source Patient Position   99 % Temporal Monitor  Sitting      BP Location FiO2 (%)     Right arm --       Physical Exam  Vitals and nursing note reviewed.   Constitutional:       General: She is not in acute distress.  HENT:      Head: Normocephalic and atraumatic.   Eyes:      General: No scleral icterus.        Right eye: No discharge.         Left eye: No discharge.      Conjunctiva/sclera: Conjunctivae normal.      Comments: NO NYSTAGMUS   Cardiovascular:      Rate and Rhythm: Normal rate and regular rhythm.      Pulses: Normal pulses.   Pulmonary:      Effort: Pulmonary effort is normal.   Abdominal:      General: Abdomen is flat.      Palpations: Abdomen is soft.      Tenderness: There is no abdominal tenderness. There is no guarding or rebound.   Musculoskeletal:         General: No deformity.      Right lower leg: No edema.      Left lower leg: No edema.   Skin:     General: Skin is warm and dry.   Neurological:      Mental Status: She is alert and oriented to person, place, and time. Mental status is at baseline.      Comments: CN II-XII intact, strength 5/5, sensation intact throughout.  Normal finger-nose heel-to-shin testing bilaterally   Psychiatric:         Mood and Affect: Mood normal.         Behavior: Behavior normal.           ED Course & MDM   Diagnoses as of 09/02/24 1711   Lightheadedness                 No data recorded     Nicolasa Coma Scale Score: 15 (09/02/24 1135 : Azra Arroyo RN)                           Medical Decision Making  84-year-old female comes emergency room with dizziness.  She reports lightheaded sensation, been constant since yesterday morning.  Was not concerned based on exam that this was a vertiginous she had no nystagmus on exam, she was able to ambulate here in the emergency department with a straight line, had no deficits on finger-nose or heel-to-shin testing here today.  Did order cardiac workup and head CT to assess for other etiologies of her lightheadedness, administered a 1 L bolus.    CBC unremarkable,  "chemistry unremarkable on my independent interpretation.  COVID test was negative, troponins were negative x 2, CT head showed no acute pathology, no signs of pneumonia, pneumothorax on chest x-ray.  EKG shows no acute injury pattern.  Highly doubt ACS at this time given duration of symptoms, delta tropes being negative here today.    Patient was able to ambulate here in a straight line to the bathroom.  She was still having lightheadedness however on reassessment.  I did inform the patient that the neck step would likely be admission for possible further workup and or neurological standpoint to consider that this was a central etiology and this is actively vertiginous symptoms.  Patient was here with her POA, expressed a desire to go home.  I informed the patient that she lives alone, it would likely not be safe to go home given she would be a fall risk given her symptoms however she said that she is confident that she will not fall at home, her POA was here and agreed that they thought she would be fine at home and they would wish to be discharged with a prescription for a \"dizziness pill\".  So I did prescribe the meclizine.  I did inform the patient that it is unsafe and that she is a fall risk however she says she has a walker at home and is very confident she will not fall so at this time instead of admitting for stroke workup we did discharge the patient home to follow-up with her PCP, return for any new, concerning or worsening symptoms.    Amount and/or Complexity of Data Reviewed  Labs: ordered. Decision-making details documented in ED Course.  Radiology:  Decision-making details documented in ED Course.  ECG/medicine tests: ordered and independent interpretation performed.     Details: EKG I interpreted independently show 60 beats sinus rhythm, QTc 432, no acute injury pattern        Procedure  Procedures     Joaquin German DO  Resident  09/02/24 1711    "

## 2024-09-03 LAB
ATRIAL RATE: 60 BPM
ATRIAL RATE: 67 BPM
P AXIS: 54 DEGREES
P AXIS: 70 DEGREES
P OFFSET: 200 MS
P OFFSET: 203 MS
P ONSET: 154 MS
P ONSET: 157 MS
PR INTERVAL: 124 MS
PR INTERVAL: 132 MS
Q ONSET: 219 MS
Q ONSET: 220 MS
QRS COUNT: 10 BEATS
QRS COUNT: 11 BEATS
QRS DURATION: 82 MS
QRS DURATION: 86 MS
QT INTERVAL: 408 MS
QT INTERVAL: 432 MS
QTC CALCULATION(BAZETT): 431 MS
QTC CALCULATION(BAZETT): 432 MS
QTC FREDERICIA: 423 MS
QTC FREDERICIA: 432 MS
R AXIS: 74 DEGREES
R AXIS: 78 DEGREES
T AXIS: 78 DEGREES
T AXIS: 80 DEGREES
T OFFSET: 423 MS
T OFFSET: 436 MS
VENTRICULAR RATE: 60 BPM
VENTRICULAR RATE: 67 BPM

## 2024-12-02 ENCOUNTER — APPOINTMENT (OUTPATIENT)
Dept: CARDIOLOGY | Facility: HOSPITAL | Age: 84
End: 2024-12-02
Payer: MEDICARE

## 2024-12-02 ENCOUNTER — APPOINTMENT (OUTPATIENT)
Dept: RADIOLOGY | Facility: HOSPITAL | Age: 84
End: 2024-12-02
Payer: MEDICARE

## 2024-12-02 ENCOUNTER — HOSPITAL ENCOUNTER (OUTPATIENT)
Facility: HOSPITAL | Age: 84
Setting detail: OBSERVATION
Discharge: HOME HEALTH | End: 2024-12-04
Attending: EMERGENCY MEDICINE | Admitting: INTERNAL MEDICINE
Payer: MEDICARE
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
## 2024-12-02 DIAGNOSIS — S01.01XA SCALP LACERATION, INITIAL ENCOUNTER: ICD-10-CM

## 2024-12-02 DIAGNOSIS — S51.812A SKIN TEAR OF LEFT FOREARM WITHOUT COMPLICATION, INITIAL ENCOUNTER: ICD-10-CM

## 2024-12-02 DIAGNOSIS — S09.90XA INJURY OF HEAD, INITIAL ENCOUNTER: ICD-10-CM

## 2024-12-02 DIAGNOSIS — N39.0 URINARY TRACT INFECTION WITHOUT HEMATURIA, SITE UNSPECIFIED: ICD-10-CM

## 2024-12-02 DIAGNOSIS — S09.90XA CLOSED HEAD INJURY, INITIAL ENCOUNTER: Primary | ICD-10-CM

## 2024-12-02 PROBLEM — E83.42 HYPOMAGNESEMIA: Status: ACTIVE | Noted: 2024-12-02

## 2024-12-02 PROBLEM — S41.112A ARM LACERATION, LEFT, INITIAL ENCOUNTER: Status: ACTIVE | Noted: 2024-12-02

## 2024-12-02 LAB
ALBUMIN SERPL BCP-MCNC: 3.8 G/DL (ref 3.4–5)
ALP SERPL-CCNC: 59 U/L (ref 33–136)
ALT SERPL W P-5'-P-CCNC: 21 U/L (ref 7–45)
ANION GAP SERPL CALC-SCNC: 14 MMOL/L (ref 10–20)
APPEARANCE UR: CLEAR
APPEARANCE UR: CLEAR
AST SERPL W P-5'-P-CCNC: 21 U/L (ref 9–39)
BACTERIA #/AREA URNS AUTO: ABNORMAL /HPF
BASOPHILS # BLD AUTO: 0.04 X10*3/UL (ref 0–0.1)
BASOPHILS NFR BLD AUTO: 0.3 %
BILIRUB SERPL-MCNC: 1.5 MG/DL (ref 0–1.2)
BILIRUB UR STRIP.AUTO-MCNC: NEGATIVE MG/DL
BILIRUB UR STRIP.AUTO-MCNC: NEGATIVE MG/DL
BUN SERPL-MCNC: 14 MG/DL (ref 6–23)
CALCIUM SERPL-MCNC: 9.2 MG/DL (ref 8.6–10.3)
CARDIAC TROPONIN I PNL SERPL HS: 8 NG/L (ref 0–13)
CHLORIDE SERPL-SCNC: 101 MMOL/L (ref 98–107)
CO2 SERPL-SCNC: 23 MMOL/L (ref 21–32)
COLOR UR: ABNORMAL
COLOR UR: ABNORMAL
CREAT SERPL-MCNC: 0.98 MG/DL (ref 0.5–1.05)
EGFRCR SERPLBLD CKD-EPI 2021: 56 ML/MIN/1.73M*2
EOSINOPHIL # BLD AUTO: 0.14 X10*3/UL (ref 0–0.4)
EOSINOPHIL NFR BLD AUTO: 1.1 %
ERYTHROCYTE [DISTWIDTH] IN BLOOD BY AUTOMATED COUNT: 12.5 % (ref 11.5–14.5)
GLUCOSE SERPL-MCNC: 115 MG/DL (ref 74–99)
GLUCOSE UR STRIP.AUTO-MCNC: NORMAL MG/DL
GLUCOSE UR STRIP.AUTO-MCNC: NORMAL MG/DL
HCT VFR BLD AUTO: 40.3 % (ref 36–46)
HGB BLD-MCNC: 14 G/DL (ref 12–16)
IMM GRANULOCYTES # BLD AUTO: 0.05 X10*3/UL (ref 0–0.5)
IMM GRANULOCYTES NFR BLD AUTO: 0.4 % (ref 0–0.9)
KETONES UR STRIP.AUTO-MCNC: NEGATIVE MG/DL
KETONES UR STRIP.AUTO-MCNC: NEGATIVE MG/DL
LEUKOCYTE ESTERASE UR QL STRIP.AUTO: ABNORMAL
LEUKOCYTE ESTERASE UR QL STRIP.AUTO: NEGATIVE
LYMPHOCYTES # BLD AUTO: 1.58 X10*3/UL (ref 0.8–3)
LYMPHOCYTES NFR BLD AUTO: 12 %
MAGNESIUM SERPL-MCNC: 1.57 MG/DL (ref 1.6–2.4)
MCH RBC QN AUTO: 30.2 PG (ref 26–34)
MCHC RBC AUTO-ENTMCNC: 34.7 G/DL (ref 32–36)
MCV RBC AUTO: 87 FL (ref 80–100)
MONOCYTES # BLD AUTO: 0.85 X10*3/UL (ref 0.05–0.8)
MONOCYTES NFR BLD AUTO: 6.5 %
NEUTROPHILS # BLD AUTO: 10.5 X10*3/UL (ref 1.6–5.5)
NEUTROPHILS NFR BLD AUTO: 79.7 %
NITRITE UR QL STRIP.AUTO: NEGATIVE
NITRITE UR QL STRIP.AUTO: NEGATIVE
NRBC BLD-RTO: 0 /100 WBCS (ref 0–0)
PH UR STRIP.AUTO: 6.5 [PH]
PH UR STRIP.AUTO: 6.5 [PH]
PLATELET # BLD AUTO: 235 X10*3/UL (ref 150–450)
POTASSIUM SERPL-SCNC: 3.6 MMOL/L (ref 3.5–5.3)
PROT SERPL-MCNC: 6.2 G/DL (ref 6.4–8.2)
PROT UR STRIP.AUTO-MCNC: NEGATIVE MG/DL
PROT UR STRIP.AUTO-MCNC: NEGATIVE MG/DL
RBC # BLD AUTO: 4.64 X10*6/UL (ref 4–5.2)
RBC # UR STRIP.AUTO: ABNORMAL /UL
RBC # UR STRIP.AUTO: NEGATIVE /UL
RBC #/AREA URNS AUTO: ABNORMAL /HPF
RBC #/AREA URNS AUTO: NORMAL /HPF
SODIUM SERPL-SCNC: 134 MMOL/L (ref 136–145)
SP GR UR STRIP.AUTO: 1.01
SP GR UR STRIP.AUTO: 1.01
SQUAMOUS #/AREA URNS AUTO: ABNORMAL /HPF
UROBILINOGEN UR STRIP.AUTO-MCNC: NORMAL MG/DL
UROBILINOGEN UR STRIP.AUTO-MCNC: NORMAL MG/DL
WBC # BLD AUTO: 13.2 X10*3/UL (ref 4.4–11.3)
WBC #/AREA URNS AUTO: ABNORMAL /HPF
WBC #/AREA URNS AUTO: NORMAL /HPF

## 2024-12-02 PROCEDURE — 36415 COLL VENOUS BLD VENIPUNCTURE: CPT | Performed by: EMERGENCY MEDICINE

## 2024-12-02 PROCEDURE — 96372 THER/PROPH/DIAG INJ SC/IM: CPT | Performed by: NURSE PRACTITIONER

## 2024-12-02 PROCEDURE — 84484 ASSAY OF TROPONIN QUANT: CPT | Performed by: EMERGENCY MEDICINE

## 2024-12-02 PROCEDURE — 96365 THER/PROPH/DIAG IV INF INIT: CPT | Mod: 59

## 2024-12-02 PROCEDURE — 2500000001 HC RX 250 WO HCPCS SELF ADMINISTERED DRUGS (ALT 637 FOR MEDICARE OP): Performed by: NURSE PRACTITIONER

## 2024-12-02 PROCEDURE — G0378 HOSPITAL OBSERVATION PER HR: HCPCS

## 2024-12-02 PROCEDURE — 81001 URINALYSIS AUTO W/SCOPE: CPT | Performed by: EMERGENCY MEDICINE

## 2024-12-02 PROCEDURE — 93010 ELECTROCARDIOGRAM REPORT: CPT | Performed by: INTERNAL MEDICINE

## 2024-12-02 PROCEDURE — 96376 TX/PRO/DX INJ SAME DRUG ADON: CPT | Mod: 59

## 2024-12-02 PROCEDURE — 85025 COMPLETE CBC W/AUTO DIFF WBC: CPT | Performed by: EMERGENCY MEDICINE

## 2024-12-02 PROCEDURE — 73552 X-RAY EXAM OF FEMUR 2/>: CPT | Mod: LT

## 2024-12-02 PROCEDURE — 96366 THER/PROPH/DIAG IV INF ADDON: CPT | Mod: 59

## 2024-12-02 PROCEDURE — 73110 X-RAY EXAM OF WRIST: CPT | Mod: LEFT SIDE | Performed by: RADIOLOGY

## 2024-12-02 PROCEDURE — 70450 CT HEAD/BRAIN W/O DYE: CPT | Performed by: RADIOLOGY

## 2024-12-02 PROCEDURE — 84075 ASSAY ALKALINE PHOSPHATASE: CPT | Performed by: EMERGENCY MEDICINE

## 2024-12-02 PROCEDURE — 72170 X-RAY EXAM OF PELVIS: CPT

## 2024-12-02 PROCEDURE — 93005 ELECTROCARDIOGRAM TRACING: CPT

## 2024-12-02 PROCEDURE — 72125 CT NECK SPINE W/O DYE: CPT | Performed by: RADIOLOGY

## 2024-12-02 PROCEDURE — 81001 URINALYSIS AUTO W/SCOPE: CPT | Performed by: NURSE PRACTITIONER

## 2024-12-02 PROCEDURE — 72125 CT NECK SPINE W/O DYE: CPT

## 2024-12-02 PROCEDURE — 2500000005 HC RX 250 GENERAL PHARMACY W/O HCPCS: Performed by: NURSE PRACTITIONER

## 2024-12-02 PROCEDURE — 99222 1ST HOSP IP/OBS MODERATE 55: CPT | Performed by: NURSE PRACTITIONER

## 2024-12-02 PROCEDURE — 73552 X-RAY EXAM OF FEMUR 2/>: CPT | Mod: LEFT SIDE | Performed by: RADIOLOGY

## 2024-12-02 PROCEDURE — 73110 X-RAY EXAM OF WRIST: CPT | Mod: LT

## 2024-12-02 PROCEDURE — 2500000004 HC RX 250 GENERAL PHARMACY W/ HCPCS (ALT 636 FOR OP/ED): Performed by: EMERGENCY MEDICINE

## 2024-12-02 PROCEDURE — 70450 CT HEAD/BRAIN W/O DYE: CPT

## 2024-12-02 PROCEDURE — 2500000001 HC RX 250 WO HCPCS SELF ADMINISTERED DRUGS (ALT 637 FOR MEDICARE OP): Performed by: EMERGENCY MEDICINE

## 2024-12-02 PROCEDURE — 72170 X-RAY EXAM OF PELVIS: CPT | Performed by: RADIOLOGY

## 2024-12-02 PROCEDURE — 2500000004 HC RX 250 GENERAL PHARMACY W/ HCPCS (ALT 636 FOR OP/ED): Performed by: NURSE PRACTITIONER

## 2024-12-02 PROCEDURE — 12001 RPR S/N/AX/GEN/TRNK 2.5CM/<: CPT | Performed by: EMERGENCY MEDICINE

## 2024-12-02 PROCEDURE — 99285 EMERGENCY DEPT VISIT HI MDM: CPT | Mod: 25 | Performed by: EMERGENCY MEDICINE

## 2024-12-02 PROCEDURE — 83735 ASSAY OF MAGNESIUM: CPT | Performed by: EMERGENCY MEDICINE

## 2024-12-02 RX ORDER — ACETAMINOPHEN 325 MG/1
650 TABLET ORAL EVERY 6 HOURS PRN
Status: DISCONTINUED | OUTPATIENT
Start: 2024-12-02 | End: 2024-12-04 | Stop reason: HOSPADM

## 2024-12-02 RX ORDER — DORZOLAMIDE HYDROCHLORIDE AND TIMOLOL MALEATE 20; 5 MG/ML; MG/ML
1 SOLUTION/ DROPS OPHTHALMIC 2 TIMES DAILY
COMMUNITY

## 2024-12-02 RX ORDER — TALC
3 POWDER (GRAM) TOPICAL NIGHTLY PRN
Status: DISCONTINUED | OUTPATIENT
Start: 2024-12-02 | End: 2024-12-04 | Stop reason: HOSPADM

## 2024-12-02 RX ORDER — BRIMONIDINE TARTRATE 2 MG/ML
1 SOLUTION/ DROPS OPHTHALMIC 2 TIMES DAILY
COMMUNITY

## 2024-12-02 RX ORDER — ENOXAPARIN SODIUM 100 MG/ML
40 INJECTION SUBCUTANEOUS EVERY 24 HOURS
Status: DISCONTINUED | OUTPATIENT
Start: 2024-12-02 | End: 2024-12-04 | Stop reason: HOSPADM

## 2024-12-02 RX ORDER — ACETAMINOPHEN 650 MG/1
650 SUPPOSITORY RECTAL EVERY 6 HOURS PRN
Status: DISCONTINUED | OUTPATIENT
Start: 2024-12-02 | End: 2024-12-04 | Stop reason: HOSPADM

## 2024-12-02 RX ORDER — BRIMONIDINE TARTRATE 2 MG/ML
1 SOLUTION/ DROPS OPHTHALMIC 2 TIMES DAILY
Status: DISCONTINUED | OUTPATIENT
Start: 2024-12-02 | End: 2024-12-04 | Stop reason: HOSPADM

## 2024-12-02 RX ORDER — CEFTRIAXONE 1 G/50ML
1 INJECTION, SOLUTION INTRAVENOUS ONCE
Status: COMPLETED | OUTPATIENT
Start: 2024-12-02 | End: 2024-12-02

## 2024-12-02 RX ORDER — ACETAMINOPHEN 325 MG/1
975 TABLET ORAL ONCE
Status: COMPLETED | OUTPATIENT
Start: 2024-12-02 | End: 2024-12-02

## 2024-12-02 RX ORDER — POLYETHYLENE GLYCOL 3350 17 G/17G
17 POWDER, FOR SOLUTION ORAL DAILY PRN
Status: DISCONTINUED | OUTPATIENT
Start: 2024-12-02 | End: 2024-12-04 | Stop reason: HOSPADM

## 2024-12-02 RX ORDER — MAGNESIUM SULFATE HEPTAHYDRATE 40 MG/ML
2 INJECTION, SOLUTION INTRAVENOUS ONCE
Status: COMPLETED | OUTPATIENT
Start: 2024-12-02 | End: 2024-12-02

## 2024-12-02 RX ORDER — CEFTRIAXONE 1 G/50ML
1 INJECTION, SOLUTION INTRAVENOUS EVERY 24 HOURS
Status: DISCONTINUED | OUTPATIENT
Start: 2024-12-03 | End: 2024-12-04 | Stop reason: HOSPADM

## 2024-12-02 RX ORDER — DORZOLAMIDE HYDROCHLORIDE AND TIMOLOL MALEATE 20; 5 MG/ML; MG/ML
1 SOLUTION/ DROPS OPHTHALMIC 2 TIMES DAILY
Status: DISCONTINUED | OUTPATIENT
Start: 2024-12-02 | End: 2024-12-04 | Stop reason: HOSPADM

## 2024-12-02 RX ORDER — LATANOPROST 50 UG/ML
1 SOLUTION/ DROPS OPHTHALMIC NIGHTLY
Status: DISCONTINUED | OUTPATIENT
Start: 2024-12-02 | End: 2024-12-04 | Stop reason: HOSPADM

## 2024-12-02 RX ORDER — ACETAMINOPHEN 160 MG/5ML
650 SOLUTION ORAL EVERY 6 HOURS PRN
Status: DISCONTINUED | OUTPATIENT
Start: 2024-12-02 | End: 2024-12-04 | Stop reason: HOSPADM

## 2024-12-02 SDOH — SOCIAL STABILITY: SOCIAL INSECURITY: WITHIN THE LAST YEAR, HAVE YOU BEEN AFRAID OF YOUR PARTNER OR EX-PARTNER?: NO

## 2024-12-02 SDOH — ECONOMIC STABILITY: FOOD INSECURITY: HOW HARD IS IT FOR YOU TO PAY FOR THE VERY BASICS LIKE FOOD, HOUSING, MEDICAL CARE, AND HEATING?: NOT HARD AT ALL

## 2024-12-02 SDOH — ECONOMIC STABILITY: HOUSING INSECURITY: AT ANY TIME IN THE PAST 12 MONTHS, WERE YOU HOMELESS OR LIVING IN A SHELTER (INCLUDING NOW)?: NO

## 2024-12-02 SDOH — SOCIAL STABILITY: SOCIAL NETWORK: IN A TYPICAL WEEK, HOW MANY TIMES DO YOU TALK ON THE PHONE WITH FAMILY, FRIENDS, OR NEIGHBORS?: ONCE A WEEK

## 2024-12-02 SDOH — SOCIAL STABILITY: SOCIAL INSECURITY: ABUSE: ADULT

## 2024-12-02 SDOH — ECONOMIC STABILITY: FOOD INSECURITY: WITHIN THE PAST 12 MONTHS, YOU WORRIED THAT YOUR FOOD WOULD RUN OUT BEFORE YOU GOT THE MONEY TO BUY MORE.: NEVER TRUE

## 2024-12-02 SDOH — SOCIAL STABILITY: SOCIAL INSECURITY: WITHIN THE LAST YEAR, HAVE YOU BEEN HUMILIATED OR EMOTIONALLY ABUSED IN OTHER WAYS BY YOUR PARTNER OR EX-PARTNER?: NO

## 2024-12-02 SDOH — SOCIAL STABILITY: SOCIAL INSECURITY: DO YOU FEEL UNSAFE GOING BACK TO THE PLACE WHERE YOU ARE LIVING?: NO

## 2024-12-02 SDOH — SOCIAL STABILITY: SOCIAL INSECURITY: ARE YOU MARRIED, WIDOWED, DIVORCED, SEPARATED, NEVER MARRIED, OR LIVING WITH A PARTNER?: DIVORCED

## 2024-12-02 SDOH — HEALTH STABILITY: MENTAL HEALTH
DO YOU FEEL STRESS - TENSE, RESTLESS, NERVOUS, OR ANXIOUS, OR UNABLE TO SLEEP AT NIGHT BECAUSE YOUR MIND IS TROUBLED ALL THE TIME - THESE DAYS?: NOT AT ALL

## 2024-12-02 SDOH — SOCIAL STABILITY: SOCIAL NETWORK
DO YOU BELONG TO ANY CLUBS OR ORGANIZATIONS SUCH AS CHURCH GROUPS, UNIONS, FRATERNAL OR ATHLETIC GROUPS, OR SCHOOL GROUPS?: YES

## 2024-12-02 SDOH — HEALTH STABILITY: MENTAL HEALTH: HOW OFTEN DO YOU HAVE A DRINK CONTAINING ALCOHOL?: NEVER

## 2024-12-02 SDOH — ECONOMIC STABILITY: HOUSING INSECURITY: IN THE PAST 12 MONTHS, HOW MANY TIMES HAVE YOU MOVED WHERE YOU WERE LIVING?: 0

## 2024-12-02 SDOH — ECONOMIC STABILITY: HOUSING INSECURITY: IN THE LAST 12 MONTHS, WAS THERE A TIME WHEN YOU WERE NOT ABLE TO PAY THE MORTGAGE OR RENT ON TIME?: NO

## 2024-12-02 SDOH — HEALTH STABILITY: MENTAL HEALTH: HOW OFTEN DO YOU HAVE SIX OR MORE DRINKS ON ONE OCCASION?: NEVER

## 2024-12-02 SDOH — SOCIAL STABILITY: SOCIAL INSECURITY: HAS ANYONE EVER THREATENED TO HURT YOUR FAMILY OR YOUR PETS?: NO

## 2024-12-02 SDOH — SOCIAL STABILITY: SOCIAL INSECURITY
WITHIN THE LAST YEAR, HAVE YOU BEEN RAPED OR FORCED TO HAVE ANY KIND OF SEXUAL ACTIVITY BY YOUR PARTNER OR EX-PARTNER?: NO

## 2024-12-02 SDOH — ECONOMIC STABILITY: TRANSPORTATION INSECURITY: IN THE PAST 12 MONTHS, HAS LACK OF TRANSPORTATION KEPT YOU FROM MEDICAL APPOINTMENTS OR FROM GETTING MEDICATIONS?: NO

## 2024-12-02 SDOH — SOCIAL STABILITY: SOCIAL INSECURITY: HAVE YOU HAD THOUGHTS OF HARMING ANYONE ELSE?: NO

## 2024-12-02 SDOH — SOCIAL STABILITY: SOCIAL INSECURITY: DOES ANYONE TRY TO KEEP YOU FROM HAVING/CONTACTING OTHER FRIENDS OR DOING THINGS OUTSIDE YOUR HOME?: NO

## 2024-12-02 SDOH — SOCIAL STABILITY: SOCIAL INSECURITY
WITHIN THE LAST YEAR, HAVE YOU BEEN KICKED, HIT, SLAPPED, OR OTHERWISE PHYSICALLY HURT BY YOUR PARTNER OR EX-PARTNER?: NO

## 2024-12-02 SDOH — HEALTH STABILITY: PHYSICAL HEALTH: ON AVERAGE, HOW MANY DAYS PER WEEK DO YOU ENGAGE IN MODERATE TO STRENUOUS EXERCISE (LIKE A BRISK WALK)?: 0 DAYS

## 2024-12-02 SDOH — SOCIAL STABILITY: SOCIAL INSECURITY: DO YOU FEEL ANYONE HAS EXPLOITED OR TAKEN ADVANTAGE OF YOU FINANCIALLY OR OF YOUR PERSONAL PROPERTY?: NO

## 2024-12-02 SDOH — SOCIAL STABILITY: SOCIAL INSECURITY: ARE YOU OR HAVE YOU BEEN THREATENED OR ABUSED PHYSICALLY, EMOTIONALLY, OR SEXUALLY BY ANYONE?: NO

## 2024-12-02 SDOH — ECONOMIC STABILITY: FOOD INSECURITY: WITHIN THE PAST 12 MONTHS, THE FOOD YOU BOUGHT JUST DIDN'T LAST AND YOU DIDN'T HAVE MONEY TO GET MORE.: NEVER TRUE

## 2024-12-02 SDOH — SOCIAL STABILITY: SOCIAL INSECURITY: WERE YOU ABLE TO COMPLETE ALL THE BEHAVIORAL HEALTH SCREENINGS?: YES

## 2024-12-02 SDOH — HEALTH STABILITY: PHYSICAL HEALTH
HOW OFTEN DO YOU NEED TO HAVE SOMEONE HELP YOU WHEN YOU READ INSTRUCTIONS, PAMPHLETS, OR OTHER WRITTEN MATERIAL FROM YOUR DOCTOR OR PHARMACY?: NEVER

## 2024-12-02 SDOH — ECONOMIC STABILITY: INCOME INSECURITY: IN THE PAST 12 MONTHS HAS THE ELECTRIC, GAS, OIL, OR WATER COMPANY THREATENED TO SHUT OFF SERVICES IN YOUR HOME?: NO

## 2024-12-02 SDOH — SOCIAL STABILITY: SOCIAL INSECURITY: ARE THERE ANY APPARENT SIGNS OF INJURIES/BEHAVIORS THAT COULD BE RELATED TO ABUSE/NEGLECT?: NO

## 2024-12-02 SDOH — SOCIAL STABILITY: SOCIAL NETWORK: HOW OFTEN DO YOU ATTEND MEETINGS OF THE CLUBS OR ORGANIZATIONS YOU BELONG TO?: MORE THAN 4 TIMES PER YEAR

## 2024-12-02 SDOH — HEALTH STABILITY: PHYSICAL HEALTH: ON AVERAGE, HOW MANY MINUTES DO YOU ENGAGE IN EXERCISE AT THIS LEVEL?: 0 MIN

## 2024-12-02 SDOH — SOCIAL STABILITY: SOCIAL NETWORK: HOW OFTEN DO YOU GET TOGETHER WITH FRIENDS OR RELATIVES?: MORE THAN THREE TIMES A WEEK

## 2024-12-02 SDOH — HEALTH STABILITY: MENTAL HEALTH: HOW MANY DRINKS CONTAINING ALCOHOL DO YOU HAVE ON A TYPICAL DAY WHEN YOU ARE DRINKING?: PATIENT DOES NOT DRINK

## 2024-12-02 SDOH — SOCIAL STABILITY: SOCIAL NETWORK: HOW OFTEN DO YOU ATTEND CHURCH OR RELIGIOUS SERVICES?: NEVER

## 2024-12-02 SDOH — SOCIAL STABILITY: SOCIAL INSECURITY: HAVE YOU HAD ANY THOUGHTS OF HARMING ANYONE ELSE?: NO

## 2024-12-02 ASSESSMENT — COGNITIVE AND FUNCTIONAL STATUS - GENERAL
PATIENT BASELINE BEDBOUND: NO
DAILY ACTIVITIY SCORE: 24
MOBILITY SCORE: 24

## 2024-12-02 ASSESSMENT — PAIN DESCRIPTION - PAIN TYPE: TYPE: ACUTE PAIN

## 2024-12-02 ASSESSMENT — PAIN DESCRIPTION - ORIENTATION: ORIENTATION: LEFT

## 2024-12-02 ASSESSMENT — LIFESTYLE VARIABLES
TOTAL SCORE: 0
HAVE YOU EVER FELT YOU SHOULD CUT DOWN ON YOUR DRINKING: NO
EVER FELT BAD OR GUILTY ABOUT YOUR DRINKING: NO
PRESCIPTION_ABUSE_PAST_12_MONTHS: NO
EVER HAD A DRINK FIRST THING IN THE MORNING TO STEADY YOUR NERVES TO GET RID OF A HANGOVER: NO
HOW OFTEN DO YOU HAVE A DRINK CONTAINING ALCOHOL: NEVER
HAVE PEOPLE ANNOYED YOU BY CRITICIZING YOUR DRINKING: NO
SUBSTANCE_ABUSE_PAST_12_MONTHS: NO
AUDIT-C TOTAL SCORE: 0
SKIP TO QUESTIONS 9-10: 1
AUDIT-C TOTAL SCORE: 0
SKIP TO QUESTIONS 9-10: 1
HOW MANY STANDARD DRINKS CONTAINING ALCOHOL DO YOU HAVE ON A TYPICAL DAY: PATIENT DOES NOT DRINK
AUDIT-C TOTAL SCORE: 0
HOW OFTEN DO YOU HAVE 6 OR MORE DRINKS ON ONE OCCASION: NEVER

## 2024-12-02 ASSESSMENT — PAIN DESCRIPTION - PROGRESSION: CLINICAL_PROGRESSION: NOT CHANGED

## 2024-12-02 ASSESSMENT — ACTIVITIES OF DAILY LIVING (ADL)
HEARING - RIGHT EAR: FUNCTIONAL
PATIENT'S MEMORY ADEQUATE TO SAFELY COMPLETE DAILY ACTIVITIES?: YES
DRESSING YOURSELF: INDEPENDENT
BATHING: INDEPENDENT
JUDGMENT_ADEQUATE_SAFELY_COMPLETE_DAILY_ACTIVITIES: YES
TOILETING: INDEPENDENT
ADEQUATE_TO_COMPLETE_ADL: YES
GROOMING: INDEPENDENT
LACK_OF_TRANSPORTATION: NO
LACK_OF_TRANSPORTATION: NO
WALKS IN HOME: INDEPENDENT
FEEDING YOURSELF: INDEPENDENT
HEARING - LEFT EAR: FUNCTIONAL

## 2024-12-02 ASSESSMENT — PAIN - FUNCTIONAL ASSESSMENT: PAIN_FUNCTIONAL_ASSESSMENT: 0-10

## 2024-12-02 ASSESSMENT — PATIENT HEALTH QUESTIONNAIRE - PHQ9
2. FEELING DOWN, DEPRESSED OR HOPELESS: SEVERAL DAYS
SUM OF ALL RESPONSES TO PHQ9 QUESTIONS 1 & 2: 1
1. LITTLE INTEREST OR PLEASURE IN DOING THINGS: NOT AT ALL

## 2024-12-02 ASSESSMENT — PAIN SCALES - GENERAL
PAINLEVEL_OUTOF10: 5 - MODERATE PAIN
PAINLEVEL_OUTOF10: 9
PAINLEVEL_OUTOF10: 0 - NO PAIN

## 2024-12-02 ASSESSMENT — PAIN DESCRIPTION - LOCATION: LOCATION: HIP

## 2024-12-02 NOTE — ED PROVIDER NOTES
Emergency Department Provider Note        History of Present Illness     History provided by: Patient and Friend  Limitations to History: Dementia  External Records Reviewed with Brief Summary: None    HPI:  Fernanda Hughes is a 84 y.o. female presenting to the emergency department after a fall.  Patient does have dementia, she is here with her friend and POA.  Friend says that she is at her current mental baseline.  She had a fall out of her bed this morning and has been having left hip pain since the fall.  She did hit her head and had some bleeding from the back of the head as well.  She is not on blood thinners.  She denies loss of consciousness.  Since the fall she been having pain in the left hip and left wrist.  She has been able to ambulate on the hip.    The patient's POA states that she does have dementia and lives home alone.  She has been more unsteady recently and been having more frequent falls.  She is concerned about her safety at home.    Physical Exam   Triage vitals:  T 36.3 °C (97.3 °F)  HR 88  /71  RR 20  O2 95 % None (Room air)    Physical Exam  Vitals and nursing note reviewed.   Constitutional:       Appearance: Normal appearance.   HENT:      Head:      Comments: 1 cm left posterior parietal scalp laceration, no active bleeding currently.  No facial bone tenderness with palpation.  She has a superficial abrasion to the left cheek.     Right Ear: Tympanic membrane normal.      Left Ear: Tympanic membrane normal.      Nose: Nose normal.      Mouth/Throat:      Mouth: Mucous membranes are moist.   Eyes:      Extraocular Movements: Extraocular movements intact.      Pupils: Pupils are equal, round, and reactive to light.   Cardiovascular:      Rate and Rhythm: Normal rate and regular rhythm.      Pulses: Normal pulses.      Heart sounds: Normal heart sounds.   Pulmonary:      Effort: Pulmonary effort is normal.      Breath sounds: Normal breath sounds.   Abdominal:      General: Abdomen is  flat. There is no distension.      Palpations: Abdomen is soft.      Tenderness: There is no abdominal tenderness.   Musculoskeletal:      Cervical back: Normal range of motion.      Comments: Skin tear to the left distal forearm.  Otherwise no bony tenderness with palpation.   Skin:     Capillary Refill: Capillary refill takes less than 2 seconds.      Comments: Skin tear over the dorsal aspect of the left forearm.   Neurological:      General: No focal deficit present.      Mental Status: She is alert. Mental status is at baseline. She is disoriented.          Medical Decision Making & ED Course   Medical Decision Makin y.o. female with history of dementia presenting to the emergency department after fall.  She reports that she fell out of bed.  She did hit her head and has a 1 cm scalp laceration which was repaired.  She is not on anticoagulation.  Her friend and POA is concerned about her safety at home given her dementia and more frequent falls.    EKG reveals normal sinus rhythm with ventricular rate of 76 bpm.  Normal axis.  No acute ischemic changes or injury pattern is present.    Patient is afebrile and hemodynamically stable.  IV was established and labs performed.  She does have mild leukocytosis and a positive urinalysis.  Vitals are all normal, no evidence for sepsis or septic shock.  She is given dose of IV antibiotic here in the emergency department.    Given her urine infection and more frequent falls and concern for safety at home I feel she is most appropriate for admission to the hospital.  I spoke with Dr. Rosales who graciously agrees to admit the patient to her service.  Patient and POA understand and agree with stated plan.  ----     Social Determinants of Health which Significantly Impact Care: Problems related to living alone     EKG Independent Interpretation: EKG interpreted by myself. Please see ED Course for full interpretation.    Independent Result Review and Interpretation:  Relevant laboratory and radiographic results were reviewed and independently interpreted by myself.  As necessary, they are commented on in the ED Course.    Chronic conditions affecting the patient's care: As documented above in [facility]    The patient was discussed with the following consultants/services: Dr. Rosales    Care Considerations: As documented above in [facility]    ED Course:  Diagnoses as of 12/05/24 1356   Closed head injury, initial encounter   Urinary tract infection without hematuria, site unspecified     Disposition   As a result of their workup, the patient will require admission to the hospital.  The patient was informed of her diagnosis.  The patient was given the opportunity to ask questions and I answered them. The patient agreed to be admitted to the hospital.    Procedures   Laceration Repair    Performed by: Dank Peres DO  Authorized by: Dank Peres DO    Consent:     Consent obtained:  Verbal    Consent given by:  Patient  Universal protocol:     Test results available: yes      Imaging studies available: yes    Anesthesia:     Anesthesia method:  None  Laceration details:     Location:  Scalp    Scalp location:  L parietal    Length (cm):  1  Exploration:     Contaminated: no    Skin repair:     Repair method:  Staples    Number of staples:  1  Repair type:     Repair type:  Simple  Post-procedure details:     Procedure completion:  Tolerated      Patient was seen independently    Dank Peres DO  Emergency Medicine     Dank Peres DO  12/02/24 2003       Dank Peres DO  12/05/24 1356

## 2024-12-03 LAB
ANION GAP SERPL CALC-SCNC: 12 MMOL/L (ref 10–20)
ATRIAL RATE: 76 BPM
BUN SERPL-MCNC: 14 MG/DL (ref 6–23)
CALCIUM SERPL-MCNC: 9 MG/DL (ref 8.6–10.3)
CHLORIDE SERPL-SCNC: 103 MMOL/L (ref 98–107)
CO2 SERPL-SCNC: 24 MMOL/L (ref 21–32)
CREAT SERPL-MCNC: 0.97 MG/DL (ref 0.5–1.05)
EGFRCR SERPLBLD CKD-EPI 2021: 57 ML/MIN/1.73M*2
ERYTHROCYTE [DISTWIDTH] IN BLOOD BY AUTOMATED COUNT: 12.6 % (ref 11.5–14.5)
GLUCOSE SERPL-MCNC: 104 MG/DL (ref 74–99)
HCT VFR BLD AUTO: 41.8 % (ref 36–46)
HGB BLD-MCNC: 13.6 G/DL (ref 12–16)
HOLD SPECIMEN: NORMAL
MAGNESIUM SERPL-MCNC: 2.3 MG/DL (ref 1.6–2.4)
MCH RBC QN AUTO: 29.4 PG (ref 26–34)
MCHC RBC AUTO-ENTMCNC: 32.5 G/DL (ref 32–36)
MCV RBC AUTO: 90 FL (ref 80–100)
NRBC BLD-RTO: 0 /100 WBCS (ref 0–0)
P AXIS: 38 DEGREES
P OFFSET: 205 MS
P ONSET: 157 MS
PHOSPHATE SERPL-MCNC: 3.5 MG/DL (ref 2.5–4.9)
PLATELET # BLD AUTO: 242 X10*3/UL (ref 150–450)
POTASSIUM SERPL-SCNC: 3.8 MMOL/L (ref 3.5–5.3)
PR INTERVAL: 122 MS
Q ONSET: 218 MS
QRS COUNT: 13 BEATS
QRS DURATION: 86 MS
QT INTERVAL: 392 MS
QTC CALCULATION(BAZETT): 441 MS
QTC FREDERICIA: 424 MS
R AXIS: 59 DEGREES
RBC # BLD AUTO: 4.63 X10*6/UL (ref 4–5.2)
SODIUM SERPL-SCNC: 135 MMOL/L (ref 136–145)
T AXIS: 77 DEGREES
T OFFSET: 414 MS
VENTRICULAR RATE: 76 BPM
WBC # BLD AUTO: 9.1 X10*3/UL (ref 4.4–11.3)

## 2024-12-03 PROCEDURE — 2500000005 HC RX 250 GENERAL PHARMACY W/O HCPCS: Performed by: NURSE PRACTITIONER

## 2024-12-03 PROCEDURE — 84100 ASSAY OF PHOSPHORUS: CPT | Performed by: NURSE PRACTITIONER

## 2024-12-03 PROCEDURE — 2500000004 HC RX 250 GENERAL PHARMACY W/ HCPCS (ALT 636 FOR OP/ED): Performed by: NURSE PRACTITIONER

## 2024-12-03 PROCEDURE — 96367 TX/PROPH/DG ADDL SEQ IV INF: CPT

## 2024-12-03 PROCEDURE — 94760 N-INVAS EAR/PLS OXIMETRY 1: CPT

## 2024-12-03 PROCEDURE — G0378 HOSPITAL OBSERVATION PER HR: HCPCS

## 2024-12-03 PROCEDURE — 97165 OT EVAL LOW COMPLEX 30 MIN: CPT | Mod: GO

## 2024-12-03 PROCEDURE — 97535 SELF CARE MNGMENT TRAINING: CPT | Mod: GO

## 2024-12-03 PROCEDURE — 85027 COMPLETE CBC AUTOMATED: CPT | Performed by: NURSE PRACTITIONER

## 2024-12-03 PROCEDURE — 96372 THER/PROPH/DIAG INJ SC/IM: CPT | Performed by: NURSE PRACTITIONER

## 2024-12-03 PROCEDURE — 97161 PT EVAL LOW COMPLEX 20 MIN: CPT | Mod: GP

## 2024-12-03 PROCEDURE — 80048 BASIC METABOLIC PNL TOTAL CA: CPT | Performed by: NURSE PRACTITIONER

## 2024-12-03 PROCEDURE — 2500000001 HC RX 250 WO HCPCS SELF ADMINISTERED DRUGS (ALT 637 FOR MEDICARE OP): Performed by: NURSE PRACTITIONER

## 2024-12-03 PROCEDURE — 36415 COLL VENOUS BLD VENIPUNCTURE: CPT | Performed by: NURSE PRACTITIONER

## 2024-12-03 PROCEDURE — 83735 ASSAY OF MAGNESIUM: CPT | Performed by: NURSE PRACTITIONER

## 2024-12-03 PROCEDURE — 97116 GAIT TRAINING THERAPY: CPT | Mod: GP

## 2024-12-03 RX ORDER — BACITRACIN 500 [USP'U]/G
OINTMENT TOPICAL 3 TIMES DAILY
Qty: 14 G | Refills: 0 | Status: SHIPPED | OUTPATIENT
Start: 2024-12-03 | End: 2024-12-08

## 2024-12-03 RX ORDER — BACITRACIN 500 [USP'U]/G
OINTMENT TOPICAL 3 TIMES DAILY
Status: DISCONTINUED | OUTPATIENT
Start: 2024-12-03 | End: 2024-12-04 | Stop reason: HOSPADM

## 2024-12-03 ASSESSMENT — COGNITIVE AND FUNCTIONAL STATUS - GENERAL
DRESSING REGULAR LOWER BODY CLOTHING: A LITTLE
TOILETING: A LITTLE
MOBILITY SCORE: 18
MOVING TO AND FROM BED TO CHAIR: A LITTLE
WALKING IN HOSPITAL ROOM: A LOT
PERSONAL GROOMING: A LITTLE
MOBILITY SCORE: 16
STANDING UP FROM CHAIR USING ARMS: A LITTLE
HELP NEEDED FOR BATHING: A LOT
STANDING UP FROM CHAIR USING ARMS: A LITTLE
HELP NEEDED FOR BATHING: A LITTLE
DRESSING REGULAR UPPER BODY CLOTHING: A LITTLE
TURNING FROM BACK TO SIDE WHILE IN FLAT BAD: A LITTLE
WALKING IN HOSPITAL ROOM: A LITTLE
TURNING FROM BACK TO SIDE WHILE IN FLAT BAD: A LITTLE
PERSONAL GROOMING: A LITTLE
EATING MEALS: A LITTLE
DRESSING REGULAR UPPER BODY CLOTHING: A LITTLE
TOILETING: A LITTLE
CLIMB 3 TO 5 STEPS WITH RAILING: TOTAL
DRESSING REGULAR LOWER BODY CLOTHING: A LOT
DAILY ACTIVITIY SCORE: 17
CLIMB 3 TO 5 STEPS WITH RAILING: A LOT
DAILY ACTIVITIY SCORE: 18
MOVING TO AND FROM BED TO CHAIR: A LITTLE

## 2024-12-03 ASSESSMENT — ACTIVITIES OF DAILY LIVING (ADL)
HOME_MANAGEMENT_TIME_ENTRY: 14
LACK_OF_TRANSPORTATION: NO
BATHING_ASSISTANCE: MINIMAL

## 2024-12-03 ASSESSMENT — PAIN SCALES - GENERAL: PAINLEVEL_OUTOF10: 6

## 2024-12-03 ASSESSMENT — PAIN - FUNCTIONAL ASSESSMENT
PAIN_FUNCTIONAL_ASSESSMENT: 0-10

## 2024-12-03 ASSESSMENT — PAIN DESCRIPTION - ORIENTATION: ORIENTATION: LEFT

## 2024-12-03 ASSESSMENT — PAIN DESCRIPTION - LOCATION: LOCATION: HIP

## 2024-12-03 NOTE — PROGRESS NOTES
12/03/24 1250   Discharge Planning   Living Arrangements Alone   Support Systems Family members;Children   Assistance Needed none   Type of Residence Private residence   Home or Post Acute Services None   Expected Discharge Disposition Home   Does the patient need discharge transport arranged? Yes   RoundTrip coordination needed? Yes  (pay may need an uber set up for her)   Has discharge transport been arranged? No   Financial Resource Strain   How hard is it for you to pay for the very basics like food, housing, medical care, and heating? Not hard   Housing Stability   In the last 12 months, was there a time when you were not able to pay the mortgage or rent on time? N   At any time in the past 12 months, were you homeless or living in a shelter (including now)? N   Transportation Needs   In the past 12 months, has lack of transportation kept you from medical appointments or from getting medications? no   In the past 12 months, has lack of transportation kept you from meetings, work, or from getting things needed for daily living? No     Met with patient at the bedside, confirmed demographics insurance, pcp is  .  She lives alone, driving, and is self sufficient in her care needs. She denies any financial concerns, able to purchase her medications, received education, and takes as ordered. She wants to return home and go to outpatient pt/ot. Will continue to follow for discharge needs.     7125: Message sent to attending regarding discharge plan and therapy recommendations .

## 2024-12-03 NOTE — H&P
Internal Medicine History and Physical    PATIENT NAME:  Fernanda Hughes    MRN: [id number]  DATE of SERVICE: December 3, 2024    Primary Care Physician: Michelle Klein MD          Chief Complaint: Fall    HPI:  This is a 84 y.o. female who presents after she had a fall at home, patient states she fell off bed while she was reaching out to get up, patient hit her head and she has a laceration on the occipital part of the skull, patient denies loss of consciousness, she also complains of pain in the left hip area started after the fall.  ua was done on the patient which showed Leukocyte esterase, patient denies dysuria.    Past Medical History:  Past Medical History:   Diagnosis Date    Anemia     Breast cancer (Multi)     right    Dizziness     Essential (primary) hypertension 2020    Essential hypertension    Gastro-esophageal reflux disease without esophagitis 2020    GERD (gastroesophageal reflux disease)    Glaucoma     Stroke (Multi) 2023    Vascular dementia        Past Surgical History:  Past Surgical History:   Procedure Laterality Date    CATARACT EXTRACTION, BILATERAL      CHOLECYSTECTOMY      MASTECTOMY Right     ORIF FEMUR FRACTURE         Family History:  Family History   Problem Relation Name Age of Onset    Other ( of old age) Mother         Social History:    Social History     Socioeconomic History    Marital status:      Spouse name: Not on file    Number of children: Not on file    Years of education: Not on file    Highest education level: Not on file   Occupational History    Not on file   Tobacco Use    Smoking status: Never    Smokeless tobacco: Never   Vaping Use    Vaping status: Never Used   Substance and Sexual Activity    Alcohol use: Never    Drug use: Never    Sexual activity: Not on file   Other Topics Concern    Not on file   Social History Narrative    Not on file     Social Drivers of Health     Financial Resource Strain: Low Risk  (2024)    Overall  Financial Resource Strain (CARDIA)     Difficulty of Paying Living Expenses: Not hard at all   Food Insecurity: No Food Insecurity (12/2/2024)    Hunger Vital Sign     Worried About Running Out of Food in the Last Year: Never true     Ran Out of Food in the Last Year: Never true   Transportation Needs: No Transportation Needs (12/2/2024)    PRAPARE - Transportation     Lack of Transportation (Medical): No     Lack of Transportation (Non-Medical): No   Physical Activity: Inactive (12/2/2024)    Exercise Vital Sign     Days of Exercise per Week: 0 days     Minutes of Exercise per Session: 0 min   Stress: No Stress Concern Present (12/2/2024)    [employer] [place] [employer] - Occupational Stress Questionnaire     Feeling of Stress : Not at all   Social Connections: Moderately Isolated (12/2/2024)    Social Connection and Isolation Panel [NHANES]     Frequency of Communication with Friends and Family: Once a week     Frequency of Social Gatherings with Friends and Family: More than three times a week     Attends Latter-day Services: Never     Active Member of Clubs or Organizations: Yes     Attends Club or Organization Meetings: More than 4 times per year     Marital Status:    Intimate Partner Violence: Not At Risk (12/2/2024)    Humiliation, Afraid, Rape, and Kick questionnaire     Fear of Current or Ex-Partner: No     Emotionally Abused: No     Physically Abused: No     Sexually Abused: No   Housing Stability: Low Risk  (12/2/2024)    Housing Stability Vital Sign     Unable to Pay for Housing in the Last Year: No     Number of Times Moved in the Last Year: 0     Homeless in the Last Year: No         Prior to Admission Medications:  Medications Prior to Admission   Medication Sig Dispense Refill Last Dose/Taking    brimonidine (AlphaGAN) 0.2 % ophthalmic solution Administer 1 drop into both eyes 2 times a day.   12/2/2024 Morning    dorzolamide-timoloL (Cosopt) 22.3-6.8 mg/mL ophthalmic solution  Administer 1 drop into both eyes 2 times a day.   12/2/2024 Morning    latanoprost (Xalatan) 0.005 % ophthalmic solution Administer 1 drop into both eyes once daily at bedtime.   12/1/2024    levETIRAcetam (Keppra) 500 mg tablet Take 1 tablet (500 mg) by mouth 2 times a day. (Patient not taking: Reported on 12/2/2024) 60 tablet 0 Not Taking       Active orders:  Active Orders   Procedures    Wound Care     Frequency: Once     Number of Occurrences: 1 Occurrences     Order Comments: Tefla dressing left arm     Lab    Basic metabolic panel     Frequency: AM draw     Number of Occurrences: Until Specified    CBC     Frequency: AM draw     Number of Occurrences: Until Specified    Magnesium     Frequency: AM draw     Number of Occurrences: Until Specified   Diet    Adult diet Regular     Frequency: Effective now     Number of Occurrences: Until Specified   Nursing    Activity (specify) Out of Bed with Assistance     Frequency: Until discontinued     Number of Occurrences: Until Specified    Cardiac Monitoring - ED/ICU/PACU Only     Frequency: Until discontinued     Number of Occurrences: Until Specified    Notify provider     Frequency: Until discontinued     Number of Occurrences: Until Specified    Pain Assessment     Frequency: q4h     Number of Occurrences: Until Specified    Sequential compression device     Frequency: Until discontinued     Number of Occurrences: Until Specified    Vital Signs     Frequency: q4h     Number of Occurrences: Until Specified    Vital Signs     Frequency: q4h     Number of Occurrences: Until Specified    Weight on admission     Frequency: Daily     Number of Occurrences: Until Specified   Code Status    Full code     Frequency: Continuous     Number of Occurrences: Until Specified   Consult    Inpatient consult to Social Work and TCC     Frequency: Once     Number of Occurrences: 1 Occurrences   Nourishments    May Participate in Room Service     Frequency: Once     Number of  Occurrences: 1 Occurrences   OT    OT eval and treat     Frequency: Until therapy completed     Number of Occurrences: 1 Occurrences     Order Comments: Impaired mobility, Safety assessment     PT    PT eval and treat     Frequency: Until therapy completed     Number of Occurrences: 1 Occurrences     Order Comments: Impaired Mobility and Gait     Respiratory Care    Pulse oximetry, spot     Frequency: q4h     Number of Occurrences: Until Specified   ECG    ECG 12 lead     Frequency: PRN     Number of Occurrences: Until Specified     Order Comments: Repeat ECG every 15-30 min for one hour with non-diagnostic ECG and ongoing symp.      Electrocardiogram, 12-lead PRN ACS symptoms     Frequency: PRN     Number of Occurrences: Until Specified     Order Comments: Notify provider if performed.     Precaution    Aspiration precautions     Frequency: Until discontinued     Number of Occurrences: Until Specified    Fall precautions     Frequency: Until discontinued     Number of Occurrences: Until Specified   Telemetry    Telemetry monitoring - Floor only     Frequency: Until discontinued     Number of Occurrences: 3 Days   Medications    acetaminophen (Tylenol) oral liquid 650 mg     Linked Order: Or     Frequency: q6h PRN     Dose: 650 mg     Route: oral    acetaminophen (Tylenol) suppository 650 mg     Linked Order: Or     Frequency: q6h PRN     Dose: 650 mg     Route: rectal    acetaminophen (Tylenol) tablet 650 mg     Linked Order: Or     Frequency: q6h PRN     Dose: 650 mg     Route: oral    brimonidine (AlphaGAN) 0.2 % ophthalmic solution 1 drop     Frequency: BID     Dose: 1 drop     Route: Both Eyes    cefTRIAXone (Rocephin) 1 g in dextrose (iso) IV 50 mL     Frequency: q24h     Dose: 1 g     Route: intravenous    dorzolamide-timoloL (Cosopt) 22.3-6.8 mg/mL ophthalmic solution 1 drop     Frequency: BID     Dose: 1 drop     Route: Both Eyes    enoxaparin (Lovenox) syringe 40 mg     Frequency: q24h     Dose: 40 mg      "Route: subcutaneous    latanoprost (Xalatan) 0.005 % ophthalmic solution 1 drop     Frequency: Nightly     Dose: 1 drop     Route: Both Eyes    melatonin tablet 3 mg     Frequency: Nightly PRN     Dose: 3 mg     Route: oral    polyethylene glycol (Glycolax, Miralax) packet 17 g     Frequency: Daily PRN     Dose: 17 g     Route: oral           Allergies:   No Known Allergies    Review of Systems:  A 10 systems review of systems is negative except for HPI.      Vitals:  Patient Vitals for the past 24 hrs:   BP Temp Temp src Pulse Resp SpO2 Height Weight   12/03/24 0000 137/67 36.1 °C (97 °F) Temporal 75 19 98 % -- --   12/02/24 2100 142/66 36.3 °C (97.3 °F) Temporal 79 17 100 % 1.702 m (5' 7\") 53.2 kg (117 lb 4.6 oz)   12/02/24 2011 166/76 -- -- 72 18 95 % -- --   12/02/24 1820 153/70 -- -- 74 18 95 % -- --   12/02/24 1545 170/71 36.3 °C (97.3 °F) Temporal 88 20 95 % 1.715 m (5' 7.5\") 51.7 kg (114 lb)     Body mass index is 18.37 kg/m².         Physical Exam:  General appearance: Well-appearing alert, in no acute distress   Skin: Positive for laceration on the occipital part of the head with staples  Head: normal  Eyes: Anicteric sclera. Extraocular movements are intact.   Ears: external ears normal  Nose/Sinuses: Negative  Oropharynx: Lips, mucosa, and tongue normal  Neck: Supple, no adenopathy  Lungs: Clear to auscultation, no wheezing or rhonchi  Heart: RRR without murmur, gallop, or rubs.  No ectopy  Abdomen: Soft, non-tender. Bowel sounds normal.   Extremities: No deformity, no edema  Neuro: Alert oriented x3, no focal deficit.      Labs:  Results for orders placed or performed during the hospital encounter of 12/02/24 (from the past 24 hours)   CBC and Auto Differential   Result Value Ref Range    WBC 13.2 (H) 4.4 - 11.3 x10*3/uL    nRBC 0.0 0.0 - 0.0 /100 WBCs    RBC 4.64 4.00 - 5.20 x10*6/uL    Hemoglobin 14.0 12.0 - 16.0 g/dL    Hematocrit 40.3 36.0 - 46.0 %    MCV 87 80 - 100 fL    MCH 30.2 26.0 - 34.0 pg    " MCHC 34.7 32.0 - 36.0 g/dL    RDW 12.5 11.5 - 14.5 %    Platelets 235 150 - 450 x10*3/uL    Neutrophils % 79.7 40.0 - 80.0 %    Immature Granulocytes %, Automated 0.4 0.0 - 0.9 %    Lymphocytes % 12.0 13.0 - 44.0 %    Monocytes % 6.5 2.0 - 10.0 %    Eosinophils % 1.1 0.0 - 6.0 %    Basophils % 0.3 0.0 - 2.0 %    Neutrophils Absolute 10.50 (H) 1.60 - 5.50 x10*3/uL    Immature Granulocytes Absolute, Automated 0.05 0.00 - 0.50 x10*3/uL    Lymphocytes Absolute 1.58 0.80 - 3.00 x10*3/uL    Monocytes Absolute 0.85 (H) 0.05 - 0.80 x10*3/uL    Eosinophils Absolute 0.14 0.00 - 0.40 x10*3/uL    Basophils Absolute 0.04 0.00 - 0.10 x10*3/uL   Comprehensive Metabolic Panel   Result Value Ref Range    Glucose 115 (H) 74 - 99 mg/dL    Sodium 134 (L) 136 - 145 mmol/L    Potassium 3.6 3.5 - 5.3 mmol/L    Chloride 101 98 - 107 mmol/L    Bicarbonate 23 21 - 32 mmol/L    Anion Gap 14 10 - 20 mmol/L    Urea Nitrogen 14 6 - 23 mg/dL    Creatinine 0.98 0.50 - 1.05 mg/dL    eGFR 56 (L) >60 mL/min/1.73m*2    Calcium 9.2 8.6 - 10.3 mg/dL    Albumin 3.8 3.4 - 5.0 g/dL    Alkaline Phosphatase 59 33 - 136 U/L    Total Protein 6.2 (L) 6.4 - 8.2 g/dL    AST 21 9 - 39 U/L    Bilirubin, Total 1.5 (H) 0.0 - 1.2 mg/dL    ALT 21 7 - 45 U/L   Magnesium   Result Value Ref Range    Magnesium 1.57 (L) 1.60 - 2.40 mg/dL   Troponin I, High Sensitivity, Initial   Result Value Ref Range    Troponin I, High Sensitivity 8 0 - 13 ng/L   Urinalysis with Reflex Microscopic   Result Value Ref Range    Color, Urine Light-Yellow Light-Yellow, Yellow, Dark-Yellow    Appearance, Urine Clear Clear    Specific Gravity, Urine 1.009 1.005 - 1.035    pH, Urine 6.5 5.0, 5.5, 6.0, 6.5, 7.0, 7.5, 8.0    Protein, Urine NEGATIVE NEGATIVE, 10 (TRACE), 20 (TRACE) mg/dL    Glucose, Urine Normal Normal mg/dL    Blood, Urine NEGATIVE NEGATIVE    Ketones, Urine NEGATIVE NEGATIVE mg/dL    Bilirubin, Urine NEGATIVE NEGATIVE    Urobilinogen, Urine Normal Normal mg/dL    Nitrite, Urine  NEGATIVE NEGATIVE    Leukocyte Esterase, Urine 75 Christina/µL (A) NEGATIVE   Microscopic Only, Urine   Result Value Ref Range    WBC, Urine 6-10 (A) 1-5, NONE /HPF    RBC, Urine NONE NONE, 1-2, 3-5 /HPF    Squamous Epithelial Cells, Urine 1-9 (SPARSE) Reference range not established. /HPF    Bacteria, Urine 1+ (A) NONE SEEN /HPF   Urinalysis with Reflex Culture and Microscopic   Result Value Ref Range    Color, Urine Light-Yellow Light-Yellow, Yellow, Dark-Yellow    Appearance, Urine Clear Clear    Specific Gravity, Urine 1.007 1.005 - 1.035    pH, Urine 6.5 5.0, 5.5, 6.0, 6.5, 7.0, 7.5, 8.0    Protein, Urine NEGATIVE NEGATIVE, 10 (TRACE), 20 (TRACE) mg/dL    Glucose, Urine Normal Normal mg/dL    Blood, Urine 0.03 (TRACE) (A) NEGATIVE    Ketones, Urine NEGATIVE NEGATIVE mg/dL    Bilirubin, Urine NEGATIVE NEGATIVE    Urobilinogen, Urine Normal Normal mg/dL    Nitrite, Urine NEGATIVE NEGATIVE    Leukocyte Esterase, Urine NEGATIVE NEGATIVE   Urinalysis Microscopic   Result Value Ref Range    WBC, Urine NONE 1-5, NONE /HPF    RBC, Urine 1-2 NONE, 1-2, 3-5 /HPF   Magnesium   Result Value Ref Range    Magnesium 2.30 1.60 - 2.40 mg/dL   CBC   Result Value Ref Range    WBC 9.1 4.4 - 11.3 x10*3/uL    nRBC 0.0 0.0 - 0.0 /100 WBCs    RBC 4.63 4.00 - 5.20 x10*6/uL    Hemoglobin 13.6 12.0 - 16.0 g/dL    Hematocrit 41.8 36.0 - 46.0 %    MCV 90 80 - 100 fL    MCH 29.4 26.0 - 34.0 pg    MCHC 32.5 32.0 - 36.0 g/dL    RDW 12.6 11.5 - 14.5 %    Platelets 242 150 - 450 x10*3/uL   Basic metabolic panel   Result Value Ref Range    Glucose 104 (H) 74 - 99 mg/dL    Sodium 135 (L) 136 - 145 mmol/L    Potassium 3.8 3.5 - 5.3 mmol/L    Chloride 103 98 - 107 mmol/L    Bicarbonate 24 21 - 32 mmol/L    Anion Gap 12 10 - 20 mmol/L    Urea Nitrogen 14 6 - 23 mg/dL    Creatinine 0.97 0.50 - 1.05 mg/dL    eGFR 57 (L) >60 mL/min/1.73m*2    Calcium 9.0 8.6 - 10.3 mg/dL   Phosphorus   Result Value Ref Range    Phosphorus 3.5 2.5 - 4.9 mg/dL         Imaging:  "  Reviewed          Assessment/Plan:    Fall    Head injury  Patient feels well, complains of pain in the left hip, x-ray of the left hip did not show any fracture  Pain control  PT/OT     UTI (urinary tract infection)  Patient is asymptomatic, she received antibiotic yesterday, will continue today, patient does not need antibiotic on discharge      Vascular dementia  Supportive care      Scalp laceration, initial encounter  Patient has staples, no active bleeding  Monitor      DVT Prophylaxis- Addressed    Discussed with patient, RN    SIGNATURE: Stef Rosales MD  DATE: December 3, 2024  TIME: 8:44 AM      This note was partially created using voice recognition software and is inherently subject to errors including those of syntax and \"sound-alike\" substitutions which may escape proofreading. In such instances, original meaning may be extrapolated by contextual derivation    "

## 2024-12-03 NOTE — CARE PLAN
Problem: Skin  Goal: Participates in plan/prevention/treatment measures  Outcome: Progressing     Problem: Pain - Adult  Goal: Verbalizes/displays adequate comfort level or baseline comfort level  Outcome: Progressing     Problem: Safety - Adult  Goal: Free from fall injury  Outcome: Progressing     Problem: Discharge Planning  Goal: Discharge to home or other facility with appropriate resources  Outcome: Progressing     Problem: Chronic Conditions and Co-morbidities  Goal: Patient's chronic conditions and co-morbidity symptoms are monitored and maintained or improved  Outcome: Progressing     Problem: Fall/Injury  Goal: Not fall by end of shift  Outcome: Progressing  Goal: Be free from injury by end of the shift  Outcome: Progressing  Goal: Verbalize understanding of personal risk factors for fall in the hospital  Outcome: Progressing  Goal: Verbalize understanding of risk factor reduction measures to prevent injury from fall in the home  Outcome: Progressing  Goal: Use assistive devices by end of the shift  Outcome: Progressing  Goal: Pace activities to prevent fatigue by end of the shift  Outcome: Progressing     Problem: Skin  Goal: Participates in plan/prevention/treatment measures  Outcome: Progressing  Goal: Prevent/manage excess moisture  Outcome: Progressing  Goal: Prevent/minimize sheer/friction injuries  Outcome: Progressing  Goal: Promote skin healing  Outcome: Progressing     Problem: Pain  Goal: Takes deep breaths with improved pain control throughout the shift  Outcome: Progressing  Goal: Turns in bed with improved pain control throughout the shift  Outcome: Progressing  Goal: Walks with improved pain control throughout the shift  Outcome: Progressing  Goal: Performs ADL's with improved pain control throughout shift  Outcome: Progressing  Goal: Participates in PT with improved pain control throughout the shift  Outcome: Progressing  Goal: Free from opioid side effects throughout the shift  Outcome:  Progressing  Goal: Free from acute confusion related to pain meds throughout the shift  Outcome: Progressing   The patient's goals for the shift include  free from falls and injuries    The clinical goals for the shift include  remain hemodynamically stable

## 2024-12-03 NOTE — CONSULTS
Wound Care Consult     Visit Date: 12/3/2024      Patient Name: Fernanda Hughes         MRN: [id number]           YOB: 1940     Reason for Consult: Left arm wound        Wound History: Skin Tear     Pertinent Labs:   Albumin   Date Value Ref Range Status   12/02/2024 3.8 3.4 - 5.0 g/dL Final       Wound Assessment:  Wound 12/02/24 Skin Tear Arm Dorsal;Left;Lower (Active)   Wound Image   12/03/24 1413   Site Assessment Clean;Fragile;Red 12/03/24 1517   Wound Length (cm) 4 cm 12/03/24 1517   Wound Width (cm) 3 cm 12/03/24 1517   Wound Surface Area (cm^2) 12 cm^2 12/03/24 1517   Wound Depth (cm) 0.1 cm 12/03/24 1517   Wound Volume (cm^3) 1.2 cm^3 12/03/24 1517   Margins Well-defined edges 12/03/24 1517   Drainage Description Serosanguineous 12/03/24 1517   Drainage Amount Small 12/03/24 1517   Dressing Dry dressing 12/03/24 1517   Dressing Changed Changed 12/03/24 1517   Dressing Status Clean;Dry 12/03/24 1517       Wound 12/02/24 Laceration Head Dorsal;Left;Lower (Active)   Wound Image   12/02/24 2204   Wound Length (cm) 1 cm 12/02/24 2204   Closure Staples 12/02/24 2204   Sutures/Staple Line Approximated 12/02/24 2204   Drainage Description None 12/02/24 2204       Wound Team Summary Assessment: Nursing request that patient be seen for left arm skin tear.  Dressing removed, small amount of serosanguineous drainage. Wound has v shape appearance with most of edges re approximated as much as possible. Wound bed red. Wound edge well-defined and surrounding tissue intact but fragile.      Wound Team Plan: Recommendation for wound care to left arm skin tear - cleanse with NS, apply collagen, cover with non adherent dressing and secure with Kerlix every other day. Will follow.      Robina Hooks RN  12/3/2024  3:19 PM

## 2024-12-03 NOTE — PROGRESS NOTES
Physical Therapy    Physical Therapy Evaluation    Patient Name: Fernanda Hughes  MRN: [id number]  Today's Date: 12/3/2024   Time Calculation  Start Time: 1053  Stop Time: 1112  Time Calculation (min): 19 min  3136/3136-A    Assessment/Plan   PT Assessment: Pt demonstrates impairments listed below.  Pt appears below baseline level of function and based on current level of function, pt would benefit from continued skilled therapy while in the hospital to ensure safety, decrease risk of falls, and regain strength/mobility back to baseline.  Once stable enough for discharge, pt would benefit from high intensity therapy prior to returning home.  Pt is unsafe to return home alone at this time d/t decreased safety awareness.     PT Assessment Results: Decreased strength, Impaired balance, Decreased mobility, Decreased coordination, Decreased cognition, Impaired judgement, Decreased safety awareness, Pain  Rehab Prognosis: Good  Evaluation/Treatment Tolerance: Patient tolerated treatment well  Medical Staff Made Aware: Yes  End of Session Communication: Bedside nurse  End of Session Patient Position: Up in chair, Alarm on  IP OR SWING BED PT PLAN  Inpatient or Swing Bed: Inpatient  PT Plan  Treatment/Interventions: Bed mobility, Transfer training, Gait training, Balance training, Neuromuscular re-education, Strengthening, Range of motion, Therapeutic exercise, Therapeutic activity, Home exercise program  PT Plan: Ongoing PT  PT Frequency: 5 times per week  PT Discharge Recommendations: High intensity level of continued care  Equipment Recommended upon Discharge: Wheeled walker  PT Recommended Transfer Status: Assist x1  PT - OK to Discharge: Yes - To next level of care when cleared by medical team    Subjective     Current Problem:  1. Closed head injury, initial encounter        2. Urinary tract infection without hematuria, site unspecified            Past Medical History:  Patient Active Problem List   Diagnosis    Vision  "changes    UTI (urinary tract infection)    Essential (primary) hypertension    Vascular dementia    Hypomagnesemia    Fall    Head injury    Scalp laceration, initial encounter    Skin tear of left forearm without complication       General Visit Information:  Per EMR: pt presents to the ED for evaluation following a mechanical fall with a scalp, left arm, and left cheek laceration from home.  Not blood thinners. The patient's HPOA reported the patient called her this morning to inform her she fell, the HPOA had errands and got to the patient's house about 1 pm and saw she had the lacerations and the patient was complaining of left hip and wrist pain, so brought her to the ED for evaluation. The HPOA reported the patient told her she rolled out of the bed and hit the table next to the bed. HPI and history obtained from HPOA due to the patient's cognitive impairment from dementia. The patient told me she could not remember what happened. The HPOA reported that the patient is always complaining she is dizzy and having more frequent falls at home.  Of note, 9/2/2024 patient seen in the ED for \"dizziness\" and prescribed meclizine, then discharged home with instructions to follow-up with PCP.  The patient denies any chest pain, palpitations, shortness of breath, cough, abdominal pain, N/V/D/C, dysuria, or hematuria. The HPOA confirmed that the patient has not complained of any of the previous listed symptoms,     Of note, the patient lives alone in a ranch style house. She has no brother or sisters. She was estranged from her father and mother passed away of old age, per her HPOA.  Her HPOA talks to her usually once a day. She cooks microwave meals, reports she still drives, drinks lots of pepsi, and does not have HHC helping her in the home. However, the HPOA is interested in HHC.     On arrival, pt supine in bed.  Pt in no apparent distress and agreeable to therapy.    General  Reason for Referral: impaired " mobility  Referred By: Stef Rosales  Co-Treatment: OT  Prior to Session Communication: Bedside nurse  Patient Position Received: Bed, 3 rail up, Alarm on    Home Living/PLOF:  Pt lives alone in ranch home with 0 EN.  Has WIS. IND with ADLs.  IND with mobility, owns SPC and FWW.  Pt drives and does own grocery shopping.  Pt denies any other falls aside from x1 PTA.  Pt owns dog.    Precautions:  Precautions  Medical Precautions: Fall precautions     Objective     Pain:  Pain Assessment  Pain Assessment: 0-10  0-10 (Numeric) Pain Score:  (pt denies any pain at rest but reports increased pain with mobility; does not rate)  Pain Interventions: Repositioned, Ambulation/increased activity    Cognition:  Cognition  Orientation Level: Disoriented to time  Insight: Moderate  Impulsive: Mildly  Processing Speed: Delayed    General Assessments:      Activity Tolerance  Endurance: Tolerates 10 - 20 min exercise with multiple rests  Sensation  Sensation Comment: pt denies any numbness/tingling    Static Sitting Balance  Static Sitting-Comment/Number of Minutes: good  Dynamic Sitting Balance  Dynamic Sitting-Comments: fair plus  Static Standing Balance  Static Standing-Comment/Number of Minutes: fair  Dynamic Standing Balance  Dynamic Standing-Comments: fair minus    Extremity/Trunk Assessments:  BLE strength: appears WFL; (+) L hip pain with WB    Functional Mobility:  Bed mobility  Supine to sit: SBA    Transfers  Sit to stand: CGA with mild LOB in standing 2/2 L hip pain requiring Min A to maintain stand balance    Stand to sit: CGA with Vcs for hand placement     Toilet transfer: onto elevated toilet with CGA    Ambulation/Stairs  Pt ambulated 3 ft with B HHA and Min-Mod A x2; grossly steady with decreased ability to WB on LLE    Pt ambulated 12 ft with FWW and CGA; improved balance and ability to WB on LLE when using FWW; pt given Vcs for sequencing with FWW to assist with WB on painful extremity     Outcome  Measures:  [facility] Basic Mobility  Turning from your back to your side while in a flat bed without using bedrails: None  Moving from lying on your back to sitting on the side of a flat bed without using bedrails: A little  Moving to and from bed to chair (including a wheelchair): A little  Standing up from a chair using your arms (e.g. wheelchair or bedside chair): A little  To walk in hospital room: A little  Climbing 3-5 steps with railing: A lot  Basic Mobility - Total Score: 18    Goals:  Encounter Problems       Encounter Problems (Active)       PT Problem       Pt will be able to perform all bed mobility tasks with Mod I.  (Progressing)       Start:  12/03/24    Expected End:  12/17/24            Pt will perform all transfers with Mod I and FWW with proper safety mechanics.   (Progressing)       Start:  12/03/24    Expected End:  12/17/24            Pt will ambulate 100 ft with Mod I using FWW for improved functional independence.  (Progressing)       Start:  12/03/24    Expected End:  12/17/24                 Education Documentation  Body Mechanics, taught by Akosua Linn, PT at 12/3/2024  1:04 PM.  Learner: Patient  Readiness: Acceptance  Method: Explanation  Response: Verbalizes Understanding, Needs Reinforcement    Home Exercise Program, taught by Akosua Linn PT at 12/3/2024  1:04 PM.  Learner: Patient  Readiness: Acceptance  Method: Explanation  Response: Verbalizes Understanding, Needs Reinforcement    Mobility Training, taught by Akosua Linn, PT at 12/3/2024  1:04 PM.  Learner: Patient  Readiness: Acceptance  Method: Explanation  Response: Verbalizes Understanding, Needs Reinforcement    Education Comments  No comments found.

## 2024-12-03 NOTE — H&P
"History Of Present Illness  Fernanda Hughes is a 84 y.o. female presenting to [facility] on 12/2/2024 with pertinent medical history of HTN, vascular dementia, CVA (1/16/23), right breast cancer s/p mastectomy, glaucoma, anemia, and GERD who presents to the ED for evaluation following a mechanical fall with a scalp, left arm, and left cheek laceration from home.  Not blood thinners. The patient's HPOA reported the patient called her this morning to inform her she fell, the HPOA had errands and got to the patient's house about 1 pm and saw she had the lacerations and the patient was complaining of left hip and wrist pain, so brought her to the ED for evaluation. The HPOA reported the patient told her she rolled out of the bed and hit the table next to the bed. HPI and history obtained from HPOA due to the patient's cognitive impairment from dementia. The patient told me she could not remember what happened. The HPOA reported that the patient is always complaining she is dizzy and having more frequent falls at home.  Of note, 9/2/2024 patient seen in the ED for \"dizziness\" and prescribed meclizine, then discharged home with instructions to follow-up with PCP.  The patient denies any chest pain, palpitations, shortness of breath, cough, abdominal pain, N/V/D/C, dysuria, or hematuria. The HPOA confirmed that the patient has not complained of any of the previous listed symptoms,    Of note, the patient lives alone in a ranch style house. She has no brother or sisters. She was estranged from her father and mother passed away of old age, per her HPOA.  Her HPOA talks to her usually once a day. She cooks microwave meals, reports she still drives, drinks lots of pepsi, and does not have HHC helping her in the home. However, the HPOA is interested in HHC.     ED Course:  Vital signs: Temp. 36.3, HR 88, RR 20, /71, SPO2 95% on room air  CBC: 13.2  CMP: Glucose 115, sodium 134, GFR 56, total bilirubin 1.5, magnesium 1.57  High " Sensitivity Troponin 1: 3> 8  UA: 1+ bacteria, WBC 6-10  Left femur/pelvis/left wrist x-ray: No acute fractures  EKG: Normal sinus rhythm, ventricular rate 56, , QRS 86, QTc 441. No ST elevation or depression noted.   CT head/C-spine: No acute intracranial abnormalities or calvarial fracture. no acute fracture or traumatic malalignment of the C-spine.  Medications: Tylenol 975 mg p.o. x 1, Rocephin 1 g IV, magnesium 2 g IV  Disposition: Patient admitted for UTI, hypomagnesia, fall with head injury, scalp laceration, left arm laceration, HTN, and vascular dementia.     Past Medical History  She has a past medical history of Anemia, Breast cancer (Multi), Dizziness, Essential (primary) hypertension (2020), Gastro-esophageal reflux disease without esophagitis (2020), Glaucoma, Stroke (Multi) (2023), and Vascular dementia.    Surgical History  She has a past surgical history that includes Cholecystectomy; Mastectomy (Right); Cataract extraction, bilateral; and ORIF femur fracture.     Social History  She reports that she has never smoked. She has never used smokeless tobacco. She reports that she does not drink alcohol and does not use drugs.    Family History  Family History   Problem Relation Name Age of Onset    Other ( of old age) Mother          Allergies  Patient has no known allergies.    Review of Systems (Bold words are positive)    Constitutional: NEGATIVE: Fever, Chills, Malaise, Weight Loss, Weight gain, Fatigue     Eyes: NEGATIVE: Blurry Vision, Vision Loss/ Change, Redness, Drainage     ENMT: NEGATIVE: Nasal Discharge, Nasal Congestion, Throat Pain, Mouth Pain, Ear Pain     Respiratory: NEGATIVE: Dry Cough, Productive Cough, Shortness of Breath, Wheezing, Hemoptysis     Cardiac: NEGATIVE: Chest Pain, Dyspnea on Exertion, Palpitations, Orthopnea, Syncope      Gastrointestinal: Negative: Nausea, Vomiting, Diarrhea, Constipation, Abdominal Pain     Genitourinary: NEGATIVE: Dysuria,  "Frequency, Urgency, Hematuria, Flank Pain     Musculoskeletal: Negative: Decreased ROM, Pain, Weakness, Swelling     Neurological: NEGATIVE: Confusion, Dizziness, Headache, Syncope, Seizures     Psychiatric: NEGATIVE: Anxiety, Depression, Hallucinations     Skin: NEGATIVE: Mass, Rash, Pruritus,  Ulcer, Pain, laceration     Endocrine: NEGATIVE: Sweat, Excessive Thirst, Polydipsia, Night Sweats     Hematologic/Lymph: NEGATIVE: Anemia, Bruising     Allergic/Immunologic: NEGATIVE: Itching, Sneezing, Hives        Physical Exam  Constitutional: Awake and alert, Calm, and Cooperative. Speech clear. Thin, frail, elderly.No acute distress.  Skin: Pale, warm, and dry. Skin tear left forearm and superficial eft cheek  Eyes: PERRL, sclera clear, No swelling or drainage noted  ENMT: Mucous membranes pink and dry, no apparent injury, no lesions seen  Head/Neck: Neck Supple, trachea midline, 1 cm laceration with staples left posterior scalp  Cardiac: Regular rhythm and rate, Auscultated S1 & S2, no murmurs  Lungs: CTAB, symmetrical chest rise, no accessory muscle usage, unlabored,  room air   Abdomen: Soft, non-tender, no rebound tenderness or guarding, nondistended, positive bowel sounds in all quadrants  Musculoskeletal: ROM intact, no joint swelling, normal strength  Extremities: No edema, No cyanosis, 1+ pulses of the extremities, see skin assessment for wounds  Neurological: Alert and Oriented x 2-very forgetful, intact senses, bilateral hand grasps and foot push/pulls equal.  Psychological: Appropriate mood and behavior.       Last Recorded Vitals  Blood pressure 166/76, pulse 72, temperature 36.3 °C (97.3 °F), temperature source Temporal, resp. rate 18, height 1.715 m (5' 7.5\"), weight 51.7 kg (114 lb), SpO2 95%.  Visit Vitals  /76 (BP Location: Right arm, Patient Position: Sitting)   Pulse 72   Temp 36.3 °C (97.3 °F) (Temporal)   Resp 18   Ht 1.715 m (5' 7.5\")   Wt 51.7 kg (114 lb)   SpO2 95%   BMI 17.59 kg/m²   OB " Status Menopausal   Smoking Status Never   BSA 1.57 m²     Weight: 51.7 kg (114 lb)   Pain Assessment: 0-10  0-10 (Numeric) Pain Score: 0 - No pain  Pain Type: Acute pain  Pain Location: Hip  Pain Orientation: Left        Relevant Results  Results for orders placed or performed during the hospital encounter of 12/02/24 (from the past 24 hours)   CBC and Auto Differential   Result Value Ref Range    WBC 13.2 (H) 4.4 - 11.3 x10*3/uL    nRBC 0.0 0.0 - 0.0 /100 WBCs    RBC 4.64 4.00 - 5.20 x10*6/uL    Hemoglobin 14.0 12.0 - 16.0 g/dL    Hematocrit 40.3 36.0 - 46.0 %    MCV 87 80 - 100 fL    MCH 30.2 26.0 - 34.0 pg    MCHC 34.7 32.0 - 36.0 g/dL    RDW 12.5 11.5 - 14.5 %    Platelets 235 150 - 450 x10*3/uL    Neutrophils % 79.7 40.0 - 80.0 %    Immature Granulocytes %, Automated 0.4 0.0 - 0.9 %    Lymphocytes % 12.0 13.0 - 44.0 %    Monocytes % 6.5 2.0 - 10.0 %    Eosinophils % 1.1 0.0 - 6.0 %    Basophils % 0.3 0.0 - 2.0 %    Neutrophils Absolute 10.50 (H) 1.60 - 5.50 x10*3/uL    Immature Granulocytes Absolute, Automated 0.05 0.00 - 0.50 x10*3/uL    Lymphocytes Absolute 1.58 0.80 - 3.00 x10*3/uL    Monocytes Absolute 0.85 (H) 0.05 - 0.80 x10*3/uL    Eosinophils Absolute 0.14 0.00 - 0.40 x10*3/uL    Basophils Absolute 0.04 0.00 - 0.10 x10*3/uL   Comprehensive Metabolic Panel   Result Value Ref Range    Glucose 115 (H) 74 - 99 mg/dL    Sodium 134 (L) 136 - 145 mmol/L    Potassium 3.6 3.5 - 5.3 mmol/L    Chloride 101 98 - 107 mmol/L    Bicarbonate 23 21 - 32 mmol/L    Anion Gap 14 10 - 20 mmol/L    Urea Nitrogen 14 6 - 23 mg/dL    Creatinine 0.98 0.50 - 1.05 mg/dL    eGFR 56 (L) >60 mL/min/1.73m*2    Calcium 9.2 8.6 - 10.3 mg/dL    Albumin 3.8 3.4 - 5.0 g/dL    Alkaline Phosphatase 59 33 - 136 U/L    Total Protein 6.2 (L) 6.4 - 8.2 g/dL    AST 21 9 - 39 U/L    Bilirubin, Total 1.5 (H) 0.0 - 1.2 mg/dL    ALT 21 7 - 45 U/L   Magnesium   Result Value Ref Range    Magnesium 1.57 (L) 1.60 - 2.40 mg/dL   Troponin I, High  Sensitivity, Initial   Result Value Ref Range    Troponin I, High Sensitivity 8 0 - 13 ng/L   Urinalysis with Reflex Microscopic   Result Value Ref Range    Color, Urine Light-Yellow Light-Yellow, Yellow, Dark-Yellow    Appearance, Urine Clear Clear    Specific Gravity, Urine 1.009 1.005 - 1.035    pH, Urine 6.5 5.0, 5.5, 6.0, 6.5, 7.0, 7.5, 8.0    Protein, Urine NEGATIVE NEGATIVE, 10 (TRACE), 20 (TRACE) mg/dL    Glucose, Urine Normal Normal mg/dL    Blood, Urine NEGATIVE NEGATIVE    Ketones, Urine NEGATIVE NEGATIVE mg/dL    Bilirubin, Urine NEGATIVE NEGATIVE    Urobilinogen, Urine Normal Normal mg/dL    Nitrite, Urine NEGATIVE NEGATIVE    Leukocyte Esterase, Urine 75 Christina/µL (A) NEGATIVE   Microscopic Only, Urine   Result Value Ref Range    WBC, Urine 6-10 (A) 1-5, NONE /HPF    RBC, Urine NONE NONE, 1-2, 3-5 /HPF    Squamous Epithelial Cells, Urine 1-9 (SPARSE) Reference range not established. /HPF    Bacteria, Urine 1+ (A) NONE SEEN /HPF      CT head wo IV contrast    Result Date: 12/2/2024  Interpreted By:  Clemente Yang, STUDY: CT HEAD WO IV CONTRAST; CT CERVICAL SPINE WO IV CONTRAST;  12/2/2024 7:01 pm   INDICATION: Signs/Symptoms:fall out of bed this am. left posterior parietal scalp lac; Signs/Symptoms:fell out of bed this am   COMPARISON: None.   ACCESSION NUMBER(S): [id number]; [id number]   ORDERING CLINICIAN: GHAZAL ALVA   TECHNIQUE: Axial noncontrast CT images of head with coronal and sagittal reconstructed images. Axial noncontrast CT images of the cervical spine with coronal and sagittal reconstructed images.   FINDINGS: Demineralization of the bones   CT HEAD:   BRAIN PARENCHYMA:  No evidence of acute intraparenchymal hemorrhage or parenchymal evidence of acute large territory ischemic infarct. No mass-effect, midline shift or effacement of cerebral sulci. Gray-white matter distinction is preserved. Global volume loss and chronic small vessel ischemic change   VENTRICLES and EXTRA-AXIAL SPACES:  No  acute extra-axial or intraventricular hemorrhage. Ventricles and sulci are age-concordant.   PARANASAL SINUSES/MASTOIDS:  No hemorrhage or air-fluid levels within the visualized paranasal sinuses. The mastoid air cells are well-aerated.   CALVARIUM/ORBITS:  No skull fracture.  The orbits and globes are intact to the extent visualized.   Vascular calcification     CT CERVICAL SPINE:   PREVERTEBRAL SOFT TISSUES: Within normal limits.   CRANIOCERVICAL JUNCTION: Intact.   ALIGNMENT:  No traumatic malalignment or traumatic facet widening.   VERTEBRAE:  No acute fracture. Vertebral body heights are maintained.   Mild degenerative changes. High-grade central canal or foraminal stenosis   OTHER: None.       CT HEAD: No acute intracranial abnormality or calvarial fracture. Global volume loss and chronic small vessel ischemic change   CT CERVICAL SPINE: No acute fracture or traumatic malalignment of the cervical spine. Demineralization and mild degenerative change.   Signed by: Clemente Yang 12/2/2024 7:32 PM Dictation workstation:   [id number]    CT cervical spine wo IV contrast    Result Date: 12/2/2024  Interpreted By:  Clemente Yang, STUDY: CT HEAD WO IV CONTRAST; CT CERVICAL SPINE WO IV CONTRAST;  12/2/2024 7:01 pm   INDICATION: Signs/Symptoms:fall out of bed this am. left posterior parietal scalp lac; Signs/Symptoms:fell out of bed this am   COMPARISON: None.   ACCESSION NUMBER(S): [id number]; [id number]   ORDERING CLINICIAN: GHAZAL ALVA   TECHNIQUE: Axial noncontrast CT images of head with coronal and sagittal reconstructed images. Axial noncontrast CT images of the cervical spine with coronal and sagittal reconstructed images.   FINDINGS: Demineralization of the bones   CT HEAD:   BRAIN PARENCHYMA:  No evidence of acute intraparenchymal hemorrhage or parenchymal evidence of acute large territory ischemic infarct. No mass-effect, midline shift or effacement of cerebral sulci. Gray-white matter distinction is  preserved. Global volume loss and chronic small vessel ischemic change   VENTRICLES and EXTRA-AXIAL SPACES:  No acute extra-axial or intraventricular hemorrhage. Ventricles and sulci are age-concordant.   PARANASAL SINUSES/MASTOIDS:  No hemorrhage or air-fluid levels within the visualized paranasal sinuses. The mastoid air cells are well-aerated.   CALVARIUM/ORBITS:  No skull fracture.  The orbits and globes are intact to the extent visualized.   Vascular calcification     CT CERVICAL SPINE:   PREVERTEBRAL SOFT TISSUES: Within normal limits.   CRANIOCERVICAL JUNCTION: Intact.   ALIGNMENT:  No traumatic malalignment or traumatic facet widening.   VERTEBRAE:  No acute fracture. Vertebral body heights are maintained.   Mild degenerative changes. High-grade central canal or foraminal stenosis   OTHER: None.       CT HEAD: No acute intracranial abnormality or calvarial fracture. Global volume loss and chronic small vessel ischemic change   CT CERVICAL SPINE: No acute fracture or traumatic malalignment of the cervical spine. Demineralization and mild degenerative change.   Signed by: Clemente Yang 12/2/2024 7:32 PM Dictation workstation:   [id number]    XR femur left 2+ views    Result Date: 12/2/2024  Interpreted By:  Danyel Hill, STUDY: XR FEMUR LEFT 2+ VIEWS; ;  12/2/2024 5:48 pm   INDICATION: Signs/Symptoms:pain.     COMPARISON: None.   ACCESSION NUMBER(S): [id number]   ORDERING CLINICIAN: GHAZAL ALVA   FINDINGS: Left femur, two views   Intramedullary nail and screw fixation of the left femur with intact hardware. No malalignment seen. No acute abnormality. No degenerative changes       ORIF of the left femur with intact hardware     MACRO: None   Signed by: Danyel Hill 12/2/2024 6:02 PM Dictation workstation:   [id number]    XR wrist left 3+ views    Result Date: 12/2/2024  Interpreted By:  Danyel Hill, STUDY: XR WRIST LEFT 3+ VIEWS; ;  12/2/2024 5:48 pm   INDICATION: Signs/Symptoms:pain after  fall.     COMPARISON: None.   ACCESSION NUMBER(S): [id number]   ORDERING CLINICIAN: GHAZAL ALVA   FINDINGS: Left wrist, four views   There is joint space narrowing with osteophytosis and the 1st CMC and triscaphe joint.  There is no fracture. There is no dislocation. There are no other degenerative changes. There is no lytic or sclerotic lesion. There is no soft tissue abnormality seen.       Mild-to-moderate degenerative changes in the base of the thumb. No acute abnormality   MACRO: None   Signed by: Danyel Hill 12/2/2024 6:02 PM Dictation workstation:   [id number]    XR pelvis 1-2 views    Result Date: 12/2/2024  Interpreted By:  Danyel Hill, STUDY: XR PELVIS 1-2 VIEWS; ;  12/2/2024 5:48 pm   INDICATION: Signs/Symptoms:pain after fall.     COMPARISON: None.   ACCESSION NUMBER(S): [id number]   ORDERING CLINICIAN: GHAZAL ALVA   FINDINGS: Pelvis, single view   Intramedullary nail and screw seen in the left hip. No acute fracture or dislocation. No degenerative changes       No acute osseus abnormality. Postsurgical changes in the left proximal femur     MACRO: None   Signed by: Danyel Hill 12/2/2024 6:01 PM Dictation workstation:   [id number]        Home Medications  Prior to Admission medications    Medication Sig Start Date End Date Taking? Authorizing Provider   brimonidine (AlphaGAN) 0.2 % ophthalmic solution Administer 1 drop into both eyes 2 times a day.   Yes Historical Provider, MD   dorzolamide-timoloL (Cosopt) 22.3-6.8 mg/mL ophthalmic solution Administer 1 drop into both eyes 2 times a day.   Yes Historical Provider, MD   latanoprost (Xalatan) 0.005 % ophthalmic solution Administer 1 drop into both eyes once daily at bedtime.   Yes Historical Provider, MD   dorzolamide HCl/timolol maleat (DORZOLAMIDE-TIMOLOL OPHT) Administer 1 drop into both eyes 2 times a day. Indications: glaucoma, an increased pressure in the eye  12/2/24 Yes Historical Provider, MD   levETIRAcetam (Keppra) 500 mg  tablet Take 1 tablet (500 mg) by mouth 2 times a day.  Patient not taking: Reported on 12/2/2024 2/5/24 3/6/24  Carol Graham PA-C   ascorbic acid (Vitamin C) 500 mg ER capsule Take 500 mg by mouth once daily.  12/2/24  Historical Provider, MD   aspirin 81 mg chewable tablet Chew 81 mg 1 time. Indications: treatment to prevent a heart attack  12/2/24  Historical Provider, MD   atorvastatin (Lipitor) 40 mg tablet Take 40 mg by mouth once daily. Indications: excessive fat in the blood  12/2/24  Historical Provider, MD   b complex 0.4 mg tablet Take 1 tablet by mouth once daily.  12/2/24  Historical Provider, MD   cholecalciferol (Vitamin D3) 50 mcg (2,000 unit) capsule Take 1 capsule (50 mcg) by mouth early in the morning..  12/2/24  Historical Provider, MD   DULoxetine (Cymbalta) 30 mg DR capsule Take 30 mg by mouth once daily. only take for 30 days  Indications: major depressive disorder  12/2/24  Historical Provider, MD   fludrocortisone (Florinef) 0.1 mg tablet Take 10 tablets (1 mg) by mouth once daily. 30 days only  12/2/24  Historical Provider, MD   multivitamin tablet Take 1 tablet by mouth once daily.  12/2/24  Historical Provider, MD   pantoprazole (ProtoNix) 40 mg EC tablet Take 40 mg by mouth once daily in the morning. Take before meals. Indications: gastroesophageal reflux disease  12/2/24  Historical Provider, MD       Medications  Scheduled medications  magnesium sulfate, 2 g, intravenous, Once      Continuous medications     PRN medications            Assessment & Plan  UTI (urinary tract infection)    Essential (primary) hypertension    Vascular dementia    Hypomagnesemia    Fall    Head injury    Scalp laceration, initial encounter    Skin tear of left forearm without complication                Plan:  Code Status: Full Code   ATB: Rocephin 1 g IV every 24 hours  Meds: Tylenol 650 mg p.o. every 6 hours as needed for pain 1-6/headaches, melatonin 3 milligrams p.o. daily as needed for insomnia,  MiraLAX 17 gms p.o daily as needed for constipation.  Avoid nephrotoxic medications   Diet: Regular  Telemetry monitoring   Vital signs with BP, HR, & RR Q 4 hours.  Pulse ox Q 4 hours.   Admission height and weight.  Staple to be removed in 7 to 10 days.  Labs ordered: CBC, BMP, Mg, Phos, repeat UA with reflex to micro and culture, urine obtained in ED did not have culture ordered.    Test ordered:  Defer to attending and/or consults  Monitor / trend labs while inpatient.  Replace electrolytes as needed.  Aspiration precautions.  Fall precautions  Out of bed with assistance   PT/OT eval and treat as indicated   Social work/TCC to be consulted for concern of patient returning home due to safety concerns.  Call physician for temperature < 36.5 or >38.0 degrees celsius.  Call physician for pulse <50 or >120.  Call physician for respiratory rate <10 or >22.  Call physician for systolic blood pressure <90 or >170.  Call physician for diastolic blood pressure < 50 or >100.  Call physician for pulse ox <92%.  See orders for further plan of care ordered.     VTE prophylaxis:   Lovenox subcutaneous   BLE SCDs.  Monitor for s/s of bleeding    Further evaluation and management per attending and consulting physicians.      This note has been transcribed using Dragon voice recognition system and there is a possibility of unintentional typing misprints.  Any information found to be copied from previous providers is done in the best interest of the patient to provide accurate, quality, and continuity of care.     I spent 45 minutes in the professional and overall care of this patient.      Miesha Andrade, APRN-CNP  [facility] [place]

## 2024-12-03 NOTE — CARE PLAN
The patient's goals for the shift include remain hemodynamically stable    The clinical goals for the shift include free from falls and injuries

## 2024-12-04 ENCOUNTER — HOME HEALTH ADMISSION (OUTPATIENT)
Dept: HOME HEALTH SERVICES | Facility: HOME HEALTH | Age: 84
End: 2024-12-04
Payer: MEDICARE

## 2024-12-04 ENCOUNTER — DOCUMENTATION (OUTPATIENT)
Dept: HOME HEALTH SERVICES | Facility: HOME HEALTH | Age: 84
End: 2024-12-04
Payer: MEDICARE

## 2024-12-04 VITALS
WEIGHT: 117.28 LBS | OXYGEN SATURATION: 98 % | HEART RATE: 91 BPM | RESPIRATION RATE: 18 BRPM | HEIGHT: 67 IN | TEMPERATURE: 96.8 F | SYSTOLIC BLOOD PRESSURE: 133 MMHG | BODY MASS INDEX: 18.41 KG/M2 | DIASTOLIC BLOOD PRESSURE: 60 MMHG

## 2024-12-04 LAB
ANION GAP SERPL CALC-SCNC: 12 MMOL/L (ref 10–20)
BUN SERPL-MCNC: 15 MG/DL (ref 6–23)
CALCIUM SERPL-MCNC: 8.6 MG/DL (ref 8.6–10.3)
CHLORIDE SERPL-SCNC: 105 MMOL/L (ref 98–107)
CO2 SERPL-SCNC: 23 MMOL/L (ref 21–32)
CREAT SERPL-MCNC: 0.89 MG/DL (ref 0.5–1.05)
EGFRCR SERPLBLD CKD-EPI 2021: 63 ML/MIN/1.73M*2
ERYTHROCYTE [DISTWIDTH] IN BLOOD BY AUTOMATED COUNT: 12.8 % (ref 11.5–14.5)
GLUCOSE SERPL-MCNC: 85 MG/DL (ref 74–99)
HCT VFR BLD AUTO: 36.2 % (ref 36–46)
HGB BLD-MCNC: 11.9 G/DL (ref 12–16)
MAGNESIUM SERPL-MCNC: 1.97 MG/DL (ref 1.6–2.4)
MCH RBC QN AUTO: 29.8 PG (ref 26–34)
MCHC RBC AUTO-ENTMCNC: 32.9 G/DL (ref 32–36)
MCV RBC AUTO: 91 FL (ref 80–100)
NRBC BLD-RTO: 0 /100 WBCS (ref 0–0)
PLATELET # BLD AUTO: 206 X10*3/UL (ref 150–450)
POTASSIUM SERPL-SCNC: 3.7 MMOL/L (ref 3.5–5.3)
RBC # BLD AUTO: 3.99 X10*6/UL (ref 4–5.2)
SODIUM SERPL-SCNC: 136 MMOL/L (ref 136–145)
WBC # BLD AUTO: 7.3 X10*3/UL (ref 4.4–11.3)

## 2024-12-04 PROCEDURE — 85027 COMPLETE CBC AUTOMATED: CPT | Performed by: NURSE PRACTITIONER

## 2024-12-04 PROCEDURE — G0378 HOSPITAL OBSERVATION PER HR: HCPCS

## 2024-12-04 PROCEDURE — 80048 BASIC METABOLIC PNL TOTAL CA: CPT | Performed by: NURSE PRACTITIONER

## 2024-12-04 PROCEDURE — 83735 ASSAY OF MAGNESIUM: CPT | Performed by: NURSE PRACTITIONER

## 2024-12-04 PROCEDURE — 97116 GAIT TRAINING THERAPY: CPT | Mod: GP,CQ

## 2024-12-04 PROCEDURE — 97530 THERAPEUTIC ACTIVITIES: CPT | Mod: GP,CQ

## 2024-12-04 PROCEDURE — 97535 SELF CARE MNGMENT TRAINING: CPT | Mod: GO,CO

## 2024-12-04 PROCEDURE — 36415 COLL VENOUS BLD VENIPUNCTURE: CPT | Performed by: NURSE PRACTITIONER

## 2024-12-04 ASSESSMENT — COGNITIVE AND FUNCTIONAL STATUS - GENERAL
DRESSING REGULAR LOWER BODY CLOTHING: A LOT
TOILETING: A LITTLE
DAILY ACTIVITIY SCORE: 17
TURNING FROM BACK TO SIDE WHILE IN FLAT BAD: A LITTLE
MOBILITY SCORE: 15
DRESSING REGULAR UPPER BODY CLOTHING: A LITTLE
TOILETING: A LITTLE
WALKING IN HOSPITAL ROOM: A LOT
DRESSING REGULAR LOWER BODY CLOTHING: A LITTLE
DRESSING REGULAR UPPER BODY CLOTHING: A LITTLE
HELP NEEDED FOR BATHING: A LOT
HELP NEEDED FOR BATHING: A LITTLE
STANDING UP FROM CHAIR USING ARMS: A LITTLE
MOVING TO AND FROM BED TO CHAIR: A LITTLE
MOBILITY SCORE: 16
MOVING FROM LYING ON BACK TO SITTING ON SIDE OF FLAT BED WITH BEDRAILS: A LITTLE
PERSONAL GROOMING: A LITTLE
TURNING FROM BACK TO SIDE WHILE IN FLAT BAD: A LITTLE
DRESSING REGULAR LOWER BODY CLOTHING: A LOT
DAILY ACTIVITIY SCORE: 21
PERSONAL GROOMING: A LITTLE
CLIMB 3 TO 5 STEPS WITH RAILING: A LOT
MOVING TO AND FROM BED TO CHAIR: A LITTLE
DAILY ACTIVITIY SCORE: 17
CLIMB 3 TO 5 STEPS WITH RAILING: TOTAL
HELP NEEDED FOR BATHING: A LOT
TOILETING: A LITTLE
STANDING UP FROM CHAIR USING ARMS: A LOT
WALKING IN HOSPITAL ROOM: A LOT

## 2024-12-04 ASSESSMENT — PAIN - FUNCTIONAL ASSESSMENT
PAIN_FUNCTIONAL_ASSESSMENT: 0-10
PAIN_FUNCTIONAL_ASSESSMENT: 0-10

## 2024-12-04 ASSESSMENT — ACTIVITIES OF DAILY LIVING (ADL)
HOME_MANAGEMENT_TIME_ENTRY: 15
EFFECT OF PAIN ON DAILY ACTIVITIES: .
HOME_MANAGEMENT_TIME_ENTRY: 15

## 2024-12-04 ASSESSMENT — PAIN SCALES - GENERAL
PAINLEVEL_OUTOF10: 8
PAINLEVEL_OUTOF10: 8
PAINLEVEL_OUTOF10: 0 - NO PAIN
PAINLEVEL_OUTOF10: 0 - NO PAIN

## 2024-12-04 NOTE — PROGRESS NOTES
Occupational Therapy    Occupational Therapy    Evaluation    Patient Name: Fernanda Hughes  MRN: [id number]  Today's Date: 12/3/2024  Time Calculation  Start Time: 1051  Stop Time: 1112  Time Calculation (min): 21 min  3136/3136-A    Assessment  IP OT Assessment  OT Assessment: Pt pleasant and cooperative. Pt unsteady on feet with increased risk for falls. Pt with increased L hip pain when weightbearing through L LE, and increasing difficulty with ambulation. Pt also requires increased assist with ADL's at this time. Recommend AR to increase safety and independence with ADL's.  Prognosis: Good  End of Session Communication: Bedside nurse  End of Session Patient Position: Up in chair, Alarm on    Plan:  Treatment Interventions: ADL retraining, Functional transfer training, UE strengthening/ROM, Endurance training, Neuromuscular reeducation  OT Frequency: 3 times per week  OT Discharge Recommendations: High intensity level of continued care (AR)  Equipment Recommended upon Discharge: Wheeled walker  OT Recommended Transfer Status: Minimal assist  OT - OK to Discharge: Yes (to next level of care)    Subjective     Current Problem:  1. Closed head injury, initial encounter        2. Urinary tract infection without hematuria, site unspecified        3. Scalp laceration, initial encounter  bacitracin 500 unit/gram ointment          General:  General  Reason for Referral: ADL's; Safety Assessment  Referred By: Stef Rosales  Co-Treatment: PT  Prior to Session Communication: Bedside nurse  Patient Position Received: Bed, 3 rail up, Alarm on    Per EMR: pt presents to the ED for evaluation following a mechanical fall with a scalp, left arm, and left cheek laceration from home.  Not blood thinners. The patient's HPOA reported the patient called her this morning to inform her she fell, the HPOA had errands and got to the patient's house about 1 pm and saw she had the lacerations and the patient was complaining of left hip and wrist  "pain, so brought her to the ED for evaluation. The HPOA reported the patient told her she rolled out of the bed and hit the table next to the bed. HPI and history obtained from [facility] due to the patient's cognitive impairment from dementia. The patient told me she could not remember what happened. The HPOA reported that the patient is always complaining she is dizzy and having more frequent falls at home.  Of note, 2024 patient seen in the ED for \"dizziness\" and prescribed meclizine, then discharged home with instructions to follow-up with PCP.  The patient denies any chest pain, palpitations, shortness of breath, cough, abdominal pain, N/V/D/C, dysuria, or hematuria. The HPOA confirmed that the patient has not complained of any of the previous listed symptoms,     Of note, the patient lives alone in a ranch style house. She has no brother or sisters. She was estranged from her father and mother passed away of old age, per her HPOA.  Her HPOA talks to her usually once a day. She cooks microwave meals, reports she still drives, drinks lots of pepsi, and does not have HHC helping her in the home. However, the HPOA is interested in HHC.     Precautions:  Medical Precautions: Fall precautions    Pain:  Pain Assessment  Pain Assessment: 0-10  0-10 (Numeric) Pain Score:  (pt denies any pain at rest but reports increased L hip pain with mobility; does not rate)    Objective     Cognition:  Overall Cognitive Status: Impaired  Orientation Level:  (Pt was able to state name, , current location and current year. Pt was not able to recall current month)  Pt with Impaired judgement and decreased safety     Home Living/PLOF:  Pt lives alone in ranch home with 0 EN.  Has WIS. IND with ADLs.  IND with mobility, owns SPC and FWW.  Pt drives and does own grocery shopping.  Pt denies any other falls aside from x1 PTA.  Pt owns dog.    ADL:  Feeding: independent  Grooming: CGA while standing  UB Dressing: SBA  LB Dressing: Min " A  Toileting:  SBA    Activity Tolerance:  Endurance: Tolerates 10 - 20 min exercise with multiple rests    Functional Mobility:  Bed mobility  Supine to sit: SBA     Transfers  Sit to stand: CGA with mild LOB in standing 2/2 L hip pain requiring Min A to maintain stand balance     Stand to sit: CGA with Vcs for hand placement      Toilet transfer: onto elevated toilet with CGA     Ambulation/Stairs  Pt ambulated 3 ft with B HHA and Min-Mod A x2; grossly steady with decreased ability to WB on LLE     Pt ambulated 12 ft with FWW and CGA; improved balance and ability to WB on LLE when using FWW; pt given Vcs for sequencing with FWW to assist with WB on painful extremity     Sitting Balance:  Static Sitting Balance  Static Sitting-Comment/Number of Minutes: fair+  Dynamic Sitting Balance  Dynamic Sitting-Comments: fair    Standing Balance:  Static Standing Balance  Static Standing-Comment/Number of Minutes: fair-  Dynamic Standing Balance  Dynamic Standing-Comments: poor (Without DME, pt with decreased stand balance s/p L hip pain)    Sensation:  Sensation Comment: pt denies any numbness/tingling    Strength:  B Ue's WFL     Extremities: RUE   RUE : Within Functional Limits and LUE   LUE: Within Functional Limits    Outcome Measures: [facility] Daily Activity  Putting on and taking off regular lower body clothing: A lot  Bathing (including washing, rinsing, drying): A lot  Putting on and taking off regular upper body clothing: A little  Toileting, which includes using toilet, bedpan or urinal: A little  Taking care of personal grooming such as brushing teeth: A little  Eating Meals: None  Daily Activity - Total Score: 17         EDUCATION:  Education  Individual(s) Educated: Patient  Education Provided:  (safety)  Education Documentation  Body Mechanics, taught by Sweta Robertson OT at 12/3/2024  9:09 PM.  Learner: Patient  Readiness: Acceptance  Method: Explanation  Response: Verbalizes Understanding    Precautions, taught  by Sweta Robertson OT at 12/3/2024  9:09 PM.  Learner: Patient  Readiness: Acceptance  Method: Explanation  Response: Verbalizes Understanding    ADL Training, taught by Sweta Robertson OT at 12/3/2024  9:09 PM.  Learner: Patient  Readiness: Acceptance  Method: Explanation  Response: Verbalizes Understanding    Education Comments  No comments found.        Goals:   Encounter Problems       Encounter Problems (Active)       OT Goals       OT Goal 1 (Progressing)       Start:  12/03/24    Expected End:  12/17/24       Pt with complete all transfers safely with modified independence           OT Goal 2 (Progressing)       Start:  12/03/24    Expected End:  12/17/24       Pt will complete LB dressing with supervision using adaptive device as needed           OT Goal 3 (Progressing)       Start:  12/03/24    Expected End:  12/17/24       Pt will complete grooming ADL's with supervision and good stand balance                 Treatment: Pt was able to complete bed mobility with SBA. Pt with good sit balance on EOB. Pt completed all transfers with CGA. Upon standing, pt with increased L hip pain, and required Min A to gain stand balance. Without DME, pt required Mod HHA of 2 to ambulate a few feet in room. With wheeled walker, pt ambulated with close CGA. Pt transferred to toilet with Min A and was maura to complete toileting with SBA. Pt stood at sink with fair stand balance to complete grooming ADL's. Pt returned to and remained in bedside chair with chair alarm on and call light within reach.

## 2024-12-04 NOTE — PROGRESS NOTES
12/04/24 0934   Discharge Planning   Home or Post Acute Services None   Expected Discharge Disposition Home   Does the patient need discharge transport arranged? Yes   RoundTrip coordination needed? Yes   Has discharge transport been arranged? No     Nursing's observations: patient is confused and forgetful. Patient is refusing to go to SNF or home care at discharge. I spoke with the patient's POA Deepali and she has been working with her to transition to [facility]. She is prepared to arrange with [facility] agency for daily caregivers for 3-4 hours a day. She states patient does have an  with POA and a plan as her dementia progresses. Bedside nurse notified of plan for discharge.     0950: I spoke with patient and she does not want to go to a SNF facility but to home. She is agreeable to have caregivers come daily to assist with tasks and transportation. Deepali (POJD) notified and she  patient after 1500 today.

## 2024-12-04 NOTE — HH CARE COORDINATION
Home Care received a Referral for Nursing, Physical Therapy, and Occupational Therapy. We have processed the referral for a Start of Care on 24-48 hrs.     If you have any questions or concerns, please feel free to contact us at [phone]. Follow the prompts, enter your five digit zip code, and you will be directed to your care team on [facility] 2.

## 2024-12-04 NOTE — DISCHARGE SUMMARY
Discharge Diagnosis  UTI (urinary tract infection)  Fall  Head injury  Vascular dementia  Scalp laceration      Discharge Meds     Medication List      START taking these medications     bacitracin 500 unit/gram ointment; Apply topically 3 times a day for 15   doses. To head laceration     CONTINUE taking these medications     brimonidine 0.2 % ophthalmic solution; Commonly known as: AlphaGAN   dorzolamide-timoloL 22.3-6.8 mg/mL ophthalmic solution; Commonly known   as: Cosopt   latanoprost 0.005 % ophthalmic solution; Commonly known as: Xalatan     STOP taking these medications     levETIRAcetam 500 mg tablet; Commonly known as: Keppra       Test Results Pending At Discharge  Pending Labs       No current pending labs.            Hospital Course  Patient is 84-year-old female admitted to the hospital after she had a fall and head trauma, imaging studies did not show any acute process or bleeding, patient was found to have UTI, she was treated antibiotics, patient was evaluated by PT/OT, and was discharged to follow-up as an outpatient    Pertinent Physical Exam At Time of Discharge  Physical Exam  HENT:      Head: Normocephalic and atraumatic.      Nose: Nose normal.      Mouth/Throat:      Mouth: Mucous membranes are moist.      Pharynx: Oropharynx is clear.   Eyes:      Pupils: Pupils are equal, round, and reactive to light.   Cardiovascular:      Rate and Rhythm: Normal rate and regular rhythm.      Pulses: Normal pulses.      Heart sounds: Normal heart sounds.   Pulmonary:      Effort: Pulmonary effort is normal.      Breath sounds: Normal breath sounds.   Abdominal:      General: Abdomen is flat. Bowel sounds are normal.      Palpations: Abdomen is soft.   Skin:     General: Skin is warm and dry.   Neurological:      General: No focal deficit present.      Mental Status: She is alert. Mental status is at baseline.         Outpatient Follow-Up  No future appointments.      Stef Rosales MD

## 2024-12-04 NOTE — PROGRESS NOTES
Occupational Therapy    OT Treatment    Patient Name: Fernanda Hughes  MRN: [id number]  Today's Date: 12/4/2024  Time Calculation  Start Time: 1345  Stop Time: 1400  Time Calculation (min): 15 min       3136/3136-A    Assessment:  OT Assessment: Pt pleasant and cooperative. Pt unsteady on feet with increased risk for falls. Pt with increased L hip pain when weightbearing through L LE, and increasing difficulty with ambulation. Pt also requires increased assist with ADL's at this time. Recommend AR to increase safety and independence with ADL's.  Prognosis: Good  End of Session Communication: Bedside nurse  End of Session Patient Position: Bed, 3 rail up, Alarm on  OT Assessment Results: Decreased ADL status, Decreased upper extremity strength, Decreased endurance, Decreased functional mobility  Prognosis: Good    Plan:  Treatment Interventions: ADL retraining  OT Frequency: 3 times per week  OT Discharge Recommendations: High intensity level of continued care  Equipment Recommended upon Discharge: Wheeled walker  Treatment Interventions: ADL retraining  Subjective     Outcome Measures:[facility] Daily Activity  Putting on and taking off regular lower body clothing: A lot  Bathing (including washing, rinsing, drying): A lot  Putting on and taking off regular upper body clothing: A little  Toileting, which includes using toilet, bedpan or urinal: A little  Taking care of personal grooming such as brushing teeth: A little  Eating Meals: None  Daily Activity - Total Score: 17         12/04/24 1345   OT Last Visit   OT Received On 12/04/24   General   Reason for Referral impaired ADL's   Prior to Session Communication Bedside nurse   Patient Position Received Bed, 3 rail up;Alarm on   Preferred Learning Style verbal;visual   General Comment Pt rang to go to the bathroom   Precautions   Medical Precautions Fall precautions   Pain Assessment   0-10 (Numeric) Pain Score 0 - No pain   Cognition   Orientation Level Oriented X4    Grooming   Grooming Level of Assistance Setup   Grooming Where Assessed Standing sinkside   Grooming Comments brushed teeth   Toileting   Toileting Level of Assistance Setup   Where Assessed Toilet   Toileting Comments pt completed marta care seated on toilet   Bed Mobility   Bed Mobility Yes   Bed Mobility 1   Bed Mobility 1 Sitting to supine   Level of Assistance 1 Minimum assistance   Bed Mobility Comments 1 assist lifting LLE into bed   Functional Mobility   Functional Mobility Performed   (Pt ambulates in room at [facility] with ww support)   Toilet Transfers   Toilet Transfer From Chair   Toilet Transfer Type To   Toilet Transfer to Standard toilet   Toilet Transfer Technique Ambulating   Toilet Transfers Minimal assistance   Toilet Transfers Comments due to low seat   IP OT Assessment   OT Assessment Pt pleasant and cooperative. Pt unsteady on feet with increased risk for falls. Pt with increased L hip pain when weightbearing through L LE, and increasing difficulty with ambulation. Pt also requires increased assist with ADL's at this time. Recommend AR to increase safety and independence with ADL's.   Prognosis Good   End of Session Communication Bedside nurse   End of Session Patient Position Bed, 3 rail up;Alarm on   OT Assessment   OT Assessment Results Decreased ADL status;Decreased upper extremity strength;Decreased endurance;Decreased functional mobility   Education   Individual(s) Educated Patient   Education Provided Fall precautons   Patient Response to Education Patient/Caregiver Verbalized Understanding of Information   Education Comment Pt brigitte benefit from continued reinforcement   Inpatient Plan   Treatment Interventions ADL retraining   OT Frequency 3 times per week   OT Discharge Recommendations High intensity level of continued care   Equipment Recommended upon Discharge Wheeled walker         Goals:  Encounter Problems       Encounter Problems (Active)       OT Goals       OT Goal 1 (Progressing)        Start:  12/03/24    Expected End:  12/17/24       Pt with complete all transfers safely with modified independence           OT Goal 2 (Progressing)       Start:  12/03/24    Expected End:  12/17/24       Pt will complete LB dressing with supervision using adaptive device as needed           OT Goal 3 (Progressing)       Start:  12/03/24    Expected End:  12/17/24       Pt will complete grooming ADL's with supervision and good stand balance

## 2024-12-04 NOTE — PROGRESS NOTES
Physical Therapy    Physical Therapy    Physical Therapy Treatment    Patient Name: Fernanda Hughes  MRN: [id number]  Today's Date: 12/4/2024  Time Calculation  Start Time: 1358  Stop Time: 1425  Time Calculation (min): 27 min     3136/3136-A       12/04/24 1358   PT  Visit   PT Received On 12/04/24   Response to Previous Treatment Patient with no complaints from previous session.   General   Reason for Referral impaired ADL's   Referred By Stef Rosales   Prior to Session Communication Bedside nurse   Patient Position Received Bed, 3 rail up;Alarm on   General Comment Pt agreeable to therapy and cleared for participation.   Precautions   Medical Precautions Fall precautions   Pain Assessment   Pain Assessment 0-10   0-10 (Numeric) Pain Score 8   Pain Type Acute pain   Pain Location Hip   Pain Orientation Left   Effect of Pain on Daily Activities .   Pain Interventions Repositioned   Cognition   Orientation Level Oriented X4  (Pt is impulsive with decreased safety awareness.)   Attention WFL   Safety/Judgement   (decreased safety awareness)   Therapeutic Activity   Therapeutic Activity Performed Yes   Therapeutic Activity 1 seated activities reaching outside EDGAR using unilateral UE support as needed vs unsupported. Close SBA/CGA d/t  being impulsive and decreased safety awareness.   Bed Mobility   Bed Mobility Yes   Bed Mobility 1   Bed Mobility 1 Supine to sitting;Sitting to supine   Level of Assistance 1 Minimum assistance;Minimal verbal cues   Bed Mobility Comments 1 bed flat with cues for sequencing and safety. Assist reqiured to lift B LE's on/off bed   Ambulation/Gait Training   Ambulation/Gait Training Performed Yes   Ambulation/Gait Training 1   Surface 1 Level tile   Device 1 Rolling walker   Gait Support Devices Gait belt   Assistance 1 Minimum assistance;Moderate assistance;Minimal verbal cues  (x2)   Quality of Gait 1 NBOS;Diminished heel strike;Antalgic   Comments/Distance (ft) 1 40' x2, 15' slow step to gait,  minimally progress to step through. However, this increased pain significantly, returning to step to gait pattern. Cues for WW management and placement for increased safety and stability. Pt has significant shooting pain in L hip and groin, becoming unstable with large LOB, modAx2 to steady. MinAx2 for instability and safety before/after LOB. Decreased safety awareness throughout. Unsafe to be up alone at this time.   Transfers   Transfer Yes   Transfer 1   Transfer From 1 Bed to   Transfer to 1 Stand;Sit   Technique 1 Sit to stand;Stand to sit   Transfer Device 1 Walker;Gait belt   Transfer Level of Assistance 1 Minimum assistance;Minimal verbal cues;Moderate verbal cues   Trials/Comments 1 Multiple STS trials. Pt stated a sharp pain in L hip upon initial stand requiring minAx2 for safety and stability. Pt is impulsive, standing/sitting prematurely despite safety cues increasing fall risk.   Activity Tolerance   Endurance Tolerates 30 min exercise with multiple rests   PT Assessment   PT Assessment Results Decreased strength;Decreased endurance;Impaired balance;Decreased mobility   Rehab Prognosis Good   End of Session Communication Bedside nurse   Assessment Comment Pt presents with significant decreased safety awareness. Large LOB noted. min/modAx1-2 for stability and safety. Pt answered orientation questions appropriately, however, was somewhat confused when getting into bed at end of session. Unsafe to be up alone at this time.   End of Session Patient Position Bed, 3 rail up;Alarm on     Outcome Measures:  [facility] Basic Mobility  Turning from your back to your side while in a flat bed without using bedrails: A little  Moving from lying on your back to sitting on the side of a flat bed without using bedrails: A little  Moving to and from bed to chair (including a wheelchair): A little  Standing up from a chair using your arms (e.g. wheelchair or bedside chair): A lot  To walk in hospital room: A lot  Climbing 3-5  steps with railing: A lot  Basic Mobility - Total Score: 15                             EDUCATION:  Outpatient Education  Individual(s) Educated: Patient  Education Provided: Fall Risk  Education Documentation  No documentation found.  Education Comments  No comments found.        GOALS:  Encounter Problems       Encounter Problems (Active)       PT Problem       Pt will be able to perform all bed mobility tasks with Mod I.  (Progressing)       Start:  12/03/24    Expected End:  12/17/24            Pt will perform all transfers with Mod I and FWW with proper safety mechanics.   (Progressing)       Start:  12/03/24    Expected End:  12/17/24            Pt will ambulate 100 ft with Mod I using FWW for improved functional independence.  (Progressing)       Start:  12/03/24    Expected End:  12/17/24               Pain - Adult          Safety       LTG - Patient will utilize safety techniques       Start:  12/02/24            STG - Patient uses call light consistently to request assistance with transfers       Start:  12/02/24            STG - Patient uses gait belt during all transfers       Start:  12/02/24

## 2024-12-04 NOTE — PROGRESS NOTES
Occupational Therapy    OT Treatment    Patient Name: Fernanda Hughes  MRN: [id number]  Today's Date: 12/4/2024  Time Calculation  Start Time: 1401  Stop Time: 1424  Time Calculation (min): 23 min       3136/3136-A    Assessment:  OT Assessment: Continue to recommend AR at this time due to unsteadiness and increased risk for falls 2/2 L hip pain.  End of Session Communication: Bedside nurse  End of Session Patient Position: Bed, 4 rail up, Alarm on       Plan:  OT Frequency: 3 times per week  OT Discharge Recommendations: High intensity level of continued care  Equipment Recommended upon Discharge: Wheeled walker (shower chair - educated in benefits and options to obtain)     Subjective      12/04/24 1401   OT Last Visit   OT Received On 12/04/24   General   Reason for Referral impaired ADL's   Referred By Stef Rosales   Prior to Session Communication Bedside nurse   Patient Position Received Bed, 3 rail up;Alarm on   General Comment Pt agreeable to therapy and cleared for participation.   Precautions   Medical Precautions Fall precautions   Pain Assessment   Pain Assessment 0-10   0-10 (Numeric) Pain Score 8   Pain Type Acute pain   Pain Location Hip   Pain Orientation Left   Pain Radiating Towards groin   Pain Interventions Rest;Relaxation technique   Cognition   Overall Cognitive Status WFL   Safety Judgment Decreased awareness of need for safety precautions   Insight Mild   Impulsive Mildly   Grooming   Grooming Level of Assistance Contact guard   Grooming Where Assessed Standing sinkside   Grooming Comments Pt combed hair, cues to increase safety in stance including proper AD placement.   UE Dressing   UE Dressing Level of Assistance Modified independent   UE Dressing Where Assessed Edge of bed   UE Dressing Comments Pt doffed gown, donned pullover sweater with setup of clothing.   LE Dressing   LE Dressing Yes   Pants Level of Assistance Minimum assistance   LE Dressing Where Assessed Edge of bed   LE Dressing  Comments Pt educated in safety and comp techniques due to L hip pain, pt demo'd good follow through and threaded LE's with SBA, however upon standing pt demo'd LOB due to pain, required Min A x2 to correct and hike pants while in stance.   Bed Mobility 1   Bed Mobility 1 Supine to sitting;Sitting to supine   Level of Assistance 1 Minimum assistance;Minimal verbal cues   Bed Mobility Comments 1 bed flat, bed rails utilized, requires assist to manage L LE   Functional Mobility   Functional Mobility Performed Pt ambulated within room and into hallway Min A , although LOB at times with increased pain requiring assist of 2 for safety.   Transfer 1   Technique 1 Sit to stand;Stand to sit   Transfer Device 1 Walker;Gait belt   Transfer Level of Assistance 1 Minimum assistance;Minimal verbal cues;Moderate verbal cues   Trials/Comments 1 STS from EOB to fww level, requires v/t cues for proper UE/LE placement to maximize safety as pt attempts to pull up on AD. Upon initial stand pt demo'd LOB due to hip pain, required Min A x2 to correct.   IP OT Assessment   OT Assessment Continue to recommend AR at this time due to unsteadiness and increased risk for falls 2/2 L hip pain.   End of Session Communication Bedside nurse   End of Session Patient Position Bed, 4 rail up;Alarm on   Inpatient Plan   OT Frequency 3 times per week   OT Discharge Recommendations High intensity level of continued care   Equipment Recommended upon Discharge Wheeled walker  (shower chair - educated in benefits and options to obtain)     Outcome Measures:[facility] Daily Activity  Putting on and taking off regular lower body clothing: A lot  Bathing (including washing, rinsing, drying): A lot  Putting on and taking off regular upper body clothing: A little  Toileting, which includes using toilet, bedpan or urinal: A little  Taking care of personal grooming such as brushing teeth: A little  Eating Meals: None  Daily Activity - Total Score: 17  Education  Documentation  No documentation found.  Education Comments  No comments found.            Goals:  Encounter Problems       Encounter Problems (Active)       OT Goals       OT Goal 1 (Progressing)       Start:  12/03/24    Expected End:  12/17/24       Pt with complete all transfers safely with modified independence           OT Goal 2 (Progressing)       Start:  12/03/24    Expected End:  12/17/24       Pt will complete LB dressing with supervision using adaptive device as needed           OT Goal 3 (Progressing)       Start:  12/03/24    Expected End:  12/17/24       Pt will complete grooming ADL's with supervision and good stand balance

## 2024-12-05 ENCOUNTER — HOME CARE VISIT (OUTPATIENT)
Dept: HOME HEALTH SERVICES | Facility: HOME HEALTH | Age: 84
End: 2024-12-05
Payer: MEDICARE

## 2024-12-05 VITALS
HEART RATE: 98 BPM | OXYGEN SATURATION: 98 % | TEMPERATURE: 97.5 F | DIASTOLIC BLOOD PRESSURE: 68 MMHG | SYSTOLIC BLOOD PRESSURE: 110 MMHG

## 2024-12-05 PROCEDURE — 169592 NO-PAY CLAIM PROCEDURE

## 2024-12-05 PROCEDURE — G0152 HHCP-SERV OF OT,EA 15 MIN: HCPCS | Mod: HHH

## 2024-12-05 PROCEDURE — G0299 HHS/HOSPICE OF RN EA 15 MIN: HCPCS | Mod: HHH

## 2024-12-05 SDOH — ECONOMIC STABILITY: HOUSING INSECURITY
HOME SAFETY: LIVES ALONE, RANCH HOME. MULTIPLE THROW RUGS ALONG ALL FLOORS, OT REC REMOVING BUT PT RESISTANT. BATHROOM HAS TUB SHOWER, SEAT AVAIL BUT PT DOES NOT USE. PT PREFERS TO SPONGE BATHE.

## 2024-12-05 ASSESSMENT — ENCOUNTER SYMPTOMS
PAIN LOCATION: LEFT LEG
CHANGE IN APPETITE: UNCHANGED
PAIN LOCATION - PAIN SEVERITY: 3/10
PAIN: 1
PERSON REPORTING PAIN: PATIENT
PAIN LOCATION - PAIN SEVERITY: 8/10
DESCRIPTION OF MEMORY LOSS: IMMEDIATE
MUSCLE WEAKNESS: 1
DOUBLE VISION: 1
HIGHEST PAIN SEVERITY IN PAST 24 HOURS: 8/10
DIZZINESS: 1
APPETITE LEVEL: GOOD
LIMITED RANGE OF MOTION: 1
DESCRIPTION OF MEMORY LOSS: SHORT TERM
PAIN: 1
CONTUSION: 1
PERSON REPORTING PAIN: PATIENT
PAIN LOCATION: LEFT HIP

## 2024-12-05 ASSESSMENT — ACTIVITIES OF DAILY LIVING (ADL)
OASIS_M1830: 03
AMBULATION ASSISTANCE: STAND BY ASSIST
ENTERING_EXITING_HOME: SUPERVISION
DRESSING_LB_CURRENT_FUNCTION: INDEPENDENT
TOILETING: 1
CURRENT_FUNCTION: STAND BY ASSIST
TOILETING: INDEPENDENT

## 2024-12-06 ENCOUNTER — HOME CARE VISIT (OUTPATIENT)
Dept: HOME HEALTH SERVICES | Facility: HOME HEALTH | Age: 84
End: 2024-12-06
Payer: MEDICARE

## 2024-12-06 VITALS
TEMPERATURE: 97.5 F | HEART RATE: 101 BPM | DIASTOLIC BLOOD PRESSURE: 60 MMHG | OXYGEN SATURATION: 100 % | SYSTOLIC BLOOD PRESSURE: 110 MMHG

## 2024-12-06 PROCEDURE — G0151 HHCP-SERV OF PT,EA 15 MIN: HCPCS | Mod: HHH

## 2024-12-06 ASSESSMENT — ENCOUNTER SYMPTOMS
PAIN LOCATION: LEFT HIP
PAIN: 1
PAIN LOCATION - PAIN SEVERITY: 8/10
MUSCLE WEAKNESS: 1
PERSON REPORTING PAIN: PATIENT

## 2024-12-06 ASSESSMENT — BALANCE ASSESSMENTS
NUDGED: 1 - STAGGERS, GRABS, CATCHES SELF
STANDING BALANCE: 1 - STEADY BUT WIDE STANCE AND USES CANE OR OTHER SUPPORT
SITTING DOWN: 1 - USES ARMS OR NOT SMOOTH MOTION
ATTEMPTS TO ARISE: 1 - ABLE, REQUIRES MORE THAN ONE ATTEMPT
EYES CLOSED AT MAXIMUM POSITION NUDGED: 0 - UNSTEADY
ARISES: 1 - ABLE, USES ARMS TO HELP
ARISING SCORE: 1
IMMEDIATE STANDING BALANCE FIRST 5 SECONDS: 1 - STEADY BUT USES WALKER OR OTHER SUPPORT
BALANCE SCORE: 7
SITTING BALANCE: 1 - STEADY, SAFE
NUDGED SCORE: 1
TURNING 360 DEGREES STEPS: 0 - DISCONTINUOUS STEPS

## 2024-12-06 ASSESSMENT — ACTIVITIES OF DAILY LIVING (ADL)
AMBULATION_DISTANCE/DURATION_TOLERATED: 60 FT
CURRENT_FUNCTION: ONE PERSON
AMBULATION ASSISTANCE ON FLAT SURFACES: 1
AMBULATION ASSISTANCE: ONE PERSON

## 2024-12-06 ASSESSMENT — GAIT ASSESSMENTS
PATH: 1 - MILD/MODERATE DEVIATION OR USES WALKING AID
PATH SCORE: 1
GAIT SCORE: 7
WALKING STANCE: 1 - HEELS ALMOST TOUCHING WHILE WALKING
STEP CONTINUITY: 0 - STOPPING OR DISCONTINUITY BETWEEN STEPS
STEP SYMMETRY: 0 - RIGHT AND LEFT STEP LENGTH NOT EQUAL
BALANCE AND GAIT SCORE: 14
INITIATION OF GAIT IMMEDIATELY AFTER GO: 1 - NO HESITANCY
TRUNK SCORE: 0
TRUNK: 0 - MARKED SWAY OR USES WALKING AID

## 2024-12-09 ENCOUNTER — HOME CARE VISIT (OUTPATIENT)
Dept: HOME HEALTH SERVICES | Facility: HOME HEALTH | Age: 84
End: 2024-12-09
Payer: MEDICARE

## 2024-12-09 VITALS — OXYGEN SATURATION: 98 %

## 2024-12-09 VITALS
SYSTOLIC BLOOD PRESSURE: 140 MMHG | DIASTOLIC BLOOD PRESSURE: 82 MMHG | HEART RATE: 100 BPM | OXYGEN SATURATION: 97 % | TEMPERATURE: 98.1 F

## 2024-12-09 PROCEDURE — G0157 HHC PT ASSISTANT EA 15: HCPCS | Mod: HHH

## 2024-12-09 PROCEDURE — G0299 HHS/HOSPICE OF RN EA 15 MIN: HCPCS | Mod: HHH

## 2024-12-09 ASSESSMENT — ENCOUNTER SYMPTOMS
MUSCLE WEAKNESS: 1
PAIN LOCATION - PAIN SEVERITY: 8/10
DESCRIPTION OF MEMORY LOSS: SHORT TERM
SUBJECTIVE PAIN PROGRESSION: UNCHANGED
PAIN: 1
DESCRIPTION OF MEMORY LOSS: IMMEDIATE
PAIN LOCATION: LEFT HIP
PERSON REPORTING PAIN: PATIENT
DESCRIPTION OF MEMORY LOSS: LONG TERM
HIGHEST PAIN SEVERITY IN PAST 24 HOURS: 8/10
PAIN LOCATION - RELIEVING FACTORS: REST
PAIN LOCATION: LEFT BUTTOCK
APPETITE LEVEL: FAIR
PERSON REPORTING PAIN: PATIENT
CHANGE IN APPETITE: UNCHANGED
LIMITED RANGE OF MOTION: 1
PAIN LOCATION - EXACERBATING FACTORS: AMBULATION
PAIN: 1

## 2024-12-09 ASSESSMENT — ACTIVITIES OF DAILY LIVING (ADL)
CURRENT_FUNCTION: STAND BY ASSIST
AMBULATION ASSISTANCE: STAND BY ASSIST

## 2024-12-11 ENCOUNTER — HOME CARE VISIT (OUTPATIENT)
Dept: HOME HEALTH SERVICES | Facility: HOME HEALTH | Age: 84
End: 2024-12-11
Payer: MEDICARE

## 2024-12-11 VITALS — OXYGEN SATURATION: 97 %

## 2024-12-11 PROCEDURE — G0157 HHC PT ASSISTANT EA 15: HCPCS | Mod: HHH

## 2024-12-11 ASSESSMENT — ENCOUNTER SYMPTOMS
PERSON REPORTING PAIN: PATIENT
PAIN LOCATION: LEFT BUTTOCK
HIGHEST PAIN SEVERITY IN PAST 24 HOURS: 8/10
PAIN: 1

## 2024-12-12 ENCOUNTER — HOME CARE VISIT (OUTPATIENT)
Dept: HOME HEALTH SERVICES | Facility: HOME HEALTH | Age: 84
End: 2024-12-12
Payer: MEDICARE

## 2024-12-12 VITALS
TEMPERATURE: 97.1 F | DIASTOLIC BLOOD PRESSURE: 72 MMHG | SYSTOLIC BLOOD PRESSURE: 120 MMHG | HEART RATE: 92 BPM | OXYGEN SATURATION: 100 % | RESPIRATION RATE: 18 BRPM

## 2024-12-12 PROCEDURE — G0299 HHS/HOSPICE OF RN EA 15 MIN: HCPCS | Mod: HHH

## 2024-12-12 ASSESSMENT — ACTIVITIES OF DAILY LIVING (ADL)
CURRENT_FUNCTION: STAND BY ASSIST
AMBULATION ASSISTANCE: STAND BY ASSIST

## 2024-12-12 ASSESSMENT — ENCOUNTER SYMPTOMS
MUSCLE WEAKNESS: 1
CHANGE IN APPETITE: UNCHANGED
APPETITE LEVEL: GOOD
DESCRIPTION OF MEMORY LOSS: SHORT TERM
DENIES PAIN: 1

## 2024-12-17 ENCOUNTER — HOME CARE VISIT (OUTPATIENT)
Dept: HOME HEALTH SERVICES | Facility: HOME HEALTH | Age: 84
End: 2024-12-17
Payer: MEDICARE

## 2024-12-17 VITALS — OXYGEN SATURATION: 97 %

## 2024-12-17 PROCEDURE — G0157 HHC PT ASSISTANT EA 15: HCPCS | Mod: HHH

## 2024-12-17 ASSESSMENT — ENCOUNTER SYMPTOMS
PERSON REPORTING PAIN: PATIENT
PAIN LOCATION: LEFT HIP
PAIN: 1
HIGHEST PAIN SEVERITY IN PAST 24 HOURS: 8/10

## 2024-12-19 ENCOUNTER — HOME CARE VISIT (OUTPATIENT)
Dept: HOME HEALTH SERVICES | Facility: HOME HEALTH | Age: 84
End: 2024-12-19
Payer: MEDICARE

## 2024-12-19 VITALS
TEMPERATURE: 97.4 F | DIASTOLIC BLOOD PRESSURE: 68 MMHG | SYSTOLIC BLOOD PRESSURE: 116 MMHG | OXYGEN SATURATION: 98 % | HEART RATE: 94 BPM

## 2024-12-19 VITALS — OXYGEN SATURATION: 96 %

## 2024-12-19 PROCEDURE — G0299 HHS/HOSPICE OF RN EA 15 MIN: HCPCS | Mod: HHH

## 2024-12-19 PROCEDURE — G0157 HHC PT ASSISTANT EA 15: HCPCS | Mod: HHH

## 2024-12-19 ASSESSMENT — ENCOUNTER SYMPTOMS
PAIN LOCATION - PAIN SEVERITY: 5/10
DENIES PAIN: 1
APPETITE LEVEL: GOOD
PERSON REPORTING PAIN: PATIENT
SUBJECTIVE PAIN PROGRESSION: GRADUALLY IMPROVING
BLURRED VISION: 1
CHANGE IN APPETITE: UNCHANGED
DESCRIPTION OF MEMORY LOSS: SHORT TERM
PERSON REPORTING PAIN: PATIENT
PAIN LOCATION: LEFT HIP
MUSCLE WEAKNESS: 1
FORGETFULNESS: 1
PAIN: 1
DESCRIPTION OF MEMORY LOSS: IMMEDIATE

## 2024-12-19 ASSESSMENT — ACTIVITIES OF DAILY LIVING (ADL)
AMBULATION ASSISTANCE: STAND BY ASSIST
CURRENT_FUNCTION: STAND BY ASSIST

## 2024-12-23 ENCOUNTER — HOME CARE VISIT (OUTPATIENT)
Dept: HOME HEALTH SERVICES | Facility: HOME HEALTH | Age: 84
End: 2024-12-23
Payer: MEDICARE

## 2024-12-23 VITALS
SYSTOLIC BLOOD PRESSURE: 140 MMHG | TEMPERATURE: 97.4 F | OXYGEN SATURATION: 100 % | HEART RATE: 59 BPM | DIASTOLIC BLOOD PRESSURE: 65 MMHG

## 2024-12-23 PROCEDURE — G0151 HHCP-SERV OF PT,EA 15 MIN: HCPCS | Mod: HHH

## 2024-12-23 ASSESSMENT — ACTIVITIES OF DAILY LIVING (ADL)
AMBULATION_DISTANCE/DURATION_TOLERATED: >150 FT
AMBULATION ASSISTANCE ON FLAT SURFACES: 1

## 2024-12-23 ASSESSMENT — ENCOUNTER SYMPTOMS
PERSON REPORTING PAIN: PATIENT
DENIES PAIN: 1

## 2024-12-27 ENCOUNTER — HOME CARE VISIT (OUTPATIENT)
Dept: HOME HEALTH SERVICES | Facility: HOME HEALTH | Age: 84
End: 2024-12-27
Payer: MEDICARE

## 2024-12-27 VITALS
DIASTOLIC BLOOD PRESSURE: 62 MMHG | HEART RATE: 96 BPM | TEMPERATURE: 97.6 F | OXYGEN SATURATION: 96 % | SYSTOLIC BLOOD PRESSURE: 130 MMHG

## 2024-12-27 PROCEDURE — G0299 HHS/HOSPICE OF RN EA 15 MIN: HCPCS | Mod: HHH

## 2024-12-27 ASSESSMENT — ACTIVITIES OF DAILY LIVING (ADL)
OASIS_M1830: 02
HOME_HEALTH_OASIS: 01

## 2024-12-27 ASSESSMENT — ENCOUNTER SYMPTOMS
PERSON REPORTING PAIN: PATIENT
DENIES PAIN: 1

## 2025-01-24 ENCOUNTER — HOSPITAL ENCOUNTER (OUTPATIENT)
Facility: HOSPITAL | Age: 85
Setting detail: OBSERVATION
End: 2025-01-24
Attending: EMERGENCY MEDICINE | Admitting: INTERNAL MEDICINE
Payer: MEDICARE

## 2025-01-24 DIAGNOSIS — R40.4 TRANSIENT ALTERATION OF AWARENESS: Primary | ICD-10-CM

## 2025-01-24 DIAGNOSIS — R26.89 IMPAIRMENT OF BALANCE: ICD-10-CM

## 2025-01-24 DIAGNOSIS — R41.0 CONFUSION AND DISORIENTATION: ICD-10-CM

## 2025-01-24 LAB
APPEARANCE UR: CLEAR
BILIRUB UR STRIP.AUTO-MCNC: NEGATIVE MG/DL
COLOR UR: YELLOW
GLUCOSE UR STRIP.AUTO-MCNC: NORMAL MG/DL
HYALINE CASTS #/AREA URNS AUTO: ABNORMAL /LPF
KETONES UR STRIP.AUTO-MCNC: ABNORMAL MG/DL
LEUKOCYTE ESTERASE UR QL STRIP.AUTO: ABNORMAL
MUCOUS THREADS #/AREA URNS AUTO: ABNORMAL /LPF
NITRITE UR QL STRIP.AUTO: NEGATIVE
PH UR STRIP.AUTO: 7.5 [PH]
PROT UR STRIP.AUTO-MCNC: NEGATIVE MG/DL
RBC # UR STRIP.AUTO: NEGATIVE /UL
RBC #/AREA URNS AUTO: ABNORMAL /HPF
SP GR UR STRIP.AUTO: 1.02
SQUAMOUS #/AREA URNS AUTO: ABNORMAL /HPF
UROBILINOGEN UR STRIP.AUTO-MCNC: ABNORMAL MG/DL
WBC #/AREA URNS AUTO: ABNORMAL /HPF

## 2025-01-24 PROCEDURE — 99285 EMERGENCY DEPT VISIT HI MDM: CPT | Performed by: EMERGENCY MEDICINE

## 2025-01-24 PROCEDURE — 81001 URINALYSIS AUTO W/SCOPE: CPT

## 2025-01-24 PROCEDURE — 87086 URINE CULTURE/COLONY COUNT: CPT | Mod: STJLAB

## 2025-01-24 ASSESSMENT — LIFESTYLE VARIABLES
HAVE YOU EVER FELT YOU SHOULD CUT DOWN ON YOUR DRINKING: NO
HAVE PEOPLE ANNOYED YOU BY CRITICIZING YOUR DRINKING: NO
TOTAL SCORE: 0
EVER HAD A DRINK FIRST THING IN THE MORNING TO STEADY YOUR NERVES TO GET RID OF A HANGOVER: NO
EVER FELT BAD OR GUILTY ABOUT YOUR DRINKING: NO

## 2025-01-24 ASSESSMENT — PAIN - FUNCTIONAL ASSESSMENT: PAIN_FUNCTIONAL_ASSESSMENT: 0-10

## 2025-01-24 ASSESSMENT — PAIN SCALES - GENERAL: PAINLEVEL_OUTOF10: 0 - NO PAIN

## 2025-01-24 NOTE — ED PROVIDER NOTES
Emergency Department Provider Note        History of Present Illness     History provided by: Patient, Nursing Staff, and Caregiver  Limitations to History: Dementia  External Records Reviewed with Brief Summary: None    HPI:  Fernanda Hughes is a 84 y.o. female with history of HTN, vascular dementia,, anxiety, cognitive impairment, GERD, breast cancer, sensorineural hearing loss, peripheral neuropathy, dyslipidemia, overactive bladder, and glaucoma who was brought from home by EMS with concern for altered mental status.  Of note, patient have no medical need to be in the hospital.  Per EMS, patient arrived home and found someone at her property missing from the house and called 911 reporting of possible theft in her home.  When the police arrived there was concern for altered mental status for which EMS was called to bring the patient to the ED.  On arrival, patient was hemodynamically stable has no clinical symptoms reports that she does not know why she was brought to the ED. I spoke with her caregiver who stated that the patient was post to be moved to [facility], his senior living facility where some of her property removed today, but the patient was refusing to stay there.  The arrangement was made by her POA, Ms. Palumbo.  I spoke with Ms. Palumbo who suggested that patient be returned to the [facility] or be admitted until she is able to speak to someone at the facility and sign any remaining paperwork electronically.  The patient denies headache, dizziness, lightheadedness, cough, fever, shortness of breath, chest pain, nausea, vomiting, abdominal pain, diarrhea, or urinary symptom.    Physical Exam   Triage vitals:  T 36.8 °C (98.2 °F)  HR 85  /60  RR 16  O2 100 % None (Room air)    General: Awake, alert, in no acute distress  Eyes: Gaze conjugate.  No scleral icterus or injection  HENT: Normo-cephalic, atraumatic. No stridor  CV: Regular rate, regular rhythm. Radial pulses 2+ bilaterally  Resp: Breathing  non-labored, speaking in full sentences.  Clear to auscultation bilaterally  GI: Soft, non-distended, non-tender. No rebound or guarding.  : Not examined.  MSK/Extremities: No gross bony deformities. Moving all extremities  Skin: Warm. Appropriate color  Neuro: Alert. Oriented. Face symmetric. Speech is fluent.  Gross strength and sensation intact in b/l UE and LEs  Psych: Appropriate mood and affect    Medical Decision Making & ED Course   Medical Decision Makin y.o. female with history of HTN, vascular dementia,, anxiety, cognitive impairment, GERD, breast cancer, sensorineural hearing loss, peripheral neuropathy, dyslipidemia, overactive bladder, and glaucoma who was brought from home by EMS with concern for altered mental status. On arrival to the ED, the patient was stable, A&Ox3, non-toxic, well-appearing, and in no acute distress. Primary assessment is evident for intact ABC. The patient was able to give account of current event and verbalize presenting symptoms.    My personal assessment is directed towards ordering labs urinalysis to rule out UTI.  The UA result came back negative for UTI.    Medicine was consulted given that the patient has no safe place to go and the POA had expressed concern about the patient being at home by herself.  I spoke with the hospitalist Dr. Rosales who accepted her to his service.  Patient was admitted for observation and will require social work follow-up for possible placement to [facility] nursing facility.    ----  Differential diagnoses considered include but are not limited to: Altered mental status.    Care Considerations: As documented above in [facility]    ED Course:  ED Course as of 25 2326      2146 Patient to be signed out to oncoming attending physician.  Patient is waiting for caretakers arrival to be discharged.   [PS]   Sat 2025   0156 Patient was discharged in stable condition anticipating that she will be able to go to the new  nursing home and upon arrival there with her caregiver she refused and returned back to the emergency department.  She will be admitted for social work consult. [ME]      ED Course User Index  [ME] Daniel Austin DO  [PS] Donald Guerin DO         Diagnoses as of 01/25/25 2326   Transient alteration of awareness     Disposition   As a result of the work-up, the patient was discharged home.  she was informed of her diagnosis and instructed to come back with any concerns or worsening of condition.  she and was agreeable to the plan as discussed above.  she was given the opportunity to ask questions.  All of the patient's questions were answered.    Procedures   Procedures    This was a shared visit with an ED attending.  The patient was seen and discussed with the ED attending Dr. Guerin.    Berhane Sawyer MD  Emergency Medicine, PGY-2     Berhane Sawyer MD  Resident  01/25/25 2454       Berhane Sawyer MD  Resident  01/25/25 4135
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
## 2025-01-25 PROBLEM — S09.90XA HEAD INJURY: Status: RESOLVED | Noted: 2024-12-02 | Resolved: 2025-01-25

## 2025-01-25 PROBLEM — E83.42 HYPOMAGNESEMIA: Status: RESOLVED | Noted: 2024-12-02 | Resolved: 2025-01-25

## 2025-01-25 PROBLEM — S01.01XA SCALP LACERATION, INITIAL ENCOUNTER: Status: RESOLVED | Noted: 2024-12-02 | Resolved: 2025-01-25

## 2025-01-25 PROBLEM — R41.0 CONFUSION AND DISORIENTATION: Status: ACTIVE | Noted: 2025-01-25

## 2025-01-25 PROBLEM — H53.9 VISION CHANGES: Status: RESOLVED | Noted: 2024-01-30 | Resolved: 2025-01-25

## 2025-01-25 PROBLEM — S51.812A SKIN TEAR OF LEFT FOREARM WITHOUT COMPLICATION: Status: RESOLVED | Noted: 2024-12-02 | Resolved: 2025-01-25

## 2025-01-25 LAB
ANION GAP SERPL CALC-SCNC: 12 MMOL/L (ref 10–20)
BUN SERPL-MCNC: 25 MG/DL (ref 6–23)
CALCIUM SERPL-MCNC: 9 MG/DL (ref 8.6–10.3)
CHLORIDE SERPL-SCNC: 106 MMOL/L (ref 98–107)
CO2 SERPL-SCNC: 25 MMOL/L (ref 21–32)
CREAT SERPL-MCNC: 1.11 MG/DL (ref 0.5–1.05)
EGFRCR SERPLBLD CKD-EPI 2021: 49 ML/MIN/1.73M*2
ERYTHROCYTE [DISTWIDTH] IN BLOOD BY AUTOMATED COUNT: 13.2 % (ref 11.5–14.5)
GLUCOSE SERPL-MCNC: 80 MG/DL (ref 74–99)
HCT VFR BLD AUTO: 37.1 % (ref 36–46)
HGB BLD-MCNC: 12.1 G/DL (ref 12–16)
HOLD SPECIMEN: NORMAL
MCH RBC QN AUTO: 30 PG (ref 26–34)
MCHC RBC AUTO-ENTMCNC: 32.6 G/DL (ref 32–36)
MCV RBC AUTO: 92 FL (ref 80–100)
NRBC BLD-RTO: 0 /100 WBCS (ref 0–0)
PLATELET # BLD AUTO: 247 X10*3/UL (ref 150–450)
POTASSIUM SERPL-SCNC: 4.4 MMOL/L (ref 3.5–5.3)
RBC # BLD AUTO: 4.03 X10*6/UL (ref 4–5.2)
SODIUM SERPL-SCNC: 139 MMOL/L (ref 136–145)
WBC # BLD AUTO: 7 X10*3/UL (ref 4.4–11.3)

## 2025-01-25 PROCEDURE — 80048 BASIC METABOLIC PNL TOTAL CA: CPT | Performed by: NURSE PRACTITIONER

## 2025-01-25 PROCEDURE — 2500000004 HC RX 250 GENERAL PHARMACY W/ HCPCS (ALT 636 FOR OP/ED): Performed by: NURSE PRACTITIONER

## 2025-01-25 PROCEDURE — 2500000001 HC RX 250 WO HCPCS SELF ADMINISTERED DRUGS (ALT 637 FOR MEDICARE OP): Performed by: NURSE PRACTITIONER

## 2025-01-25 PROCEDURE — 97161 PT EVAL LOW COMPLEX 20 MIN: CPT | Mod: GP

## 2025-01-25 PROCEDURE — G0378 HOSPITAL OBSERVATION PER HR: HCPCS

## 2025-01-25 PROCEDURE — 97165 OT EVAL LOW COMPLEX 30 MIN: CPT | Mod: GO | Performed by: OCCUPATIONAL THERAPIST

## 2025-01-25 PROCEDURE — 85027 COMPLETE CBC AUTOMATED: CPT | Performed by: NURSE PRACTITIONER

## 2025-01-25 PROCEDURE — 2500000005 HC RX 250 GENERAL PHARMACY W/O HCPCS: Performed by: NURSE PRACTITIONER

## 2025-01-25 PROCEDURE — 36415 COLL VENOUS BLD VENIPUNCTURE: CPT | Performed by: NURSE PRACTITIONER

## 2025-01-25 PROCEDURE — 99222 1ST HOSP IP/OBS MODERATE 55: CPT | Performed by: PSYCHIATRY & NEUROLOGY

## 2025-01-25 PROCEDURE — 96365 THER/PROPH/DIAG IV INF INIT: CPT

## 2025-01-25 RX ORDER — DORZOLAMIDE HYDROCHLORIDE AND TIMOLOL MALEATE 20; 5 MG/ML; MG/ML
1 SOLUTION/ DROPS OPHTHALMIC 2 TIMES DAILY
Status: DISCONTINUED | OUTPATIENT
Start: 2025-01-25 | End: 2025-01-28 | Stop reason: HOSPADM

## 2025-01-25 RX ORDER — ACETAMINOPHEN 325 MG/1
650 TABLET ORAL EVERY 4 HOURS PRN
Status: DISCONTINUED | OUTPATIENT
Start: 2025-01-25 | End: 2025-01-28 | Stop reason: HOSPADM

## 2025-01-25 RX ORDER — POLYETHYLENE GLYCOL 3350 17 G/17G
17 POWDER, FOR SOLUTION ORAL DAILY PRN
Status: DISCONTINUED | OUTPATIENT
Start: 2025-01-25 | End: 2025-01-28 | Stop reason: HOSPADM

## 2025-01-25 RX ORDER — SODIUM CHLORIDE 0.9 % (FLUSH) 0.9 %
10 SYRINGE (ML) INJECTION EVERY 8 HOURS SCHEDULED
Status: DISCONTINUED | OUTPATIENT
Start: 2025-01-25 | End: 2025-01-28 | Stop reason: HOSPADM

## 2025-01-25 RX ORDER — LATANOPROST 50 UG/ML
1 SOLUTION/ DROPS OPHTHALMIC NIGHTLY
Status: DISCONTINUED | OUTPATIENT
Start: 2025-01-25 | End: 2025-01-28 | Stop reason: HOSPADM

## 2025-01-25 RX ORDER — L. ACIDOPHILUS/L.BULGARICUS 1MM CELL
1 TABLET ORAL 2 TIMES DAILY
Status: DISCONTINUED | OUTPATIENT
Start: 2025-01-25 | End: 2025-01-28 | Stop reason: HOSPADM

## 2025-01-25 RX ORDER — DORZOLAMIDE HYDROCHLORIDE AND TIMOLOL MALEATE 20; 5 MG/ML; MG/ML
1 SOLUTION/ DROPS OPHTHALMIC 2 TIMES DAILY
Status: DISCONTINUED | OUTPATIENT
Start: 2025-01-25 | End: 2025-01-25

## 2025-01-25 RX ORDER — BRIMONIDINE TARTRATE 2 MG/ML
1 SOLUTION/ DROPS OPHTHALMIC 2 TIMES DAILY
Status: DISCONTINUED | OUTPATIENT
Start: 2025-01-25 | End: 2025-01-28 | Stop reason: HOSPADM

## 2025-01-25 RX ORDER — ONDANSETRON HYDROCHLORIDE 2 MG/ML
4 INJECTION, SOLUTION INTRAVENOUS EVERY 8 HOURS PRN
Status: DISCONTINUED | OUTPATIENT
Start: 2025-01-25 | End: 2025-01-28 | Stop reason: HOSPADM

## 2025-01-25 RX ORDER — CEFTRIAXONE 1 G/50ML
1 INJECTION, SOLUTION INTRAVENOUS EVERY 24 HOURS
Status: DISCONTINUED | OUTPATIENT
Start: 2025-01-25 | End: 2025-01-28 | Stop reason: HOSPADM

## 2025-01-25 RX ORDER — TALC
3 POWDER (GRAM) TOPICAL NIGHTLY PRN
Status: DISCONTINUED | OUTPATIENT
Start: 2025-01-25 | End: 2025-01-28 | Stop reason: HOSPADM

## 2025-01-25 RX ORDER — ONDANSETRON 4 MG/1
4 TABLET, ORALLY DISINTEGRATING ORAL EVERY 8 HOURS PRN
Status: DISCONTINUED | OUTPATIENT
Start: 2025-01-25 | End: 2025-01-28 | Stop reason: HOSPADM

## 2025-01-25 RX ORDER — ACETAMINOPHEN 160 MG/5ML
650 SOLUTION ORAL EVERY 4 HOURS PRN
Status: DISCONTINUED | OUTPATIENT
Start: 2025-01-25 | End: 2025-01-28 | Stop reason: HOSPADM

## 2025-01-25 RX ORDER — WATER
250 LIQUID (ML) MISCELLANEOUS
Status: COMPLETED | OUTPATIENT
Start: 2025-01-25 | End: 2025-01-25

## 2025-01-25 RX ORDER — ACETAMINOPHEN 650 MG/1
650 SUPPOSITORY RECTAL EVERY 4 HOURS PRN
Status: DISCONTINUED | OUTPATIENT
Start: 2025-01-25 | End: 2025-01-28 | Stop reason: HOSPADM

## 2025-01-25 RX ORDER — BRIMONIDINE TARTRATE 2 MG/ML
1 SOLUTION/ DROPS OPHTHALMIC 2 TIMES DAILY
Status: DISCONTINUED | OUTPATIENT
Start: 2025-01-25 | End: 2025-01-25

## 2025-01-25 RX ORDER — LATANOPROST 50 UG/ML
1 SOLUTION/ DROPS OPHTHALMIC NIGHTLY
Status: DISPENSED | OUTPATIENT
Start: 2025-01-25 | End: 2025-01-25

## 2025-01-25 RX ADMIN — Medication 250 ML: at 20:19

## 2025-01-25 RX ADMIN — Medication 250 ML: at 10:00

## 2025-01-25 RX ADMIN — Medication 250 ML: at 21:55

## 2025-01-25 RX ADMIN — BRIMONIDINE TARTRATE 1 DROP: 2 SOLUTION/ DROPS OPHTHALMIC at 09:10

## 2025-01-25 RX ADMIN — Medication 10 ML: at 17:30

## 2025-01-25 RX ADMIN — LATANOPROST 1 DROP: 50 SOLUTION OPHTHALMIC at 20:19

## 2025-01-25 RX ADMIN — DORZOLAMIDE HYDROCHLORIDE AND TIMOLOL MALEATE 1 DROP: 20; 5 SOLUTION/ DROPS OPHTHALMIC at 09:10

## 2025-01-25 RX ADMIN — Medication 10 ML: at 06:01

## 2025-01-25 RX ADMIN — CEFTRIAXONE SODIUM 1 G: 1 INJECTION, SOLUTION INTRAVENOUS at 06:00

## 2025-01-25 RX ADMIN — DORZOLAMIDE HYDROCHLORIDE AND TIMOLOL MALEATE 1 DROP: 20; 5 SOLUTION/ DROPS OPHTHALMIC at 20:19

## 2025-01-25 RX ADMIN — BRIMONIDINE TARTRATE 1 DROP: 2 SOLUTION/ DROPS OPHTHALMIC at 20:19

## 2025-01-25 RX ADMIN — Medication 250 ML: at 14:00

## 2025-01-25 RX ADMIN — Medication 1 TABLET: at 09:06

## 2025-01-25 RX ADMIN — Medication 10 ML: at 21:54

## 2025-01-25 RX ADMIN — Medication 3 MG: at 20:28

## 2025-01-25 RX ADMIN — Medication 1 TABLET: at 20:18

## 2025-01-25 SDOH — ECONOMIC STABILITY: FOOD INSECURITY: WITHIN THE PAST 12 MONTHS, YOU WORRIED THAT YOUR FOOD WOULD RUN OUT BEFORE YOU GOT THE MONEY TO BUY MORE.: NEVER TRUE

## 2025-01-25 SDOH — SOCIAL STABILITY: SOCIAL INSECURITY: DOES ANYONE TRY TO KEEP YOU FROM HAVING/CONTACTING OTHER FRIENDS OR DOING THINGS OUTSIDE YOUR HOME?: NO

## 2025-01-25 SDOH — SOCIAL STABILITY: SOCIAL NETWORK: HOW OFTEN DO YOU GET TOGETHER WITH FRIENDS OR RELATIVES?: MORE THAN THREE TIMES A WEEK

## 2025-01-25 SDOH — ECONOMIC STABILITY: HOUSING INSECURITY: AT ANY TIME IN THE PAST 12 MONTHS, WERE YOU HOMELESS OR LIVING IN A SHELTER (INCLUDING NOW)?: NO

## 2025-01-25 SDOH — SOCIAL STABILITY: SOCIAL INSECURITY: ABUSE: ADULT

## 2025-01-25 SDOH — SOCIAL STABILITY: SOCIAL INSECURITY: ARE THERE ANY APPARENT SIGNS OF INJURIES/BEHAVIORS THAT COULD BE RELATED TO ABUSE/NEGLECT?: NO

## 2025-01-25 SDOH — ECONOMIC STABILITY: FOOD INSECURITY: HOW HARD IS IT FOR YOU TO PAY FOR THE VERY BASICS LIKE FOOD, HOUSING, MEDICAL CARE, AND HEATING?: NOT HARD AT ALL

## 2025-01-25 SDOH — SOCIAL STABILITY: SOCIAL NETWORK: HOW OFTEN DO YOU ATTEND CHURCH OR RELIGIOUS SERVICES?: NEVER

## 2025-01-25 SDOH — SOCIAL STABILITY: SOCIAL INSECURITY: WITHIN THE LAST YEAR, HAVE YOU BEEN AFRAID OF YOUR PARTNER OR EX-PARTNER?: NO

## 2025-01-25 SDOH — ECONOMIC STABILITY: FOOD INSECURITY: WITHIN THE PAST 12 MONTHS, THE FOOD YOU BOUGHT JUST DIDN'T LAST AND YOU DIDN'T HAVE MONEY TO GET MORE.: NEVER TRUE

## 2025-01-25 SDOH — HEALTH STABILITY: MENTAL HEALTH: HOW OFTEN DO YOU HAVE A DRINK CONTAINING ALCOHOL?: NEVER

## 2025-01-25 SDOH — SOCIAL STABILITY: SOCIAL INSECURITY: ARE YOU OR HAVE YOU BEEN THREATENED OR ABUSED PHYSICALLY, EMOTIONALLY, OR SEXUALLY BY ANYONE?: NO

## 2025-01-25 SDOH — SOCIAL STABILITY: SOCIAL INSECURITY: HAVE YOU HAD ANY THOUGHTS OF HARMING ANYONE ELSE?: NO

## 2025-01-25 SDOH — HEALTH STABILITY: PHYSICAL HEALTH: ON AVERAGE, HOW MANY DAYS PER WEEK DO YOU ENGAGE IN MODERATE TO STRENUOUS EXERCISE (LIKE A BRISK WALK)?: 0 DAYS

## 2025-01-25 SDOH — ECONOMIC STABILITY: HOUSING INSECURITY: IN THE LAST 12 MONTHS, WAS THERE A TIME WHEN YOU WERE NOT ABLE TO PAY THE MORTGAGE OR RENT ON TIME?: NO

## 2025-01-25 SDOH — HEALTH STABILITY: MENTAL HEALTH: HOW MANY DRINKS CONTAINING ALCOHOL DO YOU HAVE ON A TYPICAL DAY WHEN YOU ARE DRINKING?: PATIENT DOES NOT DRINK

## 2025-01-25 SDOH — SOCIAL STABILITY: SOCIAL INSECURITY: WITHIN THE LAST YEAR, HAVE YOU BEEN HUMILIATED OR EMOTIONALLY ABUSED IN OTHER WAYS BY YOUR PARTNER OR EX-PARTNER?: NO

## 2025-01-25 SDOH — ECONOMIC STABILITY: INCOME INSECURITY: IN THE PAST 12 MONTHS HAS THE ELECTRIC, GAS, OIL, OR WATER COMPANY THREATENED TO SHUT OFF SERVICES IN YOUR HOME?: NO

## 2025-01-25 SDOH — ECONOMIC STABILITY: HOUSING INSECURITY: IN THE PAST 12 MONTHS, HOW MANY TIMES HAVE YOU MOVED WHERE YOU WERE LIVING?: 0

## 2025-01-25 SDOH — SOCIAL STABILITY: SOCIAL INSECURITY: WERE YOU ABLE TO COMPLETE ALL THE BEHAVIORAL HEALTH SCREENINGS?: NO

## 2025-01-25 SDOH — SOCIAL STABILITY: SOCIAL INSECURITY: DO YOU FEEL ANYONE HAS EXPLOITED OR TAKEN ADVANTAGE OF YOU FINANCIALLY OR OF YOUR PERSONAL PROPERTY?: NO

## 2025-01-25 SDOH — SOCIAL STABILITY: SOCIAL INSECURITY: ARE YOU MARRIED, WIDOWED, DIVORCED, SEPARATED, NEVER MARRIED, OR LIVING WITH A PARTNER?: DIVORCED

## 2025-01-25 SDOH — HEALTH STABILITY: MENTAL HEALTH: HOW OFTEN DO YOU HAVE SIX OR MORE DRINKS ON ONE OCCASION?: NEVER

## 2025-01-25 SDOH — HEALTH STABILITY: PHYSICAL HEALTH: ON AVERAGE, HOW MANY MINUTES DO YOU ENGAGE IN EXERCISE AT THIS LEVEL?: 0 MIN

## 2025-01-25 SDOH — SOCIAL STABILITY: SOCIAL NETWORK: IN A TYPICAL WEEK, HOW MANY TIMES DO YOU TALK ON THE PHONE WITH FAMILY, FRIENDS, OR NEIGHBORS?: ONCE A WEEK

## 2025-01-25 SDOH — SOCIAL STABILITY: SOCIAL INSECURITY: HAVE YOU HAD THOUGHTS OF HARMING ANYONE ELSE?: NO

## 2025-01-25 SDOH — SOCIAL STABILITY: SOCIAL NETWORK: HOW OFTEN DO YOU ATTEND MEETINGS OF THE CLUBS OR ORGANIZATIONS YOU BELONG TO?: MORE THAN 4 TIMES PER YEAR

## 2025-01-25 SDOH — SOCIAL STABILITY: SOCIAL INSECURITY: HAS ANYONE EVER THREATENED TO HURT YOUR FAMILY OR YOUR PETS?: NO

## 2025-01-25 SDOH — ECONOMIC STABILITY: TRANSPORTATION INSECURITY: IN THE PAST 12 MONTHS, HAS LACK OF TRANSPORTATION KEPT YOU FROM MEDICAL APPOINTMENTS OR FROM GETTING MEDICATIONS?: NO

## 2025-01-25 SDOH — SOCIAL STABILITY: SOCIAL INSECURITY: DO YOU FEEL UNSAFE GOING BACK TO THE PLACE WHERE YOU ARE LIVING?: NO

## 2025-01-25 ASSESSMENT — PATIENT HEALTH QUESTIONNAIRE - PHQ9
2. FEELING DOWN, DEPRESSED OR HOPELESS: NOT AT ALL
SUM OF ALL RESPONSES TO PHQ9 QUESTIONS 1 & 2: 0
1. LITTLE INTEREST OR PLEASURE IN DOING THINGS: NOT AT ALL

## 2025-01-25 ASSESSMENT — COGNITIVE AND FUNCTIONAL STATUS - GENERAL
STANDING UP FROM CHAIR USING ARMS: A LITTLE
WALKING IN HOSPITAL ROOM: A LITTLE
HELP NEEDED FOR BATHING: A LITTLE
PERSONAL GROOMING: A LITTLE
HELP NEEDED FOR BATHING: A LOT
MOBILITY SCORE: 24
HELP NEEDED FOR BATHING: A LITTLE
MOBILITY SCORE: 24
DRESSING REGULAR UPPER BODY CLOTHING: A LITTLE
DRESSING REGULAR LOWER BODY CLOTHING: A LOT
CLIMB 3 TO 5 STEPS WITH RAILING: A LITTLE
TOILETING: A LITTLE
TOILETING: A LITTLE
DAILY ACTIVITIY SCORE: 21
DAILY ACTIVITIY SCORE: 17
MOVING TO AND FROM BED TO CHAIR: A LITTLE
TOILETING: A LITTLE
MOBILITY SCORE: 24
DAILY ACTIVITIY SCORE: 21
DAILY ACTIVITIY SCORE: 24
PATIENT BASELINE BEDBOUND: NO
MOBILITY SCORE: 19
TURNING FROM BACK TO SIDE WHILE IN FLAT BAD: A LITTLE

## 2025-01-25 ASSESSMENT — ACTIVITIES OF DAILY LIVING (ADL)
LACK_OF_TRANSPORTATION: NO
ADEQUATE_TO_COMPLETE_ADL: YES
FEEDING YOURSELF: INDEPENDENT
LACK_OF_TRANSPORTATION: NO
HEARING - RIGHT EAR: FUNCTIONAL
WALKS IN HOME: INDEPENDENT
DRESSING YOURSELF: INDEPENDENT
PATIENT'S MEMORY ADEQUATE TO SAFELY COMPLETE DAILY ACTIVITIES?: YES
BATHING: INDEPENDENT
TOILETING: INDEPENDENT
JUDGMENT_ADEQUATE_SAFELY_COMPLETE_DAILY_ACTIVITIES: YES
HEARING - LEFT EAR: FUNCTIONAL
GROOMING: INDEPENDENT
ADL_ASSISTANCE: INDEPENDENT

## 2025-01-25 ASSESSMENT — PAIN SCALES - GENERAL
PAINLEVEL_OUTOF10: 0 - NO PAIN

## 2025-01-25 ASSESSMENT — COLUMBIA-SUICIDE SEVERITY RATING SCALE - C-SSRS
1. SINCE LAST CONTACT, HAVE YOU WISHED YOU WERE DEAD OR WISHED YOU COULD GO TO SLEEP AND NOT WAKE UP?: NO
2. HAVE YOU ACTUALLY HAD ANY THOUGHTS OF KILLING YOURSELF?: NO
6. HAVE YOU EVER DONE ANYTHING, STARTED TO DO ANYTHING, OR PREPARED TO DO ANYTHING TO END YOUR LIFE?: NO

## 2025-01-25 ASSESSMENT — PAIN - FUNCTIONAL ASSESSMENT
PAIN_FUNCTIONAL_ASSESSMENT: 0-10
PAIN_FUNCTIONAL_ASSESSMENT: 0-10

## 2025-01-25 ASSESSMENT — LIFESTYLE VARIABLES
SKIP TO QUESTIONS 9-10: 1
AUDIT-C TOTAL SCORE: 0
HOW OFTEN DO YOU HAVE 6 OR MORE DRINKS ON ONE OCCASION: NEVER
HOW MANY STANDARD DRINKS CONTAINING ALCOHOL DO YOU HAVE ON A TYPICAL DAY: PATIENT DOES NOT DRINK
HOW OFTEN DO YOU HAVE A DRINK CONTAINING ALCOHOL: NEVER
AUDIT-C TOTAL SCORE: 0
AUDIT-C TOTAL SCORE: 0
SKIP TO QUESTIONS 9-10: 1

## 2025-01-25 NOTE — PROGRESS NOTES
Received social work referral to assist with placement. Per report, pt was brought in by EMS as she was being moved to [facility] living in [place] and became upset stating she never agreed to the plan and accusing someone of stealing her belongings. I spoke with pt's POA/friend, Deepali via phone. Deepali stated that pt was living in her own home and has an aide that comes every AM however it became apparent she was unsafe to be on her own so they had toured [facility] a couple times and Deepali made the decision to move the pt there for her safety. She states the pt was aware however she is extremely forgetful, often times calling Deepali several times a day with no recollection of her previous phone calls.   She stated that [facility] has now stated they do not have all needed paperwork signed and that pt cannot move in until all forms are signed. Pt's POA is currently in [place] for a private matter and states she can electronically sign something on Monday 1/27 when [facility] offices are back open or can be home on Tuesday to sign in person if needed. She states she cannot secure the paperwork before then as their business offices are closed.  She is aware and agreeable to this LSW speaking to staff at [facility] to see if anything can be done sooner as pt does not require hospitalization however cannot return home bc it would be unsafe.   Call placed to [facility] and message left for business office.  KIERRA Saul

## 2025-01-25 NOTE — PROGRESS NOTES
Physical Therapy    Physical Therapy Evaluation    Patient Name: Fernanda Hughes  MRN: [id number]  Today's Date: 1/25/2025   Time Calculation  Start Time: 0906  Stop Time: 0918  Time Calculation (min): 12 min  3101/3101-A    Assessment/Plan   PT Assessment  PT Assessment Results: Impaired balance, Decreased mobility, Decreased safety awareness, Impaired judgement, Decreased cognition  Rehab Prognosis: Good  Barriers to Discharge Home: Cognition needs, Physical needs  Cognition Needs: 24hr supervision for safety awareness needed, Cognition-related high falls risk, Insight of patient limited regarding functional ability/needs  Physical Needs: In-home setup navigation limited by function/safety, 24hr mobility assistance needed, High falls risk due to function or environment  Evaluation/Treatment Tolerance: Patient tolerated treatment well  Medical Staff Made Aware: Yes  End of Session Communication: Bedside nurse  Assessment Comment: Pt presents today below baseline level of function and requires continued PT during hospital stay. Pt requires 24 hour supervision for all mobility to prevent falls. Pt is unsafe to navigate home environment at this time with available support and assist. Pt may benefit from PT at a moderate intensity level at discharge to maximize functional mobility and safety.    End of Session Patient Position: Up in chair, Alarm on  IP OR SWING BED PT PLAN  Inpatient or Swing Bed: Inpatient  PT Plan  Treatment/Interventions: Bed mobility, Transfer training, Gait training, Balance training, Neuromuscular re-education, Strengthening, Endurance training, Range of motion, Therapeutic exercise, Therapeutic activity, Home exercise program  PT Plan: Ongoing PT  PT Frequency: 3 times per week  PT Discharge Recommendations: Moderate intensity level of continued care, 24 hr supervision due to cognition  PT Recommended Transfer Status: Assist x1  PT - OK to Discharge: Yes (To next level of care when cleared by medical  team   )    Subjective     General Visit Information:  General  Reason for Referral: impaired mobility  Referred By: Stef Rosales MD  Past Medical History Relevant to Rehab: To hospital with complaint of confusion. PMH: dementia, remote breast cancer, glaucoma, seizure, peripheral neuropathy  Co-Treatment: OT  Co-Treatment Reason: for discharge planning  Prior to Session Communication: Bedside nurse  Patient Position Received:  (sitting on EOB)  Preferred Learning Style: verbal  General Comment: Pt agreeable to PT, nursing cleared for treatment.    Home Living:  Home Living  Type of Home: House  Lives With: Alone  Home Layout: One level  Home Access: Level entry  Bathroom Shower/Tub: Tub/shower unit  Bathroom Equipment: Grab bars in shower    Prior Level of Function:  Prior Function Per Pt/Caregiver Report  ADL Assistance: Independent  Homemaking Assistance: Independent  Ambulatory Assistance: Independent  Prior Function Comments: denies any falls in the past 6 months    Precautions:  Precautions  Medical Precautions: Fall precautions       Objective     Pain:  Pain Assessment  Pain Assessment: 0-10  0-10 (Numeric) Pain Score: 0 - No pain    Cognition:  Cognition  Overall Cognitive Status: Impaired  Orientation Level: Disoriented to time (knew year but not the month)  Safety/Judgement:  (decreased safety)  Insight: Moderate    General Assessments:      Activity Tolerance  Endurance: Endurance does not limit participation in activity                 Static Sitting Balance  Static Sitting-Comment/Number of Minutes: good  Dynamic Sitting Balance  Dynamic Sitting-Comments: good  Static Standing Balance  Static Standing-Comment/Number of Minutes: good  Dynamic Standing Balance  Dynamic Standing-Comments: fair+    Functional Assessments:     Bed Mobility  Bed Mobility: No  Transfers  Transfer: Yes  Transfer 1  Transfer From 1: Sit to  Transfer to 1: Stand  Transfer Device 1: Gait belt  Transfer Level of Assistance 1:  Contact guard  Transfers 2  Transfer From 2: Stand to  Transfer to 2: Sit  Transfer Device 2: Gait belt  Transfer Level of Assistance 2: Contact guard  Ambulation/Gait Training  Ambulation/Gait Training Performed: Yes  Ambulation/Gait Training 1  Gait Support Devices: Gait belt  Assistance 1: Contact guard  Quality of Gait 1: Decreased step length, Narrow base of support  Comments/Distance (ft) 1: 30 feet x 2, pt with decreased awareness of obstacles and decreased safety awareness. Pt with 2 small losses of balance, one occured with turning.          Extremity/Trunk Assessments:        RLE   RLE : Within Functional Limits  LLE   LLE : Within Functional Limits    Outcome Measures:     [facility] Basic Mobility  Turning from your back to your side while in a flat bed without using bedrails: None  Moving from lying on your back to sitting on the side of a flat bed without using bedrails: A little  Moving to and from bed to chair (including a wheelchair): A little  Standing up from a chair using your arms (e.g. wheelchair or bedside chair): A little  To walk in hospital room: A little  Climbing 3-5 steps with railing: A little  Basic Mobility - Total Score: 19                      Goals:  Encounter Problems       Encounter Problems (Active)       PT Problem       Pt will complete sit <> stand and bed <> chair transfers with mod I.  (Progressing)       Start:  01/25/25    Expected End:  02/08/25            Pt will ambulate 150 feet mod I with no significant gait deviations.   (Progressing)       Start:  01/25/25    Expected End:  02/08/25            Pt will progress to completing 3 x 20 supine/seated exercises in order to increase strength and improve gait mechanics.   (Progressing)       Start:  01/25/25    Expected End:  02/08/25                 Education Documentation  Body Mechanics, taught by Juliann Stovall, PT at 1/25/2025 12:48 PM.  Learner: Patient  Readiness: Acceptance  Method: Explanation  Response: Needs  Reinforcement    Precautions, taught by Juliann Stovall, PT at 1/25/2025 12:48 PM.  Learner: Patient  Readiness: Acceptance  Method: Explanation  Response: Needs Reinforcement    Mobility Training, taught by Juliann Stovall, PT at 1/25/2025 12:48 PM.  Learner: Patient  Readiness: Acceptance  Method: Explanation  Response: Needs Reinforcement    Education Comments  No comments found.

## 2025-01-25 NOTE — ASSESSMENT & PLAN NOTE
Likely multifactorial and combined with the fact that she does not want placement, treat UTI, may benefit from psychiatry evaluation to ensure her dementia is being optimally managed and she does not have another underlying process that has yet to be identified

## 2025-01-25 NOTE — ASSESSMENT & PLAN NOTE
Confusion disorientation with dementia with questionable power of   Consult psychiatry for competency evaluation  Social service for placement  Possible urinary tract infection on antibiotic waiting for final culture

## 2025-01-25 NOTE — H&P
History Of Present Illness  Fernanda Hughes is a 84 y.o. female presenting with past medical history of dementia and memory loss glaucoma history of seizure in the past admitted to the hospital related to confusion disorientation patient called 911 yesterday related to missing items from her home patient is being moved to assisted living and becoming more confused recently.     Past Medical History  She has a past medical history of Anxiety, Falls, Gastroesophageal reflux disease, Glaucoma, Hearing loss, History of breast cancer, History of closed head injury, History of seizure, Irritable bowel syndrome, Memory loss, Mild cognitive impairment, Osteoarthritis, Overactive bladder, Peripheral neuropathy, Underweight, Vascular dementia, and Visual field loss.    Surgical History  She has a past surgical history that includes Cholecystectomy; Cataract extraction, bilateral; ORIF femur fracture; Breast lumpectomy (Right); Lymph node dissection; and Total abdominal hysterectomy w/ bilateral salpingoophorectomy.     Social History  She reports that she has never smoked. She has never used smokeless tobacco. She reports that she does not drink alcohol and does not use drugs.    Family History  Family History   Problem Relation Name Age of Onset    Deep vein thrombosis Neg Hx      Sudden death Neg Hx          Allergies  Patient has no known allergies.    Review of Systems   Constitutional:  Negative for diaphoresis and fatigue.   HENT:  Negative for ear pain, facial swelling, tinnitus and trouble swallowing.    Eyes:  Negative for photophobia and visual disturbance.   Respiratory:  Negative for choking and stridor.    Cardiovascular:  Negative for chest pain and palpitations.   Gastrointestinal:  Negative for abdominal pain, blood in stool and diarrhea.   Endocrine: Negative for cold intolerance, heat intolerance, polydipsia and polyuria.   Musculoskeletal:  Negative for back pain and joint swelling.   Skin:  Negative for color  "change and rash.   Allergic/Immunologic: Negative for food allergies.   Neurological:  Negative for tremors, facial asymmetry and weakness.   Psychiatric/Behavioral:  The patient is not hyperactive.       Physical Exam  HENT:      Right Ear: External ear normal.      Left Ear: External ear normal.      Mouth/Throat:      Mouth: Mucous membranes are moist.   Cardiovascular:      Rate and Rhythm: Normal rate and regular rhythm.      Heart sounds: No murmur heard.     No friction rub. No gallop.   Pulmonary:      Effort: No accessory muscle usage or respiratory distress.      Breath sounds: No stridor. No wheezing or rhonchi.   Chest:      Chest wall: No tenderness.   Abdominal:      General: There is no distension.      Palpations: There is no mass.      Tenderness: There is no abdominal tenderness. There is no guarding or rebound.   Musculoskeletal:         General: No deformity or signs of injury.      Cervical back: No rigidity or tenderness. Normal range of motion.      Right lower leg: No edema.      Left lower leg: No edema.   Skin:     Coloration: Skin is not jaundiced or pale.      Findings: No lesion.   Neurological:   Patient is alert but confused     Last Recorded Vitals  Blood pressure 159/69, pulse 76, temperature 36.6 °C (97.9 °F), resp. rate 17, height 1.702 m (5' 7\"), weight 52.2 kg (115 lb), SpO2 99%.    Labs    Admission on 01/24/2025   Component Date Value Ref Range Status    Color, Urine 01/24/2025 Yellow  Light-Yellow, Yellow, Dark-Yellow Final    Appearance, Urine 01/24/2025 Clear  Clear Final    Specific Gravity, Urine 01/24/2025 1.018  1.005 - 1.035 Final    pH, Urine 01/24/2025 7.5  5.0, 5.5, 6.0, 6.5, 7.0, 7.5, 8.0 Final    Protein, Urine 01/24/2025 NEGATIVE  NEGATIVE, 10 (TRACE), 20 (TRACE) mg/dL Final    Glucose, Urine 01/24/2025 Normal  Normal mg/dL Final    Blood, Urine 01/24/2025 NEGATIVE  NEGATIVE Final    Ketones, Urine 01/24/2025 TRACE (A)  NEGATIVE mg/dL Final    Bilirubin, Urine " 01/24/2025 NEGATIVE  NEGATIVE Final    Urobilinogen, Urine 01/24/2025 3 (1+) (A)  Normal mg/dL Final    Some pigments and medications may cause a false positive urobilinogen.    Nitrite, Urine 01/24/2025 NEGATIVE  NEGATIVE Final    Leukocyte Esterase, Urine 01/24/2025 25 Chrisitna/uL (A)  NEGATIVE Final    Extra Tube 01/24/2025 Hold for add-ons.   Final    Auto resulted.    WBC, Urine 01/24/2025 6-10 (A)  1-5, NONE /HPF Final    RBC, Urine 01/24/2025 3-5  NONE, 1-2, 3-5 /HPF Final    Squamous Epithelial Cells, Urine 01/24/2025 1-9 (SPARSE)  Reference range not established. /HPF Final    Mucus, Urine 01/24/2025 FEW  Reference range not established. /LPF Final    Hyaline Casts, Urine 01/24/2025 2+ (A)  NONE /LPF Final    WBC 01/25/2025 7.0  4.4 - 11.3 x10*3/uL Final    nRBC 01/25/2025 0.0  0.0 - 0.0 /100 WBCs Final    RBC 01/25/2025 4.03  4.00 - 5.20 x10*6/uL Final    Hemoglobin 01/25/2025 12.1  12.0 - 16.0 g/dL Final    Hematocrit 01/25/2025 37.1  36.0 - 46.0 % Final    MCV 01/25/2025 92  80 - 100 fL Final    MCH 01/25/2025 30.0  26.0 - 34.0 pg Final    MCHC 01/25/2025 32.6  32.0 - 36.0 g/dL Final    RDW 01/25/2025 13.2  11.5 - 14.5 % Final    Platelets 01/25/2025 247  150 - 450 x10*3/uL Final    Glucose 01/25/2025 80  74 - 99 mg/dL Final    Sodium 01/25/2025 139  136 - 145 mmol/L Final    Potassium 01/25/2025 4.4  3.5 - 5.3 mmol/L Final    Chloride 01/25/2025 106  98 - 107 mmol/L Final    Bicarbonate 01/25/2025 25  21 - 32 mmol/L Final    Anion Gap 01/25/2025 12  10 - 20 mmol/L Final    Urea Nitrogen 01/25/2025 25 (H)  6 - 23 mg/dL Final    Creatinine 01/25/2025 1.11 (H)  0.50 - 1.05 mg/dL Final    eGFR 01/25/2025 49 (L)  >60 mL/min/1.73m*2 Final    Calculations of estimated GFR are performed using the 2021 CKD-EPI Study Refit equation without the race variable for the IDMS-Traceable creatinine methods.  https://jasn.asnjournals.org/content/early/2021/09/22/ASN.6185282307    Calcium 01/25/2025 9.0  8.6 - 10.3 mg/dL Final          Imaging     ECG 12 lead  Normal sinus rhythm  Nonspecific ST and T wave abnormality  Borderline ECG  When compared with ECG of 02-SEP-2024 14:05,  No significant change was found  Confirmed by Michi Dietrich ([id number]) on 12/3/2024 8:21:41 PM       Patient Active Problem List   Diagnosis    UTI (urinary tract infection)    Confusion and disorientation         Assessment/Plan   Assessment & Plan  Confusion and disorientation    UTI (urinary tract infection)  Confusion disorientation with dementia with questionable power of   Consult psychiatry for competency evaluation  Social service for placement  Possible urinary tract infection on antibiotic waiting for final culture           I spent 45 minutes in the professional and overall care of this patient.      MARCOS Phelan MD

## 2025-01-25 NOTE — CARE PLAN
"The patient's goals for the shift include \"to go home.\"    The clinical goals for the shift include Patient will remain free from injury through end of shift.    Patient has remained free from injury this shift.  "
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
## 2025-01-25 NOTE — DISCHARGE INSTRUCTIONS
Follow-up with your primary care physician within 48 hours regarding your ED visit.      Patient was discharged home to [facility].  She was picked up by Mr. Valle, who is her caregiver.  I spoke with her POA Ms. Palumbo who also had a conversation with the patient regarding plan to take her back to the [facility] facility.  Patient agreed to go home with Mr. Valle.  I updated Mr. Valle my conversation with Ms. Palumbo who suggested that patient be returned to the [facility] or be admitted until she is able to speak to someone at the facility and sign any remaining paperwork electronically.  Mr. Valle agreed to take patient back to [facility].  The patient endorsed being familiar with Mr. Valle.  She was discharged in stable condition.

## 2025-01-25 NOTE — PROGRESS NOTES
Occupational Therapy    Evaluation    Patient Name: Fernanda Hughes  MRN: [id number]  Today's Date: 1/25/2025  Time Calculation  Start Time: 0905  Stop Time: 0915  Time Calculation (min): 10 min  3101/3101-A  Eval only     Assessment  IP OT Assessment  Prognosis: Good  Barriers to Discharge Home: Cognition needs, Caregiver assistance, Physical needs  Medical Staff Made Aware: Yes  End of Session Communication: Bedside nurse  End of Session Patient Position: Alarm on, Up in chair  Patient presents with decline in ADLs, functional transfers, and functional mobility tasks and would benefit from OT during acute stay to maximize functional independence and safety.  Patient requires 24 hours assistance secondary to decreased cognition, decreased safety, and decreased judgement.  Recommend moderate intensity OT to maximize functional independence and safety.      Plan:  Treatment Interventions: ADL retraining, Functional transfer training, Cognitive reorientation, Patient/family training, Compensatory technique education  OT Frequency: 3 times per week  OT Discharge Recommendations: Moderate intensity level of continued care  OT - OK to Discharge: Yes    Subjective   Current Problem:  No diagnosis found.    General:  General  Reason for Referral: ADLs, safety assessment, cognitive evaluation  Referred By: Stef Rosales MD  Past Medical History Relevant to Rehab: Admitted 1/21/2025 with altered mental status.  PMH: HTN, vascular dementia, anxiety, GERD, breast cancer  Co-Treatment: PT  Co-Treatment Reason: for discharge planning  Prior to Session Communication: Bedside nurse  Patient Position Received:  (seated at edge of bed)  Preferred Learning Style: verbal  General Comment: Patient seated at edge of bed and agreeable to participate in OT evaluation.    Precautions:  Medical Precautions: Fall precautions    Pain:  Pain Assessment  Pain Assessment: 0-10  0-10 (Numeric) Pain Score: 0 - No pain    Objective   Cognition:  Overall  "Cognitive Status: Impaired  Orientation Level:  (Oriented to person and place. When asked patient if she knew what month it was, patient stated \"no\". Patient able to verbalize correct year.)  Following Commands:  (Patient able to follow commands; however, required min verbal cues for follow through)  Safety/Judgement:  (decreased safety awareness)  Insight: Moderate    Home Living:  Home Living Comments: Lives in 1 story home alone with 0 EN.  Has tub shower with GB     Prior Function:  Prior Function Comments: Reports was independent with all ADLs, functional mobility task without AD and IADLs.    ADL:  Grooming Assistance:  (CGA standing at sink with min verbal cues for follow through. When asked patient to wash hands at sink, patient only turned water on and then off intially.  Repetition required to complete hand washing in standing at sink)    Activity Tolerance:  Endurance: Endurance does not limit participation in activity    Bed Mobility/Transfers:   Transfers  Transfer:  (SBA sit to stand)    Ambulation/Gait Training:  Functional Mobility  Functional Mobility Performed:  (CGA without AD functional mobility task.  Patient CGA to correct lateral LOB)    Vision: Patient with a pair of glasses hanging on sweatshirt and reported bad vision. When asked patient to put glasses on patient stated  \"they don't help anyhow\"    Extremities: RUE   RUE : Within Functional Limits and LUE   LUE: Within Functional Limits    Outcome Measures: [facility] Daily Activity  Putting on and taking off regular lower body clothing: A lot  Bathing (including washing, rinsing, drying): A lot  Putting on and taking off regular upper body clothing: A little  Toileting, which includes using toilet, bedpan or urinal: A little  Taking care of personal grooming such as brushing teeth: A little  Eating Meals: None  Daily Activity - Total Score: 17    EDUCATION:  Education  Individual(s) Educated: Patient  Education Provided: Fall " precautons  Patient Response to Education:  (needs additional instruction)    Goals:   Encounter Problems       Encounter Problems (Active)       Dressings Lower Extremities       STG - Patient will complete lower body dressing independently with comp strategies  (Progressing)       Start:  01/25/25    Expected End:  02/08/25               Functional Balance       STG-Patient will be independent with assistive device dynamic stand task >5 minutes for ADL completion   (Progressing)       Start:  01/25/25    Expected End:  02/08/25               Functional Mobility       STG-Patient will be independent with assistive device functional mobility tasks   (Progressing)       Start:  01/25/25    Expected End:  02/08/25               Grooming       STG - Patient completes grooming independently  (Progressing)       Start:  01/25/25    Expected End:  02/08/25               OT Transfers       STG-Patient will be independent with functional transfers demonstrating good safety   (Progressing)       Start:  01/25/25    Expected End:  02/08/25                  '

## 2025-01-25 NOTE — CONSULTS
Reason for consult:  Capacity eval    History Of Present Illness  Fernanda Hughes is a 84 y.o. female presenting with altered mental status.  Patient lives alone and her friend Deepali is a power of   Patient called the EMS accusing her friends of stealing her belongings and moving her to an apartment out of her house which she disagreed.  Patient has been extremely forgetful although she denies having any of this memory issues.  Patient was getting irritable and suspicious about her friends attitude and behavior.  She thinks that she can live independently at her home house and that she does not need any help or support.  Patient is unable to analyze the risk-benefit alternatives but she is stubborn and repeatedly saying that she needs to get back to her house.  Patient is unable to retain information given to her other than having thoughts that her friends are plotting behind her back to move her to an apartment which is true.  Patient is unable to make decision based on the available information    Past Medical History  She has a past medical history of Anxiety, Falls, Gastroesophageal reflux disease, Glaucoma, Hearing loss, History of breast cancer, History of closed head injury, History of seizure, Irritable bowel syndrome, Memory loss, Mild cognitive impairment, Osteoarthritis, Overactive bladder, Peripheral neuropathy, Underweight, Vascular dementia, and Visual field loss.    Surgical History  She has a past surgical history that includes Cholecystectomy; Cataract extraction, bilateral; ORIF femur fracture; Breast lumpectomy (Right); Lymph node dissection; and Total abdominal hysterectomy w/ bilateral salpingoophorectomy.     Social History  She reports that she has never smoked. She has never used smokeless tobacco. She reports that she does not drink alcohol and does not use drugs.     Allergies  Patient has no known allergies.        Mental Status Exam  Alert and oriented x 2 cooperative reluctantly decree  "psychomotor activity.  Speech is decreased in rate and rhythm coherent.  Patient described her mood to be anxious but not depressed.  She has concrete thinking with significant cognitive decline.  Her judgment and insight is impaired.      Last Recorded Vitals  Blood pressure 149/65, pulse 76, temperature 37 °C (98.6 °F), resp. rate 17, height 1.702 m (5' 7\"), weight 52.2 kg (115 lb), SpO2 99%.    Relevant Results  Results for orders placed or performed during the hospital encounter of 01/24/25 (from the past 96 hours)   Urinalysis with Reflex Culture and Microscopic   Result Value Ref Range    Color, Urine Yellow Light-Yellow, Yellow, Dark-Yellow    Appearance, Urine Clear Clear    Specific Gravity, Urine 1.018 1.005 - 1.035    pH, Urine 7.5 5.0, 5.5, 6.0, 6.5, 7.0, 7.5, 8.0    Protein, Urine NEGATIVE NEGATIVE, 10 (TRACE), 20 (TRACE) mg/dL    Glucose, Urine Normal Normal mg/dL    Blood, Urine NEGATIVE NEGATIVE    Ketones, Urine TRACE (A) NEGATIVE mg/dL    Bilirubin, Urine NEGATIVE NEGATIVE    Urobilinogen, Urine 3 (1+) (A) Normal mg/dL    Nitrite, Urine NEGATIVE NEGATIVE    Leukocyte Esterase, Urine 25 Christina/uL (A) NEGATIVE   Extra Urine Gray Tube   Result Value Ref Range    Extra Tube Hold for add-ons.    Microscopic Only, Urine   Result Value Ref Range    WBC, Urine 6-10 (A) 1-5, NONE /HPF    RBC, Urine 3-5 NONE, 1-2, 3-5 /HPF    Squamous Epithelial Cells, Urine 1-9 (SPARSE) Reference range not established. /HPF    Mucus, Urine FEW Reference range not established. /LPF    Hyaline Casts, Urine 2+ (A) NONE /LPF   CBC   Result Value Ref Range    WBC 7.0 4.4 - 11.3 x10*3/uL    nRBC 0.0 0.0 - 0.0 /100 WBCs    RBC 4.03 4.00 - 5.20 x10*6/uL    Hemoglobin 12.1 12.0 - 16.0 g/dL    Hematocrit 37.1 36.0 - 46.0 %    MCV 92 80 - 100 fL    MCH 30.0 26.0 - 34.0 pg    MCHC 32.6 32.0 - 36.0 g/dL    RDW 13.2 11.5 - 14.5 %    Platelets 247 150 - 450 x10*3/uL   Basic metabolic panel   Result Value Ref Range    Glucose 80 74 - 99 mg/dL "    Sodium 139 136 - 145 mmol/L    Potassium 4.4 3.5 - 5.3 mmol/L    Chloride 106 98 - 107 mmol/L    Bicarbonate 25 21 - 32 mmol/L    Anion Gap 12 10 - 20 mmol/L    Urea Nitrogen 25 (H) 6 - 23 mg/dL    Creatinine 1.11 (H) 0.50 - 1.05 mg/dL    eGFR 49 (L) >60 mL/min/1.73m*2    Calcium 9.0 8.6 - 10.3 mg/dL     No results found     Assessment/Plan   Assessment & Plan  Confusion and disorientation    UTI (urinary tract infection)    Dementia senile    Based on my assessment today, patient lacks capacity to make decisions regarding her discharge plan and place of living.  Her POA is extremely concerned about her recent decline in her memory and she believes that patient needs to be transferred to an assisted living.  Since patient lacked capacity I would recommend the power of  to make decisions in the best interest of the patient.       Junior Devi MD

## 2025-01-26 VITALS
SYSTOLIC BLOOD PRESSURE: 147 MMHG | DIASTOLIC BLOOD PRESSURE: 67 MMHG | RESPIRATION RATE: 18 BRPM | HEIGHT: 67 IN | HEART RATE: 86 BPM | TEMPERATURE: 96.1 F | WEIGHT: 115 LBS | BODY MASS INDEX: 18.05 KG/M2 | OXYGEN SATURATION: 98 %

## 2025-01-26 LAB
ANION GAP SERPL CALC-SCNC: 11 MMOL/L (ref 10–20)
BACTERIA UR CULT: NORMAL
BUN SERPL-MCNC: 19 MG/DL (ref 6–23)
CALCIUM SERPL-MCNC: 8.8 MG/DL (ref 8.6–10.3)
CHLORIDE SERPL-SCNC: 105 MMOL/L (ref 98–107)
CO2 SERPL-SCNC: 25 MMOL/L (ref 21–32)
CREAT SERPL-MCNC: 1.07 MG/DL (ref 0.5–1.05)
EGFRCR SERPLBLD CKD-EPI 2021: 51 ML/MIN/1.73M*2
ERYTHROCYTE [DISTWIDTH] IN BLOOD BY AUTOMATED COUNT: 13.2 % (ref 11.5–14.5)
GLUCOSE SERPL-MCNC: 84 MG/DL (ref 74–99)
HCT VFR BLD AUTO: 37 % (ref 36–46)
HGB BLD-MCNC: 11.8 G/DL (ref 12–16)
MCH RBC QN AUTO: 29.6 PG (ref 26–34)
MCHC RBC AUTO-ENTMCNC: 31.9 G/DL (ref 32–36)
MCV RBC AUTO: 93 FL (ref 80–100)
NRBC BLD-RTO: 0 /100 WBCS (ref 0–0)
PLATELET # BLD AUTO: 247 X10*3/UL (ref 150–450)
POTASSIUM SERPL-SCNC: 4.2 MMOL/L (ref 3.5–5.3)
RBC # BLD AUTO: 3.99 X10*6/UL (ref 4–5.2)
SODIUM SERPL-SCNC: 137 MMOL/L (ref 136–145)
WBC # BLD AUTO: 6.1 X10*3/UL (ref 4.4–11.3)

## 2025-01-26 PROCEDURE — G0378 HOSPITAL OBSERVATION PER HR: HCPCS

## 2025-01-26 PROCEDURE — 2500000005 HC RX 250 GENERAL PHARMACY W/O HCPCS: Performed by: NURSE PRACTITIONER

## 2025-01-26 PROCEDURE — 2500000004 HC RX 250 GENERAL PHARMACY W/ HCPCS (ALT 636 FOR OP/ED): Performed by: NURSE PRACTITIONER

## 2025-01-26 PROCEDURE — 85027 COMPLETE CBC AUTOMATED: CPT | Performed by: NURSE PRACTITIONER

## 2025-01-26 PROCEDURE — 80048 BASIC METABOLIC PNL TOTAL CA: CPT | Performed by: NURSE PRACTITIONER

## 2025-01-26 PROCEDURE — 2500000001 HC RX 250 WO HCPCS SELF ADMINISTERED DRUGS (ALT 637 FOR MEDICARE OP): Performed by: NURSE PRACTITIONER

## 2025-01-26 PROCEDURE — 36415 COLL VENOUS BLD VENIPUNCTURE: CPT | Performed by: NURSE PRACTITIONER

## 2025-01-26 RX ORDER — WATER
200 LIQUID (ML) MISCELLANEOUS
Status: SHIPPED | OUTPATIENT
Start: 2025-01-26 | End: 2025-01-27

## 2025-01-26 RX ORDER — HALOPERIDOL 1 MG/1
1 TABLET ORAL NIGHTLY PRN
Status: DISCONTINUED | OUTPATIENT
Start: 2025-01-26 | End: 2025-01-28 | Stop reason: HOSPADM

## 2025-01-26 RX ADMIN — Medication 10 ML: at 23:57

## 2025-01-26 RX ADMIN — BRIMONIDINE TARTRATE 1 DROP: 2 SOLUTION/ DROPS OPHTHALMIC at 20:09

## 2025-01-26 RX ADMIN — BRIMONIDINE TARTRATE 1 DROP: 2 SOLUTION/ DROPS OPHTHALMIC at 09:03

## 2025-01-26 RX ADMIN — CEFTRIAXONE SODIUM 1 G: 1 INJECTION, SOLUTION INTRAVENOUS at 05:59

## 2025-01-26 RX ADMIN — Medication 10 ML: at 05:59

## 2025-01-26 RX ADMIN — Medication 3 MG: at 20:08

## 2025-01-26 RX ADMIN — Medication 200 ML: at 23:39

## 2025-01-26 RX ADMIN — DORZOLAMIDE HYDROCHLORIDE AND TIMOLOL MALEATE 1 DROP: 20; 5 SOLUTION/ DROPS OPHTHALMIC at 20:09

## 2025-01-26 RX ADMIN — Medication 200 ML: at 18:48

## 2025-01-26 RX ADMIN — Medication 10 ML: at 16:07

## 2025-01-26 RX ADMIN — LATANOPROST 1 DROP: 50 SOLUTION OPHTHALMIC at 20:09

## 2025-01-26 RX ADMIN — HALOPERIDOL 1 MG: 1 TABLET ORAL at 21:15

## 2025-01-26 RX ADMIN — Medication 1 TABLET: at 09:02

## 2025-01-26 RX ADMIN — Medication 200 ML: at 14:00

## 2025-01-26 RX ADMIN — Medication 1 TABLET: at 20:08

## 2025-01-26 RX ADMIN — Medication 200 ML: at 10:13

## 2025-01-26 RX ADMIN — DORZOLAMIDE HYDROCHLORIDE AND TIMOLOL MALEATE 1 DROP: 20; 5 SOLUTION/ DROPS OPHTHALMIC at 09:03

## 2025-01-26 ASSESSMENT — COGNITIVE AND FUNCTIONAL STATUS - GENERAL
TOILETING: A LITTLE
MOBILITY SCORE: 24
HELP NEEDED FOR BATHING: A LITTLE
DAILY ACTIVITIY SCORE: 21
TOILETING: A LITTLE
PERSONAL GROOMING: A LITTLE
HELP NEEDED FOR BATHING: A LITTLE
PERSONAL GROOMING: A LITTLE
DAILY ACTIVITIY SCORE: 21
MOBILITY SCORE: 24

## 2025-01-26 ASSESSMENT — PAIN SCALES - GENERAL
PAINLEVEL_OUTOF10: 0 - NO PAIN
PAINLEVEL_OUTOF10: 0 - NO PAIN

## 2025-01-26 ASSESSMENT — PAIN - FUNCTIONAL ASSESSMENT: PAIN_FUNCTIONAL_ASSESSMENT: 0-10

## 2025-01-26 NOTE — CARE PLAN
"The patient's goals for the shift include \"to go home.\"    The clinical goals for the shift include Patient will remain free from injury through end of shift.    "

## 2025-01-26 NOTE — CARE PLAN
The patient's goals for the shift include      The clinical goals for the shift include Patient will remain safe and free from injury throughout shift,.      Problem: Pain - Adult  Goal: Verbalizes/displays adequate comfort level or baseline comfort level  Outcome: Progressing     Problem: Safety - Adult  Goal: Free from fall injury  Outcome: Progressing     Problem: Fall/Injury  Goal: Not fall by end of shift  Outcome: Progressing  Goal: Be free from injury by end of the shift  Outcome: Progressing  Goal: Verbalize understanding of personal risk factors for fall in the hospital  Outcome: Progressing  Goal: Verbalize understanding of risk factor reduction measures to prevent injury from fall in the home  Outcome: Progressing  Goal: Use assistive devices by end of the shift  Outcome: Progressing  Goal: Pace activities to prevent fatigue by end of the shift  Outcome: Progressing

## 2025-01-26 NOTE — DISCHARGE SUMMARY
Discharge Diagnosis  Confusion and disorientation    Issues Requiring Follow-Up  Discharge to assisted living    Discharge Meds     Medication List      ASK your doctor about these medications     brimonidine 0.2 % ophthalmic solution; Commonly known as: AlphaGAN   dorzolamide-timoloL 22.3-6.8 mg/mL ophthalmic solution; Commonly known   as: Cosopt   latanoprost 0.005 % ophthalmic solution; Commonly known as: Xalatan       Test Results Pending At Discharge  Pending Labs       No current pending labs.            Hospital Course   Patient was admitted to the hospital related to more confusion disorientation and dementia at home patient called police related to missing items at home medical power of  want a place or an assisted living patient had complete blood panel which was negative symptom consistent with dementia patient was seen by psychiatry who declare her incompetent    Pertinent Physical Exam At Time of Discharge  Physical Exam  HENT:      Right Ear: External ear normal.      Left Ear: External ear normal.      Mouth/Throat:      Mouth: Mucous membranes are moist.   Cardiovascular:      Rate and Rhythm: Normal rate and regular rhythm.      Heart sounds: No murmur heard.     No friction rub. No gallop.   Pulmonary:      Effort: No accessory muscle usage or respiratory distress.      Breath sounds: No stridor. No wheezing or rhonchi.   Chest:      Chest wall: No tenderness.   Abdominal:      General: There is no distension.      Palpations: There is no mass.      Tenderness: There is no abdominal tenderness. There is no guarding or rebound.   Musculoskeletal:         General: No deformity or signs of injury.      Cervical back: No rigidity or tenderness. Normal range of motion.      Right lower leg: No edema.      Left lower leg: No edema.   Skin:     Coloration: Skin is not jaundiced or pale.      Findings: No lesion.   Neurological:      Mental Status: She is alert and easily aroused.      Cranial  Nerves: No cranial nerve deficit.      Sensory: No sensory deficit.      Motor: No weakness.     Patient is very confused    Outpatient Follow-Up  No future appointments.      MARCOS Phelan MD

## 2025-01-27 ENCOUNTER — PHARMACY VISIT (OUTPATIENT)
Dept: PHARMACY | Facility: CLINIC | Age: 85
End: 2025-01-27
Payer: MEDICARE

## 2025-01-27 VITALS
BODY MASS INDEX: 18.05 KG/M2 | DIASTOLIC BLOOD PRESSURE: 72 MMHG | HEART RATE: 83 BPM | HEIGHT: 67 IN | RESPIRATION RATE: 18 BRPM | OXYGEN SATURATION: 97 % | SYSTOLIC BLOOD PRESSURE: 167 MMHG | TEMPERATURE: 98.6 F | WEIGHT: 115 LBS

## 2025-01-27 LAB
ANION GAP SERPL CALC-SCNC: 11 MMOL/L (ref 10–20)
BUN SERPL-MCNC: 24 MG/DL (ref 6–23)
CALCIUM SERPL-MCNC: 8.7 MG/DL (ref 8.6–10.3)
CHLORIDE SERPL-SCNC: 105 MMOL/L (ref 98–107)
CO2 SERPL-SCNC: 25 MMOL/L (ref 21–32)
CREAT SERPL-MCNC: 1.09 MG/DL (ref 0.5–1.05)
EGFRCR SERPLBLD CKD-EPI 2021: 50 ML/MIN/1.73M*2
ERYTHROCYTE [DISTWIDTH] IN BLOOD BY AUTOMATED COUNT: 13.1 % (ref 11.5–14.5)
GLUCOSE SERPL-MCNC: 79 MG/DL (ref 74–99)
HCT VFR BLD AUTO: 36 % (ref 36–46)
HGB BLD-MCNC: 11.6 G/DL (ref 12–16)
MCH RBC QN AUTO: 29.7 PG (ref 26–34)
MCHC RBC AUTO-ENTMCNC: 32.2 G/DL (ref 32–36)
MCV RBC AUTO: 92 FL (ref 80–100)
NRBC BLD-RTO: 0 /100 WBCS (ref 0–0)
PLATELET # BLD AUTO: 229 X10*3/UL (ref 150–450)
POTASSIUM SERPL-SCNC: 3.9 MMOL/L (ref 3.5–5.3)
RBC # BLD AUTO: 3.9 X10*6/UL (ref 4–5.2)
SODIUM SERPL-SCNC: 137 MMOL/L (ref 136–145)
WBC # BLD AUTO: 6.5 X10*3/UL (ref 4.4–11.3)

## 2025-01-27 PROCEDURE — RXMED WILLOW AMBULATORY MEDICATION CHARGE

## 2025-01-27 PROCEDURE — 85027 COMPLETE CBC AUTOMATED: CPT | Performed by: NURSE PRACTITIONER

## 2025-01-27 PROCEDURE — G0378 HOSPITAL OBSERVATION PER HR: HCPCS

## 2025-01-27 PROCEDURE — 80048 BASIC METABOLIC PNL TOTAL CA: CPT | Performed by: NURSE PRACTITIONER

## 2025-01-27 PROCEDURE — 2500000004 HC RX 250 GENERAL PHARMACY W/ HCPCS (ALT 636 FOR OP/ED): Performed by: NURSE PRACTITIONER

## 2025-01-27 PROCEDURE — 2500000001 HC RX 250 WO HCPCS SELF ADMINISTERED DRUGS (ALT 637 FOR MEDICARE OP): Performed by: NURSE PRACTITIONER

## 2025-01-27 PROCEDURE — 36415 COLL VENOUS BLD VENIPUNCTURE: CPT | Performed by: NURSE PRACTITIONER

## 2025-01-27 RX ORDER — QUETIAPINE FUMARATE 25 MG/1
12.5 TABLET, FILM COATED ORAL EVERY 8 HOURS PRN
Qty: 6 TABLET | Refills: 0 | Status: SHIPPED | OUTPATIENT
Start: 2025-01-27

## 2025-01-27 RX ORDER — HALOPERIDOL 1 MG/1
1 TABLET ORAL ONCE AS NEEDED
Status: COMPLETED | OUTPATIENT
Start: 2025-01-27 | End: 2025-01-27

## 2025-01-27 RX ORDER — HALOPERIDOL 1 MG/1
1 TABLET ORAL EVERY 8 HOURS PRN
Qty: 9 TABLET | Refills: 0 | Status: SHIPPED | OUTPATIENT
Start: 2025-01-27 | End: 2025-01-27 | Stop reason: HOSPADM

## 2025-01-27 RX ADMIN — DORZOLAMIDE HYDROCHLORIDE AND TIMOLOL MALEATE 1 DROP: 20; 5 SOLUTION/ DROPS OPHTHALMIC at 08:55

## 2025-01-27 RX ADMIN — Medication 10 ML: at 14:49

## 2025-01-27 RX ADMIN — HALOPERIDOL 1 MG: 1 TABLET ORAL at 18:45

## 2025-01-27 RX ADMIN — BRIMONIDINE TARTRATE 1 DROP: 2 SOLUTION/ DROPS OPHTHALMIC at 08:55

## 2025-01-27 RX ADMIN — CEFTRIAXONE SODIUM 1 G: 1 INJECTION, SOLUTION INTRAVENOUS at 03:15

## 2025-01-27 RX ADMIN — Medication 200 ML: at 05:51

## 2025-01-27 RX ADMIN — Medication 10 ML: at 03:42

## 2025-01-27 RX ADMIN — Medication 1 TABLET: at 08:54

## 2025-01-27 ASSESSMENT — COGNITIVE AND FUNCTIONAL STATUS - GENERAL
DAILY ACTIVITIY SCORE: 22
TOILETING: A LITTLE
MOBILITY SCORE: 24
HELP NEEDED FOR BATHING: A LITTLE

## 2025-01-27 ASSESSMENT — PAIN SCALES - GENERAL: PAINLEVEL_OUTOF10: 0 - NO PAIN

## 2025-01-27 ASSESSMENT — PAIN - FUNCTIONAL ASSESSMENT: PAIN_FUNCTIONAL_ASSESSMENT: 0-10

## 2025-01-27 NOTE — PROGRESS NOTES
01/27/25 1119   Discharge Planning   Home or Post Acute Services Post acute facilities (Rehab/SNF/etc)   Type of Post Acute Facility Services Assisted living   Expected Discharge Disposition Home     Attempt to call admissions team at [facility] to verify when patient is able to move in, message left requesting call back.     1145: Spoke to Meneses at [facility] ([phone]). She stated she is still waiting on paperwork from POA, but she also has concerns since patient doesn't really want to be in AL and is requesting if she can be discharged with a PRN med for anxiety. Message sent to medical team with request.     1245: Per medical team, they are change PRN HS haldol to PRN every 8 hours. Spoke to Meneses at [facility] to discuss. She was agreeable to plan. She is requesting meds to beds for haldol and if patient could be medicated prior to transport. She is currently still waiting on paperwork from the POA and states patient can go there today pending paperwork.  Medical team/nursing updated.    1440: Per medical team, unable to provide haldol with meds to beds, medication switched to Seroquel every 8 hours as needed. Spoke to Meneses at [facility] to give her update. She stated she recently spoke with the POA and she was going to electronically sign papers that are needed for patient to reside there. She will call once everything is signed.     1510: Attempt to call Deepali (POA) to verify if she has signed paperwork needed for [facility]. No answer, left message requesting call back.

## 2025-01-27 NOTE — CARE PLAN
The patient's goals for the shift include      The clinical goals for the shift include free from falls and injuries      Problem: Pain - Adult  Goal: Verbalizes/displays adequate comfort level or baseline comfort level  Outcome: Progressing     Problem: Safety - Adult  Goal: Free from fall injury  Outcome: Progressing     Problem: Discharge Planning  Goal: Discharge to home or other facility with appropriate resources  Outcome: Progressing     Problem: Chronic Conditions and Co-morbidities  Goal: Patient's chronic conditions and co-morbidity symptoms are monitored and maintained or improved  Outcome: Progressing     Problem: Nutrition  Goal: Nutrient intake appropriate for maintaining nutritional needs  Outcome: Progressing     Problem: Fall/Injury  Goal: Not fall by end of shift  Outcome: Progressing  Goal: Be free from injury by end of the shift  Outcome: Progressing  Goal: Verbalize understanding of personal risk factors for fall in the hospital  Outcome: Progressing  Goal: Verbalize understanding of risk factor reduction measures to prevent injury from fall in the home  Outcome: Progressing  Goal: Use assistive devices by end of the shift  Outcome: Progressing  Goal: Pace activities to prevent fatigue by end of the shift  Outcome: Progressing     Problem: Dressings Lower Extremities  Goal: STG - Patient will complete lower body dressing independently with comp strategies   Outcome: Progressing     Problem: Grooming  Goal: STG - Patient completes grooming independently   Outcome: Progressing

## 2025-01-27 NOTE — CARE PLAN
Problem: Pain - Adult  Goal: Verbalizes/displays adequate comfort level or baseline comfort level  Outcome: Progressing   The patient's goals for the shift include  remain safe, free from falls and injuries    The clinical goals for the shift include Patient will remain free from injury through end of shift.

## 2025-02-11 ENCOUNTER — HOSPITAL ENCOUNTER (OUTPATIENT)
Facility: HOSPITAL | Age: 85
Setting detail: OBSERVATION
Discharge: NURSING, REHAB OR LONG-TERM CARE | End: 2025-02-11
Attending: EMERGENCY MEDICINE | Admitting: INTERNAL MEDICINE
Payer: MEDICARE

## 2025-02-11 ENCOUNTER — APPOINTMENT (OUTPATIENT)
Dept: CARDIOLOGY | Facility: HOSPITAL | Age: 85
End: 2025-02-11
Payer: MEDICARE

## 2025-02-11 DIAGNOSIS — E04.1 THYROID NODULE GREATER THAN OR EQUAL TO 1.5 CM IN DIAMETER INCIDENTALLY NOTED ON IMAGING STUDY: ICD-10-CM

## 2025-02-11 DIAGNOSIS — R26.2 CANNOT WALK: ICD-10-CM

## 2025-02-11 DIAGNOSIS — S30.0XXA HEMATOMA OF BUTTOCK: ICD-10-CM
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
## 2025-02-11 PROCEDURE — 85025 COMPLETE CBC W/AUTO DIFF WBC: CPT

## 2025-02-11 PROCEDURE — 99285 EMERGENCY DEPT VISIT HI MDM: CPT | Performed by: EMERGENCY MEDICINE

## 2025-02-11 PROCEDURE — 93005 ELECTROCARDIOGRAM TRACING: CPT

## 2025-02-11 PROCEDURE — 80053 COMPREHEN METABOLIC PANEL: CPT

## 2025-02-11 PROCEDURE — 84484 ASSAY OF TROPONIN QUANT: CPT

## 2025-02-11 PROCEDURE — 83735 ASSAY OF MAGNESIUM: CPT

## 2025-02-11 PROCEDURE — 36415 COLL VENOUS BLD VENIPUNCTURE: CPT

## 2025-02-11 ASSESSMENT — PAIN DESCRIPTION - PAIN TYPE: TYPE: ACUTE PAIN

## 2025-02-11 ASSESSMENT — LIFESTYLE VARIABLES
TOTAL SCORE: 0
HAVE PEOPLE ANNOYED YOU BY CRITICIZING YOUR DRINKING: NO
EVER HAD A DRINK FIRST THING IN THE MORNING TO STEADY YOUR NERVES TO GET RID OF A HANGOVER: NO
EVER FELT BAD OR GUILTY ABOUT YOUR DRINKING: NO
HAVE YOU EVER FELT YOU SHOULD CUT DOWN ON YOUR DRINKING: NO

## 2025-02-11 ASSESSMENT — COLUMBIA-SUICIDE SEVERITY RATING SCALE - C-SSRS
2. HAVE YOU ACTUALLY HAD ANY THOUGHTS OF KILLING YOURSELF?: NO
6. HAVE YOU EVER DONE ANYTHING, STARTED TO DO ANYTHING, OR PREPARED TO DO ANYTHING TO END YOUR LIFE?: NO
1. IN THE PAST MONTH, HAVE YOU WISHED YOU WERE DEAD OR WISHED YOU COULD GO TO SLEEP AND NOT WAKE UP?: NO

## 2025-02-11 ASSESSMENT — PAIN DESCRIPTION - LOCATION: LOCATION: BUTTOCKS

## 2025-02-11 ASSESSMENT — PAIN DESCRIPTION - PROGRESSION: CLINICAL_PROGRESSION: NOT CHANGED

## 2025-02-11 ASSESSMENT — PAIN DESCRIPTION - ORIENTATION: ORIENTATION: LEFT

## 2025-02-11 ASSESSMENT — PAIN DESCRIPTION - ONSET: ONSET: SUDDEN

## 2025-02-11 ASSESSMENT — PAIN - FUNCTIONAL ASSESSMENT: PAIN_FUNCTIONAL_ASSESSMENT: 0-10

## 2025-02-11 ASSESSMENT — PAIN DESCRIPTION - DESCRIPTORS: DESCRIPTORS: ACHING

## 2025-02-11 ASSESSMENT — PAIN SCALES - GENERAL
PAINLEVEL_OUTOF10: 8
PAINLEVEL_OUTOF10: 8

## 2025-02-12 ENCOUNTER — APPOINTMENT (OUTPATIENT)
Dept: RADIOLOGY | Facility: HOSPITAL | Age: 85
End: 2025-02-12
Payer: MEDICARE

## 2025-02-12 ENCOUNTER — APPOINTMENT (OUTPATIENT)
Dept: CARDIOLOGY | Facility: HOSPITAL | Age: 85
End: 2025-02-12
Payer: MEDICARE
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
## 2025-02-12 PROBLEM — S30.0XXA HEMATOMA OF LEFT BUTTOCK: Status: ACTIVE | Noted: 2025-02-12

## 2025-02-12 PROBLEM — D64.9 ACUTE ON CHRONIC ANEMIA: Status: ACTIVE | Noted: 2025-02-12

## 2025-02-12 PROBLEM — R26.2 AMBULATORY DYSFUNCTION: Status: ACTIVE | Noted: 2025-02-12

## 2025-02-12 LAB
ALBUMIN SERPL BCP-MCNC: 3.7 G/DL (ref 3.4–5)
ALP SERPL-CCNC: 73 U/L (ref 33–136)
ALT SERPL W P-5'-P-CCNC: 18 U/L (ref 7–45)
ANION GAP SERPL CALC-SCNC: 13 MMOL/L (ref 10–20)
ANION GAP SERPL CALC-SCNC: 13 MMOL/L (ref 10–20)
APPEARANCE UR: CLEAR
AST SERPL W P-5'-P-CCNC: 22 U/L (ref 9–39)
BASOPHILS # BLD AUTO: 0.05 X10*3/UL (ref 0–0.1)
BASOPHILS NFR BLD AUTO: 0.6 %
BILIRUB SERPL-MCNC: 0.3 MG/DL (ref 0–1.2)
BILIRUB UR STRIP.AUTO-MCNC: NEGATIVE MG/DL
BUN SERPL-MCNC: 19 MG/DL (ref 6–23)
BUN SERPL-MCNC: 23 MG/DL (ref 6–23)
CALCIUM SERPL-MCNC: 8.7 MG/DL (ref 8.6–10.3)
CALCIUM SERPL-MCNC: 8.8 MG/DL (ref 8.6–10.3)
CARDIAC TROPONIN I PNL SERPL HS: 18 NG/L (ref 0–13)
CARDIAC TROPONIN I PNL SERPL HS: 20 NG/L (ref 0–13)
CHLORIDE SERPL-SCNC: 104 MMOL/L (ref 98–107)
CHLORIDE SERPL-SCNC: 106 MMOL/L (ref 98–107)
CO2 SERPL-SCNC: 23 MMOL/L (ref 21–32)
CO2 SERPL-SCNC: 24 MMOL/L (ref 21–32)
COLOR UR: COLORLESS
CREAT SERPL-MCNC: 0.89 MG/DL (ref 0.5–1.05)
CREAT SERPL-MCNC: 1.2 MG/DL (ref 0.5–1.05)
EGFRCR SERPLBLD CKD-EPI 2021: 44 ML/MIN/1.73M*2
EGFRCR SERPLBLD CKD-EPI 2021: 63 ML/MIN/1.73M*2
EOSINOPHIL # BLD AUTO: 0.21 X10*3/UL (ref 0–0.4)
EOSINOPHIL NFR BLD AUTO: 2.4 %
ERYTHROCYTE [DISTWIDTH] IN BLOOD BY AUTOMATED COUNT: 13.5 % (ref 11.5–14.5)
GLUCOSE SERPL-MCNC: 74 MG/DL (ref 74–99)
GLUCOSE SERPL-MCNC: 88 MG/DL (ref 74–99)
GLUCOSE UR STRIP.AUTO-MCNC: NORMAL MG/DL
HCT VFR BLD AUTO: 29.1 % (ref 36–46)
HCT VFR BLD AUTO: 34.5 % (ref 36–46)
HGB BLD-MCNC: 9.3 G/DL (ref 12–16)
HGB BLD-MCNC: 9.8 G/DL (ref 12–16)
HOLD SPECIMEN: NORMAL
HOLD SPECIMEN: NORMAL
IMM GRANULOCYTES # BLD AUTO: 0.02 X10*3/UL (ref 0–0.5)
IMM GRANULOCYTES NFR BLD AUTO: 0.2 % (ref 0–0.9)
KETONES UR STRIP.AUTO-MCNC: NEGATIVE MG/DL
LEUKOCYTE ESTERASE UR QL STRIP.AUTO: ABNORMAL
LYMPHOCYTES # BLD AUTO: 1.77 X10*3/UL (ref 0.8–3)
LYMPHOCYTES NFR BLD AUTO: 20.5 %
MAGNESIUM SERPL-MCNC: 2.07 MG/DL (ref 1.6–2.4)
MCH RBC QN AUTO: 29.8 PG (ref 26–34)
MCHC RBC AUTO-ENTMCNC: 32 G/DL (ref 32–36)
MCV RBC AUTO: 93 FL (ref 80–100)
MONOCYTES # BLD AUTO: 0.93 X10*3/UL (ref 0.05–0.8)
MONOCYTES NFR BLD AUTO: 10.8 %
NEUTROPHILS # BLD AUTO: 5.64 X10*3/UL (ref 1.6–5.5)
NEUTROPHILS NFR BLD AUTO: 65.5 %
NITRITE UR QL STRIP.AUTO: NEGATIVE
NRBC BLD-RTO: 0 /100 WBCS (ref 0–0)
PH UR STRIP.AUTO: 5 [PH]
PLATELET # BLD AUTO: 219 X10*3/UL (ref 150–450)
POTASSIUM SERPL-SCNC: 3.8 MMOL/L (ref 3.5–5.3)
POTASSIUM SERPL-SCNC: 4 MMOL/L (ref 3.5–5.3)
PROT SERPL-MCNC: 6 G/DL (ref 6.4–8.2)
PROT UR STRIP.AUTO-MCNC: NEGATIVE MG/DL
RBC # BLD AUTO: 3.12 X10*6/UL (ref 4–5.2)
RBC # UR STRIP.AUTO: NEGATIVE MG/DL
RBC #/AREA URNS AUTO: NORMAL /HPF
SODIUM SERPL-SCNC: 137 MMOL/L (ref 136–145)
SODIUM SERPL-SCNC: 138 MMOL/L (ref 136–145)
SP GR UR STRIP.AUTO: 1.01
UROBILINOGEN UR STRIP.AUTO-MCNC: NORMAL MG/DL
WBC # BLD AUTO: 8.6 X10*3/UL (ref 4.4–11.3)
WBC #/AREA URNS AUTO: NORMAL /HPF

## 2025-02-12 PROCEDURE — 2500000001 HC RX 250 WO HCPCS SELF ADMINISTERED DRUGS (ALT 637 FOR MEDICARE OP): Performed by: NURSE PRACTITIONER

## 2025-02-12 PROCEDURE — 36415 COLL VENOUS BLD VENIPUNCTURE: CPT

## 2025-02-12 PROCEDURE — 71045 X-RAY EXAM CHEST 1 VIEW: CPT

## 2025-02-12 PROCEDURE — 80048 BASIC METABOLIC PNL TOTAL CA: CPT | Performed by: NURSE PRACTITIONER

## 2025-02-12 PROCEDURE — 72170 X-RAY EXAM OF PELVIS: CPT | Performed by: RADIOLOGY

## 2025-02-12 PROCEDURE — 85018 HEMOGLOBIN: CPT

## 2025-02-12 PROCEDURE — 93005 ELECTROCARDIOGRAM TRACING: CPT

## 2025-02-12 PROCEDURE — 72125 CT NECK SPINE W/O DYE: CPT | Performed by: RADIOLOGY

## 2025-02-12 PROCEDURE — 97161 PT EVAL LOW COMPLEX 20 MIN: CPT | Mod: GP

## 2025-02-12 PROCEDURE — 70450 CT HEAD/BRAIN W/O DYE: CPT

## 2025-02-12 PROCEDURE — 73552 X-RAY EXAM OF FEMUR 2/>: CPT | Mod: LT

## 2025-02-12 PROCEDURE — 81001 URINALYSIS AUTO W/SCOPE: CPT

## 2025-02-12 PROCEDURE — 72125 CT NECK SPINE W/O DYE: CPT

## 2025-02-12 PROCEDURE — 71045 X-RAY EXAM CHEST 1 VIEW: CPT | Performed by: RADIOLOGY

## 2025-02-12 PROCEDURE — 97165 OT EVAL LOW COMPLEX 30 MIN: CPT | Mod: GO | Performed by: OCCUPATIONAL THERAPIST

## 2025-02-12 PROCEDURE — 72170 X-RAY EXAM OF PELVIS: CPT

## 2025-02-12 PROCEDURE — 36415 COLL VENOUS BLD VENIPUNCTURE: CPT | Performed by: NURSE PRACTITIONER

## 2025-02-12 PROCEDURE — G0378 HOSPITAL OBSERVATION PER HR: HCPCS

## 2025-02-12 PROCEDURE — 70450 CT HEAD/BRAIN W/O DYE: CPT | Performed by: RADIOLOGY

## 2025-02-12 PROCEDURE — 87086 URINE CULTURE/COLONY COUNT: CPT | Mod: STJLAB

## 2025-02-12 PROCEDURE — 73552 X-RAY EXAM OF FEMUR 2/>: CPT | Performed by: RADIOLOGY

## 2025-02-12 PROCEDURE — 84484 ASSAY OF TROPONIN QUANT: CPT

## 2025-02-12 RX ORDER — ACETAMINOPHEN 160 MG/5ML
650 SOLUTION ORAL EVERY 6 HOURS PRN
Status: DISCONTINUED | OUTPATIENT
Start: 2025-02-12 | End: 2025-02-21 | Stop reason: HOSPADM

## 2025-02-12 RX ORDER — LATANOPROST 50 UG/ML
1 SOLUTION/ DROPS OPHTHALMIC NIGHTLY
Status: DISCONTINUED | OUTPATIENT
Start: 2025-02-12 | End: 2025-02-21 | Stop reason: HOSPADM

## 2025-02-12 RX ORDER — VENLAFAXINE 37.5 MG/1
37.5 TABLET ORAL EVERY MORNING
COMMUNITY
Start: 2025-02-12

## 2025-02-12 RX ORDER — VENLAFAXINE 37.5 MG/1
37.5 TABLET ORAL EVERY MORNING
Status: DISCONTINUED | OUTPATIENT
Start: 2025-02-12 | End: 2025-02-21 | Stop reason: HOSPADM

## 2025-02-12 RX ORDER — TALC
3 POWDER (GRAM) TOPICAL NIGHTLY PRN
Status: DISCONTINUED | OUTPATIENT
Start: 2025-02-12 | End: 2025-02-21 | Stop reason: HOSPADM

## 2025-02-12 RX ORDER — ACETAMINOPHEN 325 MG/1
650 TABLET ORAL EVERY 6 HOURS PRN
Status: DISCONTINUED | OUTPATIENT
Start: 2025-02-12 | End: 2025-02-21 | Stop reason: HOSPADM

## 2025-02-12 RX ORDER — ACETAMINOPHEN 650 MG/1
650 SUPPOSITORY RECTAL EVERY 6 HOURS PRN
Status: DISCONTINUED | OUTPATIENT
Start: 2025-02-12 | End: 2025-02-21 | Stop reason: HOSPADM

## 2025-02-12 RX ORDER — BRIMONIDINE TARTRATE 2 MG/ML
1 SOLUTION/ DROPS OPHTHALMIC 2 TIMES DAILY
Status: DISCONTINUED | OUTPATIENT
Start: 2025-02-12 | End: 2025-02-21 | Stop reason: HOSPADM

## 2025-02-12 RX ORDER — QUETIAPINE FUMARATE 25 MG/1
12.5 TABLET, FILM COATED ORAL EVERY 8 HOURS PRN
Status: DISCONTINUED | OUTPATIENT
Start: 2025-02-12 | End: 2025-02-21 | Stop reason: HOSPADM

## 2025-02-12 RX ORDER — POLYETHYLENE GLYCOL 3350 17 G/17G
17 POWDER, FOR SOLUTION ORAL DAILY PRN
Status: DISCONTINUED | OUTPATIENT
Start: 2025-02-12 | End: 2025-02-21 | Stop reason: HOSPADM

## 2025-02-12 RX ORDER — DORZOLAMIDE HYDROCHLORIDE AND TIMOLOL MALEATE 20; 5 MG/ML; MG/ML
1 SOLUTION/ DROPS OPHTHALMIC 2 TIMES DAILY
Status: DISCONTINUED | OUTPATIENT
Start: 2025-02-12 | End: 2025-02-21 | Stop reason: HOSPADM

## 2025-02-12 RX ADMIN — BRIMONIDINE TARTRATE 1 DROP: 2 SOLUTION/ DROPS OPHTHALMIC at 20:26

## 2025-02-12 RX ADMIN — BRIMONIDINE TARTRATE 1 DROP: 2 SOLUTION/ DROPS OPHTHALMIC at 11:58

## 2025-02-12 RX ADMIN — VENLAFAXINE 37.5 MG: 37.5 TABLET ORAL at 11:56

## 2025-02-12 RX ADMIN — LATANOPROST 1 DROP: 50 SOLUTION OPHTHALMIC at 20:27

## 2025-02-12 RX ADMIN — DORZOLAMIDE HYDROCHLORIDE AND TIMOLOL MALEATE 1 DROP: 20; 5 SOLUTION/ DROPS OPHTHALMIC at 11:58

## 2025-02-12 RX ADMIN — DORZOLAMIDE HYDROCHLORIDE AND TIMOLOL MALEATE 1 DROP: 20; 5 SOLUTION/ DROPS OPHTHALMIC at 20:27

## 2025-02-12 SDOH — SOCIAL STABILITY: SOCIAL INSECURITY
WITHIN THE LAST YEAR, HAVE YOU BEEN HUMILIATED OR EMOTIONALLY ABUSED IN OTHER WAYS BY YOUR PARTNER OR EX-PARTNER?: PATIENT UNABLE TO ANSWER

## 2025-02-12 SDOH — SOCIAL STABILITY: SOCIAL INSECURITY: DOES ANYONE TRY TO KEEP YOU FROM HAVING/CONTACTING OTHER FRIENDS OR DOING THINGS OUTSIDE YOUR HOME?: UNABLE TO ASSESS

## 2025-02-12 SDOH — SOCIAL STABILITY: SOCIAL INSECURITY: HAVE YOU HAD ANY THOUGHTS OF HARMING ANYONE ELSE?: UNABLE TO ASSESS

## 2025-02-12 SDOH — SOCIAL STABILITY: SOCIAL INSECURITY: DO YOU FEEL UNSAFE GOING BACK TO THE PLACE WHERE YOU ARE LIVING?: UNABLE TO ASSESS

## 2025-02-12 SDOH — SOCIAL STABILITY: SOCIAL INSECURITY
WITHIN THE LAST YEAR, HAVE YOU BEEN RAPED OR FORCED TO HAVE ANY KIND OF SEXUAL ACTIVITY BY YOUR PARTNER OR EX-PARTNER?: PATIENT UNABLE TO ANSWER

## 2025-02-12 SDOH — SOCIAL STABILITY: SOCIAL INSECURITY: WERE YOU ABLE TO COMPLETE ALL THE BEHAVIORAL HEALTH SCREENINGS?: NO

## 2025-02-12 SDOH — SOCIAL STABILITY: SOCIAL INSECURITY: DO YOU FEEL ANYONE HAS EXPLOITED OR TAKEN ADVANTAGE OF YOU FINANCIALLY OR OF YOUR PERSONAL PROPERTY?: UNABLE TO ASSESS

## 2025-02-12 SDOH — SOCIAL STABILITY: SOCIAL INSECURITY
WITHIN THE LAST YEAR, HAVE YOU BEEN KICKED, HIT, SLAPPED, OR OTHERWISE PHYSICALLY HURT BY YOUR PARTNER OR EX-PARTNER?: PATIENT UNABLE TO ANSWER

## 2025-02-12 SDOH — SOCIAL STABILITY: SOCIAL INSECURITY: ARE YOU OR HAVE YOU BEEN THREATENED OR ABUSED PHYSICALLY, EMOTIONALLY, OR SEXUALLY BY ANYONE?: UNABLE TO ASSESS

## 2025-02-12 SDOH — SOCIAL STABILITY: SOCIAL INSECURITY: ABUSE: ADULT

## 2025-02-12 SDOH — SOCIAL STABILITY: SOCIAL INSECURITY: WITHIN THE LAST YEAR, HAVE YOU BEEN AFRAID OF YOUR PARTNER OR EX-PARTNER?: PATIENT UNABLE TO ANSWER

## 2025-02-12 SDOH — SOCIAL STABILITY: SOCIAL INSECURITY: HAVE YOU HAD THOUGHTS OF HARMING ANYONE ELSE?: NO

## 2025-02-12 SDOH — ECONOMIC STABILITY: INCOME INSECURITY
IN THE PAST 12 MONTHS HAS THE ELECTRIC, GAS, OIL, OR WATER COMPANY THREATENED TO SHUT OFF SERVICES IN YOUR HOME?: PATIENT UNABLE TO ANSWER

## 2025-02-12 SDOH — SOCIAL STABILITY: SOCIAL INSECURITY: HAS ANYONE EVER THREATENED TO HURT YOUR FAMILY OR YOUR PETS?: UNABLE TO ASSESS

## 2025-02-12 SDOH — ECONOMIC STABILITY: FOOD INSECURITY
WITHIN THE PAST 12 MONTHS, THE FOOD YOU BOUGHT JUST DIDN'T LAST AND YOU DIDN'T HAVE MONEY TO GET MORE.: PATIENT UNABLE TO ANSWER

## 2025-02-12 SDOH — ECONOMIC STABILITY: FOOD INSECURITY
WITHIN THE PAST 12 MONTHS, YOU WORRIED THAT YOUR FOOD WOULD RUN OUT BEFORE YOU GOT THE MONEY TO BUY MORE.: PATIENT UNABLE TO ANSWER

## 2025-02-12 SDOH — SOCIAL STABILITY: SOCIAL INSECURITY: ARE THERE ANY APPARENT SIGNS OF INJURIES/BEHAVIORS THAT COULD BE RELATED TO ABUSE/NEGLECT?: UNABLE TO ASSESS

## 2025-02-12 ASSESSMENT — COGNITIVE AND FUNCTIONAL STATUS - GENERAL
TOILETING: A LOT
CLIMB 3 TO 5 STEPS WITH RAILING: A LOT
MOVING FROM LYING ON BACK TO SITTING ON SIDE OF FLAT BED WITH BEDRAILS: A LITTLE
STANDING UP FROM CHAIR USING ARMS: A LITTLE
DRESSING REGULAR LOWER BODY CLOTHING: A LITTLE
PERSONAL GROOMING: A LITTLE
STANDING UP FROM CHAIR USING ARMS: A LOT
HELP NEEDED FOR BATHING: A LOT
EATING MEALS: A LITTLE
DRESSING REGULAR UPPER BODY CLOTHING: A LITTLE
WALKING IN HOSPITAL ROOM: A LOT
PERSONAL GROOMING: A LITTLE
MOBILITY SCORE: 16
TURNING FROM BACK TO SIDE WHILE IN FLAT BAD: A LITTLE
PATIENT BASELINE BEDBOUND: NO
MOVING TO AND FROM BED TO CHAIR: A LITTLE
TURNING FROM BACK TO SIDE WHILE IN FLAT BAD: A LITTLE
MOVING TO AND FROM BED TO CHAIR: A LITTLE
WALKING IN HOSPITAL ROOM: A LITTLE
DRESSING REGULAR UPPER BODY CLOTHING: A LITTLE
TOILETING: A LITTLE
DAILY ACTIVITIY SCORE: 16
CLIMB 3 TO 5 STEPS WITH RAILING: A LOT
HELP NEEDED FOR BATHING: A LITTLE
DRESSING REGULAR LOWER BODY CLOTHING: A LOT
MOBILITY SCORE: 17
DAILY ACTIVITIY SCORE: 18

## 2025-02-12 ASSESSMENT — PAIN - FUNCTIONAL ASSESSMENT
PAIN_FUNCTIONAL_ASSESSMENT: 0-10

## 2025-02-12 ASSESSMENT — LIFESTYLE VARIABLES
HOW OFTEN DO YOU HAVE A DRINK CONTAINING ALCOHOL: NEVER
AUDIT-C TOTAL SCORE: 0
SKIP TO QUESTIONS 9-10: 1
HOW MANY STANDARD DRINKS CONTAINING ALCOHOL DO YOU HAVE ON A TYPICAL DAY: PATIENT DOES NOT DRINK
HOW OFTEN DO YOU HAVE 6 OR MORE DRINKS ON ONE OCCASION: NEVER
AUDIT-C TOTAL SCORE: 0

## 2025-02-12 ASSESSMENT — ACTIVITIES OF DAILY LIVING (ADL)
TOILETING: NEEDS ASSISTANCE
HEARING - LEFT EAR: FUNCTIONAL
ASSISTIVE_DEVICE: WALKER
PATIENT'S MEMORY ADEQUATE TO SAFELY COMPLETE DAILY ACTIVITIES?: NO
BATHING: NEEDS ASSISTANCE
LACK_OF_TRANSPORTATION: PATIENT UNABLE TO ANSWER
WALKS IN HOME: NEEDS ASSISTANCE
ADEQUATE_TO_COMPLETE_ADL: YES
JUDGMENT_ADEQUATE_SAFELY_COMPLETE_DAILY_ACTIVITIES: NO
LACK_OF_TRANSPORTATION: NO
GROOMING: NEEDS ASSISTANCE
DRESSING YOURSELF: NEEDS ASSISTANCE
HEARING - RIGHT EAR: FUNCTIONAL
FEEDING YOURSELF: NEEDS ASSISTANCE

## 2025-02-12 ASSESSMENT — PAIN SCALES - GENERAL
PAINLEVEL_OUTOF10: 0 - NO PAIN

## 2025-02-12 ASSESSMENT — PATIENT HEALTH QUESTIONNAIRE - PHQ9
1. LITTLE INTEREST OR PLEASURE IN DOING THINGS: NOT AT ALL
SUM OF ALL RESPONSES TO PHQ9 QUESTIONS 1 & 2: 0
2. FEELING DOWN, DEPRESSED OR HOPELESS: NOT AT ALL

## 2025-02-12 NOTE — ED PROVIDER NOTES
Patient received on handoff with ambulatory test pending.  Patient was unable to stand let alone walk.  She is deemed unsafe for discharge to an assisted living facility at this time.  Patient to be admitted for placement purposes.     Mitchell Viera MD  Resident  02/12/25 0462

## 2025-02-12 NOTE — H&P
History Of Present Illness  Fernanda Hughes is a 84 y.o. female presenting with presented to the emergency room related to left hip and left buttock hematoma patient had a history of dementia cannot give reliable history patient could not remember if she had a fall or not patient was admitted for placement of higher acuity level.     Past Medical History  She has a past medical history of Anxiety, Falls, Gastroesophageal reflux disease, Glaucoma, Hearing loss, History of breast cancer, History of closed head injury, History of seizure, Irritable bowel syndrome, Memory loss, Mild cognitive impairment, Osteoarthritis, Overactive bladder, Peripheral neuropathy, Underweight, Vascular dementia, and Visual field loss.    Surgical History  She has a past surgical history that includes Cholecystectomy; Cataract extraction, bilateral; ORIF femur fracture; Breast lumpectomy (Right); Lymph node dissection; and Total abdominal hysterectomy w/ bilateral salpingoophorectomy.     Social History  She reports that she has never smoked. She has never used smokeless tobacco. She reports that she does not drink alcohol and does not use drugs.    Family History  Family History   Problem Relation Name Age of Onset    Deep vein thrombosis Neg Hx      Sudden death Neg Hx          Allergies  Patient has no known allergies.    Review of Systems   Constitutional:  Negative for diaphoresis and fatigue.   HENT:  Negative for ear pain, facial swelling, tinnitus and trouble swallowing.    Eyes:  Negative for photophobia and visual disturbance.   Respiratory:  Negative for choking and stridor.    Cardiovascular:  Negative for chest pain and palpitations.   Gastrointestinal:  Negative for abdominal pain, blood in stool and diarrhea.   Endocrine: Negative for cold intolerance, heat intolerance, polydipsia and polyuria.   Musculoskeletal:  Negative for back pain and joint swelling.   Skin:  Negative for color change and rash.   Allergic/Immunologic:  "Negative for food allergies.   Neurological:  Negative for tremors, facial asymmetry and weakness.   Psychiatric/Behavioral:  The patient is not hyperactive.       Physical Exam  HENT:      Right Ear: External ear normal.      Left Ear: External ear normal.      Mouth/Throat:      Mouth: Mucous membranes are moist.   Cardiovascular:      Rate and Rhythm: Normal rate and regular rhythm.      Heart sounds: No murmur heard.     No friction rub. No gallop.   Pulmonary:      Effort: No accessory muscle usage or respiratory distress.      Breath sounds: No stridor. No wheezing or rhonchi.   Chest:      Chest wall: No tenderness.   Abdominal:      General: There is no distension.      Palpations: There is no mass.      Tenderness: There is no abdominal tenderness. There is no guarding or rebound.   Musculoskeletal:         General: No deformity or signs of injury.      Cervical back: No rigidity or tenderness. Normal range of motion.      Right lower leg: No edema.      Left lower leg: No edema.  Positive hematoma over the left hip area  Skin:     Coloration: Skin is not jaundiced or pale.      Findings: No lesion.   Neurological:      Ge patient is alert but confused no focal neurological deficit       Last Recorded Vitals  Blood pressure 168/71, pulse 82, temperature 36.1 °C (97 °F), resp. rate 18, height 1.702 m (5' 7\"), weight 68 kg (150 lb), SpO2 100%.    Labs    Admission on 02/11/2025   Component Date Value Ref Range Status    WBC 02/11/2025 8.6  4.4 - 11.3 x10*3/uL Final    nRBC 02/11/2025 0.0  0.0 - 0.0 /100 WBCs Final    RBC 02/11/2025 3.12 (L)  4.00 - 5.20 x10*6/uL Final    Hemoglobin 02/11/2025 9.3 (L)  12.0 - 16.0 g/dL Final    Hematocrit 02/11/2025 29.1 (L)  36.0 - 46.0 % Final    MCV 02/11/2025 93  80 - 100 fL Final    MCH 02/11/2025 29.8  26.0 - 34.0 pg Final    MCHC 02/11/2025 32.0  32.0 - 36.0 g/dL Final    RDW 02/11/2025 13.5  11.5 - 14.5 % Final    Platelets 02/11/2025 219  150 - 450 x10*3/uL Final    " Neutrophils % 02/11/2025 65.5  40.0 - 80.0 % Final    Immature Granulocytes %, Automated 02/11/2025 0.2  0.0 - 0.9 % Final    Immature Granulocyte Count (IG) includes promyelocytes, myelocytes and metamyelocytes but does not include bands. Percent differential counts (%) should be interpreted in the context of the absolute cell counts (cells/UL).    Lymphocytes % 02/11/2025 20.5  13.0 - 44.0 % Final    Monocytes % 02/11/2025 10.8  2.0 - 10.0 % Final    Eosinophils % 02/11/2025 2.4  0.0 - 6.0 % Final    Basophils % 02/11/2025 0.6  0.0 - 2.0 % Final    Neutrophils Absolute 02/11/2025 5.64 (H)  1.60 - 5.50 x10*3/uL Final    Percent differential counts (%) should be interpreted in the context of the absolute cell counts (cells/uL).    Immature Granulocytes Absolute, Au* 02/11/2025 0.02  0.00 - 0.50 x10*3/uL Final    Lymphocytes Absolute 02/11/2025 1.77  0.80 - 3.00 x10*3/uL Final    Monocytes Absolute 02/11/2025 0.93 (H)  0.05 - 0.80 x10*3/uL Final    Eosinophils Absolute 02/11/2025 0.21  0.00 - 0.40 x10*3/uL Final    Basophils Absolute 02/11/2025 0.05  0.00 - 0.10 x10*3/uL Final    Glucose 02/11/2025 88  74 - 99 mg/dL Final    Sodium 02/11/2025 137  136 - 145 mmol/L Final    Potassium 02/11/2025 3.8  3.5 - 5.3 mmol/L Final    Chloride 02/11/2025 104  98 - 107 mmol/L Final    Bicarbonate 02/11/2025 24  21 - 32 mmol/L Final    Anion Gap 02/11/2025 13  10 - 20 mmol/L Final    Urea Nitrogen 02/11/2025 23  6 - 23 mg/dL Final    Creatinine 02/11/2025 1.20 (H)  0.50 - 1.05 mg/dL Final    eGFR 02/11/2025 44 (L)  >60 mL/min/1.73m*2 Final    Calculations of estimated GFR are performed using the 2021 CKD-EPI Study Refit equation without the race variable for the IDMS-Traceable creatinine methods.  https://jasn.asnjournals.org/content/early/2021/09/22/ASN.7355089540    Calcium 02/11/2025 8.7  8.6 - 10.3 mg/dL Final    Albumin 02/11/2025 3.7  3.4 - 5.0 g/dL Final    Alkaline Phosphatase 02/11/2025 73  33 - 136 U/L Final    Total  Protein 02/11/2025 6.0 (L)  6.4 - 8.2 g/dL Final    AST 02/11/2025 22  9 - 39 U/L Final    Bilirubin, Total 02/11/2025 0.3  0.0 - 1.2 mg/dL Final    ALT 02/11/2025 18  7 - 45 U/L Final    Patients treated with Sulfasalazine may generate falsely decreased results for ALT.    Magnesium 02/11/2025 2.07  1.60 - 2.40 mg/dL Final    Troponin I, High Sensitivity 02/11/2025 20 (H)  0 - 13 ng/L Final    Color, Urine 02/12/2025 Colorless (N)  Light-Yellow, Yellow, Dark-Yellow Final    Appearance, Urine 02/12/2025 Clear  Clear Final    Specific Gravity, Urine 02/12/2025 1.006  1.005 - 1.035 Final    pH, Urine 02/12/2025 5.0  5.0, 5.5, 6.0, 6.5, 7.0, 7.5, 8.0 Final    Protein, Urine 02/12/2025 NEGATIVE  NEGATIVE, 10 (TRACE), 20 (TRACE) mg/dL Final    Glucose, Urine 02/12/2025 Normal  Normal mg/dL Final    Blood, Urine 02/12/2025 NEGATIVE  NEGATIVE mg/dL Final    Ketones, Urine 02/12/2025 NEGATIVE  NEGATIVE mg/dL Final    Bilirubin, Urine 02/12/2025 NEGATIVE  NEGATIVE mg/dL Final    Urobilinogen, Urine 02/12/2025 Normal  Normal mg/dL Final    Nitrite, Urine 02/12/2025 NEGATIVE  NEGATIVE Final    Leukocyte Esterase, Urine 02/12/2025 25 Christina/uL (A)  NEGATIVE Final    Troponin I, High Sensitivity 02/12/2025 18 (H)  0 - 13 ng/L Final    Extra Tube 02/11/2025 Hold for add-ons.   Final    Auto resulted.    WBC, Urine 02/12/2025 1-5  1-5, NONE /HPF Final    RBC, Urine 02/12/2025 1-2  NONE, 1-2, 3-5 /HPF Final         Imaging     CT head wo IV contrast, CT cervical spine wo IV contrast  Narrative: Interpreted By:  Lashaun Nixon,   STUDY:  CT HEAD WO IV CONTRAST; CT CERVICAL SPINE WO IV CONTRAST;  2/12/2025  1:37 am      INDICATION:  Signs/Symptoms:Unwitnessed fall, dementia.      COMPARISON:  9/2/2024      ACCESSION NUMBER(S):  [id number]; [id number]      ORDERING CLINICIAN:  JOAN SRIVASTAVA      TECHNIQUE:  Noncontrast CT images of head. Axial noncontrast CT images of the  cervical spine with coronal and sagittal reconstructed  images.      FINDINGS:  BRAIN PARENCHYMA: Gray-white matter interfaces are preserved. No mass  effect or midline shift. Generalized parenchymal volume loss noted  with concordant ventricular enlargement. Non-specific white matter  changes noted, which may be related to small vessel disease.      HEMORRHAGE: No acute intracranial hemorrhage.  VENTRICLES and EXTRA-AXIAL SPACES: Normal size.  EXTRACRANIAL SOFT TISSUES: Within normal limits.  PARANASAL SINUSES/MASTOIDS: The visualized paranasal sinuses and  mastoid air cells are aerated. CALVARIUM: No depressed skull  fracture. No destructive osseous lesion.      OTHER FINDINGS: None.      CERVICAL SPINE:      ALIGNMENT: Normal.  VERTEBRAE: No acute fracture.  SPINAL CANAL: Degenerative changes facet and uncinate arthropathy, as  well as disc space narrowing and end plate hypertrophy. No critical  spinal canal stenosis. PREVERTEBRAL SOFT TISSUES: No prevertebral  soft tissue swelling. LUNG APICES: Biapical scarring.      OTHER FINDINGS: 1.8 x 1.4 cm midthyroid cystic lesion      Impression: No acute intracranial abnormality.      No acute fracture or traumatic subluxation of the cervical spine.      1.8 x 1.4 cm midthyroid cystic lesion. Dedicated ultrasound imaging  may be obtained on a non-emergent basis if not already performed.      MACRO:  Incidental Finding:  There are few small hypoattenuating nodules  measuring equal to or greater than 1.5 cm in the thyroid gland.  (**-YCF-**)      Instructions:  Further evaluation with nonemergent thyroid ultrasound.  (Managing Incidental Thyroid Nodules Detected on Imaging: White Paper  of the ACR Incidental Thyroid Findings Committee. Gerri Nelson et  al. Journal of the American College of Radiology,Volume 12, Issue 2,  143 - 150.) [email or web address]      Signed by: Lashaun Nixon 2/12/2025 2:51 AM  Dictation workstation:   [id number]  XR femur left 2+ views, XR pelvis 1-2 views  Narrative: Interpreted By:  Tiffani  Lashaun,   STUDY:  XR PELVIS 1-2 VIEWS; XR FEMUR LEFT 2+ VIEWS; ;  2/12/2025 1:30 am      INDICATION:  Signs/Symptoms:Unwitnessed fall, left hip and buttock pain;  Signs/Symptoms:Unwitnessed fall, left hip and leg pain.          COMPARISON:  12/02/2024      ACCESSION NUMBER(S):  [id number]; [id number]      ORDERING CLINICIAN:  JOAN SRIVASTAVA      FINDINGS:  AP pelvis, 2 views of the left femur      Intramedullary nail and screw seen in the left hip. No acute fracture  or dislocation. Scattered degenerative changes.      Impression: No acute osseous abnormality identified.      MACRO:  None      Signed by: Lashaun Nixon 2/12/2025 2:25 AM  Dictation workstation:   [id number]  XR chest 1 view  Narrative: Interpreted By:  Lashaun Nixon,   STUDY:  XR CHEST 1 VIEW;  2/12/2025 1:30 am      INDICATION:  Signs/Symptoms:Unwitnessed fall, dementia.          COMPARISON:  09/02/2024      ACCESSION NUMBER(S):  [id number]      ORDERING CLINICIAN:  JOAN SRIVASTAVA      FINDINGS:  AP radiograph of the chest was provided.              CARDIOMEDIASTINAL SILHOUETTE:  Cardiomediastinal silhouette is stable in size and configuration.      LUNGS:  Hyperinflated lung fields. No focal consolidation, pleural effusion  or sizable pneumothorax seen.      ABDOMEN:  No remarkable upper abdominal findings.      BONES:  No acute osseous changes.      Impression: Emphysema/COPD. No focal consolidation.      MACRO:  None      Signed by: Lashaun Nixon 2/12/2025 2:19 AM  Dictation workstation:   [id number]       Patient Active Problem List   Diagnosis    UTI (urinary tract infection)    Vascular dementia    Confusion and disorientation    Hematoma of left buttock    Falls, sequela    Acute on chronic anemia    Ambulatory dysfunction         Assessment/Plan   Assessment & Plan  Hematoma of left buttock    Vascular dementia    Falls, sequela    Acute on chronic anemia    Ambulatory dysfunction      Buttock hematoma status post  fall  Physical therapy was ordered on the patient patient had a history of dementia lives in memory care patient most likely will need higher level of nursing and acuity we will plan on transferring her to skilled nursing facility       I spent 40 minutes in the professional and overall care of this patient.      MARCOS Phelan MD

## 2025-02-12 NOTE — NURSING NOTE
PT. ARRIVED TO FLOOR VIA CART, PT. GOT SITUATED IN  [facility]. BED ALARM AND YELLOW BAND APPLIED. DAY SHIFT PCA IN THE ROOM WILL BE DOING V/S. REPORT GIVEN TO JOSE ROBISON/RN TAKING OVER. WET DIAPER REMOVED.

## 2025-02-12 NOTE — CARE PLAN
Problem: Discharge Planning  Goal: Discharge to home or other facility with appropriate resources  Outcome: Progressing     Problem: Chronic Conditions and Co-morbidities  Goal: Patient's chronic conditions and co-morbidity symptoms are monitored and maintained or improved  Outcome: Progressing     Problem: Nutrition  Goal: Nutrient intake appropriate for maintaining nutritional needs  Outcome: Progressing     Problem: Skin  Goal: Decreased wound size/increased tissue granulation at next dressing change  Outcome: Progressing   The patient's goals for the shift include      The clinical goals for the shift include Pt will be free of injuries this shift.

## 2025-02-12 NOTE — ED PROVIDER NOTES
Emergency Department Provider Note        History of Present Illness     History provided by: Patient and EMS  Limitations to History: Dementia  External Records Reviewed with Brief Summary: None    HPI:  Fernanda Hughes is a 84 y.o. female with vascular dementia, hyperlipidemia presenting for unwitnessed fall at her facility.  Patient is oriented only to self and reportedly baseline for her.  She does not remember the fall.  She only has left hip pain and buttock pain when rolled.  She has full range of motion of her lower extremities.  Denies any headache, neck pain, back pain, chest pain, nausea vomiting, abdominal pain.  She is not on any blood thinners per review of her nursing home paperwork.    Physical Exam   Triage vitals:  T 36.8 °C (98.2 °F)  HR 88  /60  RR 18  O2 98 % None (Room air)    Physical Exam  Vitals and nursing note reviewed.   Constitutional:       General: She is not in acute distress.     Appearance: She is well-developed.   HENT:      Head: Normocephalic and atraumatic.      Right Ear: External ear normal.      Left Ear: External ear normal.      Nose: Nose normal.      Mouth/Throat:      Mouth: Mucous membranes are moist.   Eyes:      General: No scleral icterus.     Extraocular Movements: Extraocular movements intact.      Conjunctiva/sclera: Conjunctivae normal.      Pupils: Pupils are equal, round, and reactive to light.   Cardiovascular:      Rate and Rhythm: Normal rate and regular rhythm.      Heart sounds: No murmur heard.  Pulmonary:      Effort: Pulmonary effort is normal. No respiratory distress.      Breath sounds: Normal breath sounds.   Abdominal:      Palpations: Abdomen is soft.      Tenderness: There is no abdominal tenderness.   Musculoskeletal:         General: No swelling.      Cervical back: Neck supple.      Comments: Hematoma of the left buttock with tenderness to palpation.   Skin:     General: Skin is warm and dry.   Neurological:      General: No focal deficit  present.      Mental Status: She is alert.   Psychiatric:         Mood and Affect: Mood normal.          Medical Decision Making & ED Course   Medical Decision Makin y.o. female presenting for a witnessed fall at her memory care unit.  Upon arrival, she is afebrile helically stable.  Awake and alert, oriented to self and pleasant.  History exam is significant for hematoma in her left buttock with associated pain in the buttock and left hip, but otherwise unremarkable.  Given that she is not a reliable historian, will obtain lab work to eval for any metabolic abnormalities.  Obtain CT imaging of her head and C-spine as well as x-rays of her hip, pelvis, leg.    CBC is negative for leukocytosis.  Chemistry panel shows a creatinine of 1.20 which is similar to prior lab work.  Troponin mildly elevated but stable at 20, 18.  Urinalysis shows 25 leukocyte esterase without any nitrates, bacteria, or significant white cells.  X-rays of the chest, pelvis, femur are unremarkable.    Patient signed out to incoming provider pending CT imaging of the head and C-spine.  ----     Social Determinants of Health which Significantly Impact Care: None identified     EKG Independent Interpretation: EKG interpreted by myself. Please see ED Course for full interpretation.  EKG at 2336 on 2025 as interpreted by myself: Ventricular rate 80, , QRS 86, QTc 438.  Normal sinus rhythm.  No acute injury pattern.    EKG at 0038 on 2025 as interpreted by myself: Ventricular rate 80, , QRS 88, QTc 447.  Normal sinus rhythm.  No acute injury pattern.      Independent Result Review and Interpretation: Relevant laboratory and radiographic results were reviewed and independently interpreted by myself.  As necessary, they are commented on in the ED Course.    Chronic conditions affecting the patient's care: As documented above in MDM    The patient was discussed with the following consultants/services: None    Care  Considerations: As documented above in [facility]    ED Course:  ED Course as of 02/12/25 0542 Wed Feb 12, 2025   0246 Patient received on handoff with CT scans of the head and cervical spine pending. [TN]      ED Course User Index  [TN] Mitchell Viera MD         Diagnoses as of 02/12/25 0542   Fall, initial encounter   Hematoma of buttock   Cannot walk     Disposition   Patient was signed out to Dr. Viera at 0200 pending completion of their work-up.  Please see the next provider's transition of care note for the remainder of the patient's care.     Procedures   Procedures    Patient seen and discussed with ED attending physician.    Quentin Jean Baptiste DO  Emergency Medicine     Quentin Jean Baptiste DO  Resident  02/12/25 0542

## 2025-02-12 NOTE — PROGRESS NOTES
Occupational Therapy  Evaluation    Patient Name: Fernanda Hughes  MRN: [id number]  Today's Date: 2/12/2025  Time Calculation  Start Time: 0916  Stop Time: 0926  Time Calculation (min): 10 min  3105/3105-A    Assessment  IP OT Assessment  OT Assessment: Patient demonstrates decreased independence with self care, decreased independence with functional transfers, decreased endurance, decreased balance and decreased cognition.  Patient will benefit from skilled OT to address deficits.  Anticipate patient will benefit from moderate intensity therapy post hospital stay.  Prognosis: Good  Barriers to Discharge Home: Cognition needs  Cognition Needs: 24hr supervision for safety awareness needed  Evaluation/Treatment Tolerance: Patient tolerated treatment well  Medical Staff Made Aware: Yes  End of Session Communication: Bedside nurse  End of Session Patient Position: Up in chair, Alarm on    Plan:  Treatment Interventions: ADL retraining, Functional transfer training, UE strengthening/ROM, Endurance training, Cognitive reorientation, Patient/family training  OT Frequency: 3 times per week  OT Discharge Recommendations: Moderate intensity level of continued care  Equipment Recommended upon Discharge: Wheeled walker  OT Recommended Transfer Status: Minimal assist, Moderate assist, Assist of 2  OT - OK to Discharge: Yes (to next level of care when medically cleared by physician)    Subjective     Current Problem:  1. Fall, initial encounter        2. Hematoma of buttock        3. Cannot walk        4. Thyroid nodule greater than or equal to 1.5 cm in diameter incidentally noted on imaging study  US thyroid    CANCELED: US thyroid          General:  General  Reason for Referral: impaired self care  Referred By: MARCOS Phelan  Past Medical History Relevant to Rehab: vascular dementia, HLD  Co-Treatment: PT  Prior to Session Communication: Bedside nurse  Patient Position Received: Bed, 3 rail up, Alarm on  Preferred Learning Style:  verbal, visual  General Comment: Patient agreeable to therapy.    Precautions:  Medical Precautions: Fall precautions      Pain:  Pain Assessment  Pain Assessment: 0-10  0-10 (Numeric) Pain Score: 0 - No pain    Objective     Cognition:  Orientation Level: Disoriented to time, Disoriented to situation  Insight: Moderate  Processing Speed: Delayed        Home Living:  Type of Home: Memory Care unit     Prior Function:  Prior Function Comments: Patient reports being independent with self care and mobility.      ADL:  LE Dressing Assistance: Minimal    Activity Tolerance:  Endurance: Tolerates less than 10 min exercise, no significant change in vital signs    Bed Mobility/Transfers:   Bed Mobility  Bed Mobility: Yes (Patient supervision with supine to sit.)  Transfers  Transfer: Yes  Transfer 1  Transfer From 1: Sit to  Transfer to 1: Stand  Technique 1: Sit to stand, Stand to sit  Transfer Device 1: Walker, Gait belt  Transfer Level of Assistance 1: Moderate assistance, +2  Transfers 2  Transfer From 2: Bed to  Transfer to 2: Chair with arms  Technique 2: Stand pivot  Transfer Device 2: Walker, Gait belt  Transfer Level of Assistance 2: Moderate assistance, +2    Ambulation/Gait Training:  Functional Mobility  Functional Mobility Performed: Yes (Patient ambulated with mod A x 2 to chair.)      Sensation:  Sensation Comment: pt denies any numbness/tingling      Outcome Measures: [facility] Daily Activity  Putting on and taking off regular lower body clothing: A lot  Bathing (including washing, rinsing, drying): A lot  Putting on and taking off regular upper body clothing: A little  Toileting, which includes using toilet, bedpan or urinal: A lot  Taking care of personal grooming such as brushing teeth: A little  Eating Meals: None  Daily Activity - Total Score: 16           EDUCATION:  Education  Individual(s) Educated: Patient  Education Provided: Fall precautons, Risk and benefits of OT discussed with patient or  other  Plan of Care Discussed and Agreed Upon: yes  Patient Response to Education: Patient/Caregiver Verbalized Understanding of Information      Goals:   Encounter Problems       Encounter Problems (Active)       OT Goals       Patient will complete functional transfers with SBA. (Progressing)       Start:  02/12/25    Expected End:  02/26/25            Patient will complete toileting with SBA. (Progressing)       Start:  02/12/25    Expected End:  02/26/25            Patient will complete LE dressing with SBA. (Progressing)       Start:  02/12/25    Expected End:  02/26/25

## 2025-02-12 NOTE — PROGRESS NOTES
Physical Therapy    Physical Therapy Evaluation    Patient Name: Fernanda Hughes  MRN: [id number]  Today's Date: 2/12/2025   Time Calculation  Start Time: 0916  Stop Time: 0927  Time Calculation (min): 11 min  3105/3105-A    Assessment/Plan   PT Assessment: Pt demonstrates impairments listed below.  Pt appears below baseline level of function and based on current level of function, pt would benefit from continued skilled therapy while in the hospital to ensure safety, decrease risk of falls, and regain strength/mobility back to baseline.  Once stable enough for discharge, pt would benefit from moderate intensity therapy prior to returning home.     PT Assessment Results: Decreased strength, Decreased range of motion, Impaired balance, Decreased mobility, Decreased cognition, Impaired judgement, Decreased safety awareness  Rehab Prognosis: Fair (2/2 cognition)  Barriers to Discharge Home: Caregiver assistance, Physical needs  Caregiver Assistance: Caregiver assistance needed per identified barriers - however, level of patient's required assistance exceeds assistance available at home  Physical Needs: 24hr mobility assistance needed, 24hr ADL assistance needed, High falls risk due to function or environment  Evaluation/Treatment Tolerance: Patient tolerated treatment well, Patient limited by fatigue  Medical Staff Made Aware: Yes  End of Session Communication: Bedside nurse  End of Session Patient Position: Up in chair, Alarm on  IP OR SWING BED PT PLAN  Inpatient or Swing Bed: Inpatient  PT Plan  Treatment/Interventions: Bed mobility, Gait training, Transfer training, Balance training, Neuromuscular re-education, Strengthening, Range of motion, Therapeutic exercise, Therapeutic activity, Home exercise program  PT Plan: Ongoing PT  PT Frequency: 3 times per week  PT Discharge Recommendations: Moderate intensity level of continued care  Equipment Recommended upon Discharge: Wheeled walker  PT Recommended Transfer Status:  Assist x2 (Min-Mod A)  PT - OK to Discharge: Yes - To next level of care when cleared by medical team    Subjective     Current Problem:  1. Fall, initial encounter        2. Hematoma of buttock        3. Cannot walk        4. Thyroid nodule greater than or equal to 1.5 cm in diameter incidentally noted on imaging study  US thyroid    CANCELED: US thyroid          Past Medical History:  Patient Active Problem List   Diagnosis    UTI (urinary tract infection)    Vascular dementia    Confusion and disorientation    Hematoma of left buttock    Falls, sequela    Acute on chronic anemia    Ambulatory dysfunction       General Visit Information:  Per EMR: pt presenting with presented to the emergency room related to left hip and left buttock hematoma patient had a history of dementia cannot give reliable history patient could not remember if she had a fall or not patient was admitted for placement of higher acuity level.     On arrival, pt long sitting in bed.  Pt in no apparent distress and agreeable to therapy.    General  Reason for Referral: impaired mobility  Referred By: MARCOS Phelan  Co-Treatment: OT  Prior to Session Communication: Bedside nurse  Patient Position Received: Bed, 3 rail up, Alarm on    Home Living/PLOF:  Pt is a poor historian.  Per EMR, pt is from [facility].  Pt does have a walker, but usually refuses to use it, stating she holds on to the walls if needed but typically walks on her own or with help of staff at [facility].  Pt usually walks around with her dog.    Precautions:  Precautions  Medical Precautions: Fall precautions     Objective     Pain:  Pain Assessment  Pain Assessment: 0-10  0-10 (Numeric) Pain Score: 0 - No pain    Cognition:  Cognition  Overall Cognitive Status: Within Functional Limits  Orientation Level: Disoriented to time, Disoriented to situation  Insight: Moderate  Processing Speed: Delayed    General Assessments:      Activity Tolerance  Endurance: Tolerates less  than 10 min exercise, no significant change in vital signs  Sensation  Sensation Comment: pt denies any numbness/tingling    Static Sitting Balance  Static Sitting-Comment/Number of Minutes: fair plus  Dynamic Sitting Balance  Dynamic Sitting-Comments: fair  Static Standing Balance  Static Standing-Comment/Number of Minutes: fair minus  Dynamic Standing Balance  Dynamic Standing-Comments: poor    Extremity/Trunk Assessments:  BLE strength: appears at least 3/5 grossly     Functional Mobility:  Bed mobility  Supine to sit: SBA    Transfers  Sit to stand: Mod A x2; retro lean in standing  requiring Mod A to maintain stand balance; pt wavering in standing     Stand to sit: Min A x2; VCs for hand placement     Ambulation/Stairs  Pt ambulated 3 ft from bed to chair with B HHA and Mod A progessing to Min A x2; NBOS and short shuffled steps     Outcome Measures:  [facility] Basic Mobility  Turning from your back to your side while in a flat bed without using bedrails: A little  Moving from lying on your back to sitting on the side of a flat bed without using bedrails: A little  Moving to and from bed to chair (including a wheelchair): A little  Standing up from a chair using your arms (e.g. wheelchair or bedside chair): A lot  To walk in hospital room: A little  Climbing 3-5 steps with railing: A lot  Basic Mobility - Total Score: 16    Goals:  Encounter Problems       Encounter Problems (Active)       PT Problem       Pt will be able to perform all bed mobility tasks with Mod I.  (Progressing)       Start:  02/12/25    Expected End:  02/26/25            Pt will perform all transfers with Mod I and LRAD with proper safety mechanics.   (Progressing)       Start:  02/12/25    Expected End:  02/26/25            Pt will ambulate 100 ft with Mod I using LRAD for improved functional independence.  (Progressing)       Start:  02/12/25    Expected End:  02/26/25                 Education Documentation  Mobility Training, taught by  Akosua Linn, PT at 2/12/2025 10:29 AM.  Learner: Patient  Readiness: Acceptance  Method: Explanation  Response: Needs Reinforcement, No Evidence of Learning    Education Comments  No comments found.

## 2025-02-12 NOTE — PROGRESS NOTES
Spiritual Care Visit  Spiritual Care Request    Reason for Visit:  Routine Visit: Introduction     Request Received From:       Focus of Care:  Visited With: Patient         Refer to :          Spiritual Care Assessment    Spiritual Assessment:                      Care Provided:  Intended Effects: Promote sense of peace, Preserve dignity and respect, Meaning-making  Methods: Offer spiritual/Muslim support  Interventions: Share words of hope and inspiration, [place]    Sense of Community and or Jewish Affiliation:  [employer]         Addressed Needs/Concerns and/or Nubia Through:          Outcome:        Advance Directives:         Spiritual Care Annotation    Annotation:  Patient shared her story and the  was a supportive presence and prayed.

## 2025-02-12 NOTE — PROGRESS NOTES
02/12/25 0823   Discharge Planning   Living Arrangements Alone   Support Systems Friends/neighbors;Family members   Assistance Needed total   Type of Residence Assisted living   Care Facility Name [facility]   Home or Post Acute Services Post acute facilities (Rehab/SNF/etc)   Type of Post Acute Facility Services Skilled nursing   Expected Discharge Disposition SNF   Does the patient need discharge transport arranged? Yes   RoundTrip coordination needed? Yes   Has discharge transport been arranged? No   Financial Resource Strain   How hard is it for you to pay for the very basics like food, housing, medical care, and heating? Not hard   Housing Stability   In the last 12 months, was there a time when you were not able to pay the mortgage or rent on time? N   In the past 12 months, how many times have you moved where you were living? 1   At any time in the past 12 months, were you homeless or living in a shelter (including now)? N   Transportation Needs   In the past 12 months, has lack of transportation kept you from medical appointments or from getting medications? no   In the past 12 months, has lack of transportation kept you from meetings, work, or from getting things needed for daily living? No   Patient Choice   Provider Choice list and CMS website (https://medicare.gov/care-compare#search) for post-acute Quality and Resource Measure Data were provided and reviewed with: Representative   Patient / Family choosing to utilize agency / facility established prior to hospitalization Yes   Stroke Family Assessment   Stroke Family Assessment Needed No   Intensity of Service   Intensity of Service 0-30 min     Called and spoke with patient's POA, Deepali Jaramillo. She confirmed that patient lives at [facility] in [place] and that this is fairly new. However, she was not aware that patient had a fall at facility and was not notified that patient was brought to the hospital. She states that she does  have a walker, but usually refuses to use it, stating she holds on to the walls if needed but typically walks on her own or with help of staff at [facility]. Unable to discuss much about dc planning as she was not aware she was here and would need more info on what is going on to try to make a plan right now. Did let her know that she was sent to the floor because she was unable to stand/walk in ER, so she will likely need a higher level of care. PT/OT and plan pending.     0915 Called [facility] to speak with nurse familiar with patient. Spoke with Tomeka who states patient is normally ambulatory and walking around with her dog. Did make her aware that no one notified the POA that patient came to the hospital. Will call Bonnie RIZVI, when she is done with her meeting to discuss this.     8490 Received call back from BELKYS Nicole. States they did call POA last night when patient came to hospital but she did not answer. Therapy recs Mod intensity. Will discuss with POA.     9629 Called and spoke with POA--she is also agreeable to SNF but knows patient needs a memory care unit for wandering. SNF list sent to her email [email or web address]. Requested LSW follow up for choices.

## 2025-02-13 LAB
ANION GAP SERPL CALC-SCNC: 11 MMOL/L (ref 10–20)
ATRIAL RATE: 80 BPM
ATRIAL RATE: 80 BPM
BACTERIA UR CULT: NO GROWTH
BUN SERPL-MCNC: 21 MG/DL (ref 6–23)
CALCIUM SERPL-MCNC: 8.5 MG/DL (ref 8.6–10.3)
CHLORIDE SERPL-SCNC: 105 MMOL/L (ref 98–107)
CO2 SERPL-SCNC: 25 MMOL/L (ref 21–32)
CREAT SERPL-MCNC: 0.93 MG/DL (ref 0.5–1.05)
EGFRCR SERPLBLD CKD-EPI 2021: 60 ML/MIN/1.73M*2
ERYTHROCYTE [DISTWIDTH] IN BLOOD BY AUTOMATED COUNT: 13.6 % (ref 11.5–14.5)
GLUCOSE SERPL-MCNC: 85 MG/DL (ref 74–99)
HCT VFR BLD AUTO: 28.6 % (ref 36–46)
HGB BLD-MCNC: 9 G/DL (ref 12–16)
MAGNESIUM SERPL-MCNC: 2.01 MG/DL (ref 1.6–2.4)
MCH RBC QN AUTO: 29.2 PG (ref 26–34)
MCHC RBC AUTO-ENTMCNC: 31.5 G/DL (ref 32–36)
MCV RBC AUTO: 93 FL (ref 80–100)
NRBC BLD-RTO: 0 /100 WBCS (ref 0–0)
P AXIS: 53 DEGREES
P AXIS: 59 DEGREES
P OFFSET: 201 MS
P OFFSET: 202 MS
P ONSET: 152 MS
P ONSET: 155 MS
PLATELET # BLD AUTO: 237 X10*3/UL (ref 150–450)
POTASSIUM SERPL-SCNC: 4.1 MMOL/L (ref 3.5–5.3)
PR INTERVAL: 126 MS
PR INTERVAL: 132 MS
Q ONSET: 218 MS
Q ONSET: 218 MS
QRS COUNT: 13 BEATS
QRS COUNT: 13 BEATS
QRS DURATION: 86 MS
QRS DURATION: 88 MS
QT INTERVAL: 380 MS
QT INTERVAL: 388 MS
QTC CALCULATION(BAZETT): 438 MS
QTC CALCULATION(BAZETT): 447 MS
QTC FREDERICIA: 418 MS
QTC FREDERICIA: 427 MS
R AXIS: 67 DEGREES
R AXIS: 68 DEGREES
RBC # BLD AUTO: 3.08 X10*6/UL (ref 4–5.2)
SODIUM SERPL-SCNC: 137 MMOL/L (ref 136–145)
T AXIS: 83 DEGREES
T AXIS: 85 DEGREES
T OFFSET: 408 MS
T OFFSET: 412 MS
VENTRICULAR RATE: 80 BPM
VENTRICULAR RATE: 80 BPM
WBC # BLD AUTO: 6.7 X10*3/UL (ref 4.4–11.3)

## 2025-02-13 PROCEDURE — 94760 N-INVAS EAR/PLS OXIMETRY 1: CPT

## 2025-02-13 PROCEDURE — 36415 COLL VENOUS BLD VENIPUNCTURE: CPT

## 2025-02-13 PROCEDURE — G0378 HOSPITAL OBSERVATION PER HR: HCPCS

## 2025-02-13 PROCEDURE — 97535 SELF CARE MNGMENT TRAINING: CPT | Mod: GO,CO

## 2025-02-13 PROCEDURE — 2500000001 HC RX 250 WO HCPCS SELF ADMINISTERED DRUGS (ALT 637 FOR MEDICARE OP): Performed by: NURSE PRACTITIONER

## 2025-02-13 PROCEDURE — 97530 THERAPEUTIC ACTIVITIES: CPT | Mod: GO,CO

## 2025-02-13 PROCEDURE — 83735 ASSAY OF MAGNESIUM: CPT

## 2025-02-13 PROCEDURE — 82374 ASSAY BLOOD CARBON DIOXIDE: CPT

## 2025-02-13 PROCEDURE — 97116 GAIT TRAINING THERAPY: CPT | Mod: GP,CQ

## 2025-02-13 PROCEDURE — 97110 THERAPEUTIC EXERCISES: CPT | Mod: GP,CQ

## 2025-02-13 PROCEDURE — 85027 COMPLETE CBC AUTOMATED: CPT

## 2025-02-13 RX ADMIN — BRIMONIDINE TARTRATE 1 DROP: 2 SOLUTION/ DROPS OPHTHALMIC at 09:10

## 2025-02-13 RX ADMIN — LATANOPROST 1 DROP: 50 SOLUTION OPHTHALMIC at 21:17

## 2025-02-13 RX ADMIN — VENLAFAXINE 37.5 MG: 37.5 TABLET ORAL at 09:09

## 2025-02-13 RX ADMIN — DORZOLAMIDE HYDROCHLORIDE AND TIMOLOL MALEATE 1 DROP: 20; 5 SOLUTION/ DROPS OPHTHALMIC at 09:10

## 2025-02-13 RX ADMIN — DORZOLAMIDE HYDROCHLORIDE AND TIMOLOL MALEATE 1 DROP: 20; 5 SOLUTION/ DROPS OPHTHALMIC at 21:17

## 2025-02-13 RX ADMIN — BRIMONIDINE TARTRATE 1 DROP: 2 SOLUTION/ DROPS OPHTHALMIC at 21:17

## 2025-02-13 ASSESSMENT — COGNITIVE AND FUNCTIONAL STATUS - GENERAL
DRESSING REGULAR LOWER BODY CLOTHING: A LOT
WALKING IN HOSPITAL ROOM: A LOT
DRESSING REGULAR LOWER BODY CLOTHING: A LITTLE
STANDING UP FROM CHAIR USING ARMS: A LITTLE
STANDING UP FROM CHAIR USING ARMS: A LOT
CLIMB 3 TO 5 STEPS WITH RAILING: A LOT
TURNING FROM BACK TO SIDE WHILE IN FLAT BAD: A LITTLE
DRESSING REGULAR UPPER BODY CLOTHING: A LITTLE
TOILETING: A LITTLE
HELP NEEDED FOR BATHING: A LITTLE
MOVING TO AND FROM BED TO CHAIR: A LITTLE
DRESSING REGULAR LOWER BODY CLOTHING: A LITTLE
MOBILITY SCORE: 17
DAILY ACTIVITIY SCORE: 18
DRESSING REGULAR UPPER BODY CLOTHING: A LITTLE
EATING MEALS: A LITTLE
MOVING FROM LYING ON BACK TO SITTING ON SIDE OF FLAT BED WITH BEDRAILS: A LITTLE
EATING MEALS: A LITTLE
CLIMB 3 TO 5 STEPS WITH RAILING: A LOT
MOVING TO AND FROM BED TO CHAIR: A LITTLE
TURNING FROM BACK TO SIDE WHILE IN FLAT BAD: A LITTLE
MOVING FROM LYING ON BACK TO SITTING ON SIDE OF FLAT BED WITH BEDRAILS: A LITTLE
DAILY ACTIVITIY SCORE: 18
HELP NEEDED FOR BATHING: A LITTLE
STANDING UP FROM CHAIR USING ARMS: A LITTLE
TOILETING: A LOT
MOBILITY SCORE: 15
MOVING TO AND FROM BED TO CHAIR: A LITTLE
PERSONAL GROOMING: A LITTLE
CLIMB 3 TO 5 STEPS WITH RAILING: A LOT
WALKING IN HOSPITAL ROOM: A LOT
PERSONAL GROOMING: A LITTLE
TURNING FROM BACK TO SIDE WHILE IN FLAT BAD: A LITTLE
WALKING IN HOSPITAL ROOM: A LITTLE
HELP NEEDED FOR BATHING: A LOT
DRESSING REGULAR UPPER BODY CLOTHING: A LITTLE
DAILY ACTIVITIY SCORE: 16
MOBILITY SCORE: 16
MOVING FROM LYING ON BACK TO SITTING ON SIDE OF FLAT BED WITH BEDRAILS: A LITTLE
PERSONAL GROOMING: A LITTLE
TOILETING: A LITTLE

## 2025-02-13 ASSESSMENT — PAIN SCALES - GENERAL
PAINLEVEL_OUTOF10: 5 - MODERATE PAIN
PAINLEVEL_OUTOF10: 0 - NO PAIN

## 2025-02-13 ASSESSMENT — ACTIVITIES OF DAILY LIVING (ADL)
EFFECT OF PAIN ON DAILY ACTIVITIES: .
HOME_MANAGEMENT_TIME_ENTRY: 10

## 2025-02-13 ASSESSMENT — PAIN - FUNCTIONAL ASSESSMENT: PAIN_FUNCTIONAL_ASSESSMENT: 0-10

## 2025-02-13 NOTE — CARE PLAN
The patient's goals for the shift include remembering how to use the TV.    The clinical goals for the shift include monitoring hematoma for any increased swelling and bruising through the end of the shift.    Over the shift, the patient did not make progress this goal.  Hematoma bruising expanded from original site.  New borders drawn with skin marker on left hip and buttocks.  Pt currently denies pain.  No acute events overnight.  Call light within reach.  Bed alarm on.

## 2025-02-13 NOTE — DISCHARGE SUMMARY
Discharge Diagnosis  Hematoma of left buttock    Issues Requiring Follow-Up  Charge to skilled nursing facility    Discharge Meds     Medication List      ASK your doctor about these medications     brimonidine 0.2 % ophthalmic solution; Commonly known as: AlphaGAN   dorzolamide-timoloL 22.3-6.8 mg/mL ophthalmic solution; Commonly known   as: Cosopt   latanoprost 0.005 % ophthalmic solution; Commonly known as: Xalatan   QUEtiapine 25 mg tablet; Commonly known as: SEROquel; Take 0.5 tablets   (12.5 mg) by mouth every 8 hours if needed (Agitation (hold for   drowsiness/lethargy)).   venlafaxine 37.5 mg tablet; Commonly known as: Effexor       Test Results Pending At Discharge  Pending Labs       No current pending labs.            Hospital Course   Patient was admitted to the hospital status post fall and left hip hematoma physical therapy recommended skilled nursing for the patient patient had minimal pain over the left hip with no fracture patient had thyroid nodule need to follow-up as an outpatient with ultrasound    Pertinent Physical Exam At Time of Discharge  Physical Exam  HENT:      Right Ear: External ear normal.      Left Ear: External ear normal.      Mouth/Throat:      Mouth: Mucous membranes are moist.   Cardiovascular:      Rate and Rhythm: Normal rate and regular rhythm.      Heart sounds: No murmur heard.     No friction rub. No gallop.   Pulmonary:      Effort: No accessory muscle usage or respiratory distress.      Breath sounds: No stridor. No wheezing or rhonchi.   Chest:      Chest wall: No tenderness.   Abdominal:      General: There is no distension.      Palpations: There is no mass.      Tenderness: There is no abdominal tenderness. There is no guarding or rebound.   Musculoskeletal:         General: No deformity or signs of injury.      Cervical back: No rigidity or tenderness. Normal range of motion.      Right lower leg: No edema.      Left lower leg: No edema.   Skin:     Coloration: Skin  is not jaundiced or pale.      Findings: No lesion.   Neurological:      General: No focal deficit present.      Mental Status: She is alert, oriented to person, place, and time and easily aroused.      Cranial Nerves: No cranial nerve deficit.      Sensory: No sensory deficit.      Motor: No weakness.         Outpatient Follow-Up  No future appointments.      MARCOS Phelan MD

## 2025-02-13 NOTE — CARE PLAN
The patient's goals for the shift include  remain  afebrile    The clinical goals for the shift include  not  fall  by  end of shift

## 2025-02-13 NOTE — PROGRESS NOTES
Spoke with pt's Deepali NIETO.  She is requesting snf referrals to [facility] ([facility]), [facility] and [facility] (locked dementia unit).  Request to DSC to send.    11:00am; [facility] can accept.  Request to Direct Precert Team to start precert.  Pt has a dc order.  POA updated.    4:00pm; Pt has precert for [facility].  Request to DSC to complete PASRR- screen tripped.

## 2025-02-13 NOTE — PROGRESS NOTES
Occupational Therapy    OT Treatment    Patient Name: Fernanda Hughes  MRN: [id number]  Today's Date: 2/13/2025  Time Calculation  Start Time: 1037  Stop Time: 1105  Time Calculation (min): 28 min       3105/3105-A    Assessment:  OT Assessment: Patient demonstrated decreased independence in ADL tasks and functional mobility and could benefit from continued O.T. skilled services.  End of Session Communication: Bedside nurse  End of Session Patient Position: Up in chair, Alarm on       Plan:  OT Frequency: 3 times per week     Subjective     Current Problem:  Patient Active Problem List   Diagnosis    UTI (urinary tract infection)    Vascular dementia    Confusion and disorientation    Hematoma of left buttock    Falls, sequela    Acute on chronic anemia    Ambulatory dysfunction       General:  OT Received On: 02/13/25  Reason for Referral: impaired self care  Referred By: MARCOS Phelan  Past Medical History Relevant to Rehab: vascular dementia, HLD  Prior to Session Communication: Bedside nurse  Patient Position Received: Up in chair, Alarm on  General Comment: Patient was agreeable to working with therapy.    Vital Signs:       Pain:  Pain Assessment  Pain Assessment:  (No voiced complaints)  Objective      Activities of Daily Living:    Grooming  Grooming Level of Assistance: Minimum assistance (Patient unsteady at times when standing at sink and required minimal to moderate assist to find her balance.  AFter standing for several minutes patient had no further loss of balance during standing ADL tasks.)  Grooming Where Assessed: Standing sinkside (Chair was placed behind patient for safety.)  Grooming Comments: Patient stood for oral hygiene, face washing and required minimal cues to locate items on sink secondary to visual deficit.                   Functional Standing Tolerance:       Bed Mobility/Transfers: Bed Mobility  Bed Mobility: No  Transfers  Transfer: Yes  Transfer 1  Transfer From 1: Sit to  Transfer to 1:  Stand  Transfer Device 1: Walker, Gait belt  Transfer Level of Assistance 1: Moderate assistance                Therapy/Activity:               Strength:       Other Activity:       Outcome Measures:[facility] Daily Activity  Putting on and taking off regular lower body clothing: A lot  Bathing (including washing, rinsing, drying): A lot  Putting on and taking off regular upper body clothing: A little  Toileting, which includes using toilet, bedpan or urinal: A lot  Taking care of personal grooming such as brushing teeth: A little  Eating Meals: None  Daily Activity - Total Score: 16  Education Documentation  No documentation found.  Education Comments  No comments found.           EDUCATION:  Education  Individual(s) Educated: Patient  Education Provided: Fall precautons, Risk and benefits of OT discussed with patient or other  Plan of Care Discussed and Agreed Upon: yes  Patient Response to Education: Patient/Caregiver Verbalized Understanding of Information    Goals:  Encounter Problems       Encounter Problems (Active)       OT Goals       Patient will complete functional transfers with SBA. (Progressing)       Start:  02/12/25    Expected End:  02/26/25            Patient will complete toileting with SBA. (Progressing)       Start:  02/12/25    Expected End:  02/26/25            Patient will complete LE dressing with SBA. (Progressing)       Start:  02/12/25    Expected End:  02/26/25

## 2025-02-13 NOTE — PROGRESS NOTES
Physical Therapy    Physical Therapy    Physical Therapy Treatment    Patient Name: Fernanda Hughes  MRN: [id number]  Today's Date: 2/13/2025  Time Calculation  Start Time: 1046  Stop Time: 1115  Time Calculation (min): 29 min     3105/3105-A    Assessment/Plan   PT Assessment  PT Assessment Results: Decreased strength, Decreased endurance, Impaired balance, Decreased mobility  Rehab Prognosis: Good  End of Session Patient Position: Up in chair  PT Plan  Inpatient/Swing Bed or Outpatient: Inpatient  PT Plan  Treatment/Interventions: Bed mobility, Gait training, Transfer training, Balance training, Neuromuscular re-education, Strengthening, Range of motion, Therapeutic exercise, Therapeutic activity, Home exercise program  PT Plan: Ongoing PT  PT Frequency: 3 times per week  PT Discharge Recommendations: Moderate intensity level of continued care  Equipment Recommended upon Discharge: Wheeled walker  PT Recommended Transfer Status: Assist x2 (Min-Mod A)  PT - OK to Discharge: Yes     02/13/25 1046   PT  Visit   PT Received On 02/13/25   Response to Previous Treatment Patient with no complaints from previous session.   General   Reason for Referral Impaired Mobility   Referred By MARCOS Phelan   Past Medical History Relevant to Rehab vascular dementia, HLD   Prior to Session Communication Bedside nurse   Patient Position Received Bed, 3 rail up   Preferred Learning Style verbal;visual   General Comment Patient agreebale to PT session states she is having difficulty seeing today   Precautions   Medical Precautions Fall precautions   Pain Assessment   Pain Assessment 0-10   0-10 (Numeric) Pain Score 5 - Moderate pain   Pain Type Acute pain   Pain Location Buttocks   Pain Orientation Left   Pain Frequency Other (Comment)  (with movement)   Pain Onset Sudden   Effect of Pain on Daily Activities .   Cognition   Overall Cognitive Status WFL   Orientation Level Disoriented to time;Disoriented to situation   Coordination    Movements are Fluid and Coordinated No   Coordination Comment Patient  would lift Opposite LE from hip with knee extended with no warning during gait once with R LE once with L LE   General Observation   General Observation Patient is unpredicatable with movement   Therapeutic Exercise   Therapeutic Exercise Performed Yes   Therapeutic Exercise Activity 1 Heel Raises, Toe Raises, LAQ, Marching x 10 reps each in limited range L  LE   Therapeutic Activity   Therapeutic Activity Performed Yes   Balance/Neuromuscular Re-Education   Balance/Neuromuscular Re-Education Activity Performed Yes   Ambulation/Gait Training   Ambulation/Gait Training Performed Yes   Ambulation/Gait Training 1   Surface 1 Level tile   Device 1 Rolling walker   Gait Support Devices Gait belt   Assistance 1 Moderate assistance  (plus chair follow Patient is very unsteady with unpredictable  balance loss and leg movements)   Comments/Distance (ft) 1 12', 10'   Transfers   Transfer Yes   Transfer 1   Technique 1 Sit to stand;Stand to sit   Transfer Device 1 Walker;Gait belt   Transfer Level of Assistance 1 Moderate assistance  (x2)   Trials/Comments 1 x3   PT Assessment   PT Assessment Results Decreased strength;Decreased endurance;Impaired balance;Decreased mobility   Rehab Prognosis Good   End of Session Patient Position Up in chair   Outpatient Education   Individual(s) Educated Patient   Education Provided Fall Risk   PT Plan   Inpatient/Swing Bed or Outpatient Inpatient     Outcome Measures:  [facility] Basic Mobility  Turning from your back to your side while in a flat bed without using bedrails: A little  Moving from lying on your back to sitting on the side of a flat bed without using bedrails: A little  Moving to and from bed to chair (including a wheelchair): A little  Standing up from a chair using your arms (e.g. wheelchair or bedside chair): A lot  To walk in hospital room: A lot  Climbing 3-5 steps with railing: A lot  Basic Mobility -  Total Score: 15                             EDUCATION:  Outpatient Education  Individual(s) Educated: Patient  Education Provided: Fall Risk  Education Documentation  No documentation found.  Education Comments  No comments found.        GOALS:  Encounter Problems       Encounter Problems (Active)       PT Problem       Pt will be able to perform all bed mobility tasks with Mod I.  (Progressing)       Start:  02/12/25    Expected End:  02/26/25            Pt will perform all transfers with Mod I and LRAD with proper safety mechanics.   (Progressing)       Start:  02/12/25    Expected End:  02/26/25            Pt will ambulate 100 ft with Mod I using LRAD for improved functional independence.  (Progressing)       Start:  02/12/25    Expected End:  02/26/25               Pain - Adult

## 2025-02-14 PROBLEM — H40.9 END-STAGE GLAUCOMA: Status: ACTIVE | Noted: 2025-02-14

## 2025-02-14 LAB
ANION GAP SERPL CALC-SCNC: 9 MMOL/L (ref 10–20)
BUN SERPL-MCNC: 21 MG/DL (ref 6–23)
CALCIUM SERPL-MCNC: 8.9 MG/DL (ref 8.6–10.3)
CHLORIDE SERPL-SCNC: 104 MMOL/L (ref 98–107)
CO2 SERPL-SCNC: 27 MMOL/L (ref 21–32)
CREAT SERPL-MCNC: 0.98 MG/DL (ref 0.5–1.05)
EGFRCR SERPLBLD CKD-EPI 2021: 56 ML/MIN/1.73M*2
ERYTHROCYTE [DISTWIDTH] IN BLOOD BY AUTOMATED COUNT: 13.4 % (ref 11.5–14.5)
GLUCOSE SERPL-MCNC: 85 MG/DL (ref 74–99)
HCT VFR BLD AUTO: 32.6 % (ref 36–46)
HGB BLD-MCNC: 10.4 G/DL (ref 12–16)
MAGNESIUM SERPL-MCNC: 2.08 MG/DL (ref 1.6–2.4)
MCH RBC QN AUTO: 30.1 PG (ref 26–34)
MCHC RBC AUTO-ENTMCNC: 31.9 G/DL (ref 32–36)
MCV RBC AUTO: 95 FL (ref 80–100)
NRBC BLD-RTO: 0 /100 WBCS (ref 0–0)
PLATELET # BLD AUTO: 277 X10*3/UL (ref 150–450)
POTASSIUM SERPL-SCNC: 4.4 MMOL/L (ref 3.5–5.3)
RBC # BLD AUTO: 3.45 X10*6/UL (ref 4–5.2)
SODIUM SERPL-SCNC: 136 MMOL/L (ref 136–145)
WBC # BLD AUTO: 7.3 X10*3/UL (ref 4.4–11.3)

## 2025-02-14 PROCEDURE — 80048 BASIC METABOLIC PNL TOTAL CA: CPT

## 2025-02-14 PROCEDURE — 2500000002 HC RX 250 W HCPCS SELF ADMINISTERED DRUGS (ALT 637 FOR MEDICARE OP, ALT 636 FOR OP/ED): Performed by: NURSE PRACTITIONER

## 2025-02-14 PROCEDURE — G0378 HOSPITAL OBSERVATION PER HR: HCPCS

## 2025-02-14 PROCEDURE — 85027 COMPLETE CBC AUTOMATED: CPT

## 2025-02-14 PROCEDURE — 2500000005 HC RX 250 GENERAL PHARMACY W/O HCPCS

## 2025-02-14 PROCEDURE — 83735 ASSAY OF MAGNESIUM: CPT

## 2025-02-14 PROCEDURE — 36415 COLL VENOUS BLD VENIPUNCTURE: CPT

## 2025-02-14 PROCEDURE — 99221 1ST HOSP IP/OBS SF/LOW 40: CPT | Performed by: NURSE PRACTITIONER

## 2025-02-14 PROCEDURE — 2500000001 HC RX 250 WO HCPCS SELF ADMINISTERED DRUGS (ALT 637 FOR MEDICARE OP): Performed by: NURSE PRACTITIONER

## 2025-02-14 RX ADMIN — DORZOLAMIDE HYDROCHLORIDE AND TIMOLOL MALEATE 1 DROP: 20; 5 SOLUTION/ DROPS OPHTHALMIC at 09:49

## 2025-02-14 RX ADMIN — BRIMONIDINE TARTRATE 1 DROP: 2 SOLUTION/ DROPS OPHTHALMIC at 09:49

## 2025-02-14 RX ADMIN — BRIMONIDINE TARTRATE 1 DROP: 2 SOLUTION/ DROPS OPHTHALMIC at 20:29

## 2025-02-14 RX ADMIN — LATANOPROST 1 DROP: 50 SOLUTION OPHTHALMIC at 20:29

## 2025-02-14 RX ADMIN — Medication 3 MG: at 20:29

## 2025-02-14 RX ADMIN — DORZOLAMIDE HYDROCHLORIDE AND TIMOLOL MALEATE 1 DROP: 20; 5 SOLUTION/ DROPS OPHTHALMIC at 20:29

## 2025-02-14 RX ADMIN — QUETIAPINE FUMARATE 12.5 MG: 25 TABLET ORAL at 21:04

## 2025-02-14 RX ADMIN — VENLAFAXINE 37.5 MG: 37.5 TABLET ORAL at 09:49

## 2025-02-14 ASSESSMENT — COGNITIVE AND FUNCTIONAL STATUS - GENERAL
MOBILITY SCORE: 17
TURNING FROM BACK TO SIDE WHILE IN FLAT BAD: A LITTLE
STANDING UP FROM CHAIR USING ARMS: A LITTLE
MOVING TO AND FROM BED TO CHAIR: A LITTLE
EATING MEALS: A LITTLE
PERSONAL GROOMING: A LITTLE
DAILY ACTIVITIY SCORE: 18
DRESSING REGULAR UPPER BODY CLOTHING: A LITTLE
DRESSING REGULAR LOWER BODY CLOTHING: A LITTLE
MOVING FROM LYING ON BACK TO SITTING ON SIDE OF FLAT BED WITH BEDRAILS: A LITTLE
TOILETING: A LITTLE
CLIMB 3 TO 5 STEPS WITH RAILING: A LOT
HELP NEEDED FOR BATHING: A LITTLE
WALKING IN HOSPITAL ROOM: A LITTLE

## 2025-02-14 ASSESSMENT — PAIN SCALES - GENERAL
PAINLEVEL_OUTOF10: 0 - NO PAIN
PAINLEVEL_OUTOF10: 0 - NO PAIN

## 2025-02-14 ASSESSMENT — PAIN - FUNCTIONAL ASSESSMENT: PAIN_FUNCTIONAL_ASSESSMENT: 0-10

## 2025-02-14 NOTE — CARE PLAN
The clinical goals for the shift include pt will remain free from fall through the end of the shift    Problem: Discharge Planning  Goal: Discharge to home or other facility with appropriate resources  Outcome: Progressing     Problem: Chronic Conditions and Co-morbidities  Goal: Patient's chronic conditions and co-morbidity symptoms are monitored and maintained or improved  Outcome: Progressing     Problem: Nutrition  Goal: Nutrient intake appropriate for maintaining nutritional needs  Outcome: Progressing     Problem: Fall/Injury  Goal: Verbalize understanding of personal risk factors for fall in the hospital  Outcome: Progressing  Goal: Verbalize understanding of risk factor reduction measures to prevent injury from fall in the home  Outcome: Progressing  Goal: Use assistive devices by end of the shift  Outcome: Progressing  Goal: Pace activities to prevent fatigue by end of the shift  Outcome: Progressing

## 2025-02-14 NOTE — PROGRESS NOTES
Internal Medicine Progress Note    Patient Name: Fernanda Hughes          MRN: [id number]  Today's Date: February 14, 2025          Attending: MARCOS Phelan MD    Subjective:  Patient was seen and examined at bedside, patient complains of vision loss in both eyes, initially she states it is a new but on reviewing chart this is an old finding, patient was seen by pulmonology last year and she was diagnosed with end-stage glaucoma.    Review Of Systems:  GENERAL: No malaise or fevers.  CARDIOVASCULAR: Negative for chest pain, leg swelling  RESPIRATORY: Negative for shortness of breath  GI: No nausea, vomiting, or diarrhea  MUSCULOSKELETAL: Negative for joint pain or swelling      Objective:  Vitals:    02/14/25 1329 02/14/25 1330 02/14/25 1331 02/14/25 1600   BP: 131/63 136/63 120/56 148/67   BP Location: Right arm Right arm Right arm Right arm   Patient Position: Lying Sitting Standing Lying   Pulse: 79 78 82 78   Resp:    16   Temp:    36.4 °C (97.5 °F)   TempSrc:    Temporal   SpO2:    98%   Weight:       Height:               Physical Exam:   General appearance: Well-appearing alert, in no acute distress  Skin: Ecchymosis on the forehead  Lungs: Clear to auscultation, no wheezing or rhonchi  Heart: RRR without murmur, gallop, or rubs.  No ectopy  Abdomen: Soft, non-tender. Bowel sounds normal.  Extremities: No deformity, no edema  Peripheral pulses: Normal  Neuro: Alert but confused, moves all extremities    Labs:  Results for orders placed or performed during the hospital encounter of 02/11/25 (from the past 24 hours)   Basic metabolic panel   Result Value Ref Range    Glucose 85 74 - 99 mg/dL    Sodium 136 136 - 145 mmol/L    Potassium 4.4 3.5 - 5.3 mmol/L    Chloride 104 98 - 107 mmol/L    Bicarbonate 27 21 - 32 mmol/L    Anion Gap 9 (L) 10 - 20 mmol/L    Urea Nitrogen 21 6 - 23 mg/dL    Creatinine 0.98 0.50 - 1.05 mg/dL    eGFR 56 (L) >60 mL/min/1.73m*2    Calcium 8.9 8.6 - 10.3 mg/dL   CBC   Result Value Ref Range  "   WBC 7.3 4.4 - 11.3 x10*3/uL    nRBC 0.0 0.0 - 0.0 /100 WBCs    RBC 3.45 (L) 4.00 - 5.20 x10*6/uL    Hemoglobin 10.4 (L) 12.0 - 16.0 g/dL    Hematocrit 32.6 (L) 36.0 - 46.0 %    MCV 95 80 - 100 fL    MCH 30.1 26.0 - 34.0 pg    MCHC 31.9 (L) 32.0 - 36.0 g/dL    RDW 13.4 11.5 - 14.5 %    Platelets 277 150 - 450 x10*3/uL   Magnesium   Result Value Ref Range    Magnesium 2.08 1.60 - 2.40 mg/dL       Medications:  Scheduled medications  brimonidine, 1 drop, Both Eyes, BID  dorzolamide-timoloL, 1 drop, Both Eyes, BID  latanoprost, 1 drop, Both Eyes, Nightly  venlafaxine, 37.5 mg, oral, q AM      Continuous medications     PRN medications  PRN medications: acetaminophen **OR** acetaminophen **OR** acetaminophen, melatonin, polyethylene glycol, QUEtiapine      Assessment/Plan:  Assessment & Plan  Hematoma of left buttock  Continue monitoring blood count  Vascular dementia  Continue supportive care  Falls, sequela  PT/OT  Fall precautions  Acute on chronic anemia    Ambulatory dysfunction  PT/OT  End-stage glaucoma  Patient complains of vision loss, she states it is new, and reviewing chart patient was admitted last year and she had same complaints, she was seen by ophthalmologist and she was diagnosed with end-stage glaucoma, at this time we will not pursue with further workup    Discussed with patient, SYEDA Rosales MD   Date: 02/14/25  Time: 6:10 PM    This note was partially created using voice recognition software and is inherently subject to errors including those of syntax and \"sound-alike\" substitutions which may escape proofreading. In such instances, original meaning may be extrapolated by contextual derivation    "

## 2025-02-14 NOTE — ASSESSMENT & PLAN NOTE
Patient complains of vision loss, she states it is new, and reviewing chart patient was admitted last year and she had same complaints, she was seen by ophthalmologist and she was diagnosed with end-stage glaucoma, at this time we will not pursue with further workup

## 2025-02-14 NOTE — CONSULTS
Inpatient consult to Neurology  Consult performed by: ELLEN Jaime  Consult ordered by: ELLEN Jensen          History Of Present Illness  Fernanda Hughes is a 84 y.o. female presenting with blurry vision. Pt was initially admitted for a hematoma after a fall, and she was going to be discharged today, however, when the physician rounded today, she reported blurry vision. Pt has a poor short term memory due to vascular dementia, so she was not able to provide details about when the blurred vision actually started. She also has, by history, end stage glaucoma. I did speak to her POA, Deepali, who states pt does complain about her vision very frequently and follows closely with ophthalmology. Pt did tell me that the blurred vision is in both eyes and she feels like it's better when she puts on her glasses, but again, she has vascular dementia and the details are questionable. She denies associated headache, dizziness, speech changes, limb weakness, or sensation changes.     Past Medical History  Past Medical History:   Diagnosis Date    Anxiety     Falls     Gastroesophageal reflux disease     Glaucoma     Hearing loss     History of breast cancer     History of closed head injury     History of seizure     Irritable bowel syndrome     Memory loss     Mild cognitive impairment     Osteoarthritis     Overactive bladder     Peripheral neuropathy     Underweight     Vascular dementia     Visual field loss      Surgical History  Past Surgical History:   Procedure Laterality Date    BREAST LUMPECTOMY Right     CATARACT EXTRACTION, BILATERAL      CHOLECYSTECTOMY      LYMPH NODE DISSECTION      ORIF FEMUR FRACTURE      TOTAL ABDOMINAL HYSTERECTOMY W/ BILATERAL SALPINGOOPHORECTOMY       Social History  Social History     Tobacco Use    Smoking status: Never    Smokeless tobacco: Never   Vaping Use    Vaping status: Never Used   Substance Use Topics    Alcohol use: Never    Drug use: Never      Allergies  Patient has no known allergies.  Medications Prior to Admission   Medication Sig Dispense Refill Last Dose/Taking    brimonidine (AlphaGAN) 0.2 % ophthalmic solution Administer 1 drop into both eyes 2 times a day.   2/11/2025 Evening    dorzolamide-timoloL (Cosopt) 22.3-6.8 mg/mL ophthalmic solution Administer 1 drop into both eyes 2 times a day.   2/11/2025 Evening    latanoprost (Xalatan) 0.005 % ophthalmic solution Administer 1 drop into both eyes once daily at bedtime.   2/11/2025 Evening    QUEtiapine (SEROquel) 25 mg tablet Take 0.5 tablets (12.5 mg) by mouth every 8 hours if needed (Agitation (hold for drowsiness/lethargy)). 6 tablet 0 Unknown    venlafaxine (Effexor) 37.5 mg tablet Take 1 tablet (37.5 mg) by mouth once daily in the morning.          Review of Systems  Neurological Exam  Mental Status  Awake and alert. Oriented only to person, place and time. dysarthria present.  Naming and repetition intact..    Cranial Nerves  CN II: Visual fields full to confrontation.  CN III, IV, VI: Extraocular movements intact bilaterally.   Right pupil: 5 mm.   Left pupil: 4 mm.  CN V: Facial sensation is normal.  CN VII: Full and symmetric facial movement.  CN VIII: Hearing is normal.  CN IX, X: Palate elevates symmetrically  CN XI: Shoulder shrug strength is normal.  CN XII: Tongue midline without atrophy or fasciculations.    Motor   Strength is 5/5 throughout all four extremities.    Sensory  Light touch is normal in upper and lower extremities.     Coordination  Right: Finger-to-nose normal.Left: Finger-to-nose normal.    Physical Exam  Constitutional:       General: She is awake.   Eyes:      Extraocular Movements: Extraocular movements intact.   Neurological:      Mental Status: She is alert.      Cranial Nerves: Dysarthria present.      Motor: Motor strength is normal.      Last Recorded Vitals  Blood pressure 120/56, pulse 82, temperature 36.5 °C (97.7 °F), temperature source Temporal, resp.  "rate 16, height 1.702 m (5' 7\"), weight 68 kg (150 lb), SpO2 99%.    Relevant Results  Scheduled medications  brimonidine, 1 drop, Both Eyes, BID  dorzolamide-timoloL, 1 drop, Both Eyes, BID  latanoprost, 1 drop, Both Eyes, Nightly  venlafaxine, 37.5 mg, oral, q AM      Continuous medications     PRN medications  PRN medications: acetaminophen **OR** acetaminophen **OR** acetaminophen, melatonin, polyethylene glycol, QUEtiapine  Results for orders placed or performed during the hospital encounter of 02/11/25 (from the past 24 hours)   Basic metabolic panel   Result Value Ref Range    Glucose 85 74 - 99 mg/dL    Sodium 136 136 - 145 mmol/L    Potassium 4.4 3.5 - 5.3 mmol/L    Chloride 104 98 - 107 mmol/L    Bicarbonate 27 21 - 32 mmol/L    Anion Gap 9 (L) 10 - 20 mmol/L    Urea Nitrogen 21 6 - 23 mg/dL    Creatinine 0.98 0.50 - 1.05 mg/dL    eGFR 56 (L) >60 mL/min/1.73m*2    Calcium 8.9 8.6 - 10.3 mg/dL   CBC   Result Value Ref Range    WBC 7.3 4.4 - 11.3 x10*3/uL    nRBC 0.0 0.0 - 0.0 /100 WBCs    RBC 3.45 (L) 4.00 - 5.20 x10*6/uL    Hemoglobin 10.4 (L) 12.0 - 16.0 g/dL    Hematocrit 32.6 (L) 36.0 - 46.0 %    MCV 95 80 - 100 fL    MCH 30.1 26.0 - 34.0 pg    MCHC 31.9 (L) 32.0 - 36.0 g/dL    RDW 13.4 11.5 - 14.5 %    Platelets 277 150 - 450 x10*3/uL   Magnesium   Result Value Ref Range    Magnesium 2.08 1.60 - 2.40 mg/dL        I have personally reviewed the following imaging results ECG 12 lead    Result Date: 2/13/2025  Normal sinus rhythm Normal ECG When compared with ECG of 02-DEC-2024 18:18, No significant change was found Confirmed by Triston Vegas ([id number]) on 2/13/2025 8:56:24 PM    ECG 12 lead    Result Date: 2/13/2025  Normal sinus rhythm Possible Lateral infarct , age undetermined Abnormal ECG When compared with ECG of 11-FEB-2025 23:36, No significant change was found    CT head wo IV contrast    Result Date: 2/12/2025  Interpreted By:  Lashaun Nixon, STUDY: CT HEAD WO IV CONTRAST; CT CERVICAL SPINE WO IV " CONTRAST;  2/12/2025 1:37 am   INDICATION: Signs/Symptoms:Unwitnessed fall, dementia.   COMPARISON: 9/2/2024   ACCESSION NUMBER(S): [id number]; [id number]   ORDERING CLINICIAN: JOAN SRIVASTAVA   TECHNIQUE: Noncontrast CT images of head. Axial noncontrast CT images of the cervical spine with coronal and sagittal reconstructed images.   FINDINGS: BRAIN PARENCHYMA: Gray-white matter interfaces are preserved. No mass effect or midline shift. Generalized parenchymal volume loss noted with concordant ventricular enlargement. Non-specific white matter changes noted, which may be related to small vessel disease.   HEMORRHAGE: No acute intracranial hemorrhage. VENTRICLES and EXTRA-AXIAL SPACES: Normal size. EXTRACRANIAL SOFT TISSUES: Within normal limits. PARANASAL SINUSES/MASTOIDS: The visualized paranasal sinuses and mastoid air cells are aerated. CALVARIUM: No depressed skull fracture. No destructive osseous lesion.   OTHER FINDINGS: None.   CERVICAL SPINE:   ALIGNMENT: Normal. VERTEBRAE: No acute fracture. SPINAL CANAL: Degenerative changes facet and uncinate arthropathy, as well as disc space narrowing and end plate hypertrophy. No critical spinal canal stenosis. PREVERTEBRAL SOFT TISSUES: No prevertebral soft tissue swelling. LUNG APICES: Biapical scarring.   OTHER FINDINGS: 1.8 x 1.4 cm midthyroid cystic lesion       No acute intracranial abnormality.   No acute fracture or traumatic subluxation of the cervical spine.   1.8 x 1.4 cm midthyroid cystic lesion. Dedicated ultrasound imaging may be obtained on a non-emergent basis if not already performed.   MACRO: Incidental Finding:  There are few small hypoattenuating nodules measuring equal to or greater than 1.5 cm in the thyroid gland. (**-YCF-**)   Instructions:  Further evaluation with nonemergent thyroid ultrasound. (Managing Incidental Thyroid Nodules Detected on Imaging: White Paper of the ACR Incidental Thyroid Findings Committee. Gerri Nelson. et al.  Journal of the American College of Radiology,Volume 12, Issue 2, 143 - 150.) [email or web address]   Signed by: Lashaun Nixon 2/12/2025 2:51 AM Dictation workstation:   [id number]    CT cervical spine wo IV contrast    Result Date: 2/12/2025  Interpreted By:  Lashaun Nixon, STUDY: CT HEAD WO IV CONTRAST; CT CERVICAL SPINE WO IV CONTRAST;  2/12/2025 1:37 am   INDICATION: Signs/Symptoms:Unwitnessed fall, dementia.   COMPARISON: 9/2/2024   ACCESSION NUMBER(S): [id number]; [id number]   ORDERING CLINICIAN: JOAN SRIVASTAVA   TECHNIQUE: Noncontrast CT images of head. Axial noncontrast CT images of the cervical spine with coronal and sagittal reconstructed images.   FINDINGS: BRAIN PARENCHYMA: Gray-white matter interfaces are preserved. No mass effect or midline shift. Generalized parenchymal volume loss noted with concordant ventricular enlargement. Non-specific white matter changes noted, which may be related to small vessel disease.   HEMORRHAGE: No acute intracranial hemorrhage. VENTRICLES and EXTRA-AXIAL SPACES: Normal size. EXTRACRANIAL SOFT TISSUES: Within normal limits. PARANASAL SINUSES/MASTOIDS: The visualized paranasal sinuses and mastoid air cells are aerated. CALVARIUM: No depressed skull fracture. No destructive osseous lesion.   OTHER FINDINGS: None.   CERVICAL SPINE:   ALIGNMENT: Normal. VERTEBRAE: No acute fracture. SPINAL CANAL: Degenerative changes facet and uncinate arthropathy, as well as disc space narrowing and end plate hypertrophy. No critical spinal canal stenosis. PREVERTEBRAL SOFT TISSUES: No prevertebral soft tissue swelling. LUNG APICES: Biapical scarring.   OTHER FINDINGS: 1.8 x 1.4 cm midthyroid cystic lesion       No acute intracranial abnormality.   No acute fracture or traumatic subluxation of the cervical spine.   1.8 x 1.4 cm midthyroid cystic lesion. Dedicated ultrasound imaging may be obtained on a non-emergent basis if not already performed.   MACRO: Incidental Finding:  There are  few small hypoattenuating nodules measuring equal to or greater than 1.5 cm in the thyroid gland. (**-YCF-**)   Instructions:  Further evaluation with nonemergent thyroid ultrasound. (Managing Incidental Thyroid Nodules Detected on Imaging: White Paper of the ACR Incidental Thyroid Findings Committee. Gerri Nelson et al. Journal of the American College of Radiology,Volume 12, Issue 2, 143 - 150.) [email or web address]   Signed by: Lashaun Nixon 2/12/2025 2:51 AM Dictation workstation:   [id number]    XR femur left 2+ views    Result Date: 2/12/2025  Interpreted By:  Lashaun Nixon, STUDY: XR PELVIS 1-2 VIEWS; XR FEMUR LEFT 2+ VIEWS; ;  2/12/2025 1:30 am   INDICATION: Signs/Symptoms:Unwitnessed fall, left hip and buttock pain; Signs/Symptoms:Unwitnessed fall, left hip and leg pain.     COMPARISON: 12/02/2024   ACCESSION NUMBER(S): [id number]; [id number]   ORDERING CLINICIAN: JOAN SRIVASTAVA   FINDINGS: AP pelvis, 2 views of the left femur   Intramedullary nail and screw seen in the left hip. No acute fracture or dislocation. Scattered degenerative changes.       No acute osseous abnormality identified.   MACRO: None   Signed by: Lashaun Nixon 2/12/2025 2:25 AM Dictation workstation:   [id number]    XR pelvis 1-2 views    Result Date: 2/12/2025  Interpreted By:  Lashaun Nixon, STUDY: XR PELVIS 1-2 VIEWS; XR FEMUR LEFT 2+ VIEWS; ;  2/12/2025 1:30 am   INDICATION: Signs/Symptoms:Unwitnessed fall, left hip and buttock pain; Signs/Symptoms:Unwitnessed fall, left hip and leg pain.     COMPARISON: 12/02/2024   ACCESSION NUMBER(S): [id number]; [id number]   ORDERING CLINICIAN: JOAN SRIVASTAVA   FINDINGS: AP pelvis, 2 views of the left femur   Intramedullary nail and screw seen in the left hip. No acute fracture or dislocation. Scattered degenerative changes.       No acute osseous abnormality identified.   MACRO: None   Signed by: Lashaun Nixon 2/12/2025 2:25 AM Dictation workstation:   [id number]    XR  chest 1 view    Result Date: 2/12/2025  Interpreted By:  Lashaun Nixon, STUDY: XR CHEST 1 VIEW;  2/12/2025 1:30 am   INDICATION: Signs/Symptoms:Unwitnessed fall, dementia.     COMPARISON: 09/02/2024   ACCESSION NUMBER(S): [id number]   ORDERING CLINICIAN: JOAN SRIVASTAVA   FINDINGS: AP radiograph of the chest was provided.       CARDIOMEDIASTINAL SILHOUETTE: Cardiomediastinal silhouette is stable in size and configuration.   LUNGS: Hyperinflated lung fields. No focal consolidation, pleural effusion or sizable pneumothorax seen.   ABDOMEN: No remarkable upper abdominal findings.   BONES: No acute osseous changes.       Emphysema/COPD. No focal consolidation.   MACRO: None   Signed by: Lashaun Nixon 2/12/2025 2:19 AM Dictation workstation:   [id number]  .     Assessment/Plan   Assessment & Plan  Hematoma of left buttock    Vascular dementia    Falls, sequela    Acute on chronic anemia    Ambulatory dysfunction    Blurry vision- pt unable to provide full details due to vascular dementia and poor short term memory. 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 She is closely managed by ophthalmology.    Recommend:    MRI brain without contrast.    Case/plan discussed with Dr. Bryant.       MORRIS Jaime-CNP

## 2025-02-14 NOTE — CARE PLAN
The clinical goals for the shift include Patient will remain safe with no fall/injury thru the end of this shift.      Problem: Discharge Planning  Goal: Discharge to home or other facility with appropriate resources  Outcome: Progressing     Problem: Nutrition  Goal: Nutrient intake appropriate for maintaining nutritional needs  Outcome: Progressing     Problem: Skin  Goal: Decreased wound size/increased tissue granulation at next dressing change  Outcome: Progressing  Flowsheets (Taken 2/14/2025 0650)  Decreased wound size/increased tissue granulation at next dressing change: Promote sleep for wound healing  Goal: Participates in plan/prevention/treatment measures  Outcome: Progressing  Flowsheets (Taken 2/14/2025 0650)  Participates in plan/prevention/treatment measures:   Elevate heels   Increase activity/out of bed for meals  Goal: Prevent/manage excess moisture  Outcome: Progressing  Flowsheets (Taken 2/14/2025 0650)  Prevent/manage excess moisture:   Cleanse incontinence/protect with barrier cream   Monitor for/manage infection if present  Goal: Prevent/minimize sheer/friction injuries  Outcome: Progressing  Flowsheets (Taken 2/14/2025 0650)  Prevent/minimize sheer/friction injuries:   Increase activity/out of bed for meals   Use pull sheet   Turn/reposition every 2 hours/use positioning/transfer devices   HOB 30 degrees or less  Goal: Promote/optimize nutrition  Outcome: Progressing  Goal: Promote skin healing  Outcome: Progressing  Flowsheets (Taken 2/14/2025 0650)  Promote skin healing:   Assess skin/pad under line(s)/device(s)   Turn/reposition every 2 hours/use positioning/transfer devices   Rotate device position/do not position patient on device     Problem: Fall/Injury  Goal: Verbalize understanding of personal risk factors for fall in the hospital  Outcome: Progressing  Goal: Verbalize understanding of risk factor reduction measures to prevent injury from fall in the home  Outcome: Progressing  Goal:  Use assistive devices by end of the shift  Outcome: Progressing  Goal: Pace activities to prevent fatigue by end of the shift  Outcome: Progressing

## 2025-02-15 LAB
ANION GAP SERPL CALC-SCNC: 10 MMOL/L (ref 10–20)
BUN SERPL-MCNC: 20 MG/DL (ref 6–23)
CALCIUM SERPL-MCNC: 9.1 MG/DL (ref 8.6–10.3)
CHLORIDE SERPL-SCNC: 104 MMOL/L (ref 98–107)
CO2 SERPL-SCNC: 27 MMOL/L (ref 21–32)
CREAT SERPL-MCNC: 0.9 MG/DL (ref 0.5–1.05)
EGFRCR SERPLBLD CKD-EPI 2021: 62 ML/MIN/1.73M*2
ERYTHROCYTE [DISTWIDTH] IN BLOOD BY AUTOMATED COUNT: 13.2 % (ref 11.5–14.5)
GLUCOSE SERPL-MCNC: 83 MG/DL (ref 74–99)
HCT VFR BLD AUTO: 31.3 % (ref 36–46)
HGB BLD-MCNC: 10 G/DL (ref 12–16)
MAGNESIUM SERPL-MCNC: 2.02 MG/DL (ref 1.6–2.4)
MCH RBC QN AUTO: 29.9 PG (ref 26–34)
MCHC RBC AUTO-ENTMCNC: 31.9 G/DL (ref 32–36)
MCV RBC AUTO: 94 FL (ref 80–100)
NRBC BLD-RTO: 0 /100 WBCS (ref 0–0)
PLATELET # BLD AUTO: 266 X10*3/UL (ref 150–450)
POTASSIUM SERPL-SCNC: 4 MMOL/L (ref 3.5–5.3)
RBC # BLD AUTO: 3.34 X10*6/UL (ref 4–5.2)
SODIUM SERPL-SCNC: 137 MMOL/L (ref 136–145)
WBC # BLD AUTO: 6.3 X10*3/UL (ref 4.4–11.3)

## 2025-02-15 PROCEDURE — 80048 BASIC METABOLIC PNL TOTAL CA: CPT

## 2025-02-15 PROCEDURE — 85027 COMPLETE CBC AUTOMATED: CPT

## 2025-02-15 PROCEDURE — 36415 COLL VENOUS BLD VENIPUNCTURE: CPT

## 2025-02-15 PROCEDURE — 83735 ASSAY OF MAGNESIUM: CPT

## 2025-02-15 PROCEDURE — G0378 HOSPITAL OBSERVATION PER HR: HCPCS

## 2025-02-15 PROCEDURE — 2500000001 HC RX 250 WO HCPCS SELF ADMINISTERED DRUGS (ALT 637 FOR MEDICARE OP): Performed by: NURSE PRACTITIONER

## 2025-02-15 RX ADMIN — DORZOLAMIDE HYDROCHLORIDE AND TIMOLOL MALEATE 1 DROP: 20; 5 SOLUTION/ DROPS OPHTHALMIC at 08:25

## 2025-02-15 RX ADMIN — BRIMONIDINE TARTRATE 1 DROP: 2 SOLUTION/ DROPS OPHTHALMIC at 20:28

## 2025-02-15 RX ADMIN — BRIMONIDINE TARTRATE 1 DROP: 2 SOLUTION/ DROPS OPHTHALMIC at 08:28

## 2025-02-15 RX ADMIN — VENLAFAXINE 37.5 MG: 37.5 TABLET ORAL at 08:25

## 2025-02-15 RX ADMIN — LATANOPROST 1 DROP: 50 SOLUTION OPHTHALMIC at 20:54

## 2025-02-15 RX ADMIN — DORZOLAMIDE HYDROCHLORIDE AND TIMOLOL MALEATE 1 DROP: 20; 5 SOLUTION/ DROPS OPHTHALMIC at 20:41

## 2025-02-15 ASSESSMENT — COGNITIVE AND FUNCTIONAL STATUS - GENERAL
DAILY ACTIVITIY SCORE: 18
WALKING IN HOSPITAL ROOM: A LITTLE
TOILETING: A LITTLE
HELP NEEDED FOR BATHING: A LITTLE
MOBILITY SCORE: 17
PERSONAL GROOMING: A LITTLE
PERSONAL GROOMING: A LITTLE
EATING MEALS: A LITTLE
MOVING FROM LYING ON BACK TO SITTING ON SIDE OF FLAT BED WITH BEDRAILS: A LITTLE
STANDING UP FROM CHAIR USING ARMS: A LITTLE
CLIMB 3 TO 5 STEPS WITH RAILING: A LOT
EATING MEALS: A LITTLE
MOVING FROM LYING ON BACK TO SITTING ON SIDE OF FLAT BED WITH BEDRAILS: A LITTLE
DRESSING REGULAR UPPER BODY CLOTHING: A LITTLE
DRESSING REGULAR UPPER BODY CLOTHING: A LITTLE
HELP NEEDED FOR BATHING: A LITTLE
MOVING TO AND FROM BED TO CHAIR: A LITTLE
TURNING FROM BACK TO SIDE WHILE IN FLAT BAD: A LITTLE
TOILETING: A LITTLE
WALKING IN HOSPITAL ROOM: A LITTLE
MOBILITY SCORE: 17
MOVING TO AND FROM BED TO CHAIR: A LITTLE
STANDING UP FROM CHAIR USING ARMS: A LITTLE
TURNING FROM BACK TO SIDE WHILE IN FLAT BAD: A LITTLE
DRESSING REGULAR LOWER BODY CLOTHING: A LITTLE
DAILY ACTIVITIY SCORE: 18
DRESSING REGULAR LOWER BODY CLOTHING: A LITTLE
CLIMB 3 TO 5 STEPS WITH RAILING: A LOT

## 2025-02-15 ASSESSMENT — PAIN SCALES - GENERAL
PAINLEVEL_OUTOF10: 0 - NO PAIN
PAINLEVEL_OUTOF10: 0 - NO PAIN

## 2025-02-15 NOTE — CARE PLAN
The patient's goals for the shift include      The clinical goals for the shift include Pt will remain free of injury

## 2025-02-15 NOTE — CARE PLAN
The patient's goals for the shift include      The clinical goals for the shift include pt will remain free from fall through the end of the shift

## 2025-02-16 VITALS
OXYGEN SATURATION: 96 % | HEART RATE: 83 BPM | BODY MASS INDEX: 23.54 KG/M2 | SYSTOLIC BLOOD PRESSURE: 123 MMHG | TEMPERATURE: 96.1 F | RESPIRATION RATE: 16 BRPM | DIASTOLIC BLOOD PRESSURE: 70 MMHG | HEIGHT: 67 IN | WEIGHT: 150 LBS

## 2025-02-16 LAB
ANION GAP SERPL CALC-SCNC: 10 MMOL/L (ref 10–20)
BUN SERPL-MCNC: 21 MG/DL (ref 6–23)
CALCIUM SERPL-MCNC: 8.9 MG/DL (ref 8.6–10.3)
CHLORIDE SERPL-SCNC: 103 MMOL/L (ref 98–107)
CO2 SERPL-SCNC: 26 MMOL/L (ref 21–32)
CREAT SERPL-MCNC: 0.9 MG/DL (ref 0.5–1.05)
EGFRCR SERPLBLD CKD-EPI 2021: 62 ML/MIN/1.73M*2
ERYTHROCYTE [DISTWIDTH] IN BLOOD BY AUTOMATED COUNT: 13.2 % (ref 11.5–14.5)
GLUCOSE SERPL-MCNC: 86 MG/DL (ref 74–99)
HCT VFR BLD AUTO: 31 % (ref 36–46)
HGB BLD-MCNC: 9.9 G/DL (ref 12–16)
MAGNESIUM SERPL-MCNC: 1.97 MG/DL (ref 1.6–2.4)
MCH RBC QN AUTO: 29.1 PG (ref 26–34)
MCHC RBC AUTO-ENTMCNC: 31.9 G/DL (ref 32–36)
MCV RBC AUTO: 91 FL (ref 80–100)
NRBC BLD-RTO: 0 /100 WBCS (ref 0–0)
PLATELET # BLD AUTO: 288 X10*3/UL (ref 150–450)
POTASSIUM SERPL-SCNC: 4 MMOL/L (ref 3.5–5.3)
RBC # BLD AUTO: 3.4 X10*6/UL (ref 4–5.2)
SODIUM SERPL-SCNC: 135 MMOL/L (ref 136–145)
WBC # BLD AUTO: 7.3 X10*3/UL (ref 4.4–11.3)

## 2025-02-16 PROCEDURE — 2500000005 HC RX 250 GENERAL PHARMACY W/O HCPCS

## 2025-02-16 PROCEDURE — 82374 ASSAY BLOOD CARBON DIOXIDE: CPT

## 2025-02-16 PROCEDURE — 83735 ASSAY OF MAGNESIUM: CPT

## 2025-02-16 PROCEDURE — 2500000002 HC RX 250 W HCPCS SELF ADMINISTERED DRUGS (ALT 637 FOR MEDICARE OP, ALT 636 FOR OP/ED): Performed by: NURSE PRACTITIONER

## 2025-02-16 PROCEDURE — 2500000001 HC RX 250 WO HCPCS SELF ADMINISTERED DRUGS (ALT 637 FOR MEDICARE OP): Performed by: NURSE PRACTITIONER

## 2025-02-16 PROCEDURE — 36415 COLL VENOUS BLD VENIPUNCTURE: CPT

## 2025-02-16 PROCEDURE — G0378 HOSPITAL OBSERVATION PER HR: HCPCS

## 2025-02-16 PROCEDURE — 85027 COMPLETE CBC AUTOMATED: CPT

## 2025-02-16 RX ADMIN — LATANOPROST 1 DROP: 50 SOLUTION OPHTHALMIC at 19:55

## 2025-02-16 RX ADMIN — DORZOLAMIDE HYDROCHLORIDE AND TIMOLOL MALEATE 1 DROP: 20; 5 SOLUTION/ DROPS OPHTHALMIC at 08:51

## 2025-02-16 RX ADMIN — QUETIAPINE FUMARATE 12.5 MG: 25 TABLET ORAL at 19:56

## 2025-02-16 RX ADMIN — VENLAFAXINE 37.5 MG: 37.5 TABLET ORAL at 08:51

## 2025-02-16 RX ADMIN — Medication 3 MG: at 19:56

## 2025-02-16 RX ADMIN — BRIMONIDINE TARTRATE 1 DROP: 2 SOLUTION/ DROPS OPHTHALMIC at 08:52

## 2025-02-16 RX ADMIN — DORZOLAMIDE HYDROCHLORIDE AND TIMOLOL MALEATE 1 DROP: 20; 5 SOLUTION/ DROPS OPHTHALMIC at 19:55

## 2025-02-16 RX ADMIN — BRIMONIDINE TARTRATE 1 DROP: 2 SOLUTION/ DROPS OPHTHALMIC at 19:56

## 2025-02-16 ASSESSMENT — PAIN - FUNCTIONAL ASSESSMENT: PAIN_FUNCTIONAL_ASSESSMENT: 0-10

## 2025-02-16 ASSESSMENT — COGNITIVE AND FUNCTIONAL STATUS - GENERAL
CLIMB 3 TO 5 STEPS WITH RAILING: A LITTLE
DAILY ACTIVITIY SCORE: 22
CLIMB 3 TO 5 STEPS WITH RAILING: A LITTLE
DRESSING REGULAR LOWER BODY CLOTHING: A LITTLE
MOBILITY SCORE: 21
DAILY ACTIVITIY SCORE: 22
WALKING IN HOSPITAL ROOM: A LITTLE
HELP NEEDED FOR BATHING: A LITTLE
DRESSING REGULAR LOWER BODY CLOTHING: A LITTLE
STANDING UP FROM CHAIR USING ARMS: A LITTLE
HELP NEEDED FOR BATHING: A LITTLE
WALKING IN HOSPITAL ROOM: A LITTLE
STANDING UP FROM CHAIR USING ARMS: A LITTLE
MOBILITY SCORE: 21

## 2025-02-16 ASSESSMENT — PAIN SCALES - GENERAL
PAINLEVEL_OUTOF10: 0 - NO PAIN
PAINLEVEL_OUTOF10: 0 - NO PAIN

## 2025-02-16 NOTE — CARE PLAN
The patient's goals for the shift include      The clinical goals for the shift include no falls this shift    Problem: Chronic Conditions and Co-morbidities  Goal: Patient's chronic conditions and co-morbidity symptoms are monitored and maintained or improved  Outcome: Progressing     Problem: Nutrition  Goal: Nutrient intake appropriate for maintaining nutritional needs  Outcome: Progressing     Problem: Skin  Goal: Participates in plan/prevention/treatment measures  Outcome: Progressing  Goal: Prevent/manage excess moisture  Outcome: Progressing  Goal: Prevent/minimize sheer/friction injuries  Outcome: Progressing  Goal: Promote/optimize nutrition  Outcome: Progressing  Goal: Promote skin healing  Outcome: Progressing     Problem: Fall/Injury  Goal: Verbalize understanding of personal risk factors for fall in the hospital  Outcome: Progressing  Goal: Verbalize understanding of risk factor reduction measures to prevent injury from fall in the home  Outcome: Progressing  Goal: Use assistive devices by end of the shift  Outcome: Progressing  Goal: Pace activities to prevent fatigue by end of the shift  Outcome: Progressing

## 2025-02-16 NOTE — PROGRESS NOTES
Internal Medicine Progress Note    Patient Name: Fernanda Hughes          MRN: [id number]  Today's Date: February 16, 2025          Attending: MARCOS Phelan MD    Subjective:  Patient was seen and examined at bedside, patient lays down in bed in no distress.    Review Of Systems:  GENERAL: No malaise or fevers.  CARDIOVASCULAR: Negative for chest pain, leg swelling  RESPIRATORY: Negative for shortness of breath  GI: No nausea, vomiting, or diarrhea      Objective:  Vitals:    02/15/25 2000 02/16/25 0000 02/16/25 0400 02/16/25 0800   BP: 152/74 132/58 150/69 158/89   BP Location: Right arm Right arm Right arm Right arm   Patient Position: Lying Lying Lying Sitting   Pulse: 96 90 83 77   Resp: 16 16 16 18   Temp: 36.7 °C (98.1 °F) 36.3 °C (97.3 °F) 36.6 °C (97.9 °F) 36.2 °C (97.2 °F)   TempSrc: Temporal Temporal Temporal Temporal   SpO2: 99% 96% 98% 100%   Weight:       Height:               Physical Exam:   General appearance: Alert, in no acute distress  Skin: Ecchymosis on the forehead  Lungs: Clear to auscultation, no wheezing or rhonchi  Heart: RRR without murmur, gallop, or rubs.  No ectopy  Abdomen: Soft, non-tender. Bowel sounds normal.  Neuro: Alert but confused    Labs:  Results for orders placed or performed during the hospital encounter of 02/11/25 (from the past 24 hours)   Basic metabolic panel   Result Value Ref Range    Glucose 86 74 - 99 mg/dL    Sodium 135 (L) 136 - 145 mmol/L    Potassium 4.0 3.5 - 5.3 mmol/L    Chloride 103 98 - 107 mmol/L    Bicarbonate 26 21 - 32 mmol/L    Anion Gap 10 10 - 20 mmol/L    Urea Nitrogen 21 6 - 23 mg/dL    Creatinine 0.90 0.50 - 1.05 mg/dL    eGFR 62 >60 mL/min/1.73m*2    Calcium 8.9 8.6 - 10.3 mg/dL   CBC   Result Value Ref Range    WBC 7.3 4.4 - 11.3 x10*3/uL    nRBC 0.0 0.0 - 0.0 /100 WBCs    RBC 3.40 (L) 4.00 - 5.20 x10*6/uL    Hemoglobin 9.9 (L) 12.0 - 16.0 g/dL    Hematocrit 31.0 (L) 36.0 - 46.0 %    MCV 91 80 - 100 fL    MCH 29.1 26.0 - 34.0 pg    MCHC 31.9 (L)  "32.0 - 36.0 g/dL    RDW 13.2 11.5 - 14.5 %    Platelets 288 150 - 450 x10*3/uL   Magnesium   Result Value Ref Range    Magnesium 1.97 1.60 - 2.40 mg/dL       Medications:  Scheduled medications  brimonidine, 1 drop, Both Eyes, BID  dorzolamide-timoloL, 1 drop, Both Eyes, BID  latanoprost, 1 drop, Both Eyes, Nightly  venlafaxine, 37.5 mg, oral, q AM      Continuous medications     PRN medications  PRN medications: acetaminophen **OR** acetaminophen **OR** acetaminophen, melatonin, polyethylene glycol, QUEtiapine      Assessment/Plan:  Assessment & Plan  Hematoma of left buttock  Continue monitoring blood count  Vascular dementia  Continue supportive care  Falls, sequela  PT/OT  Fall precautions  Ambulatory dysfunction  PT/OT  End-stage glaucoma  Continue current medications    Discharge plan to SNF, still waiting on PASRR screen  Discussed with patient, RN    Stef Rosales MD   Date: 02/16/25  Time: 11:17 AM    This note was partially created using voice recognition software and is inherently subject to errors including those of syntax and \"sound-alike\" substitutions which may escape proofreading. In such instances, original meaning may be extrapolated by contextual derivation    "

## 2025-02-16 NOTE — PROGRESS NOTES
Internal Medicine Progress Note    Patient Name: Fernanda Hughes          MRN: [id number]  Today's Date: February 15, 2025          Attending: MARCOS Phelan MD    Subjective:  Patient was seen and examined at bedside, patient lays down in bed in no distress.    Review Of Systems:  GENERAL: No malaise or fevers.  CARDIOVASCULAR: Negative for chest pain, leg swelling  RESPIRATORY: Negative for shortness of breath  GI: No nausea, vomiting, or diarrhea      Objective:  Vitals:    02/15/25 0400 02/15/25 0800 02/15/25 1200 02/15/25 1552   BP: 155/68 150/70  142/65   BP Location: Right arm Right arm  Right arm   Patient Position: Lying Lying  Sitting   Pulse: 77 74  90   Resp: 16 18  18   Temp: 36.1 °C (97 °F) 36.2 °C (97.2 °F)  36.2 °C (97.2 °F)   TempSrc: Temporal Temporal  Temporal   SpO2: 97% 99% 99% 100%   Weight:       Height:               Physical Exam:   General appearance: Well-appearing alert, in no acute distress  Skin: Ecchymosis on the forehead  Lungs: Clear to auscultation, no wheezing or rhonchi  Heart: RRR without murmur, gallop, or rubs.  No ectopy  Abdomen: Soft, non-tender. Bowel sounds normal.  Neuro: Alert but confused, moves all extremities    Labs:  Results for orders placed or performed during the hospital encounter of 02/11/25 (from the past 24 hours)   Basic metabolic panel   Result Value Ref Range    Glucose 83 74 - 99 mg/dL    Sodium 137 136 - 145 mmol/L    Potassium 4.0 3.5 - 5.3 mmol/L    Chloride 104 98 - 107 mmol/L    Bicarbonate 27 21 - 32 mmol/L    Anion Gap 10 10 - 20 mmol/L    Urea Nitrogen 20 6 - 23 mg/dL    Creatinine 0.90 0.50 - 1.05 mg/dL    eGFR 62 >60 mL/min/1.73m*2    Calcium 9.1 8.6 - 10.3 mg/dL   CBC   Result Value Ref Range    WBC 6.3 4.4 - 11.3 x10*3/uL    nRBC 0.0 0.0 - 0.0 /100 WBCs    RBC 3.34 (L) 4.00 - 5.20 x10*6/uL    Hemoglobin 10.0 (L) 12.0 - 16.0 g/dL    Hematocrit 31.3 (L) 36.0 - 46.0 %    MCV 94 80 - 100 fL    MCH 29.9 26.0 - 34.0 pg    MCHC 31.9 (L) 32.0 - 36.0 g/dL     "RDW 13.2 11.5 - 14.5 %    Platelets 266 150 - 450 x10*3/uL   Magnesium   Result Value Ref Range    Magnesium 2.02 1.60 - 2.40 mg/dL       Medications:  Scheduled medications  brimonidine, 1 drop, Both Eyes, BID  dorzolamide-timoloL, 1 drop, Both Eyes, BID  latanoprost, 1 drop, Both Eyes, Nightly  venlafaxine, 37.5 mg, oral, q AM      Continuous medications     PRN medications  PRN medications: acetaminophen **OR** acetaminophen **OR** acetaminophen, melatonin, polyethylene glycol, QUEtiapine      Assessment/Plan:  Assessment & Plan  Hematoma of left buttock  Continue monitoring blood count  Vascular dementia  Continue supportive care  Falls, sequela  PT/OT  Fall precautions  Ambulatory dysfunction  PT/OT  End-stage glaucoma  Continue current medications    Discharge plan to SNF, waiting on PASRR screen  Discussed with patient, RN    Stef Rosales MD   Date: 02/15/25  Time: 7:46 PM    This note was partially created using voice recognition software and is inherently subject to errors including those of syntax and \"sound-alike\" substitutions which may escape proofreading. In such instances, original meaning may be extrapolated by contextual derivation    "

## 2025-02-16 NOTE — CARE PLAN
Problem: Discharge Planning  Goal: Discharge to home or other facility with appropriate resources  Outcome: Progressing  Flowsheets (Taken 2/16/2025 1712)  Discharge to home or other facility with appropriate resources: Identify barriers to discharge with patient and caregiver     Problem: Skin  Goal: Participates in plan/prevention/treatment measures  Outcome: Progressing  Flowsheets (Taken 2/16/2025 1712)  Participates in plan/prevention/treatment measures: Increase activity/out of bed for meals     Problem: Fall/Injury  Goal: Use assistive devices by end of the shift  Outcome: Met

## 2025-02-17 LAB
ANION GAP SERPL CALC-SCNC: 12 MMOL/L (ref 10–20)
BUN SERPL-MCNC: 21 MG/DL (ref 6–23)
CALCIUM SERPL-MCNC: 8.7 MG/DL (ref 8.6–10.3)
CHLORIDE SERPL-SCNC: 101 MMOL/L (ref 98–107)
CO2 SERPL-SCNC: 25 MMOL/L (ref 21–32)
CREAT SERPL-MCNC: 0.83 MG/DL (ref 0.5–1.05)
EGFRCR SERPLBLD CKD-EPI 2021: 69 ML/MIN/1.73M*2
ERYTHROCYTE [DISTWIDTH] IN BLOOD BY AUTOMATED COUNT: 13.2 % (ref 11.5–14.5)
GLUCOSE BLD MANUAL STRIP-MCNC: 160 MG/DL (ref 74–99)
GLUCOSE SERPL-MCNC: 80 MG/DL (ref 74–99)
HCT VFR BLD AUTO: 32.5 % (ref 36–46)
HGB BLD-MCNC: 10.7 G/DL (ref 12–16)
MAGNESIUM SERPL-MCNC: 1.94 MG/DL (ref 1.6–2.4)
MCH RBC QN AUTO: 29.9 PG (ref 26–34)
MCHC RBC AUTO-ENTMCNC: 32.9 G/DL (ref 32–36)
MCV RBC AUTO: 91 FL (ref 80–100)
NRBC BLD-RTO: 0 /100 WBCS (ref 0–0)
PLATELET # BLD AUTO: 283 X10*3/UL (ref 150–450)
POTASSIUM SERPL-SCNC: 3.9 MMOL/L (ref 3.5–5.3)
RBC # BLD AUTO: 3.58 X10*6/UL (ref 4–5.2)
SODIUM SERPL-SCNC: 134 MMOL/L (ref 136–145)
WBC # BLD AUTO: 6.7 X10*3/UL (ref 4.4–11.3)

## 2025-02-17 PROCEDURE — 97530 THERAPEUTIC ACTIVITIES: CPT | Mod: GP,CQ

## 2025-02-17 PROCEDURE — 80048 BASIC METABOLIC PNL TOTAL CA: CPT

## 2025-02-17 PROCEDURE — 83735 ASSAY OF MAGNESIUM: CPT

## 2025-02-17 PROCEDURE — 85027 COMPLETE CBC AUTOMATED: CPT

## 2025-02-17 PROCEDURE — 2500000001 HC RX 250 WO HCPCS SELF ADMINISTERED DRUGS (ALT 637 FOR MEDICARE OP): Performed by: NURSE PRACTITIONER

## 2025-02-17 PROCEDURE — G0378 HOSPITAL OBSERVATION PER HR: HCPCS

## 2025-02-17 PROCEDURE — 36415 COLL VENOUS BLD VENIPUNCTURE: CPT

## 2025-02-17 PROCEDURE — 82947 ASSAY GLUCOSE BLOOD QUANT: CPT

## 2025-02-17 PROCEDURE — 97116 GAIT TRAINING THERAPY: CPT | Mod: GP,CQ

## 2025-02-17 PROCEDURE — 2500000004 HC RX 250 GENERAL PHARMACY W/ HCPCS (ALT 636 FOR OP/ED): Performed by: NURSE PRACTITIONER

## 2025-02-17 PROCEDURE — 2500000002 HC RX 250 W HCPCS SELF ADMINISTERED DRUGS (ALT 637 FOR MEDICARE OP, ALT 636 FOR OP/ED): Performed by: NURSE PRACTITIONER

## 2025-02-17 PROCEDURE — 2500000005 HC RX 250 GENERAL PHARMACY W/O HCPCS

## 2025-02-17 RX ORDER — POTASSIUM CHLORIDE 20 MEQ/1
20 TABLET, EXTENDED RELEASE ORAL ONCE
Status: COMPLETED | OUTPATIENT
Start: 2025-02-17 | End: 2025-02-17

## 2025-02-17 RX ORDER — LANOLIN ALCOHOL/MO/W.PET/CERES
400 CREAM (GRAM) TOPICAL DAILY
Status: COMPLETED | OUTPATIENT
Start: 2025-02-17 | End: 2025-02-17

## 2025-02-17 RX ORDER — WATER
500 LIQUID (ML) MISCELLANEOUS ONCE
Status: COMPLETED | OUTPATIENT
Start: 2025-02-17 | End: 2025-02-17

## 2025-02-17 RX ADMIN — QUETIAPINE FUMARATE 12.5 MG: 25 TABLET ORAL at 20:33

## 2025-02-17 RX ADMIN — POTASSIUM CHLORIDE 20 MEQ: 1500 TABLET, EXTENDED RELEASE ORAL at 11:24

## 2025-02-17 RX ADMIN — Medication 3 MG: at 20:34

## 2025-02-17 RX ADMIN — BRIMONIDINE TARTRATE 1 DROP: 2 SOLUTION/ DROPS OPHTHALMIC at 20:34

## 2025-02-17 RX ADMIN — DORZOLAMIDE HYDROCHLORIDE AND TIMOLOL MALEATE 1 DROP: 20; 5 SOLUTION/ DROPS OPHTHALMIC at 20:34

## 2025-02-17 RX ADMIN — BRIMONIDINE TARTRATE 1 DROP: 2 SOLUTION/ DROPS OPHTHALMIC at 08:43

## 2025-02-17 RX ADMIN — DORZOLAMIDE HYDROCHLORIDE AND TIMOLOL MALEATE 1 DROP: 20; 5 SOLUTION/ DROPS OPHTHALMIC at 08:43

## 2025-02-17 RX ADMIN — Medication 400 MG: at 11:24

## 2025-02-17 RX ADMIN — Medication 500 ML: at 14:52

## 2025-02-17 RX ADMIN — LATANOPROST 1 DROP: 50 SOLUTION OPHTHALMIC at 20:34

## 2025-02-17 RX ADMIN — VENLAFAXINE 37.5 MG: 37.5 TABLET ORAL at 08:42

## 2025-02-17 ASSESSMENT — COGNITIVE AND FUNCTIONAL STATUS - GENERAL
WALKING IN HOSPITAL ROOM: A LITTLE
MOVING TO AND FROM BED TO CHAIR: A LITTLE
CLIMB 3 TO 5 STEPS WITH RAILING: A LOT
DAILY ACTIVITIY SCORE: 22
MOBILITY SCORE: 21
DRESSING REGULAR LOWER BODY CLOTHING: A LITTLE
MOVING FROM LYING ON BACK TO SITTING ON SIDE OF FLAT BED WITH BEDRAILS: A LITTLE
STANDING UP FROM CHAIR USING ARMS: A LITTLE
DRESSING REGULAR LOWER BODY CLOTHING: A LITTLE
CLIMB 3 TO 5 STEPS WITH RAILING: A LITTLE
MOBILITY SCORE: 21
STANDING UP FROM CHAIR USING ARMS: A LITTLE
HELP NEEDED FOR BATHING: A LITTLE
MOBILITY SCORE: 16
WALKING IN HOSPITAL ROOM: A LOT
CLIMB 3 TO 5 STEPS WITH RAILING: A LITTLE
STANDING UP FROM CHAIR USING ARMS: A LITTLE
HELP NEEDED FOR BATHING: A LITTLE
DAILY ACTIVITIY SCORE: 22
WALKING IN HOSPITAL ROOM: A LITTLE
TURNING FROM BACK TO SIDE WHILE IN FLAT BAD: A LITTLE

## 2025-02-17 ASSESSMENT — PAIN SCALES - GENERAL
PAINLEVEL_OUTOF10: 0 - NO PAIN

## 2025-02-17 ASSESSMENT — PAIN - FUNCTIONAL ASSESSMENT
PAIN_FUNCTIONAL_ASSESSMENT: 0-10
PAIN_FUNCTIONAL_ASSESSMENT: 0-10

## 2025-02-17 NOTE — CARE PLAN
The clinical goals for the shift include remain free from falls is shift      Problem: Discharge Planning  Goal: Discharge to home or other facility with appropriate resources  Outcome: Progressing  Flowsheets (Taken 2/17/2025 1021)  Discharge to home or other facility with appropriate resources:   Identify barriers to discharge with patient and caregiver   Arrange for needed discharge resources and transportation as appropriate   Identify discharge learning needs (meds, wound care, etc)   Arrange for interpreters to assist at discharge as needed   Refer to discharge planning if patient needs post-hospital services based on physician order or complex needs related to functional status, cognitive ability or social support system     Problem: Chronic Conditions and Co-morbidities  Goal: Patient's chronic conditions and co-morbidity symptoms are monitored and maintained or improved  Outcome: Progressing  Flowsheets (Taken 2/17/2025 1021)  Care Plan - Patient's Chronic Conditions and Co-Morbidity Symptoms are Monitored and Maintained or Improved:   Monitor and assess patient's chronic conditions and comorbid symptoms for stability, deterioration, or improvement   Collaborate with multidisciplinary team to address chronic and comorbid conditions and prevent exacerbation or deterioration   Update acute care plan with appropriate goals if chronic or comorbid symptoms are exacerbated and prevent overall improvement and discharge     Problem: Nutrition  Goal: Nutrient intake appropriate for maintaining nutritional needs  Outcome: Progressing     Problem: Skin  Goal: Participates in plan/prevention/treatment measures  Outcome: Progressing  Flowsheets (Taken 2/17/2025 1021)  Participates in plan/prevention/treatment measures:   Discuss with provider PT/OT consult   Elevate heels   Increase activity/out of bed for meals  Goal: Prevent/manage excess moisture  Outcome: Progressing  Flowsheets (Taken 2/17/2025  1021)  Prevent/manage excess moisture:   Cleanse incontinence/protect with barrier cream   Moisturize dry skin   Follow provider orders for dressing changes   Monitor for/manage infection if present  Goal: Prevent/minimize sheer/friction injuries  Outcome: Progressing  Flowsheets (Taken 2/17/2025 1021)  Prevent/minimize sheer/friction injuries:   Complete micro-shifts as needed if patient unable. Adjust patient position to relieve pressure points, not a full turn   Increase activity/out of bed for meals   Use pull sheet   HOB 30 degrees or less   Turn/reposition every 2 hours/use positioning/transfer devices  Goal: Promote/optimize nutrition  Outcome: Progressing  Flowsheets (Taken 2/17/2025 1021)  Promote/optimize nutrition:   Assist with feeding   Monitor/record intake including meals   Consume > 50% meals/supplements   Offer water/supplements/favorite foods   Discuss with provider if NPO > 2 days   Reassess MST if dietician not consulted  Goal: Promote skin healing  Outcome: Progressing  Flowsheets (Taken 2/17/2025 1021)  Promote skin healing:   Assess skin/pad under line(s)/device(s)   Protective dressings over bony prominences   Turn/reposition every 2 hours/use positioning/transfer devices     Problem: Fall/Injury  Goal: Verbalize understanding of personal risk factors for fall in the hospital  Outcome: Progressing  Goal: Verbalize understanding of risk factor reduction measures to prevent injury from fall in the home  Outcome: Progressing  Goal: Pace activities to prevent fatigue by end of the shift  Outcome: Progressing

## 2025-02-17 NOTE — PROGRESS NOTES
Physical Therapy    Physical Therapy    Physical Therapy Treatment    Patient Name: Fernanda Hughes  MRN: [id number]  Today's Date: 2/17/2025  Time Calculation  Start Time: 1010  Stop Time: 1035  Time Calculation (min): 25 min     3102/3102-A       02/17/25 1010   PT  Visit   PT Received On 02/17/25   Response to Previous Treatment Patient with no complaints from previous session.   General   Reason for Referral Impaired Mobility   Referred By MARCOS Phelan   Past Medical History Relevant to Rehab vascular dementia, HLD   Prior to Session Communication Bedside nurse   Patient Position Received Bed, 3 rail up   Preferred Learning Style verbal;visual   General Comment Patient agreeable to therapy.   Precautions   Medical Precautions Fall precautions   Vital Signs   Heart Rate 89   /62   Pain Assessment   Pain Assessment 0-10   0-10 (Numeric) Pain Score 0 - No pain   Cognition   Orientation Level Disoriented to time;Disoriented to situation   Therapeutic Exercise   Therapeutic Exercise Performed Yes   Therapeutic Exercise Activity 1 AP, LAQ, Marching 2x10   Bed Mobility   Bed Mobility Yes   Bed Mobility 1   Bed Mobility 1 Supine to sitting   Level of Assistance 1 Minimum assistance   Bed Mobility Comments 1 HOB elevated   Ambulation/Gait Training   Ambulation/Gait Training Performed Yes   Ambulation/Gait Training 1   Surface 1 Level tile   Device 1 Rolling walker   Gait Support Devices Gait belt   Assistance 1 Moderate assistance;Minimum assistance;Moderate verbal cues   Quality of Gait 1 NBOS;Diminished heel strike   Comments/Distance (ft) 1 15', 30', Very NBOS with cues to increase step width, fair follow through with repeated cues. Pt became dizzy on last 15', losing balance requiring modAx1 to steady and return to chair. Vitals taken and nurse aware.   Transfers   Transfer Yes   Transfer 1   Transfer From 1 Bed to   Transfer to 1 Stand   Technique 1 Sit to stand;Stand to sit   Transfer Device 1 Walker;Gait belt    Transfer Level of Assistance 1 Minimum assistance;Moderate assistance;Moderate verbal cues  (x2)   Trials/Comments 1 Cues for safe UE placement and sequencing with good follow through   Transfers 2   Transfer From 2 Stand to;Sit to   Transfer to 2 Chair with arms   Technique 2 Sit to stand;Stand to sit   Transfer Device 2 Walker;Gait belt   Transfer Level of Assistance 2 Minimum assistance;Moderate verbal cues   Other Activity   Other Activity Performed Yes   Other Activity 1 static standing balance trials x2, 30 second stand before pt c/o dizziness.   Activity Tolerance   Endurance Tolerates 10 - 20 min exercise with multiple rests   PT Assessment   PT Assessment Results Decreased strength;Decreased endurance;Impaired balance;Decreased mobility   Rehab Prognosis Good   Barriers to Discharge Home Caregiver assistance;Physical needs   Caregiver Assistance Caregiver assistance needed per identified barriers - however, level of patient's required assistance exceeds assistance available at home   Physical Needs 24hr mobility assistance needed;24hr ADL assistance needed;High falls risk due to function or environment   End of Session Communication Bedside nurse   End of Session Patient Position Up in chair;Alarm on     Outcome Measures:  [facility] Basic Mobility  Turning from your back to your side while in a flat bed without using bedrails: A little  Moving from lying on your back to sitting on the side of a flat bed without using bedrails: A little  Moving to and from bed to chair (including a wheelchair): A little  Standing up from a chair using your arms (e.g. wheelchair or bedside chair): A little  To walk in hospital room: A lot  Climbing 3-5 steps with railing: A lot  Basic Mobility - Total Score: 16                             EDUCATION:  Outpatient Education  Individual(s) Educated: Patient  Education Provided: Fall Risk  Education Documentation  Body Mechanics, taught by Danette Bender PTA at 2/17/2025 11:56  AM.  Learner: Patient  Readiness: Acceptance  Method: Explanation, Demonstration  Response: Verbalizes Understanding, Needs Reinforcement    Precautions, taught by Danette Bender PTA at 2/17/2025 11:56 AM.  Learner: Patient  Readiness: Acceptance  Method: Explanation, Demonstration  Response: Verbalizes Understanding, Needs Reinforcement    Mobility Training, taught by Danette Bender PTA at 2/17/2025 11:56 AM.  Learner: Patient  Readiness: Acceptance  Method: Explanation, Demonstration  Response: Verbalizes Understanding, Needs Reinforcement    Education Comments  No comments found.        GOALS:  Encounter Problems       Encounter Problems (Active)       PT Problem       Pt will be able to perform all bed mobility tasks with Mod I.  (Progressing)       Start:  02/12/25    Expected End:  02/26/25            Pt will perform all transfers with Mod I and LRAD with proper safety mechanics.   (Progressing)       Start:  02/12/25    Expected End:  02/26/25            Pt will ambulate 100 ft with Mod I using LRAD for improved functional independence.  (Progressing)       Start:  02/12/25    Expected End:  02/26/25               Pain - Adult

## 2025-02-17 NOTE — CARE PLAN
Problem: Discharge Planning  Goal: Discharge to home or other facility with appropriate resources  2/17/2025 0127 by Gema Pascual RN  Outcome: Progressing  2/17/2025 0126 by Gema Pascual RN  Outcome: Progressing     Problem: Chronic Conditions and Co-morbidities  Goal: Patient's chronic conditions and co-morbidity symptoms are monitored and maintained or improved  2/17/2025 0127 by Gema Pascual RN  Outcome: Progressing  2/17/2025 0126 by Gema Pascual RN  Outcome: Progressing     Problem: Nutrition  Goal: Nutrient intake appropriate for maintaining nutritional needs  2/17/2025 0127 by Gema Pascual RN  Outcome: Progressing  2/17/2025 0126 by Gema Pascual RN  Outcome: Progressing     Problem: Skin  Goal: Participates in plan/prevention/treatment measures  2/17/2025 0127 by Gema Pascual RN  Outcome: Progressing  Flowsheets (Taken 2/17/2025 0127)  Participates in plan/prevention/treatment measures:   Discuss with provider PT/OT consult   Elevate heels   Increase activity/out of bed for meals  2/17/2025 0126 by Gema Pascual RN  Outcome: Progressing  Goal: Prevent/manage excess moisture  2/17/2025 0127 by Gema Pascual RN  Outcome: Progressing  Flowsheets (Taken 2/17/2025 0127)  Prevent/manage excess moisture: Use wicking fabric (obtain order)  2/17/2025 0126 by Gema Pascual RN  Outcome: Progressing  Goal: Prevent/minimize sheer/friction injuries  2/17/2025 0127 by Gema Pascual RN  Outcome: Progressing  Flowsheets (Taken 2/17/2025 0127)  Prevent/minimize sheer/friction injuries:   Use pull sheet   HOB 30 degrees or less  2/17/2025 0126 by Gema Pascual RN  Outcome: Progressing  Goal: Promote/optimize nutrition  2/17/2025 0127 by Gema Pascual RN  Outcome: Progressing  Flowsheets (Taken 2/17/2025 0127)  Promote/optimize nutrition:   Assist with feeding   Monitor/record intake including meals  2/17/2025 0126 by Gema Pascual RN  Outcome: Progressing  Goal: Promote skin  healing  2/17/2025 0127 by Gema Pascual, RN  Outcome: Progressing  Flowsheets (Taken 2/17/2025 0127)  Promote skin healing: Assess skin/pad under line(s)/device(s)  2/17/2025 0126 by Gema Pascual, RN  Outcome: Progressing   The patient's goals for the shift include  FREE FROM FALLS AND INJURIES    The clinical goals for the shift include FREE FROM FALLS   Plan of care continued. Free from falls and any injuries. No acute issues. Bed alarm maintained and call light in reach.

## 2025-02-17 NOTE — PROGRESS NOTES
Internal Medicine Progress Note    Patient Name: Fernanda Hughes          MRN: [id number]  Today's Date: February 17, 2025          Attending: MARCOS Phelan MD    Subjective:  Patient was seen and examined at bedside.    Review Of Systems:  GENERAL: No malaise or fevers.  CARDIOVASCULAR: Negative for chest pain, leg swelling  RESPIRATORY: Negative for shortness of breath  GI: No nausea, vomiting, or diarrhea      Objective:  Vitals:    02/16/25 1600 02/16/25 2000 02/17/25 0000 02/17/25 0800   BP: 160/71 123/70 140/63 (!) 185/84   BP Location: Right arm Right arm Right arm    Patient Position: Lying Lying Lying    Pulse: 85 83 91 82   Resp: 17 16 18 17   Temp: 36.7 °C (98.1 °F) 35.6 °C (96.1 °F) 36.3 °C (97.3 °F) 37.1 °C (98.8 °F)   TempSrc: Temporal Temporal Temporal    SpO2: 98% 96% 95% 98%   Weight:       Height:               Physical Exam:   General appearance: Alert, in no acute distress  Skin: Ecchymosis on the forehead  Lungs: Clear to auscultation, no wheezing or rhonchi  Heart: RRR without murmur, gallop, or rubs.  No ectopy  Abdomen: Soft, non-tender. Bowel sounds normal.  Neuro: Awake but disoriented    Labs:  Results for orders placed or performed during the hospital encounter of 02/11/25 (from the past 24 hours)   Basic metabolic panel   Result Value Ref Range    Glucose 80 74 - 99 mg/dL    Sodium 134 (L) 136 - 145 mmol/L    Potassium 3.9 3.5 - 5.3 mmol/L    Chloride 101 98 - 107 mmol/L    Bicarbonate 25 21 - 32 mmol/L    Anion Gap 12 10 - 20 mmol/L    Urea Nitrogen 21 6 - 23 mg/dL    Creatinine 0.83 0.50 - 1.05 mg/dL    eGFR 69 >60 mL/min/1.73m*2    Calcium 8.7 8.6 - 10.3 mg/dL   CBC   Result Value Ref Range    WBC 6.7 4.4 - 11.3 x10*3/uL    nRBC 0.0 0.0 - 0.0 /100 WBCs    RBC 3.58 (L) 4.00 - 5.20 x10*6/uL    Hemoglobin 10.7 (L) 12.0 - 16.0 g/dL    Hematocrit 32.5 (L) 36.0 - 46.0 %    MCV 91 80 - 100 fL    MCH 29.9 26.0 - 34.0 pg    MCHC 32.9 32.0 - 36.0 g/dL    RDW 13.2 11.5 - 14.5 %    Platelets 283 150 -  "450 x10*3/uL   Magnesium   Result Value Ref Range    Magnesium 1.94 1.60 - 2.40 mg/dL       Medications:  Scheduled medications  brimonidine, 1 drop, Both Eyes, BID  dorzolamide-timoloL, 1 drop, Both Eyes, BID  latanoprost, 1 drop, Both Eyes, Nightly  venlafaxine, 37.5 mg, oral, q AM      Continuous medications     PRN medications  PRN medications: acetaminophen **OR** acetaminophen **OR** acetaminophen, melatonin, polyethylene glycol, QUEtiapine      Assessment/Plan:  Assessment & Plan  Hematoma of left buttock  Hemoglobin stable  Continue monitoring blood count    Vascular dementia  Continue supportive care    Falls, sequela  PT/OT  Fall precautions    Ambulatory dysfunction  PT/OT  End-stage glaucoma  Continue current medications    Still waiting on PASRR screen    Discussed with patient, RN    Stef Rosales MD   Date: 02/17/25  Time: 9:32 AM    This note was partially created using voice recognition software and is inherently subject to errors including those of syntax and \"sound-alike\" substitutions which may escape proofreading. In such instances, original meaning may be extrapolated by contextual derivation    "

## 2025-02-18 LAB
ANION GAP SERPL CALC-SCNC: 12 MMOL/L (ref 10–20)
BUN SERPL-MCNC: 19 MG/DL (ref 6–23)
CALCIUM SERPL-MCNC: 8.6 MG/DL (ref 8.6–10.3)
CHLORIDE SERPL-SCNC: 97 MMOL/L (ref 98–107)
CO2 SERPL-SCNC: 24 MMOL/L (ref 21–32)
CREAT SERPL-MCNC: 0.82 MG/DL (ref 0.5–1.05)
EGFRCR SERPLBLD CKD-EPI 2021: 70 ML/MIN/1.73M*2
ERYTHROCYTE [DISTWIDTH] IN BLOOD BY AUTOMATED COUNT: 13.3 % (ref 11.5–14.5)
GLUCOSE SERPL-MCNC: 84 MG/DL (ref 74–99)
HCT VFR BLD AUTO: 31.4 % (ref 36–46)
HGB BLD-MCNC: 10.3 G/DL (ref 12–16)
MAGNESIUM SERPL-MCNC: 1.86 MG/DL (ref 1.6–2.4)
MCH RBC QN AUTO: 29.2 PG (ref 26–34)
MCHC RBC AUTO-ENTMCNC: 32.8 G/DL (ref 32–36)
MCV RBC AUTO: 89 FL (ref 80–100)
NRBC BLD-RTO: 0 /100 WBCS (ref 0–0)
PLATELET # BLD AUTO: 308 X10*3/UL (ref 150–450)
POTASSIUM SERPL-SCNC: 4 MMOL/L (ref 3.5–5.3)
RBC # BLD AUTO: 3.53 X10*6/UL (ref 4–5.2)
SODIUM SERPL-SCNC: 129 MMOL/L (ref 136–145)
WBC # BLD AUTO: 6.6 X10*3/UL (ref 4.4–11.3)

## 2025-02-18 PROCEDURE — G0378 HOSPITAL OBSERVATION PER HR: HCPCS

## 2025-02-18 PROCEDURE — 2500000001 HC RX 250 WO HCPCS SELF ADMINISTERED DRUGS (ALT 637 FOR MEDICARE OP): Performed by: NURSE PRACTITIONER

## 2025-02-18 PROCEDURE — 97110 THERAPEUTIC EXERCISES: CPT | Mod: GP,CQ

## 2025-02-18 PROCEDURE — 80048 BASIC METABOLIC PNL TOTAL CA: CPT

## 2025-02-18 PROCEDURE — 36415 COLL VENOUS BLD VENIPUNCTURE: CPT

## 2025-02-18 PROCEDURE — 85027 COMPLETE CBC AUTOMATED: CPT

## 2025-02-18 PROCEDURE — 83735 ASSAY OF MAGNESIUM: CPT

## 2025-02-18 PROCEDURE — 97116 GAIT TRAINING THERAPY: CPT | Mod: GP,CQ

## 2025-02-18 PROCEDURE — 97535 SELF CARE MNGMENT TRAINING: CPT | Mod: GO,CO

## 2025-02-18 RX ADMIN — DORZOLAMIDE HYDROCHLORIDE AND TIMOLOL MALEATE 1 DROP: 20; 5 SOLUTION/ DROPS OPHTHALMIC at 10:19

## 2025-02-18 RX ADMIN — DORZOLAMIDE HYDROCHLORIDE AND TIMOLOL MALEATE 1 DROP: 20; 5 SOLUTION/ DROPS OPHTHALMIC at 20:20

## 2025-02-18 RX ADMIN — VENLAFAXINE 37.5 MG: 37.5 TABLET ORAL at 10:18

## 2025-02-18 RX ADMIN — LATANOPROST 1 DROP: 50 SOLUTION OPHTHALMIC at 20:21

## 2025-02-18 RX ADMIN — BRIMONIDINE TARTRATE 1 DROP: 2 SOLUTION/ DROPS OPHTHALMIC at 10:19

## 2025-02-18 RX ADMIN — BRIMONIDINE TARTRATE 1 DROP: 2 SOLUTION/ DROPS OPHTHALMIC at 20:20

## 2025-02-18 ASSESSMENT — PAIN - FUNCTIONAL ASSESSMENT
PAIN_FUNCTIONAL_ASSESSMENT: 0-10
PAIN_FUNCTIONAL_ASSESSMENT: 0-10

## 2025-02-18 ASSESSMENT — PAIN SCALES - GENERAL
PAINLEVEL_OUTOF10: 0 - NO PAIN

## 2025-02-18 ASSESSMENT — COGNITIVE AND FUNCTIONAL STATUS - GENERAL
TOILETING: A LITTLE
WALKING IN HOSPITAL ROOM: A LITTLE
DAILY ACTIVITIY SCORE: 17
MOVING TO AND FROM BED TO CHAIR: A LITTLE
STANDING UP FROM CHAIR USING ARMS: A LITTLE
MOBILITY SCORE: 17
DRESSING REGULAR UPPER BODY CLOTHING: A LITTLE
DRESSING REGULAR LOWER BODY CLOTHING: A LOT
PERSONAL GROOMING: A LITTLE
STANDING UP FROM CHAIR USING ARMS: A LITTLE
HELP NEEDED FOR BATHING: A LITTLE
MOVING FROM LYING ON BACK TO SITTING ON SIDE OF FLAT BED WITH BEDRAILS: A LITTLE
DAILY ACTIVITIY SCORE: 22
WALKING IN HOSPITAL ROOM: A LITTLE
HELP NEEDED FOR BATHING: A LOT
TURNING FROM BACK TO SIDE WHILE IN FLAT BAD: A LITTLE
CLIMB 3 TO 5 STEPS WITH RAILING: A LOT
MOBILITY SCORE: 21
CLIMB 3 TO 5 STEPS WITH RAILING: A LITTLE
DRESSING REGULAR LOWER BODY CLOTHING: A LITTLE

## 2025-02-18 ASSESSMENT — ACTIVITIES OF DAILY LIVING (ADL): HOME_MANAGEMENT_TIME_ENTRY: 25

## 2025-02-18 NOTE — CARE PLAN
Problem: Skin  Goal: Participates in plan/prevention/treatment measures  Flowsheets (Taken 2/17/2025 1021 by Margie Mix, RN)  Participates in plan/prevention/treatment measures:   Discuss with provider PT/OT consult   Elevate heels   Increase activity/out of bed for meals  Goal: Prevent/manage excess moisture  Flowsheets (Taken 2/17/2025 1021 by Margie Mix, RN)  Prevent/manage excess moisture:   Cleanse incontinence/protect with barrier cream   Moisturize dry skin   Follow provider orders for dressing changes   Monitor for/manage infection if present  Goal: Prevent/minimize sheer/friction injuries  Flowsheets (Taken 2/17/2025 1021 by Margie Mix, RN)  Prevent/minimize sheer/friction injuries:   Complete micro-shifts as needed if patient unable. Adjust patient position to relieve pressure points, not a full turn   Increase activity/out of bed for meals   Use pull sheet   HOB 30 degrees or less   Turn/reposition every 2 hours/use positioning/transfer devices  Goal: Promote/optimize nutrition  Flowsheets (Taken 2/17/2025 1021 by Margie Mix RN)  Promote/optimize nutrition:   Assist with feeding   Monitor/record intake including meals   Consume > 50% meals/supplements   Offer water/supplements/favorite foods   Discuss with provider if NPO > 2 days   Reassess MST if dietician not consulted  Goal: Promote skin healing  Flowsheets (Taken 2/17/2025 1021 by Margie Mix RN)  Promote skin healing:   Assess skin/pad under line(s)/device(s)   Protective dressings over bony prominences   Turn/reposition every 2 hours/use positioning/transfer devices     Problem: Nutrition  Goal: Nutrient intake appropriate for maintaining nutritional needs  2/17/2025 2208 by Gema Pascual RN  Outcome: Progressing  2/17/2025 2207 by Gema Pascual RN  Outcome: Progressing     Problem: Chronic Conditions and Co-morbidities  Goal: Patient's chronic conditions and co-morbidity symptoms are monitored and maintained or  improved  2/17/2025 2208 by Gema Pascual RN  Outcome: Progressing  2/17/2025 2207 by Gema Pascual RN  Outcome: Progressing     Problem: Discharge Planning  Goal: Discharge to home or other facility with appropriate resources  2/17/2025 2208 by Gema Pascual RN  Outcome: Progressing  2/17/2025 2207 by Gema Pascual RN  Outcome: Progressing     Problem: Fall/Injury  Goal: Verbalize understanding of personal risk factors for fall in the hospital  2/17/2025 2208 by Gema Pascual RN  Outcome: Progressing  2/17/2025 2207 by Gema Pascual RN  Outcome: Progressing  Goal: Verbalize understanding of risk factor reduction measures to prevent injury from fall in the home  2/17/2025 2208 by Gema Pascual RN  Outcome: Progressing  2/17/2025 2207 by Gema Pascual RN  Outcome: Progressing  Goal: Pace activities to prevent fatigue by end of the shift  2/17/2025 2208 by Gema Pascual RN  Outcome: Progressing  2/17/2025 2207 by Gema Pascual RN  Outcome: Progressing  Goal: Not fall by end of shift  Outcome: Progressing  Goal: Be free from injury by end of the shift  Outcome: Progressing  Goal: Use assistive devices by end of the shift  Outcome: Progressing    Patient remained hemodynamically stable. No acute issues through the night. Bed alarm maintained. Call light in reach.    The patient's goals for the shift include  free from falls and injuries    The clinical goals for the shift include remain free from falls is shift

## 2025-02-18 NOTE — PROGRESS NOTES
Internal Medicine Progress Note    Patient Name: Fernanda Hughes          MRN: [id number]  Today's Date: February 18, 2025          Attending: MARCOS Phelan MD    Subjective:  Patient was seen and examined at bedside, she is awake, comfortable, cooperative.    Review Of Systems:  GENERAL: No malaise or fevers.  CARDIOVASCULAR: Negative for chest pain  RESPIRATORY: Negative for shortness of breath  GI: No nausea, vomiting, or diarrhea      Objective:  Vitals:    02/17/25 1555 02/17/25 1924 02/18/25 0035 02/18/25 0800   BP: 158/70  129/60 (!) 181/78   BP Location:   Right arm Right arm   Patient Position:   Sitting Lying   Pulse: 92 90 87 83   Resp: 18 18 18 18   Temp: 36.6 °C (97.9 °F) 36.1 °C (97 °F) 36.4 °C (97.5 °F) 36.3 °C (97.3 °F)   TempSrc:   Temporal Temporal   SpO2: 100% 98% 98% 99%   Weight:       Height:               Physical Exam:   General appearance: Alert, in no acute distress  Skin: Ecchymosis on the forehead  Lungs: Clear to auscultation, no wheezing or rhonchi  Heart: RRR without murmur, gallop, or rubs.    Abdomen: Soft, non-tender. Bowel sounds normal.  Neuro: Awake but disoriented    Labs:  Results for orders placed or performed during the hospital encounter of 02/11/25 (from the past 24 hours)   POCT GLUCOSE   Result Value Ref Range    POCT Glucose 160 (H) 74 - 99 mg/dL   Basic metabolic panel   Result Value Ref Range    Glucose 84 74 - 99 mg/dL    Sodium 129 (L) 136 - 145 mmol/L    Potassium 4.0 3.5 - 5.3 mmol/L    Chloride 97 (L) 98 - 107 mmol/L    Bicarbonate 24 21 - 32 mmol/L    Anion Gap 12 10 - 20 mmol/L    Urea Nitrogen 19 6 - 23 mg/dL    Creatinine 0.82 0.50 - 1.05 mg/dL    eGFR 70 >60 mL/min/1.73m*2    Calcium 8.6 8.6 - 10.3 mg/dL   CBC   Result Value Ref Range    WBC 6.6 4.4 - 11.3 x10*3/uL    nRBC 0.0 0.0 - 0.0 /100 WBCs    RBC 3.53 (L) 4.00 - 5.20 x10*6/uL    Hemoglobin 10.3 (L) 12.0 - 16.0 g/dL    Hematocrit 31.4 (L) 36.0 - 46.0 %    MCV 89 80 - 100 fL    MCH 29.2 26.0 - 34.0 pg    MCHC  "32.8 32.0 - 36.0 g/dL    RDW 13.3 11.5 - 14.5 %    Platelets 308 150 - 450 x10*3/uL   Magnesium   Result Value Ref Range    Magnesium 1.86 1.60 - 2.40 mg/dL       Medications:  Scheduled medications  brimonidine, 1 drop, Both Eyes, BID  dorzolamide-timoloL, 1 drop, Both Eyes, BID  latanoprost, 1 drop, Both Eyes, Nightly  venlafaxine, 37.5 mg, oral, q AM      Continuous medications     PRN medications  PRN medications: acetaminophen **OR** acetaminophen **OR** acetaminophen, melatonin, polyethylene glycol, QUEtiapine      Assessment/Plan:  Assessment & Plan  Hematoma of left buttock  Hemoglobin stable  Continue monitoring blood count    Vascular dementia  Continue supportive care    Falls, sequela  PT/OT    Ambulatory dysfunction  PT/OT    End-stage glaucoma  Continue current medications    Still waiting on PASRR screen    Discussed with patient, RN    Stef Rosales MD   Date: 02/18/25  Time: 9:00 AM    This note was partially created using voice recognition software and is inherently subject to errors including those of syntax and \"sound-alike\" substitutions which may escape proofreading. In such instances, original meaning may be extrapolated by contextual derivation    "

## 2025-02-18 NOTE — PROGRESS NOTES
02/18/25 1220   Intensity of Service   Intensity of Service 0-30 min     Spoke with patient and her POA Deepali Jaramillo about PASRR and approval needed.

## 2025-02-18 NOTE — CARE PLAN
The clinical goals for the shift include remain free from falls this shift      Problem: Discharge Planning  Goal: Discharge to home or other facility with appropriate resources  Outcome: Progressing  Flowsheets (Taken 2/17/2025 1021)  Discharge to home or other facility with appropriate resources:   Identify barriers to discharge with patient and caregiver   Arrange for needed discharge resources and transportation as appropriate   Identify discharge learning needs (meds, wound care, etc)   Arrange for interpreters to assist at discharge as needed   Refer to discharge planning if patient needs post-hospital services based on physician order or complex needs related to functional status, cognitive ability or social support system     Problem: Chronic Conditions and Co-morbidities  Goal: Patient's chronic conditions and co-morbidity symptoms are monitored and maintained or improved  Outcome: Progressing  Flowsheets (Taken 2/17/2025 1021)  Care Plan - Patient's Chronic Conditions and Co-Morbidity Symptoms are Monitored and Maintained or Improved:   Monitor and assess patient's chronic conditions and comorbid symptoms for stability, deterioration, or improvement   Collaborate with multidisciplinary team to address chronic and comorbid conditions and prevent exacerbation or deterioration   Update acute care plan with appropriate goals if chronic or comorbid symptoms are exacerbated and prevent overall improvement and discharge     Problem: Nutrition  Goal: Nutrient intake appropriate for maintaining nutritional needs  Outcome: Progressing     Problem: Skin  Goal: Participates in plan/prevention/treatment measures  Outcome: Progressing  Flowsheets (Taken 2/18/2025 1500)  Participates in plan/prevention/treatment measures:   Discuss with provider PT/OT consult   Elevate heels   Increase activity/out of bed for meals  Goal: Prevent/manage excess moisture  Outcome: Progressing  Flowsheets (Taken 2/18/2025  1500)  Prevent/manage excess moisture:   Cleanse incontinence/protect with barrier cream   Moisturize dry skin   Follow provider orders for dressing changes   Monitor for/manage infection if present  Goal: Prevent/minimize sheer/friction injuries  Outcome: Progressing  Flowsheets (Taken 2/18/2025 1500)  Prevent/minimize sheer/friction injuries:   Complete micro-shifts as needed if patient unable. Adjust patient position to relieve pressure points, not a full turn   Increase activity/out of bed for meals   Use pull sheet   HOB 30 degrees or less   Turn/reposition every 2 hours/use positioning/transfer devices  Goal: Promote/optimize nutrition  Outcome: Progressing  Flowsheets (Taken 2/18/2025 1500)  Promote/optimize nutrition:   Assist with feeding   Discuss with provider if NPO > 2 days   Offer water/supplements/favorite foods   Consume > 50% meals/supplements   Monitor/record intake including meals   Reassess MST if dietician not consulted  Goal: Promote skin healing  Outcome: Progressing  Flowsheets (Taken 2/18/2025 1500)  Promote skin healing:   Assess skin/pad under line(s)/device(s)   Protective dressings over bony prominences   Turn/reposition every 2 hours/use positioning/transfer devices     Problem: Fall/Injury  Goal: Verbalize understanding of personal risk factors for fall in the hospital  Outcome: Progressing  Goal: Verbalize understanding of risk factor reduction measures to prevent injury from fall in the home  Outcome: Progressing  Goal: Pace activities to prevent fatigue by end of the shift  Outcome: Progressing  Goal: Not fall by end of shift  Outcome: Progressing  Goal: Be free from injury by end of the shift  Outcome: Progressing  Goal: Use assistive devices by end of the shift  Outcome: Progressing

## 2025-02-18 NOTE — PROGRESS NOTES
Occupational Therapy    Occupational Therapy    OT Treatment    Patient Name: Fernanda Hughes  MRN: [id number]  Today's Date: 2/18/2025          3102/3102-A    Assessment:  End of Session Communication: Bedside nurse  End of Session Patient Position: Up in chair, Alarm on       Plan:  OT Frequency: 3 times per week  OT Discharge Recommendations: Moderate intensity level of continued care     Subjective      02/18/25 1415   OT Last Visit   OT Received On 02/18/25   General   Reason for Referral Impaired Mobility   Referred By MARCOS Phelan   Past Medical History Relevant to Rehab vascular dementia, HLD   Prior to Session Communication Bedside nurse   Patient Position Received Bed, 3 rail up   General Comment Pt agreeable to tx, cleared for participation.   Precautions   Medical Precautions Fall precautions   Pain Assessment   Pain Assessment 0-10   0-10 (Numeric) Pain Score 0 - No pain   Cognition   Orientation Level Oriented X4, forgetful at times.    Impulsive   (Cues for pacing as patient is quick to move.)   Grooming   Grooming Level of Assistance Contact guard;Minimal verbal cues   Grooming Where Assessed Standing sinkside   Grooming Comments Pt completed grooming tasks in stance, cues for AD placement with follow through.   Toileting   Toileting Level of Assistance Contact guard   Where Assessed Toilet   Toileting Comments Pt completed marta care while seated, CGA for safety.   Bed Mobility 1   Bed Mobility 1 Supine to sitting   Level of Assistance 1 Close supervision;Minimal verbal cues   Bed Mobility Comments 1 HOB elevated, cues for slow position changes.   Functional Mobility   Functional Mobility Performed   (Pt ambulated short distances within room with gait belt, fww and CGA for safety, cues for pacing as pt is quick to move, cues for fww managment.)   Transfer 1   Technique 1 Sit to stand;Stand to sit   Transfer Device 1 Walker;Gait belt   Transfer Level of Assistance 1 Contact guard;Minimal verbal cues    Trials/Comments 1 STS at recliner level, trialed 5x for safety, cues for proper hand placement with good follow through.   IP OT Assessment   End of Session Communication Bedside nurse   End of Session Patient Position Up in chair;Alarm on   Inpatient Plan   OT Frequency 3 times per week   OT Discharge Recommendations Moderate intensity level of continued care     Outcome Measures:[facility] Daily Activity  Putting on and taking off regular lower body clothing: A lot  Bathing (including washing, rinsing, drying): A lot  Putting on and taking off regular upper body clothing: A little  Toileting, which includes using toilet, bedpan or urinal: A little  Taking care of personal grooming such as brushing teeth: A little  Eating Meals: None  Daily Activity - Total Score: 17  Education Documentation  No documentation found.  Education Comments  No comments found.            Goals:  Encounter Problems       Encounter Problems (Active)       OT Goals       Patient will complete functional transfers with SBA. (Progressing)       Start:  02/12/25    Expected End:  02/26/25            Patient will complete toileting with SBA. (Progressing)       Start:  02/12/25    Expected End:  02/26/25            Patient will complete LE dressing with SBA. (Progressing)       Start:  02/12/25    Expected End:  02/26/25                       LOGAN JUAN was present for supervision of this student during the treatment and documentation of this patient. JASON Del Castillo/MARÍA

## 2025-02-19 LAB
ANION GAP SERPL CALC-SCNC: 11 MMOL/L (ref 10–20)
BUN SERPL-MCNC: 21 MG/DL (ref 6–23)
CALCIUM SERPL-MCNC: 8.6 MG/DL (ref 8.6–10.3)
CHLORIDE SERPL-SCNC: 96 MMOL/L (ref 98–107)
CO2 SERPL-SCNC: 24 MMOL/L (ref 21–32)
CREAT SERPL-MCNC: 0.82 MG/DL (ref 0.5–1.05)
EGFRCR SERPLBLD CKD-EPI 2021: 70 ML/MIN/1.73M*2
ERYTHROCYTE [DISTWIDTH] IN BLOOD BY AUTOMATED COUNT: 13.2 % (ref 11.5–14.5)
GLUCOSE SERPL-MCNC: 89 MG/DL (ref 74–99)
HCT VFR BLD AUTO: 31.8 % (ref 36–46)
HGB BLD-MCNC: 10.5 G/DL (ref 12–16)
MAGNESIUM SERPL-MCNC: 1.85 MG/DL (ref 1.6–2.4)
MCH RBC QN AUTO: 29.7 PG (ref 26–34)
MCHC RBC AUTO-ENTMCNC: 33 G/DL (ref 32–36)
MCV RBC AUTO: 90 FL (ref 80–100)
NRBC BLD-RTO: 0 /100 WBCS (ref 0–0)
PLATELET # BLD AUTO: 283 X10*3/UL (ref 150–450)
POTASSIUM SERPL-SCNC: 4 MMOL/L (ref 3.5–5.3)
RBC # BLD AUTO: 3.54 X10*6/UL (ref 4–5.2)
SODIUM SERPL-SCNC: 127 MMOL/L (ref 136–145)
WBC # BLD AUTO: 6.4 X10*3/UL (ref 4.4–11.3)

## 2025-02-19 PROCEDURE — 96360 HYDRATION IV INFUSION INIT: CPT

## 2025-02-19 PROCEDURE — 85027 COMPLETE CBC AUTOMATED: CPT

## 2025-02-19 PROCEDURE — 2500000001 HC RX 250 WO HCPCS SELF ADMINISTERED DRUGS (ALT 637 FOR MEDICARE OP)

## 2025-02-19 PROCEDURE — G0378 HOSPITAL OBSERVATION PER HR: HCPCS

## 2025-02-19 PROCEDURE — 2500000005 HC RX 250 GENERAL PHARMACY W/O HCPCS

## 2025-02-19 PROCEDURE — 80048 BASIC METABOLIC PNL TOTAL CA: CPT

## 2025-02-19 PROCEDURE — 97110 THERAPEUTIC EXERCISES: CPT | Mod: GP,CQ

## 2025-02-19 PROCEDURE — 2500000001 HC RX 250 WO HCPCS SELF ADMINISTERED DRUGS (ALT 637 FOR MEDICARE OP): Performed by: NURSE PRACTITIONER

## 2025-02-19 PROCEDURE — 97530 THERAPEUTIC ACTIVITIES: CPT | Mod: GP,CQ

## 2025-02-19 PROCEDURE — 36415 COLL VENOUS BLD VENIPUNCTURE: CPT

## 2025-02-19 PROCEDURE — 83735 ASSAY OF MAGNESIUM: CPT

## 2025-02-19 PROCEDURE — 94760 N-INVAS EAR/PLS OXIMETRY 1: CPT

## 2025-02-19 PROCEDURE — 96361 HYDRATE IV INFUSION ADD-ON: CPT

## 2025-02-19 PROCEDURE — 2500000004 HC RX 250 GENERAL PHARMACY W/ HCPCS (ALT 636 FOR OP/ED): Performed by: NURSE PRACTITIONER

## 2025-02-19 PROCEDURE — 2500000002 HC RX 250 W HCPCS SELF ADMINISTERED DRUGS (ALT 637 FOR MEDICARE OP, ALT 636 FOR OP/ED): Performed by: NURSE PRACTITIONER

## 2025-02-19 RX ORDER — WATER
200 LIQUID (ML) MISCELLANEOUS
Status: COMPLETED | OUTPATIENT
Start: 2025-02-19 | End: 2025-02-19

## 2025-02-19 RX ORDER — SODIUM CHLORIDE 9 MG/ML
100 INJECTION, SOLUTION INTRAVENOUS CONTINUOUS
Status: ACTIVE | OUTPATIENT
Start: 2025-02-19 | End: 2025-02-19

## 2025-02-19 RX ADMIN — LATANOPROST 1 DROP: 50 SOLUTION OPHTHALMIC at 22:45

## 2025-02-19 RX ADMIN — ACETAMINOPHEN 650 MG: 325 TABLET ORAL at 05:11

## 2025-02-19 RX ADMIN — SODIUM CHLORIDE 100 ML/HR: 9 INJECTION, SOLUTION INTRAVENOUS at 08:45

## 2025-02-19 RX ADMIN — Medication 200 ML: at 15:32

## 2025-02-19 RX ADMIN — VENLAFAXINE 37.5 MG: 37.5 TABLET ORAL at 08:46

## 2025-02-19 RX ADMIN — QUETIAPINE FUMARATE 12.5 MG: 25 TABLET ORAL at 00:39

## 2025-02-19 RX ADMIN — BRIMONIDINE TARTRATE 1 DROP: 2 SOLUTION/ DROPS OPHTHALMIC at 08:46

## 2025-02-19 RX ADMIN — Medication 200 ML: at 11:48

## 2025-02-19 RX ADMIN — BRIMONIDINE TARTRATE 1 DROP: 2 SOLUTION/ DROPS OPHTHALMIC at 22:45

## 2025-02-19 RX ADMIN — Medication 3 MG: at 00:39

## 2025-02-19 RX ADMIN — DORZOLAMIDE HYDROCHLORIDE AND TIMOLOL MALEATE 1 DROP: 20; 5 SOLUTION/ DROPS OPHTHALMIC at 22:45

## 2025-02-19 RX ADMIN — Medication 200 ML: at 17:27

## 2025-02-19 RX ADMIN — DORZOLAMIDE HYDROCHLORIDE AND TIMOLOL MALEATE 1 DROP: 20; 5 SOLUTION/ DROPS OPHTHALMIC at 08:46

## 2025-02-19 ASSESSMENT — COGNITIVE AND FUNCTIONAL STATUS - GENERAL
DAILY ACTIVITIY SCORE: 22
TURNING FROM BACK TO SIDE WHILE IN FLAT BAD: A LITTLE
DAILY ACTIVITIY SCORE: 22
HELP NEEDED FOR BATHING: A LITTLE
DRESSING REGULAR LOWER BODY CLOTHING: A LITTLE
STANDING UP FROM CHAIR USING ARMS: A LITTLE
WALKING IN HOSPITAL ROOM: A LITTLE
WALKING IN HOSPITAL ROOM: A LITTLE
HELP NEEDED FOR BATHING: A LITTLE
STANDING UP FROM CHAIR USING ARMS: A LITTLE
MOVING TO AND FROM BED TO CHAIR: A LITTLE
CLIMB 3 TO 5 STEPS WITH RAILING: A LITTLE
MOBILITY SCORE: 21
MOBILITY SCORE: 21
MOVING FROM LYING ON BACK TO SITTING ON SIDE OF FLAT BED WITH BEDRAILS: A LITTLE
WALKING IN HOSPITAL ROOM: A LITTLE
CLIMB 3 TO 5 STEPS WITH RAILING: A LOT
STANDING UP FROM CHAIR USING ARMS: A LITTLE
MOBILITY SCORE: 17
CLIMB 3 TO 5 STEPS WITH RAILING: A LITTLE
DRESSING REGULAR LOWER BODY CLOTHING: A LITTLE

## 2025-02-19 ASSESSMENT — PAIN SCALES - GENERAL
PAINLEVEL_OUTOF10: 0 - NO PAIN
PAINLEVEL_OUTOF10: 7
PAINLEVEL_OUTOF10: 0 - NO PAIN
PAINLEVEL_OUTOF10: 2
PAINLEVEL_OUTOF10: 0 - NO PAIN
PAINLEVEL_OUTOF10: 5 - MODERATE PAIN

## 2025-02-19 ASSESSMENT — PAIN - FUNCTIONAL ASSESSMENT
PAIN_FUNCTIONAL_ASSESSMENT: 0-10

## 2025-02-19 ASSESSMENT — PAIN DESCRIPTION - LOCATION: LOCATION: NECK

## 2025-02-19 ASSESSMENT — PAIN SCALES - PAIN ASSESSMENT IN ADVANCED DEMENTIA (PAINAD)
TOTALSCORE: 1
NEGVOCALIZATION: OCCASIONAL MOAN/GROAN, LOW SPEECH, NEGATIVE/DISAPPROVING QUALITY
CONSOLABILITY: NO NEED TO CONSOLE
BREATHING: NORMAL
BODYLANGUAGE: RELAXED
FACIALEXPRESSION: SMILING OR INEXPRESSIVE
TOTALSCORE: MEDICATION (SEE MAR)

## 2025-02-19 NOTE — PROGRESS NOTES
Physical Therapy    Physical Therapy Treatment    Patient Name: Fernanda Hughes  MRN: [id number]  Today's Date: 2/18/2025  Time Calculation  Start Time: 1630  Stop Time: 1700  Time Calculation (min): 30 min     3102/3102-A       02/18/25 1630   PT  Visit   PT Received On 02/18/25   Response to Previous Treatment Patient with no complaints from previous session.   General   Reason for Referral Impaired Mobility   Referred By MARCOS Phelan   Past Medical History Relevant to Rehab vascular dementia, HLD   Prior to Session Communication Bedside nurse   Patient Position Received Bed, 3 rail up   Preferred Learning Style verbal;visual   General Comment Patient agreeable to therapy.   Precautions   Medical Precautions Fall precautions   Pain Assessment   0-10 (Numeric) Pain Score 0 - No pain  (Not pain at rest. Pain R foot with ambulation due to callous on sole of foot.)   Cognition   Orientation Level Disoriented to time;Disoriented to situation   Static Sitting Balance   Static Sitting-Comment/Number of Minutes Steady sitting on the EOB.   Static Standing Balance   Static Standing-Comment/Number of Minutes Pt. needed CGA to steady in standing.   Therapeutic Exercise   Therapeutic Exercise Performed Yes   Therapeutic Exercise Activity 1 AP, LAQ, Marching 2x10   Bed Mobility   Bed Mobility Yes   Bed Mobility 1   Bed Mobility 1 Supine to sitting;Sitting to supine   Level of Assistance 1 Contact guard   Bed Mobility Comments 1 HOB eleveated. CGA for supined to sit.   Ambulation/Gait Training   Ambulation/Gait Training Performed Yes   Ambulation/Gait Training 1   Surface 1 Level tile   Device 1 Rolling walker   Gait Support Devices Gait belt   Assistance 1 Minimum assistance   Quality of Gait 1 NBOS;Diminished heel strike   Comments/Distance (ft) 1 Pt ambulated 30'x2 then 30'x4. Pt. had one episode of loss of balance. Pt needed assist to navigate around object in her path.   Transfers   Transfer Yes   Transfer 1   Transfer From 1  Bed to   Transfer to 1 Stand   Technique 1 Sit to stand;Stand to sit   Transfer Device 1 Walker;Gait belt   Transfer Level of Assistance 1 Minimum assistance   Trials/Comments 1 Cues and assist x1for sit to stand.   Activity Tolerance   Endurance Tolerates 30 min exercise with multiple rests   PT Assessment   PT Assessment Results Decreased strength;Decreased endurance;Impaired balance;Decreased mobility   Rehab Prognosis Good   Barriers to Discharge Home Caregiver assistance;Physical needs   Caregiver Assistance Caregiver assistance needed per identified barriers - however, level of patient's required assistance exceeds assistance available at home   Physical Needs 24hr mobility assistance needed;24hr ADL assistance needed;High falls risk due to function or environment   End of Session Communication Bedside nurse   End of Session Patient Position Up in chair;Alarm on   Outpatient Education   Individual(s) Educated Patient   Education Provided Fall Risk   PT Plan   Inpatient/Swing Bed or Outpatient Inpatient   PT Plan   PT Plan Ongoing PT   PT Frequency 3 times per week   PT Discharge Recommendations Moderate intensity level of continued care   Equipment Recommended upon Discharge Wheeled walker       Assessment/Plan   PT Assessment  PT Assessment Results: Decreased strength, Decreased endurance, Impaired balance, Decreased mobility  Rehab Prognosis: Good  Barriers to Discharge Home: Caregiver assistance, Physical needs  Caregiver Assistance: Caregiver assistance needed per identified barriers - however, level of patient's required assistance exceeds assistance available at home  Physical Needs: 24hr mobility assistance needed, 24hr ADL assistance needed, High falls risk due to function or environment  End of Session Communication: Bedside nurse  End of Session Patient Position: Up in chair, Alarm on  PT Plan  Inpatient/Swing Bed or Outpatient: Inpatient  PT Plan  Treatment/Interventions: Bed mobility, Gait  training, Transfer training, Balance training, Neuromuscular re-education, Strengthening, Range of motion, Therapeutic exercise, Therapeutic activity, Home exercise program  PT Plan: Ongoing PT  PT Frequency: 3 times per week  PT Discharge Recommendations: Moderate intensity level of continued care  Equipment Recommended upon Discharge: Wheeled walker  PT Recommended Transfer Status: Assist x2 (Min-Mod A)  PT - OK to Discharge: Yes    Outcome Measures:  [facility] Basic Mobility  Turning from your back to your side while in a flat bed without using bedrails: A little  Moving from lying on your back to sitting on the side of a flat bed without using bedrails: A little  Moving to and from bed to chair (including a wheelchair): A little  Standing up from a chair using your arms (e.g. wheelchair or bedside chair): A little  To walk in hospital room: A little  Climbing 3-5 steps with railing: A lot  Basic Mobility - Total Score: 17                         EDUCATION:  Outpatient Education  Individual(s) Educated: Patient  Education Provided: Fall Risk  Education Comments  No comments found.        GOALS:  Encounter Problems       Encounter Problems (Active)       PT Problem       Pt will be able to perform all bed mobility tasks with Mod I.  (Progressing)       Start:  02/12/25    Expected End:  02/26/25            Pt will perform all transfers with Mod I and LRAD with proper safety mechanics.   (Progressing)       Start:  02/12/25    Expected End:  02/26/25            Pt will ambulate 100 ft with Mod I using LRAD for improved functional independence.  (Progressing)       Start:  02/12/25    Expected End:  02/26/25               Pain - Adult

## 2025-02-19 NOTE — CARE PLAN
The patient's goals for the shift include      The clinical goals for the shift include Pt will stay free from injury throughout shift.    Over the shift, the patient did not make progress toward the following goals. Barriers to progression include . Recommendations to address these barriers include .

## 2025-02-19 NOTE — PROGRESS NOTES
Physical Therapy    Physical Therapy    Physical Therapy Treatment    Patient Name: Fernanda Hughes  MRN: [id number]  Today's Date: 2/19/2025  Time Calculation  Start Time: 1050  Stop Time: 1115  Time Calculation (min): 25 min     3102/3102-A       02/19/25 1050   PT  Visit   PT Received On 02/19/25   Response to Previous Treatment Patient with no complaints from previous session.   General   Reason for Referral Impaired Mobility   Referred By MARCOS Phelan   Past Medical History Relevant to Rehab vascular dementia, HLD   Prior to Session Communication Bedside nurse   Patient Position Received Bed, 3 rail up   Preferred Learning Style verbal;visual   General Comment Patient agreeable to therapy. Cleared for participation   Precautions   Medical Precautions Fall precautions   Pain Assessment   Pain Assessment 0-10   0-10 (Numeric) Pain Score 0 - No pain   Cognition   Overall Cognitive Status WFL   Orientation Level Disoriented to situation   Therapeutic Exercise   Therapeutic Exercise Performed Yes   Therapeutic Exercise Activity 1 AP, LAQ,  seated and standing Marching, resisted ABD/ADD, standing heel taps, heel raises, toe raises 2x10 Standing therex using B UE support and CGA/Ana Cristina for safety d/t c/o dizziness and multiple LOB   Bed Mobility   Bed Mobility Yes   Bed Mobility 1   Bed Mobility 1 Supine to sitting;Sitting to supine   Level of Assistance 1 Contact guard   Bed Mobility Comments 1 HOB elevated   Transfers   Transfer Yes   Transfer 1   Transfer From 1 Bed to   Transfer to 1 Stand   Technique 1 Sit to stand;Stand to sit   Transfer Device 1 Walker;Gait belt   Transfer Level of Assistance 1 Minimum assistance;Minimal verbal cues   Transfers 2   Transfer From 2 Stand to;Sit to   Transfer to 2 Chair with arms   Technique 2 Sit to stand;Stand to sit   Transfer Device 2 Walker;Gait belt   Transfer Level of Assistance 2 Minimum assistance;Moderate verbal cues;Contact guard   Trials/Comments 2 Multiple STS with cues for  safe UE placement and sequencing. Pt continues to hold onto WW to stand/sit despite v/t cues.   Activity Tolerance   Endurance Tolerates 30 min exercise with multiple rests   PT Assessment   PT Assessment Results Decreased strength;Decreased endurance;Impaired balance;Decreased mobility   Rehab Prognosis Good   Barriers to Discharge Home Caregiver assistance;Physical needs   Caregiver Assistance Caregiver assistance needed per identified barriers - however, level of patient's required assistance exceeds assistance available at home   Physical Needs 24hr mobility assistance needed;24hr ADL assistance needed;High falls risk due to function or environment   End of Session Communication Bedside nurse   Assessment Comment Pt put forth good effort. Pt is limited by c/o dizziness, unable to ambulate this session d/t instability.   End of Session Patient Position Up in chair;Alarm on     Outcome Measures:  [facility] Basic Mobility  Turning from your back to your side while in a flat bed without using bedrails: A little  Moving from lying on your back to sitting on the side of a flat bed without using bedrails: A little  Moving to and from bed to chair (including a wheelchair): A little  Standing up from a chair using your arms (e.g. wheelchair or bedside chair): A little  To walk in hospital room: A little  Climbing 3-5 steps with railing: A lot  Basic Mobility - Total Score: 17                             EDUCATION:  Outpatient Education  Individual(s) Educated: Patient  Education Provided: Fall Risk  Education Documentation  Body Mechanics, taught by Danette Bender PTA at 2/19/2025 12:54 PM.  Learner: Patient  Readiness: Acceptance  Method: Explanation, Demonstration  Response: Verbalizes Understanding, Demonstrated Understanding, Needs Reinforcement    Precautions, taught by Danette Bender PTA at 2/19/2025 12:54 PM.  Learner: Patient  Readiness: Acceptance  Method: Explanation, Demonstration  Response: Verbalizes  Understanding, Demonstrated Understanding, Needs Reinforcement    Mobility Training, taught by Danette Bender PTA at 2/19/2025 12:54 PM.  Learner: Patient  Readiness: Acceptance  Method: Explanation, Demonstration  Response: Verbalizes Understanding, Demonstrated Understanding, Needs Reinforcement    Education Comments  No comments found.        GOALS:  Encounter Problems       Encounter Problems (Active)       PT Problem       Pt will be able to perform all bed mobility tasks with Mod I.  (Progressing)       Start:  02/12/25    Expected End:  02/26/25            Pt will perform all transfers with Mod I and LRAD with proper safety mechanics.   (Progressing)       Start:  02/12/25    Expected End:  02/26/25            Pt will ambulate 100 ft with Mod I using LRAD for improved functional independence.  (Progressing)       Start:  02/12/25    Expected End:  02/26/25               Pain - Adult          Safety       LTG - Patient will adhere to hip precautions during ADL's and transfers       Start:  02/17/25            LTG - Patient will demonstrate safety requirements appropriate to situation/environment       Start:  02/17/25            LTG - Patient will utilize safety techniques       Start:  02/17/25            STG - Patient uses call light consistently to request assistance with transfers       Start:  02/17/25

## 2025-02-19 NOTE — CARE PLAN
The patient's goals for the shift include      The clinical goals for the shift include Pt will stay free from injury throughout shift.      Problem: Discharge Planning  Goal: Discharge to home or other facility with appropriate resources  Outcome: Progressing     Problem: Nutrition  Goal: Nutrient intake appropriate for maintaining nutritional needs  Outcome: Progressing     Problem: Chronic Conditions and Co-morbidities  Goal: Patient's chronic conditions and co-morbidity symptoms are monitored and maintained or improved  Outcome: Progressing     Problem: Skin  Goal: Participates in plan/prevention/treatment measures  Outcome: Progressing  Goal: Prevent/manage excess moisture  Outcome: Progressing  Goal: Prevent/minimize sheer/friction injuries  Outcome: Progressing  Flowsheets (Taken 2/19/2025 1035)  Prevent/minimize sheer/friction injuries:   Turn/reposition every 2 hours/use positioning/transfer devices   HOB 30 degrees or less  Goal: Promote/optimize nutrition  Outcome: Progressing  Goal: Promote skin healing  Outcome: Progressing     Problem: Fall/Injury  Goal: Verbalize understanding of personal risk factors for fall in the hospital  Outcome: Progressing  Goal: Verbalize understanding of risk factor reduction measures to prevent injury from fall in the home  Outcome: Progressing  Goal: Pace activities to prevent fatigue by end of the shift  Outcome: Progressing  Goal: Not fall by end of shift  Outcome: Progressing  Goal: Be free from injury by end of the shift  Outcome: Progressing  Goal: Use assistive devices by end of the shift  Outcome: Progressing

## 2025-02-19 NOTE — PROGRESS NOTES
"Subjective  Patient had uneventful night does not complain about any chest pain shortness of breath no change in condition waiting for transfer to skilled nursing facility  Nursing staff was interviewed  Objectives    Last Recorded Vitals  Blood pressure 146/74, pulse 78, temperature 36.4 °C (97.5 °F), temperature source Temporal, resp. rate 17, height 1.702 m (5' 7\"), weight 68 kg (150 lb), SpO2 (!) 83%.    Physical Exam  HENT:      Right Ear: External ear normal.      Left Ear: External ear normal.      Mouth/Throat:      Mouth: Mucous membranes are moist.   Cardiovascular:      Rate and Rhythm: Normal rate and regular rhythm.      Heart sounds: No murmur heard.     No friction rub. No gallop.   Pulmonary:      Effort: No accessory muscle usage or respiratory distress.      Breath sounds: No stridor. No wheezing or rhonchi.   Chest:      Chest wall: No tenderness.   Abdominal:      General: There is no distension.      Palpations: There is no mass.      Tenderness: There is no abdominal tenderness. There is no guarding or rebound.   Musculoskeletal:         General: No deformity or signs of injury.      Cervical back: No rigidity or tenderness. Normal range of motion.      Right lower leg: No edema.      Left lower leg: No edema.   Skin:     Coloration: Skin is not jaundiced or pale.      Findings: No lesion.  Eri over the left hip  Neurological:   Alert but very confused     Labs    No results displayed because visit has over 200 results.            Imaging     ECG 12 lead  Normal sinus rhythm  Normal ECG  When compared with ECG of 02-DEC-2024 18:18,  No significant change was found  Confirmed by Triston Vegas ([id number]) on 2/13/2025 8:56:24 PM  ECG 12 lead  Normal sinus rhythm  Possible Lateral infarct , age undetermined  Abnormal ECG  When compared with ECG of 11-FEB-2025 23:36,  No significant change was found       Patient Active Problem List   Diagnosis    Vision disturbance    UTI (urinary tract infection)    " Vascular dementia    Confusion and disorientation    Hematoma of left buttock    Falls, sequela    Acute on chronic anemia    Ambulatory dysfunction    End-stage glaucoma         Assessment/Plan   Assessment & Plan  Hematoma of left buttock      Vascular dementia      Falls, sequela  PT/OT    Ambulatory dysfunction  PT/OT    End-stage glaucoma    Generalized weakness waiting for transfer to skilled nursing facility  Left hip hematoma stable continue to monitor H&H has been stable  Severe dementia waiting for transfer to skilled nursing facility         I spent 25 minutes in the professional and overall care of this patient.      MARCOS Phelan MD

## 2025-02-20 LAB
ANION GAP SERPL CALC-SCNC: 11 MMOL/L (ref 10–20)
BUN SERPL-MCNC: 20 MG/DL (ref 6–23)
CALCIUM SERPL-MCNC: 8.9 MG/DL (ref 8.6–10.3)
CHLORIDE SERPL-SCNC: 99 MMOL/L (ref 98–107)
CO2 SERPL-SCNC: 25 MMOL/L (ref 21–32)
CREAT SERPL-MCNC: 0.89 MG/DL (ref 0.5–1.05)
EGFRCR SERPLBLD CKD-EPI 2021: 63 ML/MIN/1.73M*2
ERYTHROCYTE [DISTWIDTH] IN BLOOD BY AUTOMATED COUNT: 13.5 % (ref 11.5–14.5)
GLUCOSE SERPL-MCNC: 84 MG/DL (ref 74–99)
HCT VFR BLD AUTO: 33.6 % (ref 36–46)
HGB BLD-MCNC: 11.4 G/DL (ref 12–16)
MAGNESIUM SERPL-MCNC: 1.98 MG/DL (ref 1.6–2.4)
MCH RBC QN AUTO: 30.2 PG (ref 26–34)
MCHC RBC AUTO-ENTMCNC: 33.9 G/DL (ref 32–36)
MCV RBC AUTO: 89 FL (ref 80–100)
NRBC BLD-RTO: 0 /100 WBCS (ref 0–0)
PLATELET # BLD AUTO: 319 X10*3/UL (ref 150–450)
POTASSIUM SERPL-SCNC: 4.2 MMOL/L (ref 3.5–5.3)
RBC # BLD AUTO: 3.78 X10*6/UL (ref 4–5.2)
SODIUM SERPL-SCNC: 131 MMOL/L (ref 136–145)
WBC # BLD AUTO: 6 X10*3/UL (ref 4.4–11.3)

## 2025-02-20 PROCEDURE — 36415 COLL VENOUS BLD VENIPUNCTURE: CPT

## 2025-02-20 PROCEDURE — 96361 HYDRATE IV INFUSION ADD-ON: CPT

## 2025-02-20 PROCEDURE — 80048 BASIC METABOLIC PNL TOTAL CA: CPT

## 2025-02-20 PROCEDURE — 85027 COMPLETE CBC AUTOMATED: CPT

## 2025-02-20 PROCEDURE — 2500000002 HC RX 250 W HCPCS SELF ADMINISTERED DRUGS (ALT 637 FOR MEDICARE OP, ALT 636 FOR OP/ED): Performed by: NURSE PRACTITIONER

## 2025-02-20 PROCEDURE — 2500000004 HC RX 250 GENERAL PHARMACY W/ HCPCS (ALT 636 FOR OP/ED): Performed by: NURSE PRACTITIONER

## 2025-02-20 PROCEDURE — 97535 SELF CARE MNGMENT TRAINING: CPT | Mod: GO,CO

## 2025-02-20 PROCEDURE — G0378 HOSPITAL OBSERVATION PER HR: HCPCS

## 2025-02-20 PROCEDURE — 2500000005 HC RX 250 GENERAL PHARMACY W/O HCPCS

## 2025-02-20 PROCEDURE — 83735 ASSAY OF MAGNESIUM: CPT

## 2025-02-20 PROCEDURE — 2500000001 HC RX 250 WO HCPCS SELF ADMINISTERED DRUGS (ALT 637 FOR MEDICARE OP): Performed by: NURSE PRACTITIONER

## 2025-02-20 RX ORDER — SODIUM CHLORIDE 9 MG/ML
75 INJECTION, SOLUTION INTRAVENOUS CONTINUOUS
Status: ACTIVE | OUTPATIENT
Start: 2025-02-20 | End: 2025-02-20

## 2025-02-20 RX ADMIN — BRIMONIDINE TARTRATE 1 DROP: 2 SOLUTION/ DROPS OPHTHALMIC at 20:15

## 2025-02-20 RX ADMIN — LATANOPROST 1 DROP: 50 SOLUTION OPHTHALMIC at 20:15

## 2025-02-20 RX ADMIN — Medication 3 MG: at 20:14

## 2025-02-20 RX ADMIN — VENLAFAXINE 37.5 MG: 37.5 TABLET ORAL at 09:09

## 2025-02-20 RX ADMIN — Medication 3 MG: at 01:12

## 2025-02-20 RX ADMIN — QUETIAPINE FUMARATE 12.5 MG: 25 TABLET ORAL at 20:14

## 2025-02-20 RX ADMIN — DORZOLAMIDE HYDROCHLORIDE AND TIMOLOL MALEATE 1 DROP: 20; 5 SOLUTION/ DROPS OPHTHALMIC at 20:14

## 2025-02-20 RX ADMIN — BRIMONIDINE TARTRATE 1 DROP: 2 SOLUTION/ DROPS OPHTHALMIC at 09:10

## 2025-02-20 RX ADMIN — DORZOLAMIDE HYDROCHLORIDE AND TIMOLOL MALEATE 1 DROP: 20; 5 SOLUTION/ DROPS OPHTHALMIC at 09:10

## 2025-02-20 RX ADMIN — QUETIAPINE FUMARATE 12.5 MG: 25 TABLET ORAL at 01:12

## 2025-02-20 RX ADMIN — SODIUM CHLORIDE 75 ML/HR: 9 INJECTION, SOLUTION INTRAVENOUS at 11:48

## 2025-02-20 ASSESSMENT — COGNITIVE AND FUNCTIONAL STATUS - GENERAL
DAILY ACTIVITIY SCORE: 22
DRESSING REGULAR LOWER BODY CLOTHING: A LITTLE
MOBILITY SCORE: 21
DAILY ACTIVITIY SCORE: 22
MOBILITY SCORE: 21
HELP NEEDED FOR BATHING: A LITTLE
HELP NEEDED FOR BATHING: A LOT
WALKING IN HOSPITAL ROOM: A LITTLE
DRESSING REGULAR UPPER BODY CLOTHING: A LITTLE
STANDING UP FROM CHAIR USING ARMS: A LITTLE
STANDING UP FROM CHAIR USING ARMS: A LITTLE
WALKING IN HOSPITAL ROOM: A LITTLE
DRESSING REGULAR LOWER BODY CLOTHING: A LITTLE
DAILY ACTIVITIY SCORE: 17
TOILETING: A LITTLE
CLIMB 3 TO 5 STEPS WITH RAILING: A LITTLE
PERSONAL GROOMING: A LITTLE
CLIMB 3 TO 5 STEPS WITH RAILING: A LITTLE
DRESSING REGULAR LOWER BODY CLOTHING: A LOT
HELP NEEDED FOR BATHING: A LITTLE

## 2025-02-20 ASSESSMENT — PAIN SCALES - GENERAL
PAINLEVEL_OUTOF10: 0 - NO PAIN

## 2025-02-20 ASSESSMENT — PAIN - FUNCTIONAL ASSESSMENT
PAIN_FUNCTIONAL_ASSESSMENT: 0-10
PAIN_FUNCTIONAL_ASSESSMENT: 0-10

## 2025-02-20 ASSESSMENT — ACTIVITIES OF DAILY LIVING (ADL): HOME_MANAGEMENT_TIME_ENTRY: 33

## 2025-02-20 NOTE — PROGRESS NOTES
Occupational Therapy    Occupational Therapy    OT Treatment    Patient Name: Fernanda Hughes  MRN: [id number]  Today's Date: 2/20/2025          3102/3102-A    Assessment:  End of Session Communication: Bedside nurse  End of Session Patient Position: Up in chair, Alarm on       Plan:  OT Frequency: 3 times per week  OT Discharge Recommendations: Moderate intensity level of continued care     Subjective        02/20/25 1113   OT Last Visit   OT Received On 02/20/25   General   Reason for Referral Impaired Mobility   Referred By MARCOS Phelan   Past Medical History Relevant to Rehab vascular dementia, HLD   Prior to Session Communication Bedside nurse   Patient Position Received Bed, 3 rail up   General Comment Limited session d/t dizziness, decreased mobility, improved when sitting.   Precautions   Medical Precautions Fall precautions   Vital Signs   Vitals Session During OT   /61   BP Location Right arm   BP Method Automatic   Patient Position Sitting   Pain Assessment   Pain Assessment 0-10   0-10 (Numeric) Pain Score 0 - No pain   Cognition   Orientation Level Disoriented to situation  (Pt is forgetful at times.)   Impulsive Mildly  (Pt is quick to move, cues for slow position changes getting OOB and ambulating.)   Grooming   Grooming Level of Assistance Contact guard   Grooming Where Assessed Standing sinkside   Grooming Comments   (Pt completed grooming tasks in stance with chair behind for safety.)   UE Dressing   UE Dressing Level of Assistance Minimum assistance   UE Dressing Where Assessed Recliner   UE Dressing Comments   (Pt donned clean gown while seated, Min A needed to thread arm through to avoid IV.)   Bed Mobility 1   Bed Mobility 1 Supine to sitting   Level of Assistance 1 Close supervision;Minimal verbal cues   Bed Mobility Comments 1   (Cues for slow position changes.)   Transfer 1   Technique 1 Sit to stand;Stand to sit   Transfer Device 1 Walker;Gait belt   Transfer Level of Assistance 1 Contact  guard;Minimal verbal cues   Trials/Comments 1   (Completed at EOB and recliner level, cues for proper hand placement with good follow through.)   IP OT Assessment   End of Session Communication Bedside nurse   End of Session Patient Position Up in chair;Alarm on   Inpatient Plan   OT Frequency 3 times per week   OT Discharge Recommendations Moderate intensity level of continued care     Outcome Measures:[facility] Daily Activity  Putting on and taking off regular lower body clothing: A lot  Bathing (including washing, rinsing, drying): A lot  Putting on and taking off regular upper body clothing: A little  Toileting, which includes using toilet, bedpan or urinal: A little  Taking care of personal grooming such as brushing teeth: A little  Eating Meals: None  Daily Activity - Total Score: 17  Education Documentation  No documentation found.  Education Comments  No comments found.            Goals:  Encounter Problems       Encounter Problems (Active)       OT Goals       Patient will complete functional transfers with SBA. (Progressing)       Start:  02/12/25    Expected End:  02/26/25            Patient will complete toileting with SBA. (Progressing)       Start:  02/12/25    Expected End:  02/26/25            Patient will complete LE dressing with SBA. (Progressing)       Start:  02/12/25    Expected End:  02/26/25                   LOGAN JUAN was present for supervision of this student during the treatment and documentation of this patient. JASON Del Castillo/MARÍA

## 2025-02-20 NOTE — PROGRESS NOTES
"   02/20/25 1041   Discharge Planning   Home or Post Acute Services Post acute facilities (Rehab/SNF/etc)   Type of Post Acute Facility Services Skilled nursing   Expected Discharge Disposition SNF   Does the patient need discharge transport arranged? Yes   RoundTrip coordination needed? Yes   Has discharge transport been arranged? No     Spoke to patient at bedside to update her that we are still waiting on PASSR review and to discuss if she feels like she would be able to go home. Patient states she still feels unsteady and thinks SNF would be best option still at this time. Patient also states she woke up today with \"blurry vision\". She stated she let the doctor know already. Message sent to nursing/medical team to ensure they are aware of patient's complaint.     1440: Message sent to direct precert to request to restart precert. Received message that no further screening is needed for PASSR. As soon as new precert is obtained patient will be able to d/c to facility. Message sent to facility.  "

## 2025-02-20 NOTE — PROGRESS NOTES
"Nutrition Initial Assessment:   Nutrition Assessment    Reason for Assessment: Length of stay    Patient is a 84 y.o. female presenting with left hip and left buttock hematoma s/p fall. Pt has been here for 9 days and stable to discharge, but awaiting on SNF.    Past Medical History  She has a past medical history of Anxiety, Falls, Gastroesophageal reflux disease, Glaucoma, Hearing loss, History of breast cancer, History of closed head injury, History of seizure, Irritable bowel syndrome, Memory loss, Mild cognitive impairment, Osteoarthritis, Overactive bladder, Peripheral neuropathy, Underweight, Vascular dementia, and Visual field loss.     Surgical History  She has a past surgical history that includes Cholecystectomy; Cataract extraction, bilateral; ORIF femur fracture; Breast lumpectomy (Right); Lymph node dissection; and Total abdominal hysterectomy w/ bilateral salpingoophorectomy.    Nutrition History:  Energy Intake: Good > 75 % (REGULAR diet: (2/19) 100% bfast, 75% dinner; (2/18) 100% B, L, D; (2/17) 100% bfast, 75% lunch, 75% dinner)  Food and Nutrient History: Pt from [facility] where she has been for just about 1 month. Today, she tells me she just got here (even though she has been here for 9 days). She reports she loves the food and eats very well. (Nursing records past 3 days do confirm she has been eating % of 3 meals daily.)       Anthropometrics:  Height: 170.2 cm (5' 7\")   Weight: 68 kg (150 lb)   BMI (Calculated): 23.49  IBW/kg (Dietitian Calculated): 61.24 kg  Percent of IBW: 111.11 %       Weight History:   Wt Readings from Last 10 Encounters:   02/11/25 68 kg (150 lb)   01/24/25 52.2 kg (115 lb)   12/02/24 53.2 kg (117 lb 4.6 oz)   09/02/24 52.2 kg (115 lb)   03/29/24 50.8 kg (112 lb)   02/18/24 54 kg (119 lb)   02/10/24 50.1 kg (110 lb 7.2 oz)   01/05/24 50.8 kg (111 lb 14.1 oz)   12/16/20 60.3 kg (133 lb)   12/09/20 60.3 kg (133 lb)       Weight Change " %:  Weight History / % Weight Change: Per weight records in chart, pt is up a significant amount of weight since last admit ~1 month ago. She is in her chair eating lunch, so unable to obtain new weight. Will ask nursing.    Nutrition Focused Physical Exam Findings:    Subcutaneous Fat Loss:   Defer Subcutaneous Fat Loss Assessment: Defer all  Defer All Reason: Pt eating lunch and very confused - no physical performed at this time.  Muscle Wasting:     Edema:  Edema: none  Physical Findings:  Skin: Negative (No open areas per chart.)    Nutrition Significant Labs:  CBC Trend:   Results from last 7 days   Lab Units 02/20/25  0617 02/19/25  0633 02/18/25 0528 02/17/25  0518   WBC AUTO x10*3/uL 6.0 6.4 6.6 6.7   RBC AUTO x10*6/uL 3.78* 3.54* 3.53* 3.58*   HEMOGLOBIN g/dL 11.4* 10.5* 10.3* 10.7*   HEMATOCRIT % 33.6* 31.8* 31.4* 32.5*   MCV fL 89 90 89 91   PLATELETS AUTO x10*3/uL 319 283 308 283    , BMP Trend:   Results from last 7 days   Lab Units 02/20/25  0617 02/19/25  0633 02/18/25  0528 02/17/25  0518   GLUCOSE mg/dL 84 89 84 80   CALCIUM mg/dL 8.9 8.6 8.6 8.7   SODIUM mmol/L 131* 127* 129* 134*   POTASSIUM mmol/L 4.2 4.0 4.0 3.9   CO2 mmol/L 25 24 24 25   CHLORIDE mmol/L 99 96* 97* 101   BUN mg/dL 20 21 19 21   CREATININE mg/dL 0.89 0.82 0.82 0.83        Nutrition Specific Medications:  Scheduled medications  brimonidine, 1 drop, Both Eyes, BID  dorzolamide-timoloL, 1 drop, Both Eyes, BID  latanoprost, 1 drop, Both Eyes, Nightly  venlafaxine, 37.5 mg, oral, q AM      Continuous medications  sodium chloride 0.9%, 75 mL/hr, Last Rate: 75 mL/hr (02/20/25 1154)      PRN medications  PRN medications: acetaminophen **OR** acetaminophen **OR** acetaminophen, melatonin, polyethylene glycol, QUEtiapine      I/O:   Last BM Date: 02/14/25;      Dietary Orders (From admission, onward)       Start     Ordered    02/12/25 1008  May Participate in Room Service With Assistance  ( ROOM SERVICE MAY PARTICIPATE WITH ASSISTANCE)   Once        Question:  .  Answer:  Yes    02/12/25 1007    02/12/25 0707  Adult diet Regular  Diet effective now        Question:  Diet type  Answer:  Regular    02/12/25 0706                     Estimated Needs:   Total Energy Estimated Needs in 24 hours (kCal):  (3546-3209 kcal (25-30 kcal/kg))     Total Protein Estimated Needs in 24 Hours (g):  (68-82g protein (1.0-1.2g/kg))     Total Fluid Estimated Needs in 24 Hours (mL):  (1ml/kcal)           Nutrition Diagnosis   Malnutrition Diagnosis  Patient has Malnutrition Diagnosis: No    Nutrition Diagnosis  Patient has Nutrition Diagnosis: Yes  Diagnosis Status (1): New  Nutrition Diagnosis 1: Inability to manage self care  Related to (1): dementia  As Evidenced by (1): pt awaiting SNF placement.       Nutrition Interventions/Recommendations   Nutrition prescription for oral nutrition    Nutrition Recommendations:  Individualized Nutrition Prescription Provided for : Continue REGULAR diet.    Nutrition Interventions/Goals:   Interventions: Feeding assistance management                   Nutrition Monitoring and Evaluation   Food/Nutrient Related History Monitoring  Monitoring and Evaluation Plan: Intake / amount of food  Intake / Amount of food: Consumes at least 75% or more of meals/snacks/supplements    Anthropometric Measurements  Monitoring and Evaluation Plan: Body weight  Body Weight: Body weight - Maintain stable weight (Obtain current weight.)    Biochemical Data, Medical Tests and Procedures  Monitoring and Evaluation Plan: Electrolyte/renal panel  Electrolyte and Renal Panel: Electrolytes within normal limits              Time Spent (min): 30 minutes

## 2025-02-20 NOTE — CARE PLAN
The patient's goals for the shift include      The clinical goals for the shift include Patient will remain safe with no fall/injury and use call light appropriately for assistance as needed thru the end of this shift.      Problem: Discharge Planning  Goal: Discharge to home or other facility with appropriate resources  Outcome: Progressing     Problem: Chronic Conditions and Co-morbidities  Goal: Patient's chronic conditions and co-morbidity symptoms are monitored and maintained or improved  Outcome: Progressing     Problem: Nutrition  Goal: Nutrient intake appropriate for maintaining nutritional needs  Outcome: Progressing     Problem: Skin  Goal: Participates in plan/prevention/treatment measures  Outcome: Progressing  Goal: Prevent/manage excess moisture  Outcome: Progressing  Goal: Prevent/minimize sheer/friction injuries  Outcome: Progressing  Flowsheets (Taken 2/20/2025 0915)  Prevent/minimize sheer/friction injuries:   Increase activity/out of bed for meals   Complete micro-shifts as needed if patient unable. Adjust patient position to relieve pressure points, not a full turn   HOB 30 degrees or less   Turn/reposition every 2 hours/use positioning/transfer devices  Goal: Promote/optimize nutrition  Outcome: Progressing  Goal: Promote skin healing  Outcome: Progressing     Problem: Fall/Injury  Goal: Verbalize understanding of personal risk factors for fall in the hospital  Outcome: Progressing  Goal: Verbalize understanding of risk factor reduction measures to prevent injury from fall in the home  Outcome: Progressing  Goal: Pace activities to prevent fatigue by end of the shift  Outcome: Progressing  Goal: Not fall by end of shift  Outcome: Progressing  Goal: Be free from injury by end of the shift  Outcome: Progressing  Goal: Use assistive devices by end of the shift  Outcome: Progressing

## 2025-02-20 NOTE — DISCHARGE SUMMARY
Discharge Diagnosis  Hematoma of left buttock    Issues Requiring Follow-Up  Discharge to skilled nursing facility    Discharge Meds     Medication List      CONTINUE taking these medications     brimonidine 0.2 % ophthalmic solution; Commonly known as: AlphaGAN   dorzolamide-timoloL 22.3-6.8 mg/mL ophthalmic solution; Commonly known   as: Cosopt   latanoprost 0.005 % ophthalmic solution; Commonly known as: Xalatan   QUEtiapine 25 mg tablet; Commonly known as: SEROquel; Take 0.5 tablets   (12.5 mg) by mouth every 8 hours if needed (Agitation (hold for   drowsiness/lethargy)).   venlafaxine 37.5 mg tablet; Commonly known as: Effexor       Test Results Pending At Discharge  Pending Labs       No current pending labs.            Hospital Course   Patient was admitted to the hospital status post fall and left hip hematoma patient had a history of severe dementia cannot take care of herself at home waiting for insurance approval to transfer to skilled nursing facility and memory care [facility]    Pertinent Physical Exam At Time of Discharge  Physical Exam  HENT:      Right Ear: External ear normal.      Left Ear: External ear normal.      Mouth/Throat:      Mouth: Mucous membranes are moist.   Cardiovascular:      Rate and Rhythm: Normal rate and regular rhythm.      Heart sounds: No murmur heard.     No friction rub. No gallop.   Pulmonary:      Effort: No accessory muscle usage or respiratory distress.      Breath sounds: No stridor. No wheezing or rhonchi.   Chest:      Chest wall: No tenderness.   Abdominal:      General: There is no distension.      Palpations: There is no mass.      Tenderness: There is no abdominal tenderness. There is no guarding or rebound.   Musculoskeletal:         General: No deformity or signs of injury.      Cervical back: No rigidity or tenderness. Normal range of motion.      Right lower leg: No edema.      Left lower leg: No edema.   Skin:     Coloration: Skin is not jaundiced or pale.       Findings: No lesion.   Neurological:      Mental Status: She is alert and easily aroused.      Cranial Nerves: No cranial nerve deficit.      Sensory: No sensory deficit.      Motor: No weakness.      Comments: Alert but confused with hematoma over the left hip       2/21/2025 still waiting for discharge to skilled nursing facility blood work was reviewed vital signs was reviewed  Outpatient Follow-Up  No future appointments.      MARCOS Phelan MD

## 2025-02-20 NOTE — CARE PLAN
The clinical goals for the shift include Patient will remain safe with no fall/injury and use call light appropriately for assistance as needed thru the end of this shift. Dementia. Unable to remember to call before getting out of bed. Near nurse desk.       Problem: Discharge Planning  Goal: Discharge to home or other facility with appropriate resources  Outcome: Progressing     Problem: Chronic Conditions and Co-morbidities  Goal: Patient's chronic conditions and co-morbidity symptoms are monitored and maintained or improved  Outcome: Progressing     Problem: Nutrition  Goal: Nutrient intake appropriate for maintaining nutritional needs  Outcome: Progressing     Problem: Skin  Goal: Participates in plan/prevention/treatment measures  Outcome: Progressing  Goal: Prevent/manage excess moisture  Outcome: Progressing  Goal: Prevent/minimize sheer/friction injuries  Outcome: Progressing  Goal: Promote/optimize nutrition  Outcome: Progressing  Goal: Promote skin healing  Outcome: Progressing     Problem: Fall/Injury  Goal: Verbalize understanding of personal risk factors for fall in the hospital  Outcome: Progressing  Goal: Verbalize understanding of risk factor reduction measures to prevent injury from fall in the home  Outcome: Progressing  Goal: Pace activities to prevent fatigue by end of the shift  Outcome: Progressing  Goal: Not fall by end of shift  Outcome: Progressing  Goal: Be free from injury by end of the shift  Outcome: Progressing  Goal: Use assistive devices by end of the shift  Outcome: Progressing

## 2025-02-21 VITALS
BODY MASS INDEX: 23.54 KG/M2 | TEMPERATURE: 97.3 F | OXYGEN SATURATION: 96 % | RESPIRATION RATE: 16 BRPM | HEART RATE: 86 BPM | HEIGHT: 67 IN | SYSTOLIC BLOOD PRESSURE: 140 MMHG | DIASTOLIC BLOOD PRESSURE: 60 MMHG | WEIGHT: 150 LBS

## 2025-02-21 LAB
ANION GAP SERPL CALC-SCNC: 9 MMOL/L (ref 10–20)
ATRIAL RATE: 80 BPM
BUN SERPL-MCNC: 15 MG/DL (ref 6–23)
CALCIUM SERPL-MCNC: 9.1 MG/DL (ref 8.6–10.3)
CHLORIDE SERPL-SCNC: 102 MMOL/L (ref 98–107)
CO2 SERPL-SCNC: 25 MMOL/L (ref 21–32)
CREAT SERPL-MCNC: 0.76 MG/DL (ref 0.5–1.05)
EGFRCR SERPLBLD CKD-EPI 2021: 76 ML/MIN/1.73M*2
ERYTHROCYTE [DISTWIDTH] IN BLOOD BY AUTOMATED COUNT: 13.5 % (ref 11.5–14.5)
GLUCOSE SERPL-MCNC: 85 MG/DL (ref 74–99)
HCT VFR BLD AUTO: 35.3 % (ref 36–46)
HGB BLD-MCNC: 11.8 G/DL (ref 12–16)
MAGNESIUM SERPL-MCNC: 1.97 MG/DL (ref 1.6–2.4)
MCH RBC QN AUTO: 29.8 PG (ref 26–34)
MCHC RBC AUTO-ENTMCNC: 33.4 G/DL (ref 32–36)
MCV RBC AUTO: 89 FL (ref 80–100)
NRBC BLD-RTO: 0 /100 WBCS (ref 0–0)
P AXIS: 59 DEGREES
P OFFSET: 201 MS
P ONSET: 152 MS
PLATELET # BLD AUTO: 354 X10*3/UL (ref 150–450)
POTASSIUM SERPL-SCNC: 4 MMOL/L (ref 3.5–5.3)
PR INTERVAL: 132 MS
Q ONSET: 218 MS
QRS COUNT: 13 BEATS
QRS DURATION: 88 MS
QT INTERVAL: 388 MS
QTC CALCULATION(BAZETT): 447 MS
QTC FREDERICIA: 427 MS
R AXIS: 68 DEGREES
RBC # BLD AUTO: 3.96 X10*6/UL (ref 4–5.2)
SODIUM SERPL-SCNC: 132 MMOL/L (ref 136–145)
T AXIS: 85 DEGREES
T OFFSET: 412 MS
VENTRICULAR RATE: 80 BPM
WBC # BLD AUTO: 5.9 X10*3/UL (ref 4.4–11.3)

## 2025-02-21 PROCEDURE — 36415 COLL VENOUS BLD VENIPUNCTURE: CPT

## 2025-02-21 PROCEDURE — 80048 BASIC METABOLIC PNL TOTAL CA: CPT

## 2025-02-21 PROCEDURE — 97530 THERAPEUTIC ACTIVITIES: CPT | Mod: GP

## 2025-02-21 PROCEDURE — 83735 ASSAY OF MAGNESIUM: CPT

## 2025-02-21 PROCEDURE — 2500000001 HC RX 250 WO HCPCS SELF ADMINISTERED DRUGS (ALT 637 FOR MEDICARE OP): Performed by: NURSE PRACTITIONER

## 2025-02-21 PROCEDURE — G0378 HOSPITAL OBSERVATION PER HR: HCPCS

## 2025-02-21 PROCEDURE — 85027 COMPLETE CBC AUTOMATED: CPT

## 2025-02-21 RX ADMIN — BRIMONIDINE TARTRATE 1 DROP: 2 SOLUTION/ DROPS OPHTHALMIC at 08:37

## 2025-02-21 RX ADMIN — DORZOLAMIDE HYDROCHLORIDE AND TIMOLOL MALEATE 1 DROP: 20; 5 SOLUTION/ DROPS OPHTHALMIC at 08:37

## 2025-02-21 RX ADMIN — LATANOPROST 1 DROP: 50 SOLUTION OPHTHALMIC at 20:37

## 2025-02-21 RX ADMIN — BRIMONIDINE TARTRATE 1 DROP: 2 SOLUTION/ DROPS OPHTHALMIC at 21:08

## 2025-02-21 RX ADMIN — VENLAFAXINE 37.5 MG: 37.5 TABLET ORAL at 08:37

## 2025-02-21 RX ADMIN — DORZOLAMIDE HYDROCHLORIDE AND TIMOLOL MALEATE 1 DROP: 20; 5 SOLUTION/ DROPS OPHTHALMIC at 20:51

## 2025-02-21 ASSESSMENT — COGNITIVE AND FUNCTIONAL STATUS - GENERAL
CLIMB 3 TO 5 STEPS WITH RAILING: A LITTLE
MOBILITY SCORE: 21
STANDING UP FROM CHAIR USING ARMS: A LITTLE
TURNING FROM BACK TO SIDE WHILE IN FLAT BAD: A LITTLE
DAILY ACTIVITIY SCORE: 22
DAILY ACTIVITIY SCORE: 22
WALKING IN HOSPITAL ROOM: A LITTLE
WALKING IN HOSPITAL ROOM: A LITTLE
DRESSING REGULAR LOWER BODY CLOTHING: A LITTLE
DRESSING REGULAR LOWER BODY CLOTHING: A LITTLE
STANDING UP FROM CHAIR USING ARMS: A LITTLE
HELP NEEDED FOR BATHING: A LITTLE
CLIMB 3 TO 5 STEPS WITH RAILING: A LITTLE
MOBILITY SCORE: 21
MOBILITY SCORE: 17
STANDING UP FROM CHAIR USING ARMS: A LITTLE
WALKING IN HOSPITAL ROOM: A LITTLE
CLIMB 3 TO 5 STEPS WITH RAILING: TOTAL
MOVING TO AND FROM BED TO CHAIR: A LITTLE
HELP NEEDED FOR BATHING: A LITTLE

## 2025-02-21 ASSESSMENT — PAIN SCALES - GENERAL
PAINLEVEL_OUTOF10: 3
PAINLEVEL_OUTOF10: 0 - NO PAIN

## 2025-02-21 ASSESSMENT — PAIN - FUNCTIONAL ASSESSMENT
PAIN_FUNCTIONAL_ASSESSMENT: 0-10
PAIN_FUNCTIONAL_ASSESSMENT: 0-10

## 2025-02-21 NOTE — PROGRESS NOTES
Physical Therapy Treatment    Patient Name: Fernanda Hughes  MRN: [id number]  Today's Date: 2/21/2025  Time Calculation  Start Time: 0931  Stop Time: 0948  Time Calculation (min): 17 min     3101/3101-A    Assessment/Plan   PT Assessment  PT Assessment Results: Decreased strength, Decreased range of motion, Decreased endurance, Impaired balance, Decreased mobility, Decreased safety awareness, Impaired judgement, Decreased cognition, Pain  Rehab Prognosis: Good  Barriers to Discharge Home: Caregiver assistance, Physical needs  Caregiver Assistance: Caregiver assistance needed per identified barriers - however, level of patient's required assistance exceeds assistance available at home  Physical Needs: 24hr mobility assistance needed, 24hr ADL assistance needed, High falls risk due to function or environment  Evaluation/Treatment Tolerance: Patient tolerated treatment well, Patient limited by fatigue  Medical Staff Made Aware: Yes  End of Session Communication: Bedside nurse  Assessment Comment: Pt's impairments include generalized weakness, impaired balance and decreased activity tolerance. Pt's functional limitations include bed mobility, transfers, gait and elevations. Pt would benefit from continued acute care PT during hospital LOS and upon discharge at a moderate level intensity.  End of Session Patient Position: Up in chair, Alarm on  PT Plan  Inpatient/Swing Bed or Outpatient: Inpatient  PT Plan  Treatment/Interventions: Bed mobility, Transfer training, Gait training, Stair training, Balance training, Neuromuscular re-education, Strengthening, Endurance training, Range of motion, Therapeutic exercise, Therapeutic activity, Home exercise program, Positioning, Postural re-education  PT Plan: Ongoing PT  PT Frequency: 3 times per week  PT Discharge Recommendations: Moderate intensity level of continued care  Equipment Recommended upon Discharge: Wheeled walker  PT Recommended Transfer Status: Assist x1, Assistive  device  PT - OK to Discharge: Yes (Pt ok to dc from acute care PT to next level of care once cleared by medical team.)    Current Problem:  Patient Active Problem List   Diagnosis    Vision disturbance    UTI (urinary tract infection)    Vascular dementia    Confusion and disorientation    Hematoma of left buttock    Falls, sequela    Acute on chronic anemia    Ambulatory dysfunction    End-stage glaucoma       General Visit Information:   PT  Visit  PT Received On: 02/21/25  General  Reason for Referral: 85 yo F adm for L buttock hematoma.  Referred By: MARCOS Phelan  Past Medical History Relevant to Rehab:   Past Medical History:   Diagnosis Date    Anxiety     Falls     Gastroesophageal reflux disease     Glaucoma     Hearing loss     History of breast cancer     History of closed head injury     History of seizure     Irritable bowel syndrome     Memory loss     Mild cognitive impairment     Osteoarthritis     Overactive bladder     Peripheral neuropathy     Underweight     Vascular dementia     Visual field loss        Prior to Session Communication: Bedside nurse  Patient Position Received: Bed, 3 rail up, Alarm on  General Comment: Pt is pleasant and agreeable to PT treatment.  Subjective     Precautions:  Precautions  Medical Precautions: Fall precautions    Vital Signs:  Vital Signs  Vitals Session: Post PT  Heart Rate: 91  Heart Rate Source: Monitor  SpO2: 100 %  BP: 141/66  BP Location: Right arm  BP Method: Automatic  Patient Position: Sitting  Objective     Pain:  Pain Assessment  Pain Assessment: 0-10  0-10 (Numeric) Pain Score: 0 - No pain    Cognition:  Cognition  Orientation Level: Disoriented to situation (Reports she is here for her left heel. Denies pain. Reports correct year and month without cues. Reports correct place without cues.)    Extremity/Trunk Assessments:        RLE   RLE :  (SLR 4/5. DF 5/5)  LLE   LLE :  (SLR 4/5. DF 5/5.)    Treatments:    Bed Mobility  Bed Mobility: Yes  Bed  Mobility 1  Bed Mobility 1: Supine to sitting  Bed Mobility Comments 1: SBAx1  Ambulation/Gait Training  Ambulation/Gait Training Performed: Yes  Ambulation/Gait Training 1  Surface 1: Level tile  Device 1: No device  Assistance 1: Minimum assistance, Hand held assistance  Comments/Distance (ft) 1: Pt amb 1x20' with L HHA, MinAx1 with reciprocal gait, equal short step length, pathway deviation with x1 LOB episode requiring stepping strategy to correct. Pt reports impaired vision this am. Per previous notes, team is aware but reported to nurse after session as well. Pt describes missing top half of visual field. Pt reports dizziness while ambulating that resolves after seated rest break.  Ambulation/Gait Training 2  Surface 2: Level tile  Device 2: Rolling walker  Gait Support Devices: Gait belt  Assistance 2: Contact guard  Comments/Distance (ft) 2: Pt amb 1x22' with FWW, CGAx1 with improved safety and pathway observed. Encouraged pt to amb further distance, pt declines but unable to give answer why while ambulating. Denies fatigue, dizziness or pain. Cues to keep walker on ground while navigating turn and to remain within walker frame for support. After pt is seated pt reports R foot pain where her callous is located between 1st and 2nd metatarsals. Nurse notified. BP taken in sitting after this 2nd trial of ambulation.  Transfers  Transfer: Yes  Transfer 1  Technique 1: Sit to stand, Stand to sit  Transfer Device 1: Walker, Gait belt  Transfer Level of Assistance 1: Contact guard  Trials/Comments 1: 1st trial STS without AD, CGAx1. 2nd trial STS with FWW, CGAx1.          Outcome Measures:     [facility] Basic Mobility  Turning from your back to your side while in a flat bed without using bedrails: None  Moving from lying on your back to sitting on the side of a flat bed without using bedrails: A little  Moving to and from bed to chair (including a wheelchair): A little  Standing up from a chair using your arms (e.g.  wheelchair or bedside chair): A little  To walk in hospital room: A little  Climbing 3-5 steps with railing: Total  Basic Mobility - Total Score: 17    Education Documentation  Body Mechanics, taught by Enid Licea PT at 2/21/2025 10:02 AM.  Learner: Patient  Readiness: Acceptance  Method: Explanation, Demonstration  Response: Needs Reinforcement    Precautions, taught by Enid Licea PT at 2/21/2025 10:02 AM.  Learner: Patient  Readiness: Acceptance  Method: Explanation, Demonstration  Response: Needs Reinforcement    Mobility Training, taught by Enid Licea, PT at 2/21/2025 10:02 AM.  Learner: Patient  Readiness: Acceptance  Method: Explanation, Demonstration  Response: Needs Reinforcement    Education Comments  No comments found.           EDUCATION:  Outpatient Education  Individual(s) Educated: Patient  Education Provided: Fall Risk  Education Comment: Pt educated on use of walker at this time 2/2 visual impairment, balance impairment and reported intermittent dizziness.  Encounter Problems       Encounter Problems (Active)       PT Problem       Pt will be able to perform all bed mobility tasks with Mod I.  (Progressing)       Start:  02/12/25    Expected End:  02/26/25            Pt will perform all transfers with Mod I and LRAD with proper safety mechanics.   (Progressing)       Start:  02/12/25    Expected End:  02/26/25            Pt will ambulate 100 ft with Mod I using LRAD for improved functional independence.  (Progressing)       Start:  02/12/25    Expected End:  02/26/25

## 2025-02-21 NOTE — CARE PLAN
The clinical goals for the shift include Patient will remain safe with no fall/injury thru the end of this shift. Remained safe. However, forgets to call before trying to get out of bed. Bed alarm set at low setting and pt room is in front of nurse desk for safety.       Problem: Discharge Planning  Goal: Discharge to home or other facility with appropriate resources  Outcome: Progressing     Problem: Chronic Conditions and Co-morbidities  Goal: Patient's chronic conditions and co-morbidity symptoms are monitored and maintained or improved  Outcome: Progressing     Problem: Nutrition  Goal: Nutrient intake appropriate for maintaining nutritional needs  Outcome: Progressing     Problem: Skin  Goal: Participates in plan/prevention/treatment measures  Outcome: Progressing  Goal: Prevent/manage excess moisture  Outcome: Progressing  Goal: Prevent/minimize sheer/friction injuries  Outcome: Progressing  Goal: Promote/optimize nutrition  Outcome: Progressing  Goal: Promote skin healing  Outcome: Progressing     Problem: Fall/Injury  Goal: Verbalize understanding of personal risk factors for fall in the hospital  Outcome: Progressing  Goal: Verbalize understanding of risk factor reduction measures to prevent injury from fall in the home  Outcome: Progressing  Goal: Pace activities to prevent fatigue by end of the shift  Outcome: Progressing  Goal: Not fall by end of shift  Outcome: Progressing  Goal: Be free from injury by end of the shift  Outcome: Progressing  Goal: Use assistive devices by end of the shift  Outcome: Progressing

## 2025-02-21 NOTE — NURSING NOTE
S: Spoke with Neel Montgomery, patient to return via [facility] ambulance at 1820. Spoke with Paula and will notify nurse of patient returning, to call if any questions.

## 2025-02-21 NOTE — PROGRESS NOTES
"   02/21/25 1252   Discharge Planning   Home or Post Acute Services Post acute facilities (Rehab/SNF/etc)   Type of Post Acute Facility Services Skilled nursing   Expected Discharge Disposition SNF     Received message from direct precert team \"They now are offering a peer to peer as they say the pt's status has improved and performing Contact Guard and Standby Assist     Please call [phone] option 4 by 4:30 p.m TODAY  Ref #: [id number]  [employer] ID: [id number]\".  Information sent to medical team.     1500: Received message from medical team that peer to peer was denied.  "

## 2025-02-21 NOTE — PROGRESS NOTES
02/21/25 1518   Discharge Planning   Living Arrangements Alone   Support Systems   (Deepali POA)   Type of Residence Assisted living  ([facility])   Care Facility Name [facility]   Type of Post Acute Facility Services Assisted living   Expected Discharge Disposition Home   Does the patient need discharge transport arranged? Yes   RoundTrip coordination needed? Yes     Insurance denied SNF and P2P denied. Called and spoke to patient's POA Deepali and plan is to return to [facility], Deepali is in agreement with this and notifying the facility. Met with the patient and notified her that she will be returning to [facility]. Left voicemail with [facility]. Nursing updated.

## 2025-02-22 NOTE — NURSING NOTE
PT. DISCHARGES TO [facility] ALL BELONGINGS WAS SENT WITH THE PT. V/S GOOD.

## 2025-02-22 NOTE — NURSING NOTE
Attempted to call  [facility] NO ANSWER. PARAMEDICT INSTRUCTED THEY CAN CALL [facility] IF THEY HAVE QUESTIONS.

## 2025-03-05 ENCOUNTER — TELEPHONE (OUTPATIENT)
Dept: RADIOLOGY | Facility: HOSPITAL | Age: 85
End: 2025-03-05
Payer: MEDICARE

## 2025-03-05 NOTE — TELEPHONE ENCOUNTER
Call to Kika Palumbo, to discuss follow up recommendations based on imaging completed 2/12/2025 while hospitalized. A thyroid ultrasound order has been placed and needs scheduled. Left a voice message requesting a call back.

## 2025-03-10 ENCOUNTER — APPOINTMENT (OUTPATIENT)
Dept: RADIOLOGY | Facility: HOSPITAL | Age: 85
End: 2025-03-10
Payer: MEDICARE

## 2025-03-10 ENCOUNTER — HOSPITAL ENCOUNTER (INPATIENT)
Facility: HOSPITAL | Age: 85
LOS: 3 days | Discharge: NURSING, REHAB OR LONG-TERM CARE | End: 2025-03-13
Attending: STUDENT IN AN ORGANIZED HEALTH CARE EDUCATION/TRAINING PROGRAM | Admitting: INTERNAL MEDICINE
Payer: MEDICARE

## 2025-03-10 ENCOUNTER — APPOINTMENT (OUTPATIENT)
Dept: CARDIOLOGY | Facility: HOSPITAL | Age: 85
End: 2025-03-10
Payer: MEDICARE

## 2025-03-10 DIAGNOSIS — R56.9 SEIZURE (MULTI): Primary | ICD-10-CM

## 2025-03-10 DIAGNOSIS — R60.0 EDEMA OF BOTH LEGS: ICD-10-CM

## 2025-03-10 PROBLEM — E87.20 LACTIC ACIDOSIS: Status: ACTIVE | Noted: 2025-03-10

## 2025-03-10 LAB
ALBUMIN SERPL BCP-MCNC: 4.2 G/DL (ref 3.4–5)
ALP SERPL-CCNC: 77 U/L (ref 33–136)
ALT SERPL W P-5'-P-CCNC: 15 U/L (ref 7–45)
AMMONIA PLAS-SCNC: 25 UMOL/L (ref 16–53)
AMPHETAMINES UR QL SCN: NORMAL
ANION GAP BLDV CALCULATED.4IONS-SCNC: 14 MMOL/L (ref 10–25)
ANION GAP SERPL CALC-SCNC: 20 MMOL/L (ref 10–20)
APPEARANCE UR: CLEAR
AST SERPL W P-5'-P-CCNC: 20 U/L (ref 9–39)
ATRIAL RATE: 105 BPM
ATRIAL RATE: 116 BPM
BARBITURATES UR QL SCN: NORMAL
BASE EXCESS BLDV CALC-SCNC: -0.8 MMOL/L (ref -2–3)
BASOPHILS # BLD AUTO: 0.04 X10*3/UL (ref 0–0.1)
BASOPHILS NFR BLD AUTO: 0.4 %
BENZODIAZ UR QL SCN: NORMAL
BILIRUB SERPL-MCNC: 0.8 MG/DL (ref 0–1.2)
BILIRUB UR STRIP.AUTO-MCNC: NEGATIVE MG/DL
BODY TEMPERATURE: ABNORMAL
BUN SERPL-MCNC: 16 MG/DL (ref 6–23)
BZE UR QL SCN: NORMAL
CA-I BLDV-SCNC: 1.31 MMOL/L (ref 1.1–1.33)
CALCIUM SERPL-MCNC: 9.1 MG/DL (ref 8.6–10.3)
CANNABINOIDS UR QL SCN: NORMAL
CARDIAC TROPONIN I PNL SERPL HS: 14 NG/L (ref 0–13)
CARDIAC TROPONIN I PNL SERPL HS: 17 NG/L (ref 0–13)
CHLORIDE BLDV-SCNC: 101 MMOL/L (ref 98–107)
CHLORIDE SERPL-SCNC: 101 MMOL/L (ref 98–107)
CO2 SERPL-SCNC: 21 MMOL/L (ref 21–32)
COLOR UR: ABNORMAL
CREAT SERPL-MCNC: 0.93 MG/DL (ref 0.5–1.05)
CRITICAL CALL TIME: 1400
CRITICAL CALLED BY: ABNORMAL
CRITICAL CALLED TO: ABNORMAL
CRITICAL READ BACK: ABNORMAL
EGFRCR SERPLBLD CKD-EPI 2021: 60 ML/MIN/1.73M*2
EOSINOPHIL # BLD AUTO: 0.09 X10*3/UL (ref 0–0.4)
EOSINOPHIL NFR BLD AUTO: 0.9 %
ERYTHROCYTE [DISTWIDTH] IN BLOOD BY AUTOMATED COUNT: 13.1 % (ref 11.5–14.5)
FENTANYL+NORFENTANYL UR QL SCN: NORMAL
GLUCOSE BLDV-MCNC: 125 MG/DL (ref 74–99)
GLUCOSE SERPL-MCNC: 122 MG/DL (ref 74–99)
GLUCOSE UR STRIP.AUTO-MCNC: NORMAL MG/DL
HCO3 BLDV-SCNC: 25.4 MMOL/L (ref 22–26)
HCT VFR BLD AUTO: 39.9 % (ref 36–46)
HCT VFR BLD EST: 38 % (ref 36–46)
HGB BLD-MCNC: 13.1 G/DL (ref 12–16)
HGB BLDV-MCNC: 12.7 G/DL (ref 12–16)
HOLD SPECIMEN: NORMAL
IMM GRANULOCYTES # BLD AUTO: 0.04 X10*3/UL (ref 0–0.5)
IMM GRANULOCYTES NFR BLD AUTO: 0.4 % (ref 0–0.9)
INHALED O2 CONCENTRATION: 30 %
INR PPP: 1 (ref 0.9–1.1)
KETONES UR STRIP.AUTO-MCNC: NEGATIVE MG/DL
LACTATE BLDV-SCNC: 5.9 MMOL/L (ref 0.4–2)
LACTATE SERPL-SCNC: 1.8 MMOL/L (ref 0.4–2)
LACTATE SERPL-SCNC: 2.2 MMOL/L (ref 0.4–2)
LACTATE SERPL-SCNC: 2.4 MMOL/L (ref 0.4–2)
LEUKOCYTE ESTERASE UR QL STRIP.AUTO: NEGATIVE
LYMPHOCYTES # BLD AUTO: 1.04 X10*3/UL (ref 0.8–3)
LYMPHOCYTES NFR BLD AUTO: 10.7 %
MAGNESIUM SERPL-MCNC: 1.93 MG/DL (ref 1.6–2.4)
MCH RBC QN AUTO: 30.2 PG (ref 26–34)
MCHC RBC AUTO-ENTMCNC: 32.8 G/DL (ref 32–36)
MCV RBC AUTO: 92 FL (ref 80–100)
METHADONE UR QL SCN: NORMAL
MONOCYTES # BLD AUTO: 0.51 X10*3/UL (ref 0.05–0.8)
MONOCYTES NFR BLD AUTO: 5.3 %
MUCOUS THREADS #/AREA URNS AUTO: ABNORMAL /LPF
NEUTROPHILS # BLD AUTO: 7.96 X10*3/UL (ref 1.6–5.5)
NEUTROPHILS NFR BLD AUTO: 82.3 %
NITRITE UR QL STRIP.AUTO: NEGATIVE
NRBC BLD-RTO: 0 /100 WBCS (ref 0–0)
OPIATES UR QL SCN: NORMAL
OXYCODONE+OXYMORPHONE UR QL SCN: NORMAL
OXYHGB MFR BLDV: 73.4 % (ref 45–75)
P AXIS: 53 DEGREES
P AXIS: 72 DEGREES
P OFFSET: 204 MS
P OFFSET: 205 MS
P ONSET: 142 MS
P ONSET: 145 MS
PCO2 BLDV: 47 MM HG (ref 41–51)
PCP UR QL SCN: NORMAL
PH BLDV: 7.34 PH (ref 7.33–7.43)
PH UR STRIP.AUTO: 7 [PH]
PHOSPHATE SERPL-MCNC: 3.2 MG/DL (ref 2.5–4.9)
PLATELET # BLD AUTO: 295 X10*3/UL (ref 150–450)
PO2 BLDV: 46 MM HG (ref 35–45)
POTASSIUM BLDV-SCNC: 4 MMOL/L (ref 3.5–5.3)
POTASSIUM SERPL-SCNC: 4 MMOL/L (ref 3.5–5.3)
PR INTERVAL: 146 MS
PR INTERVAL: 154 MS
PROT SERPL-MCNC: 6.4 G/DL (ref 6.4–8.2)
PROT UR STRIP.AUTO-MCNC: ABNORMAL MG/DL
PROTHROMBIN TIME: 10.5 SECONDS (ref 9.8–12.4)
Q ONSET: 218 MS
Q ONSET: 219 MS
QRS COUNT: 18 BEATS
QRS COUNT: 19 BEATS
QRS DURATION: 78 MS
QRS DURATION: 86 MS
QT INTERVAL: 320 MS
QT INTERVAL: 328 MS
QTC CALCULATION(BAZETT): 433 MS
QTC CALCULATION(BAZETT): 444 MS
QTC FREDERICIA: 395 MS
QTC FREDERICIA: 398 MS
R AXIS: 26 DEGREES
R AXIS: 79 DEGREES
RBC # BLD AUTO: 4.34 X10*6/UL (ref 4–5.2)
RBC # UR STRIP.AUTO: ABNORMAL MG/DL
RBC #/AREA URNS AUTO: ABNORMAL /HPF
SAO2 % BLDV: 75 % (ref 45–75)
SODIUM BLDV-SCNC: 136 MMOL/L (ref 136–145)
SODIUM SERPL-SCNC: 138 MMOL/L (ref 136–145)
SP GR UR STRIP.AUTO: 1.01
T AXIS: 80 DEGREES
T AXIS: 81 DEGREES
T OFFSET: 378 MS
T OFFSET: 383 MS
TSH SERPL-ACNC: 2.47 MIU/L (ref 0.44–3.98)
UROBILINOGEN UR STRIP.AUTO-MCNC: NORMAL MG/DL
VENTRICULAR RATE: 105 BPM
VENTRICULAR RATE: 116 BPM
WBC # BLD AUTO: 9.7 X10*3/UL (ref 4.4–11.3)
WBC #/AREA URNS AUTO: ABNORMAL /HPF
WBC CLUMPS #/AREA URNS AUTO: ABNORMAL /HPF

## 2025-03-10 PROCEDURE — 36415 COLL VENOUS BLD VENIPUNCTURE: CPT

## 2025-03-10 PROCEDURE — 84132 ASSAY OF SERUM POTASSIUM: CPT

## 2025-03-10 PROCEDURE — 99291 CRITICAL CARE FIRST HOUR: CPT | Performed by: STUDENT IN AN ORGANIZED HEALTH CARE EDUCATION/TRAINING PROGRAM

## 2025-03-10 PROCEDURE — 84484 ASSAY OF TROPONIN QUANT: CPT

## 2025-03-10 PROCEDURE — 2500000004 HC RX 250 GENERAL PHARMACY W/ HCPCS (ALT 636 FOR OP/ED)

## 2025-03-10 PROCEDURE — 71045 X-RAY EXAM CHEST 1 VIEW: CPT

## 2025-03-10 PROCEDURE — 83735 ASSAY OF MAGNESIUM: CPT

## 2025-03-10 PROCEDURE — 99285 EMERGENCY DEPT VISIT HI MDM: CPT | Mod: 25 | Performed by: STUDENT IN AN ORGANIZED HEALTH CARE EDUCATION/TRAINING PROGRAM

## 2025-03-10 PROCEDURE — 70450 CT HEAD/BRAIN W/O DYE: CPT

## 2025-03-10 PROCEDURE — 83605 ASSAY OF LACTIC ACID: CPT | Performed by: NURSE PRACTITIONER

## 2025-03-10 PROCEDURE — 2500000001 HC RX 250 WO HCPCS SELF ADMINISTERED DRUGS (ALT 637 FOR MEDICARE OP): Performed by: NURSE PRACTITIONER

## 2025-03-10 PROCEDURE — 93005 ELECTROCARDIOGRAM TRACING: CPT

## 2025-03-10 PROCEDURE — 84100 ASSAY OF PHOSPHORUS: CPT

## 2025-03-10 PROCEDURE — 70450 CT HEAD/BRAIN W/O DYE: CPT | Performed by: STUDENT IN AN ORGANIZED HEALTH CARE EDUCATION/TRAINING PROGRAM

## 2025-03-10 PROCEDURE — 82140 ASSAY OF AMMONIA: CPT

## 2025-03-10 PROCEDURE — 80307 DRUG TEST PRSMV CHEM ANLYZR: CPT | Performed by: STUDENT IN AN ORGANIZED HEALTH CARE EDUCATION/TRAINING PROGRAM

## 2025-03-10 PROCEDURE — 84443 ASSAY THYROID STIM HORMONE: CPT

## 2025-03-10 PROCEDURE — 71045 X-RAY EXAM CHEST 1 VIEW: CPT | Performed by: RADIOLOGY

## 2025-03-10 PROCEDURE — 81001 URINALYSIS AUTO W/SCOPE: CPT

## 2025-03-10 PROCEDURE — 96365 THER/PROPH/DIAG IV INF INIT: CPT

## 2025-03-10 PROCEDURE — 85025 COMPLETE CBC W/AUTO DIFF WBC: CPT

## 2025-03-10 PROCEDURE — 85610 PROTHROMBIN TIME: CPT

## 2025-03-10 PROCEDURE — 2060000001 HC INTERMEDIATE ICU ROOM DAILY

## 2025-03-10 PROCEDURE — 2500000004 HC RX 250 GENERAL PHARMACY W/ HCPCS (ALT 636 FOR OP/ED): Performed by: NURSE PRACTITIONER

## 2025-03-10 RX ORDER — HALOPERIDOL LACTATE 5 MG/ML
2 INJECTION, SOLUTION INTRAMUSCULAR ONCE
Status: COMPLETED | OUTPATIENT
Start: 2025-03-10 | End: 2025-03-10

## 2025-03-10 RX ORDER — MIRTAZAPINE 15 MG/1
15 TABLET, FILM COATED ORAL NIGHTLY
Status: DISCONTINUED | OUTPATIENT
Start: 2025-03-10 | End: 2025-03-11 | Stop reason: ALTCHOICE

## 2025-03-10 RX ORDER — LATANOPROST 50 UG/ML
1 SOLUTION/ DROPS OPHTHALMIC NIGHTLY
Status: DISCONTINUED | OUTPATIENT
Start: 2025-03-10 | End: 2025-03-13 | Stop reason: HOSPADM

## 2025-03-10 RX ORDER — BRIMONIDINE TARTRATE 2 MG/ML
1 SOLUTION/ DROPS OPHTHALMIC 2 TIMES DAILY
Status: DISCONTINUED | OUTPATIENT
Start: 2025-03-10 | End: 2025-03-13 | Stop reason: HOSPADM

## 2025-03-10 RX ORDER — LEVETIRACETAM 15 MG/ML
1500 INJECTION INTRAVASCULAR ONCE
Status: COMPLETED | OUTPATIENT
Start: 2025-03-10 | End: 2025-03-10

## 2025-03-10 RX ORDER — QUETIAPINE FUMARATE 25 MG/1
12.5 TABLET, FILM COATED ORAL EVERY 8 HOURS PRN
Status: DISCONTINUED | OUTPATIENT
Start: 2025-03-10 | End: 2025-03-13 | Stop reason: HOSPADM

## 2025-03-10 RX ORDER — DORZOLAMIDE HYDROCHLORIDE AND TIMOLOL MALEATE 20; 5 MG/ML; MG/ML
1 SOLUTION/ DROPS OPHTHALMIC 2 TIMES DAILY
Status: DISCONTINUED | OUTPATIENT
Start: 2025-03-10 | End: 2025-03-13 | Stop reason: HOSPADM

## 2025-03-10 RX ORDER — LORAZEPAM 2 MG/ML
2 INJECTION INTRAMUSCULAR DAILY PRN
Status: DISCONTINUED | OUTPATIENT
Start: 2025-03-10 | End: 2025-03-13 | Stop reason: HOSPADM

## 2025-03-10 RX ORDER — HEPARIN SODIUM 5000 [USP'U]/ML
5000 INJECTION, SOLUTION INTRAVENOUS; SUBCUTANEOUS EVERY 8 HOURS
Status: DISCONTINUED | OUTPATIENT
Start: 2025-03-10 | End: 2025-03-13 | Stop reason: HOSPADM

## 2025-03-10 RX ORDER — ACETAMINOPHEN 325 MG/1
650 TABLET ORAL EVERY 6 HOURS PRN
COMMUNITY

## 2025-03-10 RX ORDER — LEVETIRACETAM 10 MG/ML
1000 INJECTION INTRAVASCULAR ONCE
Status: COMPLETED | OUTPATIENT
Start: 2025-03-10 | End: 2025-03-10

## 2025-03-10 RX ORDER — LEVETIRACETAM 5 MG/ML
500 INJECTION INTRAVASCULAR EVERY 12 HOURS
Status: DISCONTINUED | OUTPATIENT
Start: 2025-03-11 | End: 2025-03-11 | Stop reason: ALTCHOICE

## 2025-03-10 RX ORDER — HYDROXYZINE HYDROCHLORIDE 25 MG/1
25 TABLET, FILM COATED ORAL 2 TIMES DAILY PRN
Status: DISCONTINUED | OUTPATIENT
Start: 2025-03-10 | End: 2025-03-13 | Stop reason: HOSPADM

## 2025-03-10 RX ORDER — ACETAMINOPHEN 325 MG/1
650 TABLET ORAL EVERY 6 HOURS PRN
Status: DISCONTINUED | OUTPATIENT
Start: 2025-03-10 | End: 2025-03-13 | Stop reason: HOSPADM

## 2025-03-10 RX ORDER — SODIUM CHLORIDE, SODIUM LACTATE, POTASSIUM CHLORIDE, CALCIUM CHLORIDE 600; 310; 30; 20 MG/100ML; MG/100ML; MG/100ML; MG/100ML
100 INJECTION, SOLUTION INTRAVENOUS CONTINUOUS
Status: ACTIVE | OUTPATIENT
Start: 2025-03-10 | End: 2025-03-11

## 2025-03-10 RX ORDER — VENLAFAXINE 37.5 MG/1
37.5 TABLET ORAL EVERY MORNING
Status: DISCONTINUED | OUTPATIENT
Start: 2025-03-11 | End: 2025-03-13 | Stop reason: HOSPADM

## 2025-03-10 RX ORDER — HYDROXYZINE HYDROCHLORIDE 25 MG/1
25 TABLET, FILM COATED ORAL 2 TIMES DAILY PRN
COMMUNITY

## 2025-03-10 RX ORDER — MIRTAZAPINE 15 MG/1
15 TABLET, FILM COATED ORAL NIGHTLY
COMMUNITY
End: 2025-03-13 | Stop reason: HOSPADM

## 2025-03-10 RX ADMIN — LEVETIRACETAM 1000 MG: 10 INJECTION INTRAVENOUS at 14:38

## 2025-03-10 RX ADMIN — DORZOLAMIDE HYDROCHLORIDE AND TIMOLOL MALEATE 1 DROP: 20; 5 SOLUTION/ DROPS OPHTHALMIC at 23:09

## 2025-03-10 RX ADMIN — SODIUM CHLORIDE, POTASSIUM CHLORIDE, SODIUM LACTATE AND CALCIUM CHLORIDE 100 ML/HR: 600; 310; 30; 20 INJECTION, SOLUTION INTRAVENOUS at 18:55

## 2025-03-10 RX ADMIN — BRIMONIDINE TARTRATE 1 DROP: 2 SOLUTION/ DROPS OPHTHALMIC at 23:09

## 2025-03-10 RX ADMIN — HALOPERIDOL LACTATE 2 MG: 5 INJECTION, SOLUTION INTRAMUSCULAR at 21:28

## 2025-03-10 RX ADMIN — LEVETIRACETAM 1500 MG: 15 INJECTION INTRAVENOUS at 13:59

## 2025-03-10 RX ADMIN — LATANOPROST 1 DROP: 50 SOLUTION OPHTHALMIC at 23:09

## 2025-03-10 RX ADMIN — LEVETIRACETAM 1500 MG: 15 INJECTION INTRAVENOUS at 14:27

## 2025-03-10 RX ADMIN — HEPARIN SODIUM 5000 UNITS: 5000 INJECTION INTRAVENOUS; SUBCUTANEOUS at 21:27

## 2025-03-10 SDOH — ECONOMIC STABILITY: HOUSING INSECURITY: IN THE PAST 12 MONTHS, HOW MANY TIMES HAVE YOU MOVED WHERE YOU WERE LIVING?: 0

## 2025-03-10 SDOH — SOCIAL STABILITY: SOCIAL INSECURITY: HAVE YOU HAD THOUGHTS OF HARMING ANYONE ELSE?: UNABLE TO ASSESS

## 2025-03-10 SDOH — SOCIAL STABILITY: SOCIAL INSECURITY: DO YOU FEEL ANYONE HAS EXPLOITED OR TAKEN ADVANTAGE OF YOU FINANCIALLY OR OF YOUR PERSONAL PROPERTY?: UNABLE TO ASSESS

## 2025-03-10 SDOH — ECONOMIC STABILITY: HOUSING INSECURITY: AT ANY TIME IN THE PAST 12 MONTHS, WERE YOU HOMELESS OR LIVING IN A SHELTER (INCLUDING NOW)?: PATIENT UNABLE TO ANSWER

## 2025-03-10 SDOH — SOCIAL STABILITY: SOCIAL INSECURITY: DO YOU FEEL UNSAFE GOING BACK TO THE PLACE WHERE YOU ARE LIVING?: UNABLE TO ASSESS

## 2025-03-10 SDOH — SOCIAL STABILITY: SOCIAL INSECURITY: WITHIN THE LAST YEAR, HAVE YOU BEEN AFRAID OF YOUR PARTNER OR EX-PARTNER?: PATIENT UNABLE TO ANSWER

## 2025-03-10 SDOH — SOCIAL STABILITY: SOCIAL INSECURITY: DOES ANYONE TRY TO KEEP YOU FROM HAVING/CONTACTING OTHER FRIENDS OR DOING THINGS OUTSIDE YOUR HOME?: UNABLE TO ASSESS

## 2025-03-10 SDOH — SOCIAL STABILITY: SOCIAL INSECURITY: WERE YOU ABLE TO COMPLETE ALL THE BEHAVIORAL HEALTH SCREENINGS?: NO

## 2025-03-10 SDOH — SOCIAL STABILITY: SOCIAL INSECURITY: HAVE YOU HAD ANY THOUGHTS OF HARMING ANYONE ELSE?: UNABLE TO ASSESS

## 2025-03-10 SDOH — SOCIAL STABILITY: SOCIAL INSECURITY: ARE THERE ANY APPARENT SIGNS OF INJURIES/BEHAVIORS THAT COULD BE RELATED TO ABUSE/NEGLECT?: UNABLE TO ASSESS

## 2025-03-10 SDOH — ECONOMIC STABILITY: HOUSING INSECURITY
IN THE LAST 12 MONTHS, WAS THERE A TIME WHEN YOU WERE NOT ABLE TO PAY THE MORTGAGE OR RENT ON TIME?: PATIENT UNABLE TO ANSWER

## 2025-03-10 SDOH — SOCIAL STABILITY: SOCIAL INSECURITY: ABUSE: ADULT

## 2025-03-10 SDOH — SOCIAL STABILITY: SOCIAL INSECURITY: HAS ANYONE EVER THREATENED TO HURT YOUR FAMILY OR YOUR PETS?: UNABLE TO ASSESS

## 2025-03-10 SDOH — ECONOMIC STABILITY: FOOD INSECURITY
HOW HARD IS IT FOR YOU TO PAY FOR THE VERY BASICS LIKE FOOD, HOUSING, MEDICAL CARE, AND HEATING?: PATIENT UNABLE TO ANSWER

## 2025-03-10 SDOH — ECONOMIC STABILITY: TRANSPORTATION INSECURITY
IN THE PAST 12 MONTHS, HAS LACK OF TRANSPORTATION KEPT YOU FROM MEDICAL APPOINTMENTS OR FROM GETTING MEDICATIONS?: PATIENT UNABLE TO ANSWER

## 2025-03-10 SDOH — SOCIAL STABILITY: SOCIAL INSECURITY: ARE YOU OR HAVE YOU BEEN THREATENED OR ABUSED PHYSICALLY, EMOTIONALLY, OR SEXUALLY BY ANYONE?: UNABLE TO ASSESS

## 2025-03-10 ASSESSMENT — LIFESTYLE VARIABLES
HOW OFTEN DO YOU HAVE 6 OR MORE DRINKS ON ONE OCCASION: PATIENT UNABLE TO ANSWER
HOW MANY STANDARD DRINKS CONTAINING ALCOHOL DO YOU HAVE ON A TYPICAL DAY: PATIENT UNABLE TO ANSWER
AUDIT-C TOTAL SCORE: -1
AUDIT-C TOTAL SCORE: -1
HOW OFTEN DO YOU HAVE A DRINK CONTAINING ALCOHOL: PATIENT UNABLE TO ANSWER
SKIP TO QUESTIONS 9-10: 0

## 2025-03-10 ASSESSMENT — PAIN - FUNCTIONAL ASSESSMENT
PAIN_FUNCTIONAL_ASSESSMENT: PAINAD (PAIN ASSESSMENT IN ADVANCED DEMENTIA SCALE)
PAIN_FUNCTIONAL_ASSESSMENT: PAINAD (PAIN ASSESSMENT IN ADVANCED DEMENTIA SCALE)

## 2025-03-10 ASSESSMENT — PAIN SCALES - PAIN ASSESSMENT IN ADVANCED DEMENTIA (PAINAD)
CONSOLABILITY: NO NEED TO CONSOLE
CONSOLABILITY: NO NEED TO CONSOLE
BODYLANGUAGE: TENSE, DISTRESSED PACING, FIDGETING
FACIALEXPRESSION: SMILING OR INEXPRESSIVE
TOTALSCORE: 1
BREATHING: NORMAL
BREATHING: NORMAL
BODYLANGUAGE: TENSE, DISTRESSED PACING, FIDGETING
TOTALSCORE: 1
FACIALEXPRESSION: SMILING OR INEXPRESSIVE

## 2025-03-10 ASSESSMENT — ACTIVITIES OF DAILY LIVING (ADL)
HEARING - LEFT EAR: UNABLE TO ASSESS
GROOMING: UNABLE TO ASSESS
WALKS IN HOME: UNABLE TO ASSESS
LACK_OF_TRANSPORTATION: PATIENT UNABLE TO ANSWER
ADEQUATE_TO_COMPLETE_ADL: UNABLE TO ASSESS
FEEDING YOURSELF: UNABLE TO ASSESS
HEARING - RIGHT EAR: UNABLE TO ASSESS
BATHING: UNABLE TO ASSESS
DRESSING YOURSELF: UNABLE TO ASSESS
LACK_OF_TRANSPORTATION: PATIENT UNABLE TO ANSWER
ASSISTIVE_DEVICE: OTHER (COMMENT)
TOILETING: UNABLE TO ASSESS
PATIENT'S MEMORY ADEQUATE TO SAFELY COMPLETE DAILY ACTIVITIES?: UNABLE TO ASSESS
JUDGMENT_ADEQUATE_SAFELY_COMPLETE_DAILY_ACTIVITIES: UNABLE TO ASSESS

## 2025-03-10 ASSESSMENT — PAIN SCALES - GENERAL
PAINLEVEL_OUTOF10: 0 - NO PAIN
PAINLEVEL_OUTOF10: 0 - NO PAIN

## 2025-03-10 ASSESSMENT — COGNITIVE AND FUNCTIONAL STATUS - GENERAL: PATIENT BASELINE BEDBOUND: UNABLE TO ASSESS AT THIS TIME

## 2025-03-10 ASSESSMENT — PATIENT HEALTH QUESTIONNAIRE - PHQ9
SUM OF ALL RESPONSES TO PHQ9 QUESTIONS 1 & 2: 0
1. LITTLE INTEREST OR PLEASURE IN DOING THINGS: NOT AT ALL
2. FEELING DOWN, DEPRESSED OR HOPELESS: NOT AT ALL

## 2025-03-10 NOTE — PROGRESS NOTES
Advanced Practice Admission Note    History Of Present Illness  Fernanda Hughes is a 84 y.o. female with history significant for cognitive impairment with memory loss, remote right breast cancer postlumpectomy, glaucoma with vision issues, and remote history of seizures; presenting to [facility] on 3/10/2025 from SNF with unresponsive episode and witnessed likely seizure.    Patient unable to provide history as she is just starting to become more arousable and is only oriented to her name and her birth month.  She denies shortness of breath, chest pain, abdominal pain, or shortness of breath.  She is slow to respond and otherwise not answering questions.    According to report from the emergency room she has remote history of seizure and is not on any antiepileptics.  She had a 20-minute unresponsive episode around 1 PM that was thought to be seizure activity.  Neurology has seen the patient in the emergency room and is requesting placement in IMCU for video EEG monitoring.  Patient was loaded with Keppra.  Lactate was initially 5.9, repeat is pending.  WBC 9.7, hemoglobin 13.1, troponin unremarkable, sodium 138, potassium 4.0, magnesium 1.93, BUN 19/creatinine 0.93, glucose 122.  Urines have been ordered and are pending.  Chest x-ray unremarkable, CT of the head unremarkable, EKG sinus tach with no acute changes.    Past Medical History  Past Medical History:   Diagnosis Date    Anxiety     Falls     Gastroesophageal reflux disease     Glaucoma     Hearing loss     History of breast cancer     History of closed head injury     History of seizure     Irritable bowel syndrome     Memory loss     Mild cognitive impairment     Osteoarthritis     Overactive bladder     Peripheral neuropathy     Underweight     Vascular dementia     Visual field loss        Surgical History  Past Surgical History:   Procedure Laterality Date    BREAST LUMPECTOMY Right     CATARACT EXTRACTION, BILATERAL      CHOLECYSTECTOMY      LYMPH NODE  DISSECTION      ORIF FEMUR FRACTURE      TOTAL ABDOMINAL HYSTERECTOMY W/ BILATERAL SALPINGOOPHORECTOMY          Social History  She reports that she has never smoked. She has never used smokeless tobacco. She reports that she does not drink alcohol and does not use drugs.    Family History  Family History   Problem Relation Name Age of Onset    Deep vein thrombosis Neg Hx      Sudden death Neg Hx          Allergies  Vicodin [hydrocodone-acetaminophen]    Review of Systems   Unable to perform ROS: Mental status change        Physical Exam  Vitals reviewed.   Constitutional:       Appearance: She is obese. She is ill-appearing. She is not diaphoretic.      Comments: She opens her eyes to stimulation and is arousable but quite drowsy.  She is able to state her first name and last name and her birth month but appears mildly confused and is slow to respond.  Speech is slightly garbled.    HENT:      Head: Normocephalic and atraumatic.      Right Ear: External ear normal.      Left Ear: External ear normal.      Nose: Nose normal.      Mouth/Throat:      Mouth: Mucous membranes are dry.      Pharynx: Oropharynx is clear.      Comments: Mucous membranes extremely dry  Eyes:      General: No scleral icterus.        Right eye: No discharge.         Left eye: No discharge.      Pupils: Pupils are equal, round, and reactive to light.      Comments: Pupils are 5 mm and do equally react   Cardiovascular:      Rate and Rhythm: Regular rhythm. Tachycardia present.      Pulses: Normal pulses.      Heart sounds: No murmur heard.  Pulmonary:      Effort: Pulmonary effort is normal. No respiratory distress.      Breath sounds: Normal breath sounds. No wheezing.   Abdominal:      General: Abdomen is flat. There is no distension.      Palpations: Abdomen is soft.      Tenderness: There is no abdominal tenderness. There is no right CVA tenderness or left CVA tenderness.      Comments: Bowel sounds are hypoactive   Musculoskeletal:         " General: Normal range of motion.      Cervical back: Normal range of motion and neck supple.      Right lower leg: Edema present.      Left lower leg: Edema present.      Comments: Left lower extremity edema +2 edema, right lower extremity +1   Skin:     General: Skin is warm and dry.      Capillary Refill: Capillary refill takes 2 to 3 seconds.      Coloration: Skin is pale. Skin is not jaundiced.      Findings: No bruising, lesion or rash.   Neurological:      Motor: Weakness present.      Comments: She is moving all extremities, speech is somewhat garbled but discernible she is able to state her first and last name and her birth month otherwise unable to obtain other orientation answers as she just does not answer.           Last Recorded Vitals  Visit Vitals  /72 (BP Location: Left arm, Patient Position: Lying)   Pulse 98   Temp 36.5 °C (97.7 °F) (Temporal)   Resp 15   Ht 1.702 m (5' 7\")   Wt 68 kg (150 lb)   SpO2 99%   BMI 23.49 kg/m²   OB Status Hysterectomy   Smoking Status Never   BSA 1.79 m²        Relevant Results  Results for orders placed or performed during the hospital encounter of 03/10/25 (from the past 24 hours)   CBC and Auto Differential   Result Value Ref Range    WBC 9.7 4.4 - 11.3 x10*3/uL    nRBC 0.0 0.0 - 0.0 /100 WBCs    RBC 4.34 4.00 - 5.20 x10*6/uL    Hemoglobin 13.1 12.0 - 16.0 g/dL    Hematocrit 39.9 36.0 - 46.0 %    MCV 92 80 - 100 fL    MCH 30.2 26.0 - 34.0 pg    MCHC 32.8 32.0 - 36.0 g/dL    RDW 13.1 11.5 - 14.5 %    Platelets 295 150 - 450 x10*3/uL    Neutrophils % 82.3 40.0 - 80.0 %    Immature Granulocytes %, Automated 0.4 0.0 - 0.9 %    Lymphocytes % 10.7 13.0 - 44.0 %    Monocytes % 5.3 2.0 - 10.0 %    Eosinophils % 0.9 0.0 - 6.0 %    Basophils % 0.4 0.0 - 2.0 %    Neutrophils Absolute 7.96 (H) 1.60 - 5.50 x10*3/uL    Immature Granulocytes Absolute, Automated 0.04 0.00 - 0.50 x10*3/uL    Lymphocytes Absolute 1.04 0.80 - 3.00 x10*3/uL    Monocytes Absolute 0.51 0.05 - 0.80 " x10*3/uL    Eosinophils Absolute 0.09 0.00 - 0.40 x10*3/uL    Basophils Absolute 0.04 0.00 - 0.10 x10*3/uL   Comprehensive metabolic panel   Result Value Ref Range    Glucose 122 (H) 74 - 99 mg/dL    Sodium 138 136 - 145 mmol/L    Potassium 4.0 3.5 - 5.3 mmol/L    Chloride 101 98 - 107 mmol/L    Bicarbonate 21 21 - 32 mmol/L    Anion Gap 20 10 - 20 mmol/L    Urea Nitrogen 16 6 - 23 mg/dL    Creatinine 0.93 0.50 - 1.05 mg/dL    eGFR 60 (L) >60 mL/min/1.73m*2    Calcium 9.1 8.6 - 10.3 mg/dL    Albumin 4.2 3.4 - 5.0 g/dL    Alkaline Phosphatase 77 33 - 136 U/L    Total Protein 6.4 6.4 - 8.2 g/dL    AST 20 9 - 39 U/L    Bilirubin, Total 0.8 0.0 - 1.2 mg/dL    ALT 15 7 - 45 U/L   Phosphorus   Result Value Ref Range    Phosphorus 3.2 2.5 - 4.9 mg/dL   Magnesium   Result Value Ref Range    Magnesium 1.93 1.60 - 2.40 mg/dL   Ammonia   Result Value Ref Range    Ammonia 25 16 - 53 umol/L   Troponin I, High Sensitivity   Result Value Ref Range    Troponin I, High Sensitivity 14 (H) 0 - 13 ng/L   Blood Gas Venous Full Panel   Result Value Ref Range    POCT pH, Venous 7.34 7.33 - 7.43 pH    POCT pCO2, Venous 47 41 - 51 mm Hg    POCT pO2, Venous 46 (H) 35 - 45 mm Hg    POCT SO2, Venous 75 45 - 75 %    POCT Oxy Hemoglobin, Venous 73.4 45.0 - 75.0 %    POCT Hematocrit Calculated, Venous 38.0 36.0 - 46.0 %    POCT Sodium, Venous 136 136 - 145 mmol/L    POCT Potassium, Venous 4.0 3.5 - 5.3 mmol/L    POCT Chloride, Venous 101 98 - 107 mmol/L    POCT Ionized Calicum, Venous 1.31 1.10 - 1.33 mmol/L    POCT Glucose, Venous 125 (H) 74 - 99 mg/dL    POCT Lactate, Venous 5.9 (HH) 0.4 - 2.0 mmol/L    POCT Base Excess, Venous -0.8 -2.0 - 3.0 mmol/L    POCT HCO3 Calculated, Venous 25.4 22.0 - 26.0 mmol/L    POCT Hemoglobin, Venous 12.7 12.0 - 16.0 g/dL    POCT Anion Gap, Venous 14.0 10.0 - 25.0 mmol/L    Patient Temperature      FiO2 30 %    Critical Called By      Critical Called To A Bettendorf     Critical Call Time 1400     Critical Read  Back Y    TSH with reflex to Free T4 if abnormal   Result Value Ref Range    Thyroid Stimulating Hormone 2.47 0.44 - 3.98 mIU/L   ECG 12 lead   Result Value Ref Range    Ventricular Rate 105 BPM    Atrial Rate 105 BPM    IL Interval 154 ms    QRS Duration 86 ms    QT Interval 328 ms    QTC Calculation(Bazett) 433 ms    P Axis 72 degrees    R Axis 79 degrees    T Axis 81 degrees    QRS Count 18 beats    Q Onset 219 ms    P Onset 142 ms    P Offset 204 ms    T Offset 383 ms    QTC Fredericia 395 ms   Protime-INR   Result Value Ref Range    Protime 10.5 9.8 - 12.4 seconds    INR 1.0 0.9 - 1.1   Troponin I, High Sensitivity   Result Value Ref Range    Troponin I, High Sensitivity 17 (H) 0 - 13 ng/L   Urinalysis with Reflex Culture and Microscopic   Result Value Ref Range    Color, Urine Light-Yellow Light-Yellow, Yellow, Dark-Yellow    Appearance, Urine Clear Clear    Specific Gravity, Urine 1.015 1.005 - 1.035    pH, Urine 7.0 5.0, 5.5, 6.0, 6.5, 7.0, 7.5, 8.0    Protein, Urine 30 (1+) (A) NEGATIVE, 10 (TRACE), 20 (TRACE) mg/dL    Glucose, Urine Normal Normal mg/dL    Blood, Urine 0.03 (TRACE) (A) NEGATIVE mg/dL    Ketones, Urine NEGATIVE NEGATIVE mg/dL    Bilirubin, Urine NEGATIVE NEGATIVE mg/dL    Urobilinogen, Urine Normal Normal mg/dL    Nitrite, Urine NEGATIVE NEGATIVE    Leukocyte Esterase, Urine NEGATIVE NEGATIVE   Urinalysis Microscopic   Result Value Ref Range    WBC, Urine 1-5 1-5, NONE /HPF    WBC Clumps, Urine RARE Reference range not established. /HPF    RBC, Urine 11-20 (A) NONE, 1-2, 3-5 /HPF    Mucus, Urine FEW Reference range not established. /LPF   Drug Screen, Urine   Result Value Ref Range    Amphetamine Screen, Urine Presumptive Negative Presumptive Negative    Barbiturate Screen, Urine Presumptive Negative Presumptive Negative    Benzodiazepines Screen, Urine Presumptive Negative Presumptive Negative    Cannabinoid Screen, Urine Presumptive Negative Presumptive Negative    Cocaine Metabolite  Screen, Urine Presumptive Negative Presumptive Negative    Fentanyl Screen, Urine Presumptive Negative Presumptive Negative    Opiate Screen, Urine Presumptive Negative Presumptive Negative    Oxycodone Screen, Urine Presumptive Negative Presumptive Negative    PCP Screen, Urine Presumptive Negative Presumptive Negative    Methadone Screen, Urine Presumptive Negative Presumptive Negative        Home Medications  Prior to Admission medications    Medication Sig Start Date End Date Taking? Authorizing Provider   acetaminophen (Tylenol) 325 mg tablet Take 2 tablets (650 mg) by mouth every 6 hours if needed for mild pain (1 - 3).   Yes Historical Provider, MD   brimonidine (AlphaGAN) 0.2 % ophthalmic solution Administer 1 drop into both eyes 2 times a day.   Yes Historical Provider, MD   dorzolamide-timoloL (Cosopt) 22.3-6.8 mg/mL ophthalmic solution Administer 1 drop into both eyes 2 times a day.   Yes Historical Provider, MD   hydrOXYzine HCL (Atarax) 25 mg tablet Take 1 tablet (25 mg) by mouth 2 times a day as needed for itching.   Yes Historical Provider, MD   latanoprost (Xalatan) 0.005 % ophthalmic solution Administer 1 drop into both eyes once daily at bedtime.   Yes Historical Provider, MD   mirtazapine (Remeron) 15 mg tablet Take 1 tablet (15 mg) by mouth once daily at bedtime.   Yes Historical Provider, MD   QUEtiapine (SEROquel) 25 mg tablet Take 0.5 tablets (12.5 mg) by mouth every 8 hours if needed (Agitation (hold for drowsiness/lethargy)). 1/27/25  Yes Lakshmi Reid, MORRIS-CNP   venlafaxine (Effexor) 37.5 mg tablet Take 1 tablet (37.5 mg) by mouth once daily in the morning. 2/12/25  Yes Historical Provider, MD       Medications  Scheduled medications    Continuous medications    PRN medications      Imaging  XR chest 1 view    Result Date: 3/10/2025  Interpreted By:  Carlin Hester, STUDY: XR CHEST 1 VIEW;  3/10/2025 3:06 pm   INDICATION: Signs/Symptoms:AMS.     COMPARISON: 02/12/2025.    ACCESSION NUMBER(S): [id number]   ORDERING CLINICIAN: BUCKY LOPEZ   FINDINGS: CARDIOMEDIASTINAL SILHOUETTE: Aortic calcifications are again seen. Cardiac silhouette is stable and not significantly enlarged.   LUNGS: Lungs are clear.  No pleural effusion or pneumothorax.   ABDOMEN: Right upper quadrant surgical clips are present.   BONES: No acute osseous changes.       1.  No evidence of acute cardiopulmonary process.       MACRO: None.   Signed by: Carlin Hester 3/10/2025 3:11 PM Dictation workstation:   [id number]    ECG 12 lead    Result Date: 3/10/2025  Sinus tachycardia Possible Left atrial enlargement Minimal voltage criteria for LVH, may be normal variant ( Kempner product ) Borderline ECG When compared with ECG of 10-MAR-2025 13:38, (unconfirmed) T wave inversion no longer evident in Lateral leads    CT head wo IV contrast    Result Date: 3/10/2025  Interpreted By:  Sobia Darnell, STUDY: CT HEAD WO IV CONTRAST;  3/10/2025 2:17 pm   INDICATION: Trauma. Signs/Symptoms:Altered mental status, seizure history.   COMPARISON: CT head without contrast dated 02/12/2025   ACCESSION NUMBER(S): [id number]   ORDERING CLINICIAN: BUCKY LOPEZ   TECHNIQUE: Axial noncontrast head CT   FINDINGS: Parenchyma: There is mild diffuse cerebral volume loss with widening of the sulci and ex vacuo dilatation of ventricles. There is mild diffuse periventricular white matter hypoattenuation in bilateral cerebral hemispheres, likely favored to be related to chronic white matter ischemic changes. The grey-white differentiation is intact. There is no mass effect or midline shift.  There is no intracranial hemorrhage.   CSF Spaces: The ventricles, sulci and basal cisterns are within normal limits. There is no extraaxial fluid collection.   Calvarium: No evidence of fracture was identified.   Paranasal sinuses and mastoids: Visualized paranasal sinuses and mastoids are clear.       No acute intracranial abnormality was  identified.   MACRO: None   Signed by: Sobia Darnell 3/10/2025 2:36 PM Dictation workstation:   [id number]    ECG 12 lead    Result Date: 3/10/2025  Sinus tachycardia Possible Left atrial enlargement Minimal voltage criteria for LVH, may be normal variant ( [place] product ) Possible Lateral infarct (cited on or before 10-MAR-2025) Abnormal ECG When compared with ECG of 12-FEB-2025 00:38, No significant change was found    ECG 12 lead    Result Date: 2/21/2025  Normal sinus rhythm Normal ECG When compared with ECG of 11-FEB-2025 23:36, No significant change was found Confirmed by Triston Vegas ([id number]) on 2/21/2025 7:58:46 PM    ECG 12 lead    Result Date: 2/13/2025  Normal sinus rhythm Normal ECG When compared with ECG of 02-DEC-2024 18:18, No significant change was found Confirmed by Triston Vegas ([id number]) on 2/13/2025 8:56:24 PM    CT head wo IV contrast    Result Date: 2/12/2025  Interpreted By:  Lashaun Nixon, STUDY: CT HEAD WO IV CONTRAST; CT CERVICAL SPINE WO IV CONTRAST;  2/12/2025 1:37 am   INDICATION: Signs/Symptoms:Unwitnessed fall, dementia.   COMPARISON: 9/2/2024   ACCESSION NUMBER(S): [id number]; [id number]   ORDERING CLINICIAN: JOAN SRIVASTAVA   TECHNIQUE: Noncontrast CT images of head. Axial noncontrast CT images of the cervical spine with coronal and sagittal reconstructed images.   FINDINGS: BRAIN PARENCHYMA: Gray-white matter interfaces are preserved. No mass effect or midline shift. Generalized parenchymal volume loss noted with concordant ventricular enlargement. Non-specific white matter changes noted, which may be related to small vessel disease.   HEMORRHAGE: No acute intracranial hemorrhage. VENTRICLES and EXTRA-AXIAL SPACES: Normal size. EXTRACRANIAL SOFT TISSUES: Within normal limits. PARANASAL SINUSES/MASTOIDS: The visualized paranasal sinuses and mastoid air cells are aerated. CALVARIUM: No depressed skull fracture. No destructive osseous lesion.   OTHER FINDINGS: None.   CERVICAL  SPINE:   ALIGNMENT: Normal. VERTEBRAE: No acute fracture. SPINAL CANAL: Degenerative changes facet and uncinate arthropathy, as well as disc space narrowing and end plate hypertrophy. No critical spinal canal stenosis. PREVERTEBRAL SOFT TISSUES: No prevertebral soft tissue swelling. LUNG APICES: Biapical scarring.   OTHER FINDINGS: 1.8 x 1.4 cm midthyroid cystic lesion       No acute intracranial abnormality.   No acute fracture or traumatic subluxation of the cervical spine.   1.8 x 1.4 cm midthyroid cystic lesion. Dedicated ultrasound imaging may be obtained on a non-emergent basis if not already performed.   MACRO: Incidental Finding:  There are few small hypoattenuating nodules measuring equal to or greater than 1.5 cm in the thyroid gland. (**-YCF-**)   Instructions:  Further evaluation with nonemergent thyroid ultrasound. (Managing Incidental Thyroid Nodules Detected on Imaging: White Paper of the ACR Incidental Thyroid Findings Committee. Gerri Nelson. et al. Journal of the American College of Radiology,Volume 12, Issue 2, 143 - 150.) [email or web address]   Signed by: Lashaun Nixon 2/12/2025 2:51 AM Dictation workstation:   [id number]    CT cervical spine wo IV contrast    Result Date: 2/12/2025  Interpreted By:  Lashaun Nixon, STUDY: CT HEAD WO IV CONTRAST; CT CERVICAL SPINE WO IV CONTRAST;  2/12/2025 1:37 am   INDICATION: Signs/Symptoms:Unwitnessed fall, dementia.   COMPARISON: 9/2/2024   ACCESSION NUMBER(S): [id number]; [id number]   ORDERING CLINICIAN: JOAN SRIVASTAVA   TECHNIQUE: Noncontrast CT images of head. Axial noncontrast CT images of the cervical spine with coronal and sagittal reconstructed images.   FINDINGS: BRAIN PARENCHYMA: Gray-white matter interfaces are preserved. No mass effect or midline shift. Generalized parenchymal volume loss noted with concordant ventricular enlargement. Non-specific white matter changes noted, which may be related to small vessel disease.   HEMORRHAGE: No  acute intracranial hemorrhage. VENTRICLES and EXTRA-AXIAL SPACES: Normal size. EXTRACRANIAL SOFT TISSUES: Within normal limits. PARANASAL SINUSES/MASTOIDS: The visualized paranasal sinuses and mastoid air cells are aerated. CALVARIUM: No depressed skull fracture. No destructive osseous lesion.   OTHER FINDINGS: None.   CERVICAL SPINE:   ALIGNMENT: Normal. VERTEBRAE: No acute fracture. SPINAL CANAL: Degenerative changes facet and uncinate arthropathy, as well as disc space narrowing and end plate hypertrophy. No critical spinal canal stenosis. PREVERTEBRAL SOFT TISSUES: No prevertebral soft tissue swelling. LUNG APICES: Biapical scarring.   OTHER FINDINGS: 1.8 x 1.4 cm midthyroid cystic lesion       No acute intracranial abnormality.   No acute fracture or traumatic subluxation of the cervical spine.   1.8 x 1.4 cm midthyroid cystic lesion. Dedicated ultrasound imaging may be obtained on a non-emergent basis if not already performed.   MACRO: Incidental Finding:  There are few small hypoattenuating nodules measuring equal to or greater than 1.5 cm in the thyroid gland. (**-YCF-**)   Instructions:  Further evaluation with nonemergent thyroid ultrasound. (Managing Incidental Thyroid Nodules Detected on Imaging: White Paper of the ACR Incidental Thyroid Findings Committee. Gerri Nelson. et al. Journal of the American College of Radiology,Volume 12, Issue 2, 143 - 150.) [email or web address]   Signed by: Lashaun Nixon 2/12/2025 2:51 AM Dictation workstation:   [id number]    XR femur left 2+ views    Result Date: 2/12/2025  Interpreted By:  Lashaun Nixon, STUDY: XR PELVIS 1-2 VIEWS; XR FEMUR LEFT 2+ VIEWS; ;  2/12/2025 1:30 am   INDICATION: Signs/Symptoms:Unwitnessed fall, left hip and buttock pain; Signs/Symptoms:Unwitnessed fall, left hip and leg pain.     COMPARISON: 12/02/2024   ACCESSION NUMBER(S): [id number]; [id number]   ORDERING CLINICIAN: JOAN SRIVASTAVA   FINDINGS: AP pelvis, 2 views of the left femur    Intramedullary nail and screw seen in the left hip. No acute fracture or dislocation. Scattered degenerative changes.       No acute osseous abnormality identified.   MACRO: None   Signed by: Lashaun Nixon 2/12/2025 2:25 AM Dictation workstation:   [id number]    XR pelvis 1-2 views    Result Date: 2/12/2025  Interpreted By:  Lashaun Nixon, STUDY: XR PELVIS 1-2 VIEWS; XR FEMUR LEFT 2+ VIEWS; ;  2/12/2025 1:30 am   INDICATION: Signs/Symptoms:Unwitnessed fall, left hip and buttock pain; Signs/Symptoms:Unwitnessed fall, left hip and leg pain.     COMPARISON: 12/02/2024   ACCESSION NUMBER(S): [id number]; [id number]   ORDERING CLINICIAN: JOAN SRIVASTAVA   FINDINGS: AP pelvis, 2 views of the left femur   Intramedullary nail and screw seen in the left hip. No acute fracture or dislocation. Scattered degenerative changes.       No acute osseous abnormality identified.   MACRO: None   Signed by: Lashaun Nixon 2/12/2025 2:25 AM Dictation workstation:   [id number]    XR chest 1 view    Result Date: 2/12/2025  Interpreted By:  Lashaun Nixon, STUDY: XR CHEST 1 VIEW;  2/12/2025 1:30 am   INDICATION: Signs/Symptoms:Unwitnessed fall, dementia.     COMPARISON: 09/02/2024   ACCESSION NUMBER(S): [id number]   ORDERING CLINICIAN: JOAN SRIVASTAVA   FINDINGS: AP radiograph of the chest was provided.       CARDIOMEDIASTINAL SILHOUETTE: Cardiomediastinal silhouette is stable in size and configuration.   LUNGS: Hyperinflated lung fields. No focal consolidation, pleural effusion or sizable pneumothorax seen.   ABDOMEN: No remarkable upper abdominal findings.   BONES: No acute osseous changes.       Emphysema/COPD. No focal consolidation.   MACRO: None   Signed by: Lashaun Nixon 2/12/2025 2:19 AM Dictation workstation:   [id number]         Assessment/Plan   Assessment & Plan  Seizure (Multi)  According to her past medical history she has had remote history of seizure but is not currently on any medication for this.  I have  reached out to her POA when unable to reach anyone.  She was seen by neurology here at [facility] for blurred vision on February 14 of this year.  At that time she was noted to have vascular dementia and poor short-term memory with blurry vision that was attributed to her end-stage glaucoma.  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  Neurology has been placed on consult, video EEG is ordered, seizure precautions, loaded with Keppra, monitor.    Lactic acidosis  Lactate 5.9 likely secondary to seizure activity.  Will repeat.       VTE prophylaxis:   DVT prophylaxis: subcutaneous Heparin    Code Status  Full code at this time as negative unable to reach her POA called at 1750 p.m. on 3/10.  Previous admission she was listed as full code.  Will request palliative care to consult on this matter as she does have dementia.     I spent 50 minutes in the professional and overall care of this patient.      Admission history and physical to follow by attending physician. Plan to resume home medications as appropriate when list is available from pharmacy, see orders for additional plan elements, management deferred to attending and consult physicians.      Abigail Chan, APRN-[facility]  [facility] [phone]

## 2025-03-10 NOTE — ASSESSMENT & PLAN NOTE
According to her past medical history she has had remote history of seizure but is not currently on any medication for this.  I have reached out to her POA when unable to reach anyone.  She was seen by neurology here at [facility] for blurred vision on February 14 of this year.  At that time she was noted to have vascular dementia and poor short-term memory with blurry vision that was attributed to her end-stage glaucoma.  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  Neurology has been placed on consult, video EEG is ordered, seizure precautions, loaded with Keppra, monitor.

## 2025-03-10 NOTE — ED PROVIDER NOTES
HPI   Chief Complaint   Patient presents with    Altered Mental Status       Patient is a 84-year-old female presenting to [facility] ED for altered mental status that started approximately around 1 PM.  Patient presenting from nursing home for episode of unresponsiveness, decorticate posturing that occurred around 1 PM.  Patient has remote seizure history.  Patient was somnolent, however answering some questions on exam.  Patient protecting airway, has bilateral breath sounds, strong bilateral upper and lower extremity pulses.  History limited due to patient mental status.              Patient History   Past Medical History:   Diagnosis Date    Anxiety     Falls     Gastroesophageal reflux disease     Glaucoma     Hearing loss     History of breast cancer     History of closed head injury     History of seizure     Irritable bowel syndrome     Memory loss     Mild cognitive impairment     Osteoarthritis     Overactive bladder     Peripheral neuropathy     Underweight     Vascular dementia     Visual field loss      Past Surgical History:   Procedure Laterality Date    BREAST LUMPECTOMY Right     CATARACT EXTRACTION, BILATERAL      CHOLECYSTECTOMY      LYMPH NODE DISSECTION      ORIF FEMUR FRACTURE      TOTAL ABDOMINAL HYSTERECTOMY W/ BILATERAL SALPINGOOPHORECTOMY       Family History   Problem Relation Name Age of Onset    Deep vein thrombosis Neg Hx      Sudden death Neg Hx       Social History     Tobacco Use    Smoking status: Never    Smokeless tobacco: Never   Vaping Use    Vaping status: Never Used   Substance Use Topics    Alcohol use: Never    Drug use: Never       Physical Exam   ED Triage Vitals [03/10/25 1341]   Temperature Heart Rate Respirations BP   36.5 °C (97.7 °F) (!) 116 20 (!) 217/95      Pulse Ox Temp Source Heart Rate Source Patient Position   96 % Temporal Monitor Sitting      BP Location FiO2 (%)     Right arm --       Physical Exam  Constitutional:       Appearance: Normal appearance. She is  ill-appearing.   HENT:      Head: Normocephalic and atraumatic.      Nose: Nose normal.      Mouth/Throat:      Mouth: Mucous membranes are moist.      Pharynx: Oropharynx is clear.   Eyes:      Extraocular Movements: Extraocular movements intact.      Conjunctiva/sclera: Conjunctivae normal.      Pupils: Pupils are equal, round, and reactive to light.   Cardiovascular:      Rate and Rhythm: Normal rate and regular rhythm.      Pulses: Normal pulses.      Heart sounds: Normal heart sounds.   Pulmonary:      Effort: Pulmonary effort is normal. No respiratory distress.      Breath sounds: Normal breath sounds. No stridor. No wheezing, rhonchi or rales.   Abdominal:      General: Abdomen is flat. Bowel sounds are normal.      Palpations: Abdomen is soft.      Tenderness: There is no abdominal tenderness.   Musculoskeletal:         General: Normal range of motion.      Cervical back: Normal range of motion and neck supple.   Skin:     General: Skin is warm and dry.      Capillary Refill: Capillary refill takes less than 2 seconds.   Neurological:      General: No focal deficit present.      Mental Status: She is disoriented and confused.           ED Course & MDM   ED Course as of 03/10/25 1700   Mon Mar 10, 2025   1404 Lactate 5.9 otherwise municipal panel is otherwise reassuring.  Will start on Keppra load at this time. [TL]   1411 Per nursing home paperwork as well as EMS report facility reported no previous history of seizures.  However reportedly there was a friend with the patient at the time of her change in mental status that noticed seizure-like activity and the patient then became significantly unresponsive afterward.  Friend at nursing facility did report to the EMS crew that the patient does have a history of seizures.  Does not appear the patient is on any antiepileptic medication after review of her medication list. [TL]   1544 Patient signed out to Dr. Stewart at this time with pending neurology  consultation and plan for admission. [TL]   1608 Dr. Uribe, on-call for neurology recommended admitting patient to stepdown unit for possible video EEG, and starting patient on 500 twice daily Keppra. [MI]      ED Course User Index  [MI] Du Musa MD  [TL] Jan Soriano,          Diagnoses as of 03/10/25 1700   Seizure (Multi)                 No data recorded     [place] Coma Scale Score: 13 (03/10/25 1344 : Summer Enamorado RN)                           Medical Decision Making  Patient is a 84 y.o. female who presents to [facility] ED for Altered Mental Status (Pt had an unresponsive episode of approx 20 min pta with a hx of seizures and was sent in to us. Per EMS, pt is a dementia pt but nurse is unsure of her baseline. Pt presented responding verbally but inappropriately upon arrival and then went nonverbal again. Pt's pupils are slugghish but equal and reactive. ). On initial ED evaluation, patient found to be in mild distress. Per HPI, concern to evaluate and treat for differential diagnosis including, but not limited to CVA versus metabolic encephalopathy versus infectious encephalopathy versus seizure.  Patient Keppra loaded at 60 cc/kg.  Obtaining CT head, altered mental status workup.  CT head negative for any acute hemorrhage or other abnormality.  Lab workup was remarkable for elevated lactate of 5.9 on VBG.  No acidosis noted.  Consistent with recent seizure activity.  CBC showed no concerning anemia or leukocytosis.  Ammonia, TSH within normal limits.  CMP showed no significant PEMA or electrolyte abnormality.  Low concern for uremic encephalopathy.  Low concern for metabolic encephalopathy overall.  Consulted on-call neurology, Dr. Uribe who recommended starting patient on 500 twice daily Keppra, recommended admission, video EEG while in the stepdown unit.  Patient hemodynamically stable at this time.    Diagnostic findings and treatment plan discussed with patient/family. They are amenable to plan.  Patient admitted to medicine service for further evaluation and management.          Procedure  Critical Care    Performed by: Jan Soriano DO  Authorized by: Jan Soriano DO    Critical care provider statement:     Critical care time (minutes):  35    Critical care time was exclusive of:  Separately billable procedures and treating other patients and teaching time    Critical care was necessary to treat or prevent imminent or life-threatening deterioration of the following conditions:  CNS failure or compromise    Critical care was time spent personally by me on the following activities:  Ordering and performing treatments and interventions, ordering and review of laboratory studies, ordering and review of radiographic studies, pulse oximetry, re-evaluation of patient's condition, review of old charts, obtaining history from patient or surrogate, evaluation of patient's response to treatment, development of treatment plan with patient or surrogate and examination of patient       Du Musa MD  Resident  03/10/25 1910    I performed a history and physical examination of the patient and discussed his management with the resident physician.  I agree with the history, physical, assessment, and plan of care, with the following exceptions:   Patient presented after possible seizure-like activity with remote history of seizures no longer on antiepileptic medication significantly encephalopathic.  Waxing and waning mental status.  Was protecting her airway and blood gas did demonstrate intact ventilation.  Placed on seizure precautions immediate CT imaging of the head was obtained after blood work, IV access obtained, EKG and Keppra load initiated.  Given concern for possible status epilepticus 60 mg/kg IV Keppra load to be initiated.  Patient had equal and reactive pupils.  Unilateral horizontal nystagmus monocularly noted.  No clonus.  No increased tone of any extremities.  Cranial nerves intact with no  facial droop.  Extraocular motor was intact of the eyes.  Patient unable to cooperate with finger-nose or heel-to-shin testing but did have strong withdrawal to painful stimuli in all 4 extremities.  Unable to assess gait.  No sign of head trauma.  No reported falls.  No sign of infectious process on history or examination.    I was present for key and critical portions of the following procedures: Critical care  Time Spent in Critical Care of the patient: 35  Time spent in discussions with the patient and family: 30    DO Jan Rojas DO  03/11/25 1505

## 2025-03-11 ENCOUNTER — APPOINTMENT (OUTPATIENT)
Dept: CARDIOLOGY | Facility: HOSPITAL | Age: 85
End: 2025-03-11
Payer: MEDICARE

## 2025-03-11 ENCOUNTER — APPOINTMENT (OUTPATIENT)
Dept: NEUROLOGY | Facility: HOSPITAL | Age: 85
End: 2025-03-11
Payer: MEDICARE

## 2025-03-11 LAB
ANION GAP SERPL CALC-SCNC: 13 MMOL/L (ref 10–20)
ATRIAL RATE: 100 BPM
BUN SERPL-MCNC: 16 MG/DL (ref 6–23)
CALCIUM SERPL-MCNC: 8.8 MG/DL (ref 8.6–10.3)
CHLORIDE SERPL-SCNC: 103 MMOL/L (ref 98–107)
CO2 SERPL-SCNC: 25 MMOL/L (ref 21–32)
CREAT SERPL-MCNC: 0.82 MG/DL (ref 0.5–1.05)
EGFRCR SERPLBLD CKD-EPI 2021: 70 ML/MIN/1.73M*2
ERYTHROCYTE [DISTWIDTH] IN BLOOD BY AUTOMATED COUNT: 13 % (ref 11.5–14.5)
GLUCOSE SERPL-MCNC: 80 MG/DL (ref 74–99)
HCT VFR BLD AUTO: 37.8 % (ref 36–46)
HGB BLD-MCNC: 12 G/DL (ref 12–16)
HOLD SPECIMEN: NORMAL
MCH RBC QN AUTO: 29.5 PG (ref 26–34)
MCHC RBC AUTO-ENTMCNC: 31.7 G/DL (ref 32–36)
MCV RBC AUTO: 93 FL (ref 80–100)
NRBC BLD-RTO: 0 /100 WBCS (ref 0–0)
P AXIS: 66 DEGREES
P OFFSET: 204 MS
P ONSET: 158 MS
PLATELET # BLD AUTO: 236 X10*3/UL (ref 150–450)
POTASSIUM SERPL-SCNC: 3.6 MMOL/L (ref 3.5–5.3)
PR INTERVAL: 122 MS
Q ONSET: 219 MS
QRS COUNT: 16 BEATS
QRS DURATION: 72 MS
QT INTERVAL: 334 MS
QTC CALCULATION(BAZETT): 430 MS
QTC FREDERICIA: 396 MS
R AXIS: 78 DEGREES
RBC # BLD AUTO: 4.07 X10*6/UL (ref 4–5.2)
SODIUM SERPL-SCNC: 137 MMOL/L (ref 136–145)
T AXIS: 81 DEGREES
T OFFSET: 386 MS
VENTRICULAR RATE: 100 BPM
WBC # BLD AUTO: 9.2 X10*3/UL (ref 4.4–11.3)

## 2025-03-11 PROCEDURE — 2500000004 HC RX 250 GENERAL PHARMACY W/ HCPCS (ALT 636 FOR OP/ED)

## 2025-03-11 PROCEDURE — 2500000004 HC RX 250 GENERAL PHARMACY W/ HCPCS (ALT 636 FOR OP/ED): Performed by: NURSE PRACTITIONER

## 2025-03-11 PROCEDURE — 36415 COLL VENOUS BLD VENIPUNCTURE: CPT | Performed by: NURSE PRACTITIONER

## 2025-03-11 PROCEDURE — 95819 EEG AWAKE AND ASLEEP: CPT

## 2025-03-11 PROCEDURE — 99223 1ST HOSP IP/OBS HIGH 75: CPT

## 2025-03-11 PROCEDURE — 93970 EXTREMITY STUDY: CPT

## 2025-03-11 PROCEDURE — 80048 BASIC METABOLIC PNL TOTAL CA: CPT | Performed by: NURSE PRACTITIONER

## 2025-03-11 PROCEDURE — 2060000001 HC INTERMEDIATE ICU ROOM DAILY

## 2025-03-11 PROCEDURE — 85027 COMPLETE CBC AUTOMATED: CPT | Performed by: NURSE PRACTITIONER

## 2025-03-11 PROCEDURE — 93970 EXTREMITY STUDY: CPT | Performed by: NURSE PRACTITIONER

## 2025-03-11 PROCEDURE — 2500000001 HC RX 250 WO HCPCS SELF ADMINISTERED DRUGS (ALT 637 FOR MEDICARE OP): Performed by: NURSE PRACTITIONER

## 2025-03-11 PROCEDURE — 95819 EEG AWAKE AND ASLEEP: CPT | Performed by: PSYCHIATRY & NEUROLOGY

## 2025-03-11 PROCEDURE — 2500000001 HC RX 250 WO HCPCS SELF ADMINISTERED DRUGS (ALT 637 FOR MEDICARE OP)

## 2025-03-11 PROCEDURE — 2500000002 HC RX 250 W HCPCS SELF ADMINISTERED DRUGS (ALT 637 FOR MEDICARE OP, ALT 636 FOR OP/ED): Performed by: NURSE PRACTITIONER

## 2025-03-11 RX ORDER — HALOPERIDOL LACTATE 5 MG/ML
2 INJECTION, SOLUTION INTRAMUSCULAR ONCE
Status: COMPLETED | OUTPATIENT
Start: 2025-03-11 | End: 2025-03-11

## 2025-03-11 RX ORDER — POTASSIUM CHLORIDE 14.9 MG/ML
20 INJECTION INTRAVENOUS
Status: COMPLETED | OUTPATIENT
Start: 2025-03-11 | End: 2025-03-11

## 2025-03-11 RX ORDER — LORAZEPAM 2 MG/ML
0.5 INJECTION INTRAMUSCULAR ONCE
Status: COMPLETED | OUTPATIENT
Start: 2025-03-11 | End: 2025-03-11

## 2025-03-11 RX ORDER — DIVALPROEX SODIUM 250 MG/1
250 TABLET, DELAYED RELEASE ORAL EVERY 12 HOURS SCHEDULED
Status: DISCONTINUED | OUTPATIENT
Start: 2025-03-11 | End: 2025-03-13 | Stop reason: HOSPADM

## 2025-03-11 RX ADMIN — LEVETIRACETAM 500 MG: 5 INJECTION INTRAVENOUS at 02:32

## 2025-03-11 RX ADMIN — HEPARIN SODIUM 5000 UNITS: 5000 INJECTION INTRAVENOUS; SUBCUTANEOUS at 04:52

## 2025-03-11 RX ADMIN — DORZOLAMIDE HYDROCHLORIDE AND TIMOLOL MALEATE 1 DROP: 20; 5 SOLUTION/ DROPS OPHTHALMIC at 08:58

## 2025-03-11 RX ADMIN — SODIUM CHLORIDE, POTASSIUM CHLORIDE, SODIUM LACTATE AND CALCIUM CHLORIDE 100 ML/HR: 600; 310; 30; 20 INJECTION, SOLUTION INTRAVENOUS at 14:37

## 2025-03-11 RX ADMIN — DORZOLAMIDE HYDROCHLORIDE AND TIMOLOL MALEATE 1 DROP: 20; 5 SOLUTION/ DROPS OPHTHALMIC at 20:27

## 2025-03-11 RX ADMIN — POTASSIUM CHLORIDE 20 MEQ: 200 INJECTION, SOLUTION INTRAVENOUS at 11:09

## 2025-03-11 RX ADMIN — HYDROXYZINE HYDROCHLORIDE 25 MG: 25 TABLET ORAL at 21:20

## 2025-03-11 RX ADMIN — LORAZEPAM 0.5 MG: 2 INJECTION, SOLUTION INTRAMUSCULAR; INTRAVENOUS at 23:05

## 2025-03-11 RX ADMIN — SODIUM CHLORIDE, POTASSIUM CHLORIDE, SODIUM LACTATE AND CALCIUM CHLORIDE 100 ML/HR: 600; 310; 30; 20 INJECTION, SOLUTION INTRAVENOUS at 04:52

## 2025-03-11 RX ADMIN — QUETIAPINE FUMARATE 12.5 MG: 25 TABLET ORAL at 20:27

## 2025-03-11 RX ADMIN — HEPARIN SODIUM 5000 UNITS: 5000 INJECTION INTRAVENOUS; SUBCUTANEOUS at 20:27

## 2025-03-11 RX ADMIN — POTASSIUM CHLORIDE 20 MEQ: 200 INJECTION, SOLUTION INTRAVENOUS at 08:58

## 2025-03-11 RX ADMIN — BRIMONIDINE TARTRATE 1 DROP: 2 SOLUTION/ DROPS OPHTHALMIC at 20:28

## 2025-03-11 RX ADMIN — HEPARIN SODIUM 5000 UNITS: 5000 INJECTION INTRAVENOUS; SUBCUTANEOUS at 11:09

## 2025-03-11 RX ADMIN — DIVALPROEX SODIUM 250 MG: 250 TABLET, DELAYED RELEASE ORAL at 20:28

## 2025-03-11 RX ADMIN — LATANOPROST 1 DROP: 50 SOLUTION OPHTHALMIC at 20:28

## 2025-03-11 RX ADMIN — HALOPERIDOL LACTATE 2 MG: 5 INJECTION, SOLUTION INTRAMUSCULAR at 21:59

## 2025-03-11 RX ADMIN — BRIMONIDINE TARTRATE 1 DROP: 2 SOLUTION/ DROPS OPHTHALMIC at 08:58

## 2025-03-11 ASSESSMENT — PAIN SCALES - PAIN ASSESSMENT IN ADVANCED DEMENTIA (PAINAD)
BODYLANGUAGE: TENSE, DISTRESSED PACING, FIDGETING
CONSOLABILITY: NO NEED TO CONSOLE
TOTALSCORE: 1
FACIALEXPRESSION: SMILING OR INEXPRESSIVE
BREATHING: NORMAL

## 2025-03-11 ASSESSMENT — COGNITIVE AND FUNCTIONAL STATUS - GENERAL
HELP NEEDED FOR BATHING: TOTAL
DRESSING REGULAR LOWER BODY CLOTHING: TOTAL
MOVING TO AND FROM BED TO CHAIR: TOTAL
STANDING UP FROM CHAIR USING ARMS: TOTAL
TURNING FROM BACK TO SIDE WHILE IN FLAT BAD: A LOT
DRESSING REGULAR UPPER BODY CLOTHING: TOTAL
EATING MEALS: TOTAL
MOVING FROM LYING ON BACK TO SITTING ON SIDE OF FLAT BED WITH BEDRAILS: A LOT
WALKING IN HOSPITAL ROOM: TOTAL
CLIMB 3 TO 5 STEPS WITH RAILING: TOTAL
PERSONAL GROOMING: TOTAL
MOBILITY SCORE: 8
TOILETING: TOTAL
DAILY ACTIVITIY SCORE: 6

## 2025-03-11 ASSESSMENT — PAIN SCALES - GENERAL
PAINLEVEL_OUTOF10: 0 - NO PAIN

## 2025-03-11 ASSESSMENT — PAIN - FUNCTIONAL ASSESSMENT
PAIN_FUNCTIONAL_ASSESSMENT: PAINAD (PAIN ASSESSMENT IN ADVANCED DEMENTIA SCALE)
PAIN_FUNCTIONAL_ASSESSMENT: 0-10
PAIN_FUNCTIONAL_ASSESSMENT: PAINAD (PAIN ASSESSMENT IN ADVANCED DEMENTIA SCALE)

## 2025-03-11 NOTE — CARE PLAN
SHIFT NOTE    Patient remains hds. No seizure-like activity overnight. Patient is still very confused and is alert and oriented x0 at this time. She continues to try to interfere with lines and climb out of bed. Restraints were started on her around 20:30 for these reasons. Neuro wants patient hooked up to continuous video EEG. No complaints of pain and she remains afebrile.     Hourly rounding was performed and patient needs were met. Call light within reach. No other acute events, will continue to monitor.       The patient's goals for the shift include    Problem: Skin  Goal: Participates in plan/prevention/treatment measures  3/11/2025 0611 by Romana Rodriguez RN  Outcome: Progressing  3/10/2025 2224 by Romana Rodriguez RN  Flowsheets (Taken 3/10/2025 2224)  Participates in plan/prevention/treatment measures:   Discuss with provider PT/OT consult   Elevate heels     Problem: Skin  Goal: Prevent/manage excess moisture  3/11/2025 0611 by Romana Rodriguez RN  Outcome: Progressing  3/10/2025 2224 by Romana Rodriguez RN  Flowsheets (Taken 3/10/2025 2224)  Prevent/manage excess moisture: Cleanse incontinence/protect with barrier cream     Problem: Skin  Goal: Prevent/minimize sheer/friction injuries  3/11/2025 0611 by Romana Rodriguez RN  Outcome: Progressing  3/10/2025 2224 by Romana Rodriguez RN  Flowsheets (Taken 3/10/2025 2224)  Prevent/minimize sheer/friction injuries:   Complete micro-shifts as needed if patient unable. Adjust patient position to relieve pressure points, not a full turn   Increase activity/out of bed for meals   HOB 30 degrees or less   Turn/reposition every 2 hours/use positioning/transfer devices     Problem: Fall/Injury  Goal: Not fall by end of shift  Outcome: Progressing     Problem: Fall/Injury  Goal: Be free from injury by end of the shift  Outcome: Progressing     Problem: Safety - Medical Restraint  Goal: Remains free of injury from restraints (Restraint for Interference with Medical Device)  3/11/2025  0611 by Romana Rodriguez RN  Outcome: Progressing  3/10/2025 2224 by Romana Rodriguez RN  Flowsheets (Taken 3/10/2025 2224)  Remains free of injury from restraints (restraint for interference with medical device):   Determine that other, less restrictive measures have been tried or would not be effective before applying the restraint   Evaluate the patient's condition at the time of restraint application   Inform patient/family regarding the reason for restraint   Every 2 hours: Monitor safety, psychosocial status, comfort, nutrition and hydration       The clinical goals for the shift include remain free from seizure like activity on shift

## 2025-03-11 NOTE — ASSESSMENT & PLAN NOTE
Continue supportive care  Last night Patient Agitated and combative, currently she has soft restraints

## 2025-03-11 NOTE — SIGNIFICANT EVENT
Spoke with patient's POA p.m. via telephone.  Lab work reviewed.  UA negative.  Patient with remote history of seizure whom had a witnessed seizure yesterday prior to presenting to the emergency room.  Plan for this admission reviewed, with POA.  Neuro consult, psychiatry consult, and EEG pending.  CODE STATUS was initially a full code.  POA spoke with palliative medicine this morning who discussed CODE STATUS.  CODE STATUS was discussed further by myself and the POA.  CODE STATUS changed to DNR Comfort Care arrest DO NOT INTUBATE.

## 2025-03-11 NOTE — H&P
Internal Medicine History and Physical    PATIENT NAME:  Fernanda Hughes    MRN: [id number]  DATE of SERVICE: March 11, 2025    Primary Care Physician: Michelle Klein MD          Chief Complaint: Seizure-like activity    HPI: Patient cannot provide history due to dementia, history was obtained from medical records  This is a 84 y.o. female with Rolf history of vascular dementia, end-stage glaucoma, who presents with seizure-like activity, patient has remote history of seizure, she is not on antiseizure medication, of notes patient has an episode of being unresponsive for like 20 minutes, in the emergency room patient received Keppra, she admitted for video EEG.    Past Medical History:  Past Medical History:   Diagnosis Date    Anxiety     Falls     Gastroesophageal reflux disease     Glaucoma     Hearing loss     History of breast cancer     History of closed head injury     History of seizure     Irritable bowel syndrome     Memory loss     Mild cognitive impairment     Osteoarthritis     Overactive bladder     Peripheral neuropathy     Underweight     Vascular dementia     Visual field loss        Past Surgical History:  Past Surgical History:   Procedure Laterality Date    BREAST LUMPECTOMY Right     CATARACT EXTRACTION, BILATERAL      CHOLECYSTECTOMY      LYMPH NODE DISSECTION      ORIF FEMUR FRACTURE      TOTAL ABDOMINAL HYSTERECTOMY W/ BILATERAL SALPINGOOPHORECTOMY         Family History:  Family History   Problem Relation Name Age of Onset    Deep vein thrombosis Neg Hx      Sudden death Neg Hx         Social History:    Social History     Socioeconomic History    Marital status:      Spouse name: Not on file    Number of children: Not on file    Years of education: Not on file    Highest education level: Not on file   Occupational History    Not on file   Tobacco Use    Smoking status: Never    Smokeless tobacco: Never   Vaping Use    Vaping status: Never Used   Substance and Sexual Activity     Alcohol use: Never    Drug use: Never    Sexual activity: Not on file   Other Topics Concern    Not on file   Social History Narrative    Not on file     Social Drivers of Health     Financial Resource Strain: Patient Unable To Answer (3/10/2025)    Overall Financial Resource Strain (CARDIA)     Difficulty of Paying Living Expenses: Patient unable to answer   Food Insecurity: Patient Unable To Answer (3/10/2025)    Hunger Vital Sign     Worried About Running Out of Food in the Last Year: Patient unable to answer     Ran Out of Food in the Last Year: Patient unable to answer   Transportation Needs: Patient Unable To Answer (3/10/2025)    PRAPARE - Transportation     Lack of Transportation (Medical): Patient unable to answer     Lack of Transportation (Non-Medical): Patient unable to answer   Physical Activity: Inactive (1/25/2025)    Exercise Vital Sign     Days of Exercise per Week: 0 days     Minutes of Exercise per Session: 0 min   Stress: No Stress Concern Present (1/25/2025)    [employer] [place] [employer] - Occupational Stress Questionnaire     Feeling of Stress : Not at all   Social Connections: Moderately Isolated (1/25/2025)    Social Connection and Isolation Panel [NHANES]     Frequency of Communication with Friends and Family: Once a week     Frequency of Social Gatherings with Friends and Family: More than three times a week     Attends Anabaptism Services: Never     Active Member of Clubs or Organizations: Yes     Attends Club or Organization Meetings: More than 4 times per year     Marital Status:    Intimate Partner Violence: Patient Unable To Answer (3/10/2025)    Humiliation, Afraid, Rape, and Kick questionnaire     Fear of Current or Ex-Partner: Patient unable to answer     Emotionally Abused: Patient unable to answer     Physically Abused: Patient unable to answer     Sexually Abused: Patient unable to answer   Housing Stability: Patient Unable To Answer (3/10/2025)    Housing  Stability Vital Sign     Unable to Pay for Housing in the Last Year: Patient unable to answer     Number of Times Moved in the Last Year: 0     Homeless in the Last Year: Patient unable to answer         Prior to Admission Medications:  Medications Prior to Admission   Medication Sig Dispense Refill Last Dose/Taking    acetaminophen (Tylenol) 325 mg tablet Take 2 tablets (650 mg) by mouth every 6 hours if needed for mild pain (1 - 3).   Past Week    brimonidine (AlphaGAN) 0.2 % ophthalmic solution Administer 1 drop into both eyes 2 times a day.   3/10/2025 Morning    dorzolamide-timoloL (Cosopt) 22.3-6.8 mg/mL ophthalmic solution Administer 1 drop into both eyes 2 times a day.   3/10/2025 Morning    hydrOXYzine HCL (Atarax) 25 mg tablet Take 1 tablet (25 mg) by mouth 2 times a day as needed for itching.   Past Week    latanoprost (Xalatan) 0.005 % ophthalmic solution Administer 1 drop into both eyes once daily at bedtime.   3/9/2025    mirtazapine (Remeron) 15 mg tablet Take 1 tablet (15 mg) by mouth once daily at bedtime.   3/9/2025 Evening    QUEtiapine (SEROquel) 25 mg tablet Take 0.5 tablets (12.5 mg) by mouth every 8 hours if needed (Agitation (hold for drowsiness/lethargy)). 6 tablet 0 Past Week    venlafaxine (Effexor) 37.5 mg tablet Take 1 tablet (37.5 mg) by mouth once daily in the morning.   3/10/2025 Morning       Active orders:  Active Orders   Lab    Basic metabolic panel     Frequency: AM draw     Number of Occurrences: Until Specified    CBC     Frequency: AM draw     Number of Occurrences: Until Specified   Diet    NPO Diet Except: Sips with meds; Effective now     Frequency: Effective now     Number of Occurrences: Until Specified     Order Comments: When passes bedside swallow notify [facility] [place] for diet order     Nursing    Activity (specify) No Restrictions     Frequency: Until discontinued     Number of Occurrences: Until Specified    Apply sequential compression device     Frequency: Until  discontinued     Number of Occurrences: Until Specified    Nursing swallow assessment     Frequency: Once     Number of Occurrences: 1 Occurrences     Order Comments: When alert      Vital Signs     Frequency: q4h     Number of Occurrences: Until Specified   Code Status    Full code     Frequency: Continuous     Number of Occurrences: Until Specified   Consult    Inpatient consult to Dietitian     Frequency: Once     Number of Occurrences: 1 Occurrences     Order Comments: Nursing Admission Screening      Inpatient consult to Neurology     Frequency: Once     Number of Occurrences: 1 Occurrences    Inpatient consult to Palliative Care     Frequency: Once     Number of Occurrences: 1 Occurrences   Nourishments    May Not Participate in Room Service     Frequency: Once     Number of Occurrences: 1 Occurrences   Respiratory Care    Pulse oximetry, spot     Frequency: q4h     Number of Occurrences: Until Specified   ECG    Electrocardiogram, 12-lead PRN ACS symptoms     Frequency: PRN     Number of Occurrences: Until Specified     Order Comments: Notify provider if performed.     Restraints    Restraints non-violent or non-self destructive     Frequency: Continuous x 1 day     Number of Occurrences: 1 Days   Precaution    Fall precautions     Frequency: Until discontinued     Number of Occurrences: Until Specified    Fall precautions     Frequency: Until discontinued     Number of Occurrences: Until Specified    Seizure precautions     Frequency: Until discontinued     Number of Occurrences: Until Specified   Privilege Level    Sitter at bedside     Frequency: Continuous     Number of Occurrences: Until Specified   Vascular Ultrasound    Vascular US Lower Extremity Venous Duplex Bilateral     Frequency: Once     Number of Occurrences: 1 Occurrences   Telemetry    Telemetry monitoring - Floor only     Frequency: Until discontinued     Number of Occurrences: 3 Days   Medications    acetaminophen (Tylenol) tablet 650 mg      Frequency: q6h PRN     Dose: 650 mg     Route: oral    brimonidine (AlphaGAN) 0.2 % ophthalmic solution 1 drop     Frequency: BID     Dose: 1 drop     Route: Both Eyes    dorzolamide-timoloL (Cosopt) 22.3-6.8 mg/mL ophthalmic solution 1 drop     Frequency: BID     Dose: 1 drop     Route: Both Eyes    heparin (porcine) injection 5,000 Units     Frequency: q8h     Dose: 5,000 Units     Route: subcutaneous    hydrOXYzine HCL (Atarax) tablet 25 mg     Frequency: BID PRN     Dose: 25 mg     Route: oral    lactated Ringer's infusion     Frequency: Continuous     Dose: 100 mL/hr     Route: intravenous    latanoprost (Xalatan) 0.005 % ophthalmic solution 1 drop     Frequency: Nightly     Dose: 1 drop     Route: Both Eyes    levETIRAcetam (Keppra) 500 mg in sodium chloride (iso)  mL     Frequency: q12h     Dose: 500 mg     Route: intravenous     Order Comments: Please time off loading dose given in ER    LORazepam (Ativan) injection 2 mg     Frequency: Daily PRN     Dose: 2 mg     Route: intravenous    mirtazapine (Remeron) tablet 15 mg     Frequency: Nightly     Dose: 15 mg     Route: oral    potassium chloride 20 mEq in sterile water for injection 100 mL     Frequency: q2h     Dose: 20 mEq     Route: intravenous    QUEtiapine (SEROquel) tablet 12.5 mg     Frequency: q8h PRN     Dose: 12.5 mg     Route: oral    venlafaxine (Effexor) tablet 37.5 mg     Frequency: q AM     Dose: 37.5 mg     Route: oral           Allergies:   Allergies   Allergen Reactions    Vicodin [Hydrocodone-Acetaminophen] Other       Review of Systems:  A 10 systems review of systems is negative except for HPI.      Vitals:  Patient Vitals for the past 24 hrs:   BP Temp Temp src Pulse Resp SpO2 Height Weight   03/11/25 0800 159/65 -- -- 82 -- 99 % -- --   03/11/25 0758 169/77 36.3 °C (97.3 °F) Temporal 83 20 100 % -- --   03/11/25 0400 171/74 36.5 °C (97.7 °F) Temporal 78 20 100 % -- 54.9 kg (121 lb 0.5 oz)   03/11/25 0000 175/79 36.5 °C (97.7 °F)  "Temporal 90 20 100 % -- --   03/10/25 2031 165/72 37.5 °C (99.5 °F) Temporal 104 24 96 % -- --   03/10/25 2000 -- -- -- 110 -- -- -- --   03/10/25 1922 176/76 -- -- 109 (!) 28 -- -- 54.1 kg (119 lb 3.2 oz)   03/10/25 1830 163/72 -- -- 91 20 97 % -- --   03/10/25 1800 170/72 -- -- 96 -- -- -- --   03/10/25 1730 176/81 -- -- (!) 115 -- (!) 67 % -- --   03/10/25 1700 (!) 183/82 -- -- 87 -- 100 % -- --   03/10/25 1653 153/72 -- -- -- -- 99 % -- --   03/10/25 1650 (!) 185/81 -- -- -- -- 97 % -- --   03/10/25 1645 -- -- -- 98 -- (!) 81 % -- --   03/10/25 1630 -- -- -- (!) 104 -- 98 % -- --   03/10/25 1615 -- -- -- 94 -- 100 % -- --   03/10/25 1600 -- -- -- 93 -- 100 % -- --   03/10/25 1545 -- -- -- (!) 107 -- 100 % -- --   03/10/25 1530 (!) 188/110 -- -- 86 15 100 % -- --   03/10/25 1515 -- -- -- 91 18 100 % -- --   03/10/25 1500 -- -- -- 92 (!) 26 (!) 91 % -- --   03/10/25 1445 (!) 185/81 -- -- 99 (!) 31 (!) 73 % -- --   03/10/25 1430 (!) 185/82 -- -- 94 17 100 % -- --   03/10/25 1422 (!) 187/151 -- -- (!) 104 (!) 34 (!) 87 % -- --   03/10/25 1400 -- -- -- 98 20 100 % -- --   03/10/25 1345 (!) 188/88 -- -- (!) 115 20 (!) 74 % -- --   03/10/25 1341 (!) 217/95 36.5 °C (97.7 °F) Temporal (!) 116 20 96 % 1.702 m (5' 7\") 68 kg (150 lb)     Body mass index is 18.96 kg/m².         Physical Exam:  General appearance: Frail-appearing, in no distress  Skin: Skin color, texture, turgor normal  Head: normal  Eyes: Anicteric sclera.  Extraocular movements are intact.   Ears: external ears normal  Nose/Sinuses: Negative  Oropharynx: Lips, mucosa, and tongue normal  Neck: Supple, no adenopathy; thyroid symmetric, normal size, no bruits  Lungs: Clear to auscultation, no wheezing or rhonchi  Heart: RRR without murmur, gallop, or rubs.  No ectopy  Abdomen: Soft, non-tender. Bowel sounds normal.  Extremities: No deformity, no edema  Neuro: Awake but confused      Labs:  Results for orders placed or performed during the hospital encounter " of 03/10/25 (from the past 24 hours)   CBC and Auto Differential   Result Value Ref Range    WBC 9.7 4.4 - 11.3 x10*3/uL    nRBC 0.0 0.0 - 0.0 /100 WBCs    RBC 4.34 4.00 - 5.20 x10*6/uL    Hemoglobin 13.1 12.0 - 16.0 g/dL    Hematocrit 39.9 36.0 - 46.0 %    MCV 92 80 - 100 fL    MCH 30.2 26.0 - 34.0 pg    MCHC 32.8 32.0 - 36.0 g/dL    RDW 13.1 11.5 - 14.5 %    Platelets 295 150 - 450 x10*3/uL    Neutrophils % 82.3 40.0 - 80.0 %    Immature Granulocytes %, Automated 0.4 0.0 - 0.9 %    Lymphocytes % 10.7 13.0 - 44.0 %    Monocytes % 5.3 2.0 - 10.0 %    Eosinophils % 0.9 0.0 - 6.0 %    Basophils % 0.4 0.0 - 2.0 %    Neutrophils Absolute 7.96 (H) 1.60 - 5.50 x10*3/uL    Immature Granulocytes Absolute, Automated 0.04 0.00 - 0.50 x10*3/uL    Lymphocytes Absolute 1.04 0.80 - 3.00 x10*3/uL    Monocytes Absolute 0.51 0.05 - 0.80 x10*3/uL    Eosinophils Absolute 0.09 0.00 - 0.40 x10*3/uL    Basophils Absolute 0.04 0.00 - 0.10 x10*3/uL   Comprehensive metabolic panel   Result Value Ref Range    Glucose 122 (H) 74 - 99 mg/dL    Sodium 138 136 - 145 mmol/L    Potassium 4.0 3.5 - 5.3 mmol/L    Chloride 101 98 - 107 mmol/L    Bicarbonate 21 21 - 32 mmol/L    Anion Gap 20 10 - 20 mmol/L    Urea Nitrogen 16 6 - 23 mg/dL    Creatinine 0.93 0.50 - 1.05 mg/dL    eGFR 60 (L) >60 mL/min/1.73m*2    Calcium 9.1 8.6 - 10.3 mg/dL    Albumin 4.2 3.4 - 5.0 g/dL    Alkaline Phosphatase 77 33 - 136 U/L    Total Protein 6.4 6.4 - 8.2 g/dL    AST 20 9 - 39 U/L    Bilirubin, Total 0.8 0.0 - 1.2 mg/dL    ALT 15 7 - 45 U/L   Phosphorus   Result Value Ref Range    Phosphorus 3.2 2.5 - 4.9 mg/dL   Magnesium   Result Value Ref Range    Magnesium 1.93 1.60 - 2.40 mg/dL   Ammonia   Result Value Ref Range    Ammonia 25 16 - 53 umol/L   Troponin I, High Sensitivity   Result Value Ref Range    Troponin I, High Sensitivity 14 (H) 0 - 13 ng/L   Blood Gas Venous Full Panel   Result Value Ref Range    POCT pH, Venous 7.34 7.33 - 7.43 pH    POCT pCO2, Venous 47  41 - 51 mm Hg    POCT pO2, Venous 46 (H) 35 - 45 mm Hg    POCT SO2, Venous 75 45 - 75 %    POCT Oxy Hemoglobin, Venous 73.4 45.0 - 75.0 %    POCT Hematocrit Calculated, Venous 38.0 36.0 - 46.0 %    POCT Sodium, Venous 136 136 - 145 mmol/L    POCT Potassium, Venous 4.0 3.5 - 5.3 mmol/L    POCT Chloride, Venous 101 98 - 107 mmol/L    POCT Ionized [place], Venous 1.31 1.10 - 1.33 mmol/L    POCT Glucose, Venous 125 (H) 74 - 99 mg/dL    POCT Lactate, Venous 5.9 (HH) 0.4 - 2.0 mmol/L    POCT Base Excess, Venous -0.8 -2.0 - 3.0 mmol/L    POCT HCO3 Calculated, Venous 25.4 22.0 - 26.0 mmol/L    POCT Hemoglobin, Venous 12.7 12.0 - 16.0 g/dL    POCT Anion Gap, Venous 14.0 10.0 - 25.0 mmol/L    Patient Temperature      FiO2 30 %    Critical Called By      Critical Called To A [place]     Critical Call Time 1400     Critical Read Back Y    TSH with reflex to Free T4 if abnormal   Result Value Ref Range    Thyroid Stimulating Hormone 2.47 0.44 - 3.98 mIU/L   ECG 12 lead   Result Value Ref Range    Ventricular Rate 105 BPM    Atrial Rate 105 BPM    RI Interval 154 ms    QRS Duration 86 ms    QT Interval 328 ms    QTC Calculation(Bazett) 433 ms    P Axis 72 degrees    R Axis 79 degrees    T Axis 81 degrees    QRS Count 18 beats    Q Onset 219 ms    P Onset 142 ms    P Offset 204 ms    T Offset 383 ms    QTC Fredericia 395 ms   Protime-INR   Result Value Ref Range    Protime 10.5 9.8 - 12.4 seconds    INR 1.0 0.9 - 1.1   Troponin I, High Sensitivity   Result Value Ref Range    Troponin I, High Sensitivity 17 (H) 0 - 13 ng/L   Lavender Top   Result Value Ref Range    Extra Tube Hold for add-ons.    SST TOP   Result Value Ref Range    Extra Tube Hold for add-ons.    Gray Top   Result Value Ref Range    Extra Tube Hold for add-ons.    PST Top   Result Value Ref Range    Extra Tube Hold for add-ons.    Urinalysis with Reflex Culture and Microscopic   Result Value Ref Range    Color, Urine Light-Yellow Light-Yellow, Yellow, Dark-Yellow     Appearance, Urine Clear Clear    Specific Gravity, Urine 1.015 1.005 - 1.035    pH, Urine 7.0 5.0, 5.5, 6.0, 6.5, 7.0, 7.5, 8.0    Protein, Urine 30 (1+) (A) NEGATIVE, 10 (TRACE), 20 (TRACE) mg/dL    Glucose, Urine Normal Normal mg/dL    Blood, Urine 0.03 (TRACE) (A) NEGATIVE mg/dL    Ketones, Urine NEGATIVE NEGATIVE mg/dL    Bilirubin, Urine NEGATIVE NEGATIVE mg/dL    Urobilinogen, Urine Normal Normal mg/dL    Nitrite, Urine NEGATIVE NEGATIVE    Leukocyte Esterase, Urine NEGATIVE NEGATIVE   Extra Urine Gray Tube   Result Value Ref Range    Extra Tube Hold for add-ons.    Urinalysis Microscopic   Result Value Ref Range    WBC, Urine 1-5 1-5, NONE /HPF    WBC Clumps, Urine RARE Reference range not established. /HPF    RBC, Urine 11-20 (A) NONE, 1-2, 3-5 /HPF    Mucus, Urine FEW Reference range not established. /LPF   Drug Screen, Urine   Result Value Ref Range    Amphetamine Screen, Urine Presumptive Negative Presumptive Negative    Barbiturate Screen, Urine Presumptive Negative Presumptive Negative    Benzodiazepines Screen, Urine Presumptive Negative Presumptive Negative    Cannabinoid Screen, Urine Presumptive Negative Presumptive Negative    Cocaine Metabolite Screen, Urine Presumptive Negative Presumptive Negative    Fentanyl Screen, Urine Presumptive Negative Presumptive Negative    Opiate Screen, Urine Presumptive Negative Presumptive Negative    Oxycodone Screen, Urine Presumptive Negative Presumptive Negative    PCP Screen, Urine Presumptive Negative Presumptive Negative    Methadone Screen, Urine Presumptive Negative Presumptive Negative   Lactate   Result Value Ref Range    Lactate 2.2 (H) 0.4 - 2.0 mmol/L   Lactate   Result Value Ref Range    Lactate 2.4 (H) 0.4 - 2.0 mmol/L   Lactate   Result Value Ref Range    Lactate 1.8 0.4 - 2.0 mmol/L   CBC   Result Value Ref Range    WBC 9.2 4.4 - 11.3 x10*3/uL    nRBC 0.0 0.0 - 0.0 /100 WBCs    RBC 4.07 4.00 - 5.20 x10*6/uL    Hemoglobin 12.0 12.0 - 16.0 g/dL  "   Hematocrit 37.8 36.0 - 46.0 %    MCV 93 80 - 100 fL    MCH 29.5 26.0 - 34.0 pg    MCHC 31.7 (L) 32.0 - 36.0 g/dL    RDW 13.0 11.5 - 14.5 %    Platelets 236 150 - 450 x10*3/uL   Basic metabolic panel   Result Value Ref Range    Glucose 80 74 - 99 mg/dL    Sodium 137 136 - 145 mmol/L    Potassium 3.6 3.5 - 5.3 mmol/L    Chloride 103 98 - 107 mmol/L    Bicarbonate 25 21 - 32 mmol/L    Anion Gap 13 10 - 20 mmol/L    Urea Nitrogen 16 6 - 23 mg/dL    Creatinine 0.82 0.50 - 1.05 mg/dL    eGFR 70 >60 mL/min/1.73m*2    Calcium 8.8 8.6 - 10.3 mg/dL         Imaging:   Reviewed          Assessment/Plan:  Assessment & Plan  Seizure (Multi)  Admit patient to stepdown unit with telemetry  Will obtain EEG  Consult neurology  Continue Keppra  Lactic acidosis  Resolved  Continue monitoring  Vascular dementia  Continue supportive care  Last night Patient Agitated and combative, currently she has soft restraints  End-stage glaucoma  Continue latanoprost drops      DVT Prophylaxis- Addressed    Discussed with patient, RN    SIGNATURE: Stef Rosales MD  DATE: March 11, 2025  TIME: 8:56 AM      This note was partially created using voice recognition software and is inherently subject to errors including those of syntax and \"sound-alike\" substitutions which may escape proofreading. In such instances, original meaning may be extrapolated by contextual derivation    "

## 2025-03-11 NOTE — PROGRESS NOTES
"Nutrition Initial Assessment:   Nutrition Assessment    Reason for Assessment: Admission nursing screening (MST score 2 for wt loss of unsure amount and unsure of any appetite changes)    Patient is a 84 y.o. female presenting from [facility] unit for seizure. Palliative care and Neurology on consult. Plan for video EEG.     Past Medical History   has a past medical history of Anxiety, Falls, Gastroesophageal reflux disease, Glaucoma, Hearing loss, History of breast cancer, History of closed head injury, History of seizure, Irritable bowel syndrome, Memory loss, Mild cognitive impairment, Osteoarthritis, Overactive bladder, Peripheral neuropathy, Underweight, Vascular dementia, and Visual field loss.  Surgical History   has a past surgical history that includes Cholecystectomy; Cataract extraction, bilateral; ORIF femur fracture; Breast lumpectomy (Right); Lymph node dissection; and Total abdominal hysterectomy w/ bilateral salpingoophorectomy.    Nutrition History:  Energy Intake: Fair 50-75 %  Food and Nutrient History: Pt unable to provide hx. Pt with 3 meals/day provided at facility where pt has resided over 1 month. Pt w/recent admit last month where she ate % of meals per MNT note. Per RN at facility, pt was on a regular diet w/no oral nutrition supplements. Pt had a fair appetite at her facility and was able to feed herself.  Vitamin/Herbal Supplement Use: Remeron  Food Allergy:  (NKFA)  Food Intolerance:  (NKFA or intolerances)     Anthropometrics:  Height: 170.2 cm (5' 7\")   Weight: 54.9 kg (121 lb 0.5 oz)   BMI (Calculated): 18.95      Weight History:  suspect error for 2/11/25 wt  Wt Readings from Last 22 Encounters:   03/11/25 54.9 kg (121 lb 0.5 oz)   02/11/25 68 kg (150 lb)   01/24/25 52.2 kg (115 lb)   12/02/24 53.2 kg (117 lb 4.6 oz)   09/02/24 52.2 kg (115 lb)   03/29/24 50.8 kg (112 lb)   02/18/24 54 kg (119 lb)   02/10/24 50.1 kg (110 lb 7.2 oz)   01/05/24 50.8 kg (111 " "lb 14.1 oz)   12/16/20 60.3 kg (133 lb)   12/09/20 60.3 kg (133 lb)     Weight Change %:  Weight History / % Weight Change: per archives 1/24/25 115#, 3/24 112#, 1/24 111#, Pt denied any wt loss.  Significant Weight Loss: No    Nutrition Focused Physical Exam Findings:  Subcutaneous Fat Loss:   Defer Subcutaneous Fat Loss Assessment: Defer all  Defer All Reason: per pt request. Adament of no wt loss.  Muscle Wasting:  Defer Muscle Wasting Assessment: Defer all  Defer All Reason: per pt request. Adament of no wt loss.  Edema:  Edema: none  Physical Findings:  Skin: Negative (pale. cool, hot, bruising)    Nutrition Significant Labs:  CBC Trend:   Results from last 7 days   Lab Units 03/11/25  0515 03/10/25  1354   WBC AUTO x10*3/uL 9.2 9.7   RBC AUTO x10*6/uL 4.07 4.34   HEMOGLOBIN g/dL 12.0 13.1   HEMATOCRIT % 37.8 39.9   MCV fL 93 92   PLATELETS AUTO x10*3/uL 236 295    , BMP Trend:   Results from last 7 days   Lab Units 03/11/25  0515 03/10/25  1354   GLUCOSE mg/dL 80 122*   CALCIUM mg/dL 8.8 9.1   SODIUM mmol/L 137 138   POTASSIUM mmol/L 3.6 4.0   CO2 mmol/L 25 21   CHLORIDE mmol/L 103 101   BUN mg/dL 16 16   CREATININE mg/dL 0.82 0.93    , A1C:  Lab Results   Component Value Date    HGBA1C 5.3 02/02/2024   , BG POCT trend:    , Liver Function Trend:   Results from last 7 days   Lab Units 03/10/25  1354   ALK PHOS U/L 77   AST U/L 20   ALT U/L 15   BILIRUBIN TOTAL mg/dL 0.8    , Renal Lab Trend:   Results from last 7 days   Lab Units 03/11/25  0515 03/10/25  1354   POTASSIUM mmol/L 3.6 4.0   PHOSPHORUS mg/dL  --  3.2   SODIUM mmol/L 137 138   MAGNESIUM mg/dL  --  1.93   EGFR mL/min/1.73m*2 70 60*   BUN mg/dL 16 16   CREATININE mg/dL 0.82 0.93    , Vit D: No results found for: \"VITD25\" , Vit B12: No results found for: \"GHYLBTMS22\" , Iron Panel: No results found for: \"IRON\", \"TIBC\", \"FERRITIN\" , Folate: No results found for: \"FOLATE\"     Nutrition Specific Medications:  Scheduled medications  brimonidine, 1 drop, " Both Eyes, BID  dorzolamide-timoloL, 1 drop, Both Eyes, BID  heparin (porcine), 5,000 Units, subcutaneous, q8h  latanoprost, 1 drop, Both Eyes, Nightly  levETIRAcetam, 500 mg, intravenous, q12h  mirtazapine, 15 mg, oral, Nightly  venlafaxine, 37.5 mg, oral, q AM    Continuous medications  lactated Ringer's, 100 mL/hr, Last Rate: 100 mL/hr (03/11/25 0452)    PRN medications  PRN medications: acetaminophen, hydrOXYzine HCL, LORazepam, QUEtiapine  0-10 (Numeric) Pain Score: 0 - No pain  PAINAD Score:  [1]   I/O:    ;    Dietary Orders (From admission, onward)       Start     Ordered    03/11/25 1407  Oral nutritional supplements  Until discontinued        Comments: TID once diet advanced   Question Answer Comment   Deliver with All meals    Select supplement: Ensure Plus High Protein        03/11/25 1406    03/10/25 1932  NPO Diet Except: Sips with meds; Effective now  Diet effective now        Comments: When passes bedside swallow notify [facility] for diet order   Question:  Except:  Answer:  Sips with meds    03/10/25 1931    03/10/25 1930  May Not Participate in Room Service  ( ROOM SERVICE MAY NOT PARTICIPATE)  Once        Question:  .  Answer:  Yes    03/10/25 1929                Estimated Needs:   Total Energy Estimated Needs in 24 hours (kCal):  (6274-6462 (25-30kcal/kg))     Total Protein Estimated Needs in 24 Hours (g):  (66-82 (1.2-1.5g/kg))      Method for Estimating 24 Hour Fluid Needs: 1mL/kcal  Patient on Order Fluid Restriction: No      Nutrition Diagnosis   Malnutrition Diagnosis  Patient has Malnutrition Diagnosis: No    Nutrition Diagnosis  Patient has Nutrition Diagnosis: Yes  Diagnosis Status (1): New  Nutrition Diagnosis 1: Inadequate oral intake  Related to (1): hospitalization for seizure  As Evidenced by (1): currently NPO.     Nutrition Interventions/Recommendations   Nutrition prescription for oral nutrition    Nutrition Recommendations:  Individualized Nutrition Prescription Provided for :  Diet: advance diet as tolerated/medically appropriate to goal of Regular diet.    Nutrition Interventions/Goals:   Interventions: Meals and snacks, Medical food supplement  Meals and Snacks: General healthful diet  Goal: once diet advanced, will consume >50% of meals  Medical Food Supplement: Commercial beverage medical food supplement therapy  Goal: once diet advanced, will provide Ensure Plus HP (350kcal, 20g pro per 8oz) TID w/meals.  Coordination of Care with Providers: Nursing (SYEDA Salguero)    Education Documentation  No documentation found.     Pt not appropriate at this time. Will follow plan of care.     Nutrition Monitoring and Evaluation   Food/Nutrient Related History Monitoring  Monitoring and Evaluation Plan: Estimated Energy Intake  Estimated Energy Intake: Energy intake 50 -75% of estimated energy needs    Anthropometric Measurements  Monitoring and Evaluation Plan: Body weight change  Body Weight Change: Body weight gain - Gradual weight gain    Biochemical Data, Medical Tests and Procedures  Monitoring and Evaluation Plan: Electrolyte/renal panel, Glucose/endocrine profile  Electrolyte and Renal Panel: Electrolytes within normal limits  Glucose/Endocrine Profile: Glucose within normal limits ( mg/dL)    Physical Exam Findings  Monitoring and Evaluation Plan: Skin  Skin Finding: Promote intact skin - Promote skin integrity    Goal Status: New goal(s) identified    Time Spent (min): 60 minutes     Follow up 5-7 days  Last RD note 3/11/25

## 2025-03-11 NOTE — CARE PLAN
Patient admitted on 3/10 for seizure like activity. Routine EEG completed on this shift. Patient mentation is better, currently only confused to situation. Restraints Dced at 1400. Patient intermittently impulsive, able to be redirected. Passed nursing bedside swallow eval, diet ordered. VSS. Seizure precautions and safety maintained.      Problem: Skin  Goal: Decreased wound size/increased tissue granulation at next dressing change  Outcome: Progressing  Goal: Participates in plan/prevention/treatment measures  Outcome: Progressing     Problem: Fall/Injury  Goal: Not fall by end of shift  Outcome: Met  Goal: Verbalize understanding of personal risk factors for fall in the hospital  Outcome: Not Progressing  Goal: Verbalize understanding of risk factor reduction measures to prevent injury from fall in the home  Outcome: Not Progressing     Problem: Safety - Medical Restraint  Goal: Remains free of injury from restraints (Restraint for Interference with Medical Device)  Outcome: Met  Goal: Free from restraint(s) (Restraint for Interference with Medical Device)  Outcome: Met

## 2025-03-11 NOTE — CONSULTS
Neurology Consult Note      Patient Fernanda Hughes PCP Michelle Klein MD    MRN [id number]  Admission Date 3/10/2025      Chief Complaint/Reason for Admission:  Fernanda is a 84 y.o. female who presented  with AMS and concern for seizure.  Neurology was consulted for evaluation management of suspected seizure.    Subjective    Subjective   History of Presenting Illness  Ms. Fernanda Hughes is a 84 y.o. female with a PMHx of vascular dementia, remote history of CVA left parietal lobe/bilateral occipital lobes (1/16/2023 at [facility] mainly for unsteady gait/vision changes with stroke not confirmed and vestibular therapy recommendations), cognitive impairment with memory loss, remote right breast cancer postlumpectomy, end-stage glaucoma, previous seizures, presented to ED from SNF with unresponsiveness/seizure.  As per ED report, she had 20 minutes of unresponsive episode around 1 PM yesterday, she had prior episode of seizure and not on any antiepileptic meds.      Patient has no recollection of the event, only oriented to her name and birth month.  Asymptomatic at the time of the evaluation.    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  She also had multiple hospitalizations within the last 2 years for altered mental status, unresponsiveness and concern for seizure (February 2024) which she was admitted to ICU and worked up for seizure with EEG, placed on Keppra then weaned off/discontinued outpatient.  Other hospitalizations for fall, effusion 2/2 UTI.  Also she had multiple ED visits for dizziness, fall.    Home meds: Tylenol, Atarax 25 mg, mirtazapine 15 mg, Seroquel 25 mg, venlafaxine 37.5 mg, ophthalmic eyedrops for glaucoma.    In ED,  VS: BP  217/95, , RR 20, afebrile 36.5, SpO2 96% on RA and desats to 74% for which she required 4 L via NC.  Labs: CBC unremarkable.  Chemistry unremarkable except for lactate 2.2-2.4 and trended down to 1.8 after IVF.  Troponin 14, 17.  TSH normal at 2.47.  VBG with pH 7.34, pCO2 47, pO2 46, lactate 5.9, HCO3 25.4.  UA with protein, RBCs 11-20.  UDS negative.  Imaging: CT head with no acute normalities.  CXR no acute cardiopulmonary process.    She received LR boluses then started on 100 mL/hour infusion, potassium, 2 mg Haldol, loaded with 2 doses of 1500 mg of Keppra followed by 1000 mg dose then continued on 500 mg every 12 hours in ED.  Neurology was consulted by ED, recommended placement and IMCU for video EEG monitoring.    Past Medical History  Active Ambulatory Problems     Diagnosis Date Noted    Vision disturbance 01/30/2024    UTI (urinary tract infection) 12/02/2024    Vascular dementia     Confusion and disorientation 01/25/2025    Hematoma of left buttock 02/12/2025    Falls, sequela 02/12/2025    Acute on chronic anemia 02/12/2025    Ambulatory dysfunction 02/12/2025    End-stage glaucoma 02/14/2025     Resolved Ambulatory Problems     Diagnosis Date Noted    Essential (primary) hypertension     Hypomagnesemia 12/02/2024    Fall 12/02/2024    Head injury 12/02/2024    Scalp laceration, initial encounter 12/02/2024    Skin tear of left forearm without complication 12/02/2024     Past Medical History:   Diagnosis Date    Anxiety     Falls     Gastroesophageal reflux disease     Glaucoma     Hearing loss     History of breast cancer     History of closed head injury     History of seizure     Irritable bowel syndrome     Memory loss     Mild cognitive impairment     Osteoarthritis     Overactive bladder     Peripheral neuropathy     Underweight     Visual field loss        Home Medication:  Prior to Admission medications    Medication Sig Start Date End Date Taking? Authorizing Provider   acetaminophen  "(Tylenol) 325 mg tablet Take 2 tablets (650 mg) by mouth every 6 hours if needed for mild pain (1 - 3).   Yes Historical Provider, MD   brimonidine (AlphaGAN) 0.2 % ophthalmic solution Administer 1 drop into both eyes 2 times a day.   Yes Historical Provider, MD   dorzolamide-timoloL (Cosopt) 22.3-6.8 mg/mL ophthalmic solution Administer 1 drop into both eyes 2 times a day.   Yes Historical Provider, MD   hydrOXYzine HCL (Atarax) 25 mg tablet Take 1 tablet (25 mg) by mouth 2 times a day as needed for itching.   Yes Historical Provider, MD   latanoprost (Xalatan) 0.005 % ophthalmic solution Administer 1 drop into both eyes once daily at bedtime.   Yes Historical Provider, MD   mirtazapine (Remeron) 15 mg tablet Take 1 tablet (15 mg) by mouth once daily at bedtime.   Yes Historical Provider, MD   QUEtiapine (SEROquel) 25 mg tablet Take 0.5 tablets (12.5 mg) by mouth every 8 hours if needed (Agitation (hold for drowsiness/lethargy)). 1/27/25  Yes Lakshmi Reid, APRN-CNP   venlafaxine (Effexor) 37.5 mg tablet Take 1 tablet (37.5 mg) by mouth once daily in the morning. 2/12/25  Yes Historical Provider, MD      Allergies:  Allergies   Allergen Reactions    Vicodin [Hydrocodone-Acetaminophen] Other        Review of System:  Pertinent positives and negatives as per history of present illness. Remainder of 10 point review of systems is negative.    Objective    Objective   Vital Signs:  Visit Vitals  /65   Pulse 82   Temp 36.3 °C (97.3 °F) (Temporal)   Resp 20   Ht 1.702 m (5' 7\")   Wt 54.9 kg (121 lb 0.5 oz)   SpO2 99%   BMI 18.96 kg/m²   OB Status Hysterectomy   Smoking Status Never   BSA 1.61 m²        Physical Examination:    General: Elderly female, appears frail but in no acute distress at the time of evaluation.  Cardiovascular: RRR, S1/S2, no murmurs, rubs, or gallops, radial pulses +2  Pulmonary: CTAB, no respiratory distress, no wheezing, rales or rhonchi, on RA  Abdomen: +BS, soft, non-tender, " nondistended, no guarding rigidity or rebound tenderness, no masses noted  MSK: Negative for edema, No joint swelling, normal movements of all extremities.   Neuro:   Cranial Nerves: Pupils 3 mm, equal and reactive. No facial asymmetry. EOMI. No nystagmus.   Motor: Moves all extremities spontaneously, no focal weakness.   Reflexes: 2+ throughout. No clonus. No Babinski sign.   Coordination: No dysmetria noted.   Gait: Unable to assess at bedside.   Sensory: Intact to light touch bilaterally.    Laboratory Data:    Results from last 7 days   Lab Units 03/11/25  0515 03/10/25  1354   WBC AUTO x10*3/uL 9.2 9.7   RBC AUTO x10*6/uL 4.07 4.34   HEMOGLOBIN g/dL 12.0 13.1     Results from last 7 days   Lab Units 03/11/25  0515 03/10/25  1354   SODIUM mmol/L 137 138   POTASSIUM mmol/L 3.6 4.0   CHLORIDE mmol/L 103 101   CO2 mmol/L 25 21   BUN mg/dL 16 16   CREATININE mg/dL 0.82 0.93   CALCIUM mg/dL 8.8 9.1   PHOSPHORUS mg/dL  --  3.2   MAGNESIUM mg/dL  --  1.93   BILIRUBIN TOTAL mg/dL  --  0.8   ALT U/L  --  15   AST U/L  --  20       Imaging:  XR chest 1 view    Result Date: 3/10/2025  Interpreted By:  Carlin Hester, STUDY: XR CHEST 1 VIEW;  3/10/2025 3:06 pm   INDICATION: Signs/Symptoms:AMS.     COMPARISON: 02/12/2025.   ACCESSION NUMBER(S): [id number]   ORDERING CLINICIAN: BUCKY LOPEZ   FINDINGS: CARDIOMEDIASTINAL SILHOUETTE: Aortic calcifications are again seen. Cardiac silhouette is stable and not significantly enlarged.   LUNGS: Lungs are clear.  No pleural effusion or pneumothorax.   ABDOMEN: Right upper quadrant surgical clips are present.   BONES: No acute osseous changes.       1.  No evidence of acute cardiopulmonary process.       MACRO: None.   Signed by: Carlin Hester 3/10/2025 3:11 PM Dictation workstation:   [id number]    ECG 12 lead    Result Date: 3/10/2025  Sinus tachycardia Possible Left atrial enlargement Minimal voltage criteria for LVH, may be normal variant ( Frederick product ) Borderline  ECG When compared with ECG of 10-MAR-2025 13:38, (unconfirmed) T wave inversion no longer evident in Lateral leads    CT head wo IV contrast    Result Date: 3/10/2025  Interpreted By:  Sobia Darnell, STUDY: CT HEAD WO IV CONTRAST;  3/10/2025 2:17 pm   INDICATION: Trauma. Signs/Symptoms:Altered mental status, seizure history.   COMPARISON: CT head without contrast dated 02/12/2025   ACCESSION NUMBER(S): [id number]   ORDERING CLINICIAN: BUCKY LOPEZ   TECHNIQUE: Axial noncontrast head CT   FINDINGS: Parenchyma: There is mild diffuse cerebral volume loss with widening of the sulci and ex vacuo dilatation of ventricles. There is mild diffuse periventricular white matter hypoattenuation in bilateral cerebral hemispheres, likely favored to be related to chronic white matter ischemic changes. The grey-white differentiation is intact. There is no mass effect or midline shift.  There is no intracranial hemorrhage.   CSF Spaces: The ventricles, sulci and basal cisterns are within normal limits. There is no extraaxial fluid collection.   Calvarium: No evidence of fracture was identified.   Paranasal sinuses and mastoids: Visualized paranasal sinuses and mastoids are clear.       No acute intracranial abnormality was identified.   MACRO: None   Signed by: Sobia Darnell 3/10/2025 2:36 PM Dictation workstation:   [id number]    ECG 12 lead    Result Date: 3/10/2025  Sinus tachycardia Possible Left atrial enlargement Minimal voltage criteria for LVH, may be normal variant ( Bobby product ) Possible Lateral infarct (cited on or before 10-MAR-2025) Abnormal ECG When compared with ECG of 12-FEB-2025 00:38, No significant change was found      Medications:  Scheduled medications  brimonidine, 1 drop, Both Eyes, BID  dorzolamide-timoloL, 1 drop, Both Eyes, BID  heparin (porcine), 5,000 Units, subcutaneous, q8h  latanoprost, 1 drop, Both Eyes, Nightly  levETIRAcetam, 500 mg, intravenous, q12h  mirtazapine, 15 mg, oral,  Nightly  potassium chloride, 20 mEq, intravenous, q2h  venlafaxine, 37.5 mg, oral, q AM      Continuous medications  lactated Ringer's, 100 mL/hr, Last Rate: 100 mL/hr (03/11/25 7768)      PRN medications  PRN medications: acetaminophen, hydrOXYzine HCL, LORazepam, QUEtiapine    Assessment    Assessment & Plan   Ms. Fernanad Hughes is a 84 y.o. female with vascular dementia, history of stroke, previous seizure-like episodes, and multiple AMS-related hospitalizations, presenting with unresponsiveness concerning for seizure.  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  Considering the unresponsiveness for 20-minute, disorientation postevent, and history of prior seizure-like activity, ongoing seizure activity or NCSE cannot be ruled out.  Also patient's BP of 2 17-95,  is concerning for hypertensive emergency, possibly triggered by seizure.  Her encephalopathy is likely multifactorial (postictal vs metabolic vs neurodegenerative progression), with history of vascular dementia, prior strokes and multiple AMS episodes suggested a baseline impairment.  Patient mild elevated troponin is likely demand ischemia vs underlying cardiac disease, her desaturation is possibly 2/2 seizure, aspiration or cardiac event.  Additionally, patient is at high risk for fall considering her history of multiple falls, dizziness, multiple hospitalizations so she is at high risk for future falls, aspiration and further cognitive decline.    Plan:  -Continue to monitor in IMCU on telemetry  -Discontinuing Keppra and mirtazapine  -Starting Depakote 250 mg every 12 hours  -Video EEG  -If recurrent seizures occur, we might consider  escalating therapy.  -Monitor BP closely and manage accordingly  -Neurochecks  -Avoid CNS depressants or utilize carefully (Atarax, Seroquel, venlafaxine) as they might contribute to confusion  -Consider cardiology workup  -SLP  -PT/OT    The rest per the primary team.    Thank you for the consult. Will follow-up.      Emergency Contact: Extended Emergency Contact Information  Primary Emergency Contact: CHIVOARIADNE (POA)  Mobile Phone: [phone]  Relation: Friend  Secondary Emergency Contact: ARASH ORTIZ  Mobile Phone: [phone]  Relation: Other  Preferred language: English   needed? No    Patient seen and discussed with the attending.  Signature: Herbert Jackson MD  Date: March 11, 2025  This note has been transcribed using Dragon voice recognition system and there is a possibility of unintentional typing misprints.  Any information found to be copied from previous providers is done in the best interest of the patient to provide accurate, quality, and continuity of care.

## 2025-03-11 NOTE — CONSULTS
Reason For Consult  Goals of care ; Code status    History Of Present Illness  Fernanda Hughes is a 84 y.o. female presenting with history of vascular dementia, end-stage glaucoma, who presents with seizure-like activity, patient has remote history of seizure, she is not on antiseizure medication, of notes patient has an episode of being unresponsive for like 20 minutes, in the emergency room patient received Keppra, she admitted for video EEG. Pt is from [facility], she was just moved into their memory care unit a few months ago. HPOA is her friend Deepali Jaramillo, [phone]. Palliative care consulted for goals of care, when pt was admitted to the ED, HPOA was unable to be reached to discuss code status. Currently pt is listed as Full code.     Past Medical History  She has a past medical history of Anxiety, Falls, Gastroesophageal reflux disease, Glaucoma, Hearing loss, History of breast cancer, History of closed head injury, History of seizure, Irritable bowel syndrome, Memory loss, Mild cognitive impairment, Osteoarthritis, Overactive bladder, Peripheral neuropathy, Underweight, Vascular dementia, and Visual field loss.       Assessment/Plan     Palliative care consulted to complete code status discussion. Phone call placed to pts HPFELICITA Palumbo, introduced self and palliative care. We discussed need to address goals of care and pts code status. Deepali states that pts wishes are for DNAR CCA DNI. She also discussed with this writer about pt recently moving into the memory care and after this hospitalization, she is not sure if it remains in the pts best interest to have her going back and forth to the hospital. We discussed hospice care as an option. Deepali is not sure if she is ready to engage hospice care for pt at this time. Deepali is open to having hospice discussions back at the facility when pt discharges. She is agreeable to palliative care to help bridge these discussions, she is also going to talk with [facility] and see what  their recommendations are.     Notified team of code status change to DNAR CCA DNI at this time.  Neurology on consult  Will continue to follow for supportive care. Anticipate Outpatient palliative referral at discharge.       Pamela Palacios RN

## 2025-03-11 NOTE — ASSESSMENT & PLAN NOTE
Admit patient to stepdown unit with telemetry  Will obtain EEG  Consult neurology  Continue Keppra

## 2025-03-12 PROBLEM — I67.4 HYPERTENSIVE ENCEPHALOPATHY: Status: ACTIVE | Noted: 2025-03-12

## 2025-03-12 LAB
ANION GAP SERPL CALC-SCNC: 13 MMOL/L (ref 10–20)
BUN SERPL-MCNC: 16 MG/DL (ref 6–23)
CALCIUM SERPL-MCNC: 9 MG/DL (ref 8.6–10.3)
CHLORIDE SERPL-SCNC: 104 MMOL/L (ref 98–107)
CO2 SERPL-SCNC: 26 MMOL/L (ref 21–32)
CREAT SERPL-MCNC: 0.81 MG/DL (ref 0.5–1.05)
EGFRCR SERPLBLD CKD-EPI 2021: 71 ML/MIN/1.73M*2
ERYTHROCYTE [DISTWIDTH] IN BLOOD BY AUTOMATED COUNT: 13.1 % (ref 11.5–14.5)
GLUCOSE SERPL-MCNC: 77 MG/DL (ref 74–99)
HCT VFR BLD AUTO: 37.2 % (ref 36–46)
HGB BLD-MCNC: 12.1 G/DL (ref 12–16)
MCH RBC QN AUTO: 29.7 PG (ref 26–34)
MCHC RBC AUTO-ENTMCNC: 32.5 G/DL (ref 32–36)
MCV RBC AUTO: 91 FL (ref 80–100)
NRBC BLD-RTO: 0 /100 WBCS (ref 0–0)
PLATELET # BLD AUTO: 235 X10*3/UL (ref 150–450)
POTASSIUM SERPL-SCNC: 3.8 MMOL/L (ref 3.5–5.3)
RBC # BLD AUTO: 4.08 X10*6/UL (ref 4–5.2)
SODIUM SERPL-SCNC: 139 MMOL/L (ref 136–145)
WBC # BLD AUTO: 7.4 X10*3/UL (ref 4.4–11.3)

## 2025-03-12 PROCEDURE — 94760 N-INVAS EAR/PLS OXIMETRY 1: CPT

## 2025-03-12 PROCEDURE — 2060000001 HC INTERMEDIATE ICU ROOM DAILY

## 2025-03-12 PROCEDURE — 85027 COMPLETE CBC AUTOMATED: CPT | Performed by: NURSE PRACTITIONER

## 2025-03-12 PROCEDURE — 99232 SBSQ HOSP IP/OBS MODERATE 35: CPT | Performed by: NURSE PRACTITIONER

## 2025-03-12 PROCEDURE — 2500000001 HC RX 250 WO HCPCS SELF ADMINISTERED DRUGS (ALT 637 FOR MEDICARE OP): Performed by: NURSE PRACTITIONER

## 2025-03-12 PROCEDURE — 2500000004 HC RX 250 GENERAL PHARMACY W/ HCPCS (ALT 636 FOR OP/ED): Performed by: NURSE PRACTITIONER

## 2025-03-12 PROCEDURE — 2500000002 HC RX 250 W HCPCS SELF ADMINISTERED DRUGS (ALT 637 FOR MEDICARE OP, ALT 636 FOR OP/ED): Performed by: NURSE PRACTITIONER

## 2025-03-12 PROCEDURE — 36415 COLL VENOUS BLD VENIPUNCTURE: CPT | Performed by: NURSE PRACTITIONER

## 2025-03-12 PROCEDURE — 80048 BASIC METABOLIC PNL TOTAL CA: CPT | Performed by: NURSE PRACTITIONER

## 2025-03-12 PROCEDURE — 2500000001 HC RX 250 WO HCPCS SELF ADMINISTERED DRUGS (ALT 637 FOR MEDICARE OP)

## 2025-03-12 RX ORDER — DIVALPROEX SODIUM 250 MG/1
250 TABLET, DELAYED RELEASE ORAL EVERY 12 HOURS SCHEDULED
Start: 2025-03-12 | End: 2025-03-13

## 2025-03-12 RX ADMIN — DORZOLAMIDE HYDROCHLORIDE AND TIMOLOL MALEATE 1 DROP: 20; 5 SOLUTION/ DROPS OPHTHALMIC at 20:01

## 2025-03-12 RX ADMIN — HEPARIN SODIUM 5000 UNITS: 5000 INJECTION INTRAVENOUS; SUBCUTANEOUS at 20:00

## 2025-03-12 RX ADMIN — DIVALPROEX SODIUM 250 MG: 250 TABLET, DELAYED RELEASE ORAL at 10:41

## 2025-03-12 RX ADMIN — HEPARIN SODIUM 5000 UNITS: 5000 INJECTION INTRAVENOUS; SUBCUTANEOUS at 15:42

## 2025-03-12 RX ADMIN — QUETIAPINE FUMARATE 12.5 MG: 25 TABLET ORAL at 20:00

## 2025-03-12 RX ADMIN — HEPARIN SODIUM 5000 UNITS: 5000 INJECTION INTRAVENOUS; SUBCUTANEOUS at 04:12

## 2025-03-12 RX ADMIN — LATANOPROST 1 DROP: 50 SOLUTION OPHTHALMIC at 20:01

## 2025-03-12 RX ADMIN — DORZOLAMIDE HYDROCHLORIDE AND TIMOLOL MALEATE 1 DROP: 20; 5 SOLUTION/ DROPS OPHTHALMIC at 10:41

## 2025-03-12 RX ADMIN — DIVALPROEX SODIUM 250 MG: 250 TABLET, DELAYED RELEASE ORAL at 20:01

## 2025-03-12 RX ADMIN — BRIMONIDINE TARTRATE 1 DROP: 2 SOLUTION/ DROPS OPHTHALMIC at 20:01

## 2025-03-12 RX ADMIN — ACETAMINOPHEN 650 MG: 325 TABLET ORAL at 20:00

## 2025-03-12 RX ADMIN — BRIMONIDINE TARTRATE 1 DROP: 2 SOLUTION/ DROPS OPHTHALMIC at 10:41

## 2025-03-12 RX ADMIN — VENLAFAXINE 37.5 MG: 37.5 TABLET ORAL at 10:41

## 2025-03-12 ASSESSMENT — COGNITIVE AND FUNCTIONAL STATUS - GENERAL
DRESSING REGULAR UPPER BODY CLOTHING: A LOT
STANDING UP FROM CHAIR USING ARMS: TOTAL
MOVING TO AND FROM BED TO CHAIR: A LOT
HELP NEEDED FOR BATHING: A LOT
MOVING FROM LYING ON BACK TO SITTING ON SIDE OF FLAT BED WITH BEDRAILS: A LOT
CLIMB 3 TO 5 STEPS WITH RAILING: A LOT
MOVING TO AND FROM BED TO CHAIR: TOTAL
TOILETING: A LOT
TURNING FROM BACK TO SIDE WHILE IN FLAT BAD: A LOT
HELP NEEDED FOR BATHING: TOTAL
EATING MEALS: A LOT
WALKING IN HOSPITAL ROOM: TOTAL
CLIMB 3 TO 5 STEPS WITH RAILING: TOTAL
TURNING FROM BACK TO SIDE WHILE IN FLAT BAD: A LOT
STANDING UP FROM CHAIR USING ARMS: A LOT
PERSONAL GROOMING: A LOT
DRESSING REGULAR LOWER BODY CLOTHING: A LITTLE
MOBILITY SCORE: 12
TOILETING: TOTAL
DAILY ACTIVITIY SCORE: 14
WALKING IN HOSPITAL ROOM: A LOT
DAILY ACTIVITIY SCORE: 7
DRESSING REGULAR UPPER BODY CLOTHING: TOTAL
MOBILITY SCORE: 8
MOVING FROM LYING ON BACK TO SITTING ON SIDE OF FLAT BED WITH BEDRAILS: A LOT
DRESSING REGULAR LOWER BODY CLOTHING: TOTAL
PERSONAL GROOMING: TOTAL
EATING MEALS: A LITTLE

## 2025-03-12 ASSESSMENT — PAIN - FUNCTIONAL ASSESSMENT
PAIN_FUNCTIONAL_ASSESSMENT: 0-10

## 2025-03-12 ASSESSMENT — PAIN DESCRIPTION - LOCATION: LOCATION: BUTTOCKS

## 2025-03-12 ASSESSMENT — PAIN SCALES - GENERAL
PAINLEVEL_OUTOF10: 0 - NO PAIN
PAINLEVEL_OUTOF10: 0 - NO PAIN
PAINLEVEL_OUTOF10: 5 - MODERATE PAIN
PAINLEVEL_OUTOF10: 0 - NO PAIN

## 2025-03-12 ASSESSMENT — PAIN DESCRIPTION - ORIENTATION: ORIENTATION: RIGHT

## 2025-03-12 NOTE — PROGRESS NOTES
"Fernanda Hughes is a 84 y.o. female on day 2 of admission presenting with Seizure (Multi).      Subjective   Pt sleeping, easily awakens to voice but very drowsy.  Nurse states pt was agitated last night but calmed down after Depakote given- she has been sleepy since.       Objective     Last Recorded Vitals  Blood pressure 174/77, pulse 76, temperature 36 °C (96.8 °F), temperature source Temporal, resp. rate 20, height 1.702 m (5' 7\"), weight 53 kg (116 lb 13.5 oz), SpO2 99%.    Physical Exam  Eyes:      Pupils: Pupils are equal, round, and reactive to light.   Neurological:      Motor: Motor strength is normal.  Psychiatric:         Speech: Speech normal.       Neurological Exam  Mental Status  Arousable to verbal stimuli. Oriented to person, place and time. Speech is normal. Language is fluent with no aphasia.  Naming/repetition intact..    Cranial Nerves  CN III, IV, VI: Pupils equal round and reactive to light bilaterally.  CN V: Facial sensation is normal.  CN VII: Full and symmetric facial movement.  CN XII: Tongue midline without atrophy or fasciculations.  Blinks to bilateral visual threat.    Motor   Strength is 5/5 throughout all four extremities.  Generalized weakness.    Sensory  Light touch is normal in upper and lower extremities.     Coordination  Right: Finger-to-nose normal.Left: Finger-to-nose normal.    Relevant Results  Scheduled medications  brimonidine, 1 drop, Both Eyes, BID  divalproex, 250 mg, oral, q12h JESSICA  dorzolamide-timoloL, 1 drop, Both Eyes, BID  heparin (porcine), 5,000 Units, subcutaneous, q8h  latanoprost, 1 drop, Both Eyes, Nightly  venlafaxine, 37.5 mg, oral, q AM      Continuous medications     PRN medications  PRN medications: acetaminophen, hydrOXYzine HCL, LORazepam, QUEtiapine  Results for orders placed or performed during the hospital encounter of 03/10/25 (from the past 24 hours)   CBC   Result Value Ref Range    WBC 7.4 4.4 - 11.3 x10*3/uL    nRBC 0.0 0.0 - 0.0 /100 WBCs    " RBC 4.08 4.00 - 5.20 x10*6/uL    Hemoglobin 12.1 12.0 - 16.0 g/dL    Hematocrit 37.2 36.0 - 46.0 %    MCV 91 80 - 100 fL    MCH 29.7 26.0 - 34.0 pg    MCHC 32.5 32.0 - 36.0 g/dL    RDW 13.1 11.5 - 14.5 %    Platelets 235 150 - 450 x10*3/uL   Basic metabolic panel   Result Value Ref Range    Glucose 77 74 - 99 mg/dL    Sodium 139 136 - 145 mmol/L    Potassium 3.8 3.5 - 5.3 mmol/L    Chloride 104 98 - 107 mmol/L    Bicarbonate 26 21 - 32 mmol/L    Anion Gap 13 10 - 20 mmol/L    Urea Nitrogen 16 6 - 23 mg/dL    Creatinine 0.81 0.50 - 1.05 mg/dL    eGFR 71 >60 mL/min/1.73m*2    Calcium 9.0 8.6 - 10.3 mg/dL     Vascular US Lower Extremity Venous Duplex Bilateral    Result Date: 3/11/2025            [facility] [place] [address] [place] [address]     Tel [phone] Fax [phone]  Vascular Lab Report  VASC US LOWER EXTREMITY VENOUS DUPLEX BILATERAL Patient Name:     BABS Will Physician: [id number] Antonio MD Study Date:       3/11/2025           Ordering Provider: [id number] FELIPE ZAZUETA MRN/PID:          [id number]            Fellow: Accession#:       [id number]        Technologist:      Danielle Gtz RVT Date of           1940 / 84      Technologist 2: Birth/Age:        years Gender:           F                   Encounter#:        [id number] Admission Status: Inpatient           Location           [facility]                                       Performed:  Diagnosis/ICD: Localized (leg) edema-R60.0 Indication:    Limb edema CPT Codes:     60049 Peripheral venous duplex scan for DVT complete  Pertinent History: LE Edema.  CONCLUSIONS: Right Lower Venous: No evidence of acute deep vein thrombus visualized in the right lower extremity. Left Lower Venous: No evidence of acute deep vein thrombus visualized in the left lower extremity.  Imaging & Doppler Findings:  Right                 Compressible Thrombus         Flow Distal External Iliac     Yes        None   Spontaneous/Phasic CFV                       Yes        None   Spontaneous/Phasic PFV                       Yes        None FV Proximal               Yes        None   Spontaneous/Phasic FV Mid                    Yes        None FV Distal                 Yes        None Popliteal                 Yes        None   Spontaneous/Phasic Peroneal                  Yes        None PTV                       Yes        None  Left                  Compress Thrombus        Flow Distal External Iliac   Yes      None   Spontaneous/Phasic CFV                     Yes      None   Spontaneous/Phasic PFV                     Yes      None FV Proximal             Yes      None   Spontaneous/Phasic FV Mid                  Yes      None FV Distal               Yes      None Popliteal               Yes      None   Spontaneous/Phasic Peroneal                Yes      None PTV                     Yes      None  [id number] SCOTT Antonio MD Electronically signed by [id number] SCOTT Antonio MD on 3/11/2025 at 3:40:40 PM  ** Final **     EEG    IMPRESSION Impression This EEG is indicative of right hemisphere structural lesion. No epileptiform discharges or seizures are noted. EEG shows 10-15% awake state only. A full report will be scanned into the patient's chart at a later time. This report has been interpreted and electronically signed by    Electrocardiogram, 12-lead PRN ACS symptoms    Result Date: 3/11/2025  Normal sinus rhythm Possible Left atrial enlargement Borderline ECG When compared with ECG of 10-MAR-2025 14:42, (unconfirmed) No significant change was found    XR chest 1 view    Result Date: 3/10/2025  Interpreted By:  Carlin Hester, STUDY: XR CHEST 1 VIEW;  3/10/2025 3:06 pm   INDICATION: Signs/Symptoms:AMS.     COMPARISON: 02/12/2025.   ACCESSION NUMBER(S): [id number]   ORDERING CLINICIAN: BUCKY LOPEZ   FINDINGS: CARDIOMEDIASTINAL SILHOUETTE: Aortic calcifications are again  seen. Cardiac silhouette is stable and not significantly enlarged.   LUNGS: Lungs are clear.  No pleural effusion or pneumothorax.   ABDOMEN: Right upper quadrant surgical clips are present.   BONES: No acute osseous changes.       1.  No evidence of acute cardiopulmonary process.       MACRO: None.   Signed by: Carlin Hester 3/10/2025 3:11 PM Dictation workstation:   [id number]    ECG 12 lead    Result Date: 3/10/2025  Sinus tachycardia Possible Left atrial enlargement Minimal voltage criteria for LVH, may be normal variant ( [place] product ) Borderline ECG When compared with ECG of 10-MAR-2025 13:38, (unconfirmed) T wave inversion no longer evident in Lateral leads    CT head wo IV contrast    Result Date: 3/10/2025  Interpreted By:  Sobia Darnell, STUDY: CT HEAD WO IV CONTRAST;  3/10/2025 2:17 pm   INDICATION: Trauma. Signs/Symptoms:Altered mental status, seizure history.   COMPARISON: CT head without contrast dated 02/12/2025   ACCESSION NUMBER(S): [id number]   ORDERING CLINICIAN: BUCKY LOPEZ   TECHNIQUE: Axial noncontrast head CT   FINDINGS: Parenchyma: There is mild diffuse cerebral volume loss with widening of the sulci and ex vacuo dilatation of ventricles. There is mild diffuse periventricular white matter hypoattenuation in bilateral cerebral hemispheres, likely favored to be related to chronic white matter ischemic changes. The grey-white differentiation is intact. There is no mass effect or midline shift.  There is no intracranial hemorrhage.   CSF Spaces: The ventricles, sulci and basal cisterns are within normal limits. There is no extraaxial fluid collection.   Calvarium: No evidence of fracture was identified.   Paranasal sinuses and mastoids: Visualized paranasal sinuses and mastoids are clear.       No acute intracranial abnormality was identified.   MACRO: None   Signed by: Sobia Darnell 3/10/2025 2:36 PM Dictation workstation:   [id number]    ECG 12 lead    Result Date:  3/10/2025  Sinus tachycardia Possible Left atrial enlargement Minimal voltage criteria for LVH, may be normal variant ( [place] product ) Possible Lateral infarct (cited on or before 10-MAR-2025) Abnormal ECG When compared with ECG of 12-FEB-2025 00:38, No significant change was found    ECG 12 lead    Result Date: 2/21/2025  Normal sinus rhythm Normal ECG When compared with ECG of 11-FEB-2025 23:36, No significant change was found Confirmed by Triston Vegas ([id number]) on 2/21/2025 7:58:46 PM    ECG 12 lead    Result Date: 2/13/2025  Normal sinus rhythm Normal ECG When compared with ECG of 02-DEC-2024 18:18, No significant change was found Confirmed by Triston Vegas ([id number]) on 2/13/2025 8:56:24 PM    CT head wo IV contrast    Result Date: 2/12/2025  Interpreted By:  Lashaun Nixon, STUDY: CT HEAD WO IV CONTRAST; CT CERVICAL SPINE WO IV CONTRAST;  2/12/2025 1:37 am   INDICATION: Signs/Symptoms:Unwitnessed fall, dementia.   COMPARISON: 9/2/2024   ACCESSION NUMBER(S): [id number]; [id number]   ORDERING CLINICIAN: JOAN SRIVASTAVA   TECHNIQUE: Noncontrast CT images of head. Axial noncontrast CT images of the cervical spine with coronal and sagittal reconstructed images.   FINDINGS: BRAIN PARENCHYMA: Gray-white matter interfaces are preserved. No mass effect or midline shift. Generalized parenchymal volume loss noted with concordant ventricular enlargement. Non-specific white matter changes noted, which may be related to small vessel disease.   HEMORRHAGE: No acute intracranial hemorrhage. VENTRICLES and EXTRA-AXIAL SPACES: Normal size. EXTRACRANIAL SOFT TISSUES: Within normal limits. PARANASAL SINUSES/MASTOIDS: The visualized paranasal sinuses and mastoid air cells are aerated. CALVARIUM: No depressed skull fracture. No destructive osseous lesion.   OTHER FINDINGS: None.   CERVICAL SPINE:   ALIGNMENT: Normal. VERTEBRAE: No acute fracture. SPINAL CANAL: Degenerative changes facet and uncinate arthropathy, as well as disc  space narrowing and end plate hypertrophy. No critical spinal canal stenosis. PREVERTEBRAL SOFT TISSUES: No prevertebral soft tissue swelling. LUNG APICES: Biapical scarring.   OTHER FINDINGS: 1.8 x 1.4 cm midthyroid cystic lesion       No acute intracranial abnormality.   No acute fracture or traumatic subluxation of the cervical spine.   1.8 x 1.4 cm midthyroid cystic lesion. Dedicated ultrasound imaging may be obtained on a non-emergent basis if not already performed.   MACRO: Incidental Finding:  There are few small hypoattenuating nodules measuring equal to or greater than 1.5 cm in the thyroid gland. (**-YCF-**)   Instructions:  Further evaluation with nonemergent thyroid ultrasound. (Managing Incidental Thyroid Nodules Detected on Imaging: White Paper of the ACR Incidental Thyroid Findings Committee. Gerri Nelson et al. Journal of the American College of Radiology,Volume 12, Issue 2, 143 - 150.) [email or web address]   Signed by: Lashaun Nixon 2/12/2025 2:51 AM Dictation workstation:   [id number]    CT cervical spine wo IV contrast    Result Date: 2/12/2025  Interpreted By:  Lashaun Nixon, STUDY: CT HEAD WO IV CONTRAST; CT CERVICAL SPINE WO IV CONTRAST;  2/12/2025 1:37 am   INDICATION: Signs/Symptoms:Unwitnessed fall, dementia.   COMPARISON: 9/2/2024   ACCESSION NUMBER(S): [id number]; [id number]   ORDERING CLINICIAN: JOAN SRIVASTAVA   TECHNIQUE: Noncontrast CT images of head. Axial noncontrast CT images of the cervical spine with coronal and sagittal reconstructed images.   FINDINGS: BRAIN PARENCHYMA: Gray-white matter interfaces are preserved. No mass effect or midline shift. Generalized parenchymal volume loss noted with concordant ventricular enlargement. Non-specific white matter changes noted, which may be related to small vessel disease.   HEMORRHAGE: No acute intracranial hemorrhage. VENTRICLES and EXTRA-AXIAL SPACES: Normal size. EXTRACRANIAL SOFT TISSUES: Within normal limits. PARANASAL  SINUSES/MASTOIDS: The visualized paranasal sinuses and mastoid air cells are aerated. CALVARIUM: No depressed skull fracture. No destructive osseous lesion.   OTHER FINDINGS: None.   CERVICAL SPINE:   ALIGNMENT: Normal. VERTEBRAE: No acute fracture. SPINAL CANAL: Degenerative changes facet and uncinate arthropathy, as well as disc space narrowing and end plate hypertrophy. No critical spinal canal stenosis. PREVERTEBRAL SOFT TISSUES: No prevertebral soft tissue swelling. LUNG APICES: Biapical scarring.   OTHER FINDINGS: 1.8 x 1.4 cm midthyroid cystic lesion       No acute intracranial abnormality.   No acute fracture or traumatic subluxation of the cervical spine.   1.8 x 1.4 cm midthyroid cystic lesion. Dedicated ultrasound imaging may be obtained on a non-emergent basis if not already performed.   MACRO: Incidental Finding:  There are few small hypoattenuating nodules measuring equal to or greater than 1.5 cm in the thyroid gland. (**-YCF-**)   Instructions:  Further evaluation with nonemergent thyroid ultrasound. (Managing Incidental Thyroid Nodules Detected on Imaging: White Paper of the ACR Incidental Thyroid Findings Committee. Gerri Nelson et al. Journal of the American College of Radiology,Volume 12, Issue 2, 143 - 150.) [email or web address]   Signed by: Lashaun Nixon 2/12/2025 2:51 AM Dictation workstation:   [id number]    XR femur left 2+ views    Result Date: 2/12/2025  Interpreted By:  Lashaun Nixon, STUDY: XR PELVIS 1-2 VIEWS; XR FEMUR LEFT 2+ VIEWS; ;  2/12/2025 1:30 am   INDICATION: Signs/Symptoms:Unwitnessed fall, left hip and buttock pain; Signs/Symptoms:Unwitnessed fall, left hip and leg pain.     COMPARISON: 12/02/2024   ACCESSION NUMBER(S): [id number]; [id number]   ORDERING CLINICIAN: JOAN SRIVASTAVA   FINDINGS: AP pelvis, 2 views of the left femur   Intramedullary nail and screw seen in the left hip. No acute fracture or dislocation. Scattered degenerative changes.       No acute osseous  abnormality identified.   MACRO: None   Signed by: Lashaun Nixon 2/12/2025 2:25 AM Dictation workstation:   [id number]    XR pelvis 1-2 views    Result Date: 2/12/2025  Interpreted By:  Lashaun Nixon, STUDY: XR PELVIS 1-2 VIEWS; XR FEMUR LEFT 2+ VIEWS; ;  2/12/2025 1:30 am   INDICATION: Signs/Symptoms:Unwitnessed fall, left hip and buttock pain; Signs/Symptoms:Unwitnessed fall, left hip and leg pain.     COMPARISON: 12/02/2024   ACCESSION NUMBER(S): [id number]; [id number]   ORDERING CLINICIAN: JOAN SRIVASTAVA   FINDINGS: AP pelvis, 2 views of the left femur   Intramedullary nail and screw seen in the left hip. No acute fracture or dislocation. Scattered degenerative changes.       No acute osseous abnormality identified.   MACRO: None   Signed by: Lashaun Nixon 2/12/2025 2:25 AM Dictation workstation:   [id number]    XR chest 1 view    Result Date: 2/12/2025  Interpreted By:  Lashaun Nixon, STUDY: XR CHEST 1 VIEW;  2/12/2025 1:30 am   INDICATION: Signs/Symptoms:Unwitnessed fall, dementia.     COMPARISON: 09/02/2024   ACCESSION NUMBER(S): [id number]   ORDERING CLINICIAN: JOAN SRIVASTAVA   FINDINGS: AP radiograph of the chest was provided.       CARDIOMEDIASTINAL SILHOUETTE: Cardiomediastinal silhouette is stable in size and configuration.   LUNGS: Hyperinflated lung fields. No focal consolidation, pleural effusion or sizable pneumothorax seen.   ABDOMEN: No remarkable upper abdominal findings.   BONES: No acute osseous changes.       Emphysema/COPD. No focal consolidation.   MACRO: None   Signed by: Lashaun Nixon 2/12/2025 2:19 AM Dictation workstation:   [id number]     Assessment & Plan  Hypertensive encephalopathy    Vascular dementia    End-stage glaucoma    Seizure (Multi)    Lactic acidosis    Seizure like episode with possible postictal state.  Hypertensive emergency vs postictal HTN.  Encephalopathy, likely multifactorial.  Hx of seizure like activity, not on anti-seizure medication prior to  admission.    EEG    Result Date: 3/11/2025  IMPRESSION Impression This EEG is indicative of right hemisphere structural lesion. No epileptiform discharges or seizures are noted. EEG shows 10-15% awake state only. A full report will be scanned into the patient's chart at a later time. This report has been interpreted and electronically signed by      Plan:    Continue Depakote 250mg BID- if remains drowsy after one week, medication need to be adjusted.  Ativan 2mg IV daily PRN seizures.  Video EEG results reviewed  Monitor blood pressure  Neuro checks every 4 hours  Seizure precautions  ST/PT/OT  Follow up with Jyoti Odell NP in 4 to 6 weeks.  ELLEN Jaime      Case/plan discussed with DR. Uribe.     I spent 60 minutes in the professional and overall care of this patient.      ELLEN Jaime

## 2025-03-12 NOTE — PROGRESS NOTES
Placed a call to [facility] and spoke with Delphine. Patient at baseline is ambulatory and independent with most things. Informed Delphine of POA's plan for patient to return to facility with Palliative care on discharge and facility will be able to take patient back. She stated they will also be able to assist patient with whatever is needed. CT team to follow patient.

## 2025-03-12 NOTE — CONSULTS
Pt is sedated, unable to assess  Spoke with RN about the treatment  Seizure is currently being ruled out  Agree with seroquel PRN for agiation  Will follow and make further recommendation      03/12/25 at 3:09 PM - Junior Devi MD

## 2025-03-12 NOTE — CARE PLAN
SHIFT NOTE    Patient remains hds. However, she was still very confused and agitated overnight and received IM haldol, IV Ativan, PO Seroquel and PO Atarax. She would calm down for a few minutes and then try to climb out of bed again. No complaints of pain and she remains afebrile. No seizure-like activity.     Hourly rounding was performed and patient needs were met. Call light within reach. No other acute events, will continue to monitor.       The patient's goals for the shift include    Problem: Skin  Goal: Decreased wound size/increased tissue granulation at next dressing change  3/12/2025 0714 by Romana Rodriguez RN  Outcome: Progressing  3/11/2025 2207 by Romana Rodriguez RN  Flowsheets (Taken 3/10/2025 2224)  Decreased wound size/increased tissue granulation at next dressing change:   Promote sleep for wound healing   Protective dressings over bony prominences     Problem: Skin  Goal: Prevent/minimize sheer/friction injuries  3/12/2025 0714 by Romana Rodriguez RN  Outcome: Progressing  3/11/2025 2207 by Romana Rodriguez RN  Flowsheets (Taken 3/10/2025 2224)  Prevent/minimize sheer/friction injuries:   Complete micro-shifts as needed if patient unable. Adjust patient position to relieve pressure points, not a full turn   Increase activity/out of bed for meals   HOB 30 degrees or less   Turn/reposition every 2 hours/use positioning/transfer devices     Problem: Fall/Injury  Goal: Verbalize understanding of risk factor reduction measures to prevent injury from fall in the home  Outcome: Progressing     Problem: Fall/Injury  Goal: Use assistive devices by end of the shift  Outcome: Progressing       The clinical goals for the shift include Patient will remain hds by end of shift 3/12/25 at 0700.

## 2025-03-12 NOTE — NURSING NOTE
Pt more alert this morning, remains confused to situation and place. Spoke with HPOA Deepali this morning over the phone, she was able to visit with the pt yesterday. We discussed again palliative care services v hospice care for the pt. At this time, Deepali would like to start with outpatient palliative care through [facility] and have further discussions about hospice care once pt is back at the [facility] care. Discussed the current PT/OT order and if Deepali would want pt to go to SNF for rehab. Deepali states that she is declining rehab for the pt, she did not do well with it in the past and prefers she does back to the [facility] at time of discharge.     Notified team of HPOA request at discharge.   Referral sent to [facility] palliative care for outpatient follow up.  No further palliative needs, will sign off.

## 2025-03-12 NOTE — CARE PLAN
Patient reorientation needed throughout shift. Patient follows direction when reoriented.   Discharge order is in.   Patient safety maintained throughout shift.     The clinical goals for the shift include patient will remain HDS throughout shift      Problem: Skin  Goal: Decreased wound size/increased tissue granulation at next dressing change  Outcome: Progressing  Goal: Participates in plan/prevention/treatment measures  Outcome: Progressing  Goal: Prevent/manage excess moisture  Outcome: Progressing  Goal: Prevent/minimize sheer/friction injuries  Outcome: Progressing  Goal: Promote/optimize nutrition  Outcome: Progressing  Goal: Promote skin healing  Outcome: Progressing     Problem: Fall/Injury  Goal: Be free from injury by end of the shift  Outcome: Progressing  Goal: Verbalize understanding of personal risk factors for fall in the hospital  Outcome: Progressing  Goal: Verbalize understanding of risk factor reduction measures to prevent injury from fall in the home  Outcome: Progressing  Goal: Use assistive devices by end of the shift  Outcome: Progressing  Goal: Pace activities to prevent fatigue by end of the shift  Outcome: Progressing

## 2025-03-12 NOTE — PROGRESS NOTES
Internal Medicine Progress Note    Patient Name: Fernanda Hughes          MRN: [id number]  Today's Date: March 12, 2025          Attending: Stef Rosales MD    Subjective:  Patient was seen and examined at bedside, she was laying down in bed in no distress.    Review Of Systems:  Denies having pain      Objective:  Vitals:    03/12/25 0004 03/12/25 0400 03/12/25 0404 03/12/25 0800   BP: 171/90  174/77    BP Location:       Patient Position:       Pulse: 76 76 84 76   Resp: 20  20    Temp: 35.9 °C (96.6 °F)  36 °C (96.8 °F)    TempSrc: Temporal  Temporal    SpO2: 99%  99%    Weight:   53 kg (116 lb 13.5 oz)    Height:               Physical Exam:   General appearance: Well-appearing alert, in no acute distress   Lungs: Clear to auscultation, no wheezing or rhonchi  Heart: RRR without murmur, gallop, or rubs.   Abdomen: Soft, non-tender. Bowel sounds normal.  Extremities: No deformity, no edema  Neuro: Alert but disoriented    Labs:  Results for orders placed or performed during the hospital encounter of 03/10/25 (from the past 24 hours)   CBC   Result Value Ref Range    WBC 7.4 4.4 - 11.3 x10*3/uL    nRBC 0.0 0.0 - 0.0 /100 WBCs    RBC 4.08 4.00 - 5.20 x10*6/uL    Hemoglobin 12.1 12.0 - 16.0 g/dL    Hematocrit 37.2 36.0 - 46.0 %    MCV 91 80 - 100 fL    MCH 29.7 26.0 - 34.0 pg    MCHC 32.5 32.0 - 36.0 g/dL    RDW 13.1 11.5 - 14.5 %    Platelets 235 150 - 450 x10*3/uL   Basic metabolic panel   Result Value Ref Range    Glucose 77 74 - 99 mg/dL    Sodium 139 136 - 145 mmol/L    Potassium 3.8 3.5 - 5.3 mmol/L    Chloride 104 98 - 107 mmol/L    Bicarbonate 26 21 - 32 mmol/L    Anion Gap 13 10 - 20 mmol/L    Urea Nitrogen 16 6 - 23 mg/dL    Creatinine 0.81 0.50 - 1.05 mg/dL    eGFR 71 >60 mL/min/1.73m*2    Calcium 9.0 8.6 - 10.3 mg/dL       Medications:  Scheduled medications  brimonidine, 1 drop, Both Eyes, BID  divalproex, 250 mg, oral, q12h JESSICA  dorzolamide-timoloL, 1 drop, Both Eyes, BID  heparin (porcine), 5,000 Units,  "subcutaneous, q8h  latanoprost, 1 drop, Both Eyes, Nightly  venlafaxine, 37.5 mg, oral, q AM      Continuous medications     PRN medications  PRN medications: acetaminophen, hydrOXYzine HCL, LORazepam, QUEtiapine      Assessment/Plan:  Assessment & Plan  Seizure (Multi)  EEG was done no seizure activity was identified  Continue Depakote  Keppra was discontinued   Lactic acidosis  Continue monitoring  Vascular dementia  Continue supportive care  Hydroxyzine, Ativan and Seroquel as needed  End-stage glaucoma  Continue latanoprost drops    Discussed with patient, RN    Stef Rosales MD   Date: 03/12/25  Time: 9:38 AM    This note was partially created using voice recognition software and is inherently subject to errors including those of syntax and \"sound-alike\" substitutions which may escape proofreading. In such instances, original meaning may be extrapolated by contextual derivation    "

## 2025-03-12 NOTE — DOCUMENTATION CLARIFICATION NOTE
"    PATIENT:               BABS KUMAR  ACCT #:                  [id number]  MRN:                       [id number]  :                       1940  ADMIT DATE:       3/10/2025 1:33 PM  DISCH DATE:  RESPONDING PROVIDER #:        [id number]          PROVIDER RESPONSE TEXT:    Encephalopathy related to Seizure, postictal state    CDI QUERY TEXT:    Clarification    Instruction:    Based on your assessment of the patient and the clinical information, please provide the requested documentation by clicking on the appropriate radio button and enter any additional information if prompted.    Question: Please clarify the diagnosis of Encephalopathy as    When answering this query, please exercise your independent professional judgment. The fact that a question is being asked, does not imply that any particular answer is desired or expected.    The patient's clinical indicators include:  Clinical Information:  Admitted for Seizure.    Documented Diagnosis:  Neurology note 3/11 0921 AM states \"Encephalopathy, likely multifactorial\".    Clinical Indicators and Documentation:  -Vital Signs: T 36.5, , RR 20 w/pox 96 RA, /95 on 3/10.  -Baseline Mental Status: \"nurse is unsure of her baseline\" per ED physician note 3/10.  -Underlying cause of change in mental status: as above  -Imaging Results: CT Head \"No acute intracranial abnormality was identified\" on 3/10.  -Lab Values: Lactate VBG 5.9 \"likely secondary to seizure activity\" per IM on 3/10.  -Other: NA    Treatment: Neuro checks.  CT Head.  Ammonia.  Neurology consult.  Video EEG.  Depakote 250 mg PO on 3/11.  Keppra 1000 mg IV on 3/10.  Keppra 1500 mg IV on 3/10.  Keppra 500 mg IV Q12 hrs 3/11.  LR IVF continuous 3/10-3/11.    Risk Factors: Elderly female presenting w/AMS, hx of remote Seizure.  Options provided:  -- Encephalopathy related to Seizure, postictal state  -- Encephalopathy not related to Seizure, postictal state, Please indicate etiology of " Encephalopathy  -- Other - I will add my own diagnosis  -- Refer to Clinical Documentation Reviewer    Query created by: Kati Shields on 3/12/2025 10:43 AM      Electronically signed by:  TRUPTI MCKEON MD 3/12/2025 12:13 PM

## 2025-03-13 VITALS
WEIGHT: 116.84 LBS | HEIGHT: 67 IN | DIASTOLIC BLOOD PRESSURE: 59 MMHG | TEMPERATURE: 97.3 F | HEART RATE: 104 BPM | OXYGEN SATURATION: 96 % | SYSTOLIC BLOOD PRESSURE: 128 MMHG | RESPIRATION RATE: 18 BRPM | BODY MASS INDEX: 18.34 KG/M2

## 2025-03-13 LAB
ANION GAP SERPL CALC-SCNC: 15 MMOL/L (ref 10–20)
BUN SERPL-MCNC: 27 MG/DL (ref 6–23)
CALCIUM SERPL-MCNC: 8.9 MG/DL (ref 8.6–10.3)
CHLORIDE SERPL-SCNC: 101 MMOL/L (ref 98–107)
CO2 SERPL-SCNC: 24 MMOL/L (ref 21–32)
CREAT SERPL-MCNC: 0.93 MG/DL (ref 0.5–1.05)
EGFRCR SERPLBLD CKD-EPI 2021: 60 ML/MIN/1.73M*2
ERYTHROCYTE [DISTWIDTH] IN BLOOD BY AUTOMATED COUNT: 13.1 % (ref 11.5–14.5)
GLUCOSE SERPL-MCNC: 96 MG/DL (ref 74–99)
HCT VFR BLD AUTO: 38.2 % (ref 36–46)
HGB BLD-MCNC: 12.4 G/DL (ref 12–16)
MCH RBC QN AUTO: 30.1 PG (ref 26–34)
MCHC RBC AUTO-ENTMCNC: 32.5 G/DL (ref 32–36)
MCV RBC AUTO: 93 FL (ref 80–100)
NRBC BLD-RTO: 0 /100 WBCS (ref 0–0)
PLATELET # BLD AUTO: 265 X10*3/UL (ref 150–450)
POTASSIUM SERPL-SCNC: 4.1 MMOL/L (ref 3.5–5.3)
RBC # BLD AUTO: 4.12 X10*6/UL (ref 4–5.2)
SODIUM SERPL-SCNC: 136 MMOL/L (ref 136–145)
WBC # BLD AUTO: 7.1 X10*3/UL (ref 4.4–11.3)

## 2025-03-13 PROCEDURE — 2500000001 HC RX 250 WO HCPCS SELF ADMINISTERED DRUGS (ALT 637 FOR MEDICARE OP): Performed by: NURSE PRACTITIONER

## 2025-03-13 PROCEDURE — 36415 COLL VENOUS BLD VENIPUNCTURE: CPT | Performed by: NURSE PRACTITIONER

## 2025-03-13 PROCEDURE — 85027 COMPLETE CBC AUTOMATED: CPT | Performed by: NURSE PRACTITIONER

## 2025-03-13 PROCEDURE — 2500000004 HC RX 250 GENERAL PHARMACY W/ HCPCS (ALT 636 FOR OP/ED): Performed by: NURSE PRACTITIONER

## 2025-03-13 PROCEDURE — 99232 SBSQ HOSP IP/OBS MODERATE 35: CPT | Performed by: NURSE PRACTITIONER

## 2025-03-13 PROCEDURE — 82374 ASSAY BLOOD CARBON DIOXIDE: CPT | Performed by: NURSE PRACTITIONER

## 2025-03-13 PROCEDURE — 97161 PT EVAL LOW COMPLEX 20 MIN: CPT | Mod: GP | Performed by: PHYSICAL THERAPIST

## 2025-03-13 PROCEDURE — 2500000001 HC RX 250 WO HCPCS SELF ADMINISTERED DRUGS (ALT 637 FOR MEDICARE OP)

## 2025-03-13 RX ORDER — DIVALPROEX SODIUM 250 MG/1
250 TABLET, DELAYED RELEASE ORAL EVERY 12 HOURS SCHEDULED
Qty: 60 TABLET | Refills: 0 | Status: SHIPPED | OUTPATIENT
Start: 2025-03-13 | End: 2025-04-12

## 2025-03-13 RX ADMIN — HEPARIN SODIUM 5000 UNITS: 5000 INJECTION INTRAVENOUS; SUBCUTANEOUS at 14:00

## 2025-03-13 RX ADMIN — VENLAFAXINE 37.5 MG: 37.5 TABLET ORAL at 09:29

## 2025-03-13 RX ADMIN — BRIMONIDINE TARTRATE 1 DROP: 2 SOLUTION/ DROPS OPHTHALMIC at 09:29

## 2025-03-13 RX ADMIN — DIVALPROEX SODIUM 250 MG: 250 TABLET, DELAYED RELEASE ORAL at 09:29

## 2025-03-13 RX ADMIN — DORZOLAMIDE HYDROCHLORIDE AND TIMOLOL MALEATE 1 DROP: 20; 5 SOLUTION/ DROPS OPHTHALMIC at 09:29

## 2025-03-13 ASSESSMENT — COGNITIVE AND FUNCTIONAL STATUS - GENERAL
MOBILITY SCORE: 18
MOBILITY SCORE: 16
MOVING FROM LYING ON BACK TO SITTING ON SIDE OF FLAT BED WITH BEDRAILS: A LITTLE
CLIMB 3 TO 5 STEPS WITH RAILING: A LOT
DAILY ACTIVITIY SCORE: 13
MOVING FROM LYING ON BACK TO SITTING ON SIDE OF FLAT BED WITH BEDRAILS: A LITTLE
STANDING UP FROM CHAIR USING ARMS: A LITTLE
WALKING IN HOSPITAL ROOM: A LITTLE
TOILETING: A LOT
HELP NEEDED FOR BATHING: A LOT
WALKING IN HOSPITAL ROOM: A LOT
CLIMB 3 TO 5 STEPS WITH RAILING: A LITTLE
TURNING FROM BACK TO SIDE WHILE IN FLAT BAD: A LITTLE
MOVING TO AND FROM BED TO CHAIR: A LITTLE
PERSONAL GROOMING: A LOT
TURNING FROM BACK TO SIDE WHILE IN FLAT BAD: A LITTLE
MOVING TO AND FROM BED TO CHAIR: A LITTLE
DRESSING REGULAR LOWER BODY CLOTHING: A LOT
EATING MEALS: A LITTLE
STANDING UP FROM CHAIR USING ARMS: A LITTLE
DRESSING REGULAR UPPER BODY CLOTHING: A LOT

## 2025-03-13 ASSESSMENT — PAIN SCALES - GENERAL
PAINLEVEL_OUTOF10: 0 - NO PAIN

## 2025-03-13 ASSESSMENT — PAIN - FUNCTIONAL ASSESSMENT
PAIN_FUNCTIONAL_ASSESSMENT: 0-10

## 2025-03-13 NOTE — PROGRESS NOTES
"Fernanda Hughes is a 84 y.o. female on day 3 of admission presenting with Seizure (Multi).      Subjective   More alert and interactive today than yesterday.  Still drowsy but easily awakens to voice.       Objective     Last Recorded Vitals  Blood pressure 132/89, pulse 104, temperature 36.3 °C (97.3 °F), temperature source Temporal, resp. rate 18, height 1.702 m (5' 7\"), weight 53 kg (116 lb 13.5 oz), SpO2 99%.    Physical Exam  Neurological Exam    Mental Status  Arousable to verbal stimuli. Oriented to person, place and time. Speech is normal. Language is fluent with no aphasia.  Naming/repetition intact.     Cranial Nerves  CN III, IV, VI: Pupils equal round and reactive to light bilaterally.  CN V: Facial sensation is normal.  CN VII: Full and symmetric facial movement.  CN XII: Tongue midline without atrophy or fasciculations.  Blinks to bilateral visual threat.     Motor  Strength is 5/5 throughout all four extremities.    Sensory  Light touch is normal in upper and lower extremities.      Coordination  Right: Finger-to-nose normal.Left: Finger-to-nose normal.    Relevant Results  Scheduled medications  brimonidine, 1 drop, Both Eyes, BID  divalproex, 250 mg, oral, q12h JESSICA  dorzolamide-timoloL, 1 drop, Both Eyes, BID  heparin (porcine), 5,000 Units, subcutaneous, q8h  latanoprost, 1 drop, Both Eyes, Nightly  venlafaxine, 37.5 mg, oral, q AM      Continuous medications     PRN medications  PRN medications: acetaminophen, hydrOXYzine HCL, LORazepam, QUEtiapine  Results for orders placed or performed during the hospital encounter of 03/10/25 (from the past 24 hours)   Basic metabolic panel   Result Value Ref Range    Glucose 96 74 - 99 mg/dL    Sodium 136 136 - 145 mmol/L    Potassium 4.1 3.5 - 5.3 mmol/L    Chloride 101 98 - 107 mmol/L    Bicarbonate 24 21 - 32 mmol/L    Anion Gap 15 10 - 20 mmol/L    Urea Nitrogen 27 (H) 6 - 23 mg/dL    Creatinine 0.93 0.50 - 1.05 mg/dL    eGFR 60 (L) >60 mL/min/1.73m*2    " Calcium 8.9 8.6 - 10.3 mg/dL   CBC   Result Value Ref Range    WBC 7.1 4.4 - 11.3 x10*3/uL    nRBC 0.0 0.0 - 0.0 /100 WBCs    RBC 4.12 4.00 - 5.20 x10*6/uL    Hemoglobin 12.4 12.0 - 16.0 g/dL    Hematocrit 38.2 36.0 - 46.0 %    MCV 93 80 - 100 fL    MCH 30.1 26.0 - 34.0 pg    MCHC 32.5 32.0 - 36.0 g/dL    RDW 13.1 11.5 - 14.5 %    Platelets 265 150 - 450 x10*3/uL     Vascular US Lower Extremity Venous Duplex Bilateral    Result Date: 3/11/2025            [facility] [address] [place] [address]     Tel [phone] Fax [phone]  Vascular Lab Report  VASC US LOWER EXTREMITY VENOUS DUPLEX BILATERAL Patient Name:     BABS Will Physician: [id number] Antonio MD Study Date:       3/11/2025           Ordering Provider: [id number] FELIPE ZAZUETA MRN/PID:          [id number]            Fellow: Accession#:       [id number]        Technologist:      Danielle Gtz , T Date of           1940 / 84      Technologist 2: Birth/Age:        years Gender:           F                   Encounter#:        [id number] Admission Status: Inpatient           Location           [facility]                                       Performed:  Diagnosis/ICD: Localized (leg) edema-R60.0 Indication:    Limb edema CPT Codes:     81915 Peripheral venous duplex scan for DVT complete  Pertinent History: LE Edema.  CONCLUSIONS: Right Lower Venous: No evidence of acute deep vein thrombus visualized in the right lower extremity. Left Lower Venous: No evidence of acute deep vein thrombus visualized in the left lower extremity.  Imaging & Doppler Findings:  Right                 Compressible Thrombus        Flow Distal External Iliac     Yes        None   Spontaneous/Phasic CFV                       Yes        None   Spontaneous/Phasic PFV                       Yes        None FV Proximal               Yes        None   Spontaneous/Phasic FV  Mid                    Yes        None FV Distal                 Yes        None Popliteal                 Yes        None   Spontaneous/Phasic Peroneal                  Yes        None PTV                       Yes        None  Left                  Compress Thrombus        Flow Distal External Iliac   Yes      None   Spontaneous/Phasic CFV                     Yes      None   Spontaneous/Phasic PFV                     Yes      None FV Proximal             Yes      None   Spontaneous/Phasic FV Mid                  Yes      None FV Distal               Yes      None Popliteal               Yes      None   Spontaneous/Phasic Peroneal                Yes      None PTV                     Yes      None  [id number] SCOTT Antonio MD Electronically signed by [id number] SCOTT Antonio MD on 3/11/2025 at 3:40:40 PM  ** Final **     EEG    IMPRESSION Impression This EEG is indicative of right hemisphere structural lesion. No epileptiform discharges or seizures are noted. EEG shows 10-15% awake state only. A full report will be scanned into the patient's chart at a later time. This report has been interpreted and electronically signed by    Electrocardiogram, 12-lead PRN ACS symptoms    Result Date: 3/11/2025  Normal sinus rhythm Possible Left atrial enlargement Borderline ECG When compared with ECG of 10-MAR-2025 14:42, (unconfirmed) No significant change was found    XR chest 1 view    Result Date: 3/10/2025  Interpreted By:  Carlin Hester, STUDY: XR CHEST 1 VIEW;  3/10/2025 3:06 pm   INDICATION: Signs/Symptoms:AMS.     COMPARISON: 02/12/2025.   ACCESSION NUMBER(S): [id number]   ORDERING CLINICIAN: BUCKY LOPEZ   FINDINGS: CARDIOMEDIASTINAL SILHOUETTE: Aortic calcifications are again seen. Cardiac silhouette is stable and not significantly enlarged.   LUNGS: Lungs are clear.  No pleural effusion or pneumothorax.   ABDOMEN: Right upper quadrant surgical clips are present.   BONES: No acute osseous changes.       1.  No evidence  of acute cardiopulmonary process.       MACRO: None.   Signed by: Carlin Hester 3/10/2025 3:11 PM Dictation workstation:   [id number]    ECG 12 lead    Result Date: 3/10/2025  Sinus tachycardia Possible Left atrial enlargement Minimal voltage criteria for LVH, may be normal variant ( Sargeant product ) Borderline ECG When compared with ECG of 10-MAR-2025 13:38, (unconfirmed) T wave inversion no longer evident in Lateral leads    CT head wo IV contrast    Result Date: 3/10/2025  Interpreted By:  Sobia Darnell, STUDY: CT HEAD WO IV CONTRAST;  3/10/2025 2:17 pm   INDICATION: Trauma. Signs/Symptoms:Altered mental status, seizure history.   COMPARISON: CT head without contrast dated 02/12/2025   ACCESSION NUMBER(S): [id number]   ORDERING CLINICIAN: BUCKY LOPEZ   TECHNIQUE: Axial noncontrast head CT   FINDINGS: Parenchyma: There is mild diffuse cerebral volume loss with widening of the sulci and ex vacuo dilatation of ventricles. There is mild diffuse periventricular white matter hypoattenuation in bilateral cerebral hemispheres, likely favored to be related to chronic white matter ischemic changes. The grey-white differentiation is intact. There is no mass effect or midline shift.  There is no intracranial hemorrhage.   CSF Spaces: The ventricles, sulci and basal cisterns are within normal limits. There is no extraaxial fluid collection.   Calvarium: No evidence of fracture was identified.   Paranasal sinuses and mastoids: Visualized paranasal sinuses and mastoids are clear.       No acute intracranial abnormality was identified.   MACRO: None   Signed by: Sobia Darnell 3/10/2025 2:36 PM Dictation workstation:   [id number]    ECG 12 lead    Result Date: 3/10/2025  Sinus tachycardia Possible Left atrial enlargement Minimal voltage criteria for LVH, may be normal variant ( Sargeant product ) Possible Lateral infarct (cited on or before 10-MAR-2025) Abnormal ECG When compared with ECG of 12-FEB-2025 00:38, No  significant change was found    ECG 12 lead    Result Date: 2/21/2025  Normal sinus rhythm Normal ECG When compared with ECG of 11-FEB-2025 23:36, No significant change was found Confirmed by Triston Vegas ([id number]) on 2/21/2025 7:58:46 PM    ECG 12 lead    Result Date: 2/13/2025  Normal sinus rhythm Normal ECG When compared with ECG of 02-DEC-2024 18:18, No significant change was found Confirmed by Triston Vegas ([id number]) on 2/13/2025 8:56:24 PM    CT head wo IV contrast    Result Date: 2/12/2025  Interpreted By:  Lashaun Nixon, STUDY: CT HEAD WO IV CONTRAST; CT CERVICAL SPINE WO IV CONTRAST;  2/12/2025 1:37 am   INDICATION: Signs/Symptoms:Unwitnessed fall, dementia.   COMPARISON: 9/2/2024   ACCESSION NUMBER(S): [id number]; [id number]   ORDERING CLINICIAN: JOAN SRIVASTAVA   TECHNIQUE: Noncontrast CT images of head. Axial noncontrast CT images of the cervical spine with coronal and sagittal reconstructed images.   FINDINGS: BRAIN PARENCHYMA: Gray-white matter interfaces are preserved. No mass effect or midline shift. Generalized parenchymal volume loss noted with concordant ventricular enlargement. Non-specific white matter changes noted, which may be related to small vessel disease.   HEMORRHAGE: No acute intracranial hemorrhage. VENTRICLES and EXTRA-AXIAL SPACES: Normal size. EXTRACRANIAL SOFT TISSUES: Within normal limits. PARANASAL SINUSES/MASTOIDS: The visualized paranasal sinuses and mastoid air cells are aerated. CALVARIUM: No depressed skull fracture. No destructive osseous lesion.   OTHER FINDINGS: None.   CERVICAL SPINE:   ALIGNMENT: Normal. VERTEBRAE: No acute fracture. SPINAL CANAL: Degenerative changes facet and uncinate arthropathy, as well as disc space narrowing and end plate hypertrophy. No critical spinal canal stenosis. PREVERTEBRAL SOFT TISSUES: No prevertebral soft tissue swelling. LUNG APICES: Biapical scarring.   OTHER FINDINGS: 1.8 x 1.4 cm midthyroid cystic lesion       No acute intracranial  abnormality.   No acute fracture or traumatic subluxation of the cervical spine.   1.8 x 1.4 cm midthyroid cystic lesion. Dedicated ultrasound imaging may be obtained on a non-emergent basis if not already performed.   MACRO: Incidental Finding:  There are few small hypoattenuating nodules measuring equal to or greater than 1.5 cm in the thyroid gland. (**-YCF-**)   Instructions:  Further evaluation with nonemergent thyroid ultrasound. (Managing Incidental Thyroid Nodules Detected on Imaging: White Paper of the ACR Incidental Thyroid Findings Committee. Gerri Nelson et al. Journal of the American College of Radiology,Volume 12, Issue 2, 143 - 150.) [email or web address]   Signed by: Lashaun Nixon 2/12/2025 2:51 AM Dictation workstation:   [id number]    CT cervical spine wo IV contrast    Result Date: 2/12/2025  Interpreted By:  Lashaun Nixon, STUDY: CT HEAD WO IV CONTRAST; CT CERVICAL SPINE WO IV CONTRAST;  2/12/2025 1:37 am   INDICATION: Signs/Symptoms:Unwitnessed fall, dementia.   COMPARISON: 9/2/2024   ACCESSION NUMBER(S): [id number]; [id number]   ORDERING CLINICIAN: JOAN SRIVASTAVA   TECHNIQUE: Noncontrast CT images of head. Axial noncontrast CT images of the cervical spine with coronal and sagittal reconstructed images.   FINDINGS: BRAIN PARENCHYMA: Gray-white matter interfaces are preserved. No mass effect or midline shift. Generalized parenchymal volume loss noted with concordant ventricular enlargement. Non-specific white matter changes noted, which may be related to small vessel disease.   HEMORRHAGE: No acute intracranial hemorrhage. VENTRICLES and EXTRA-AXIAL SPACES: Normal size. EXTRACRANIAL SOFT TISSUES: Within normal limits. PARANASAL SINUSES/MASTOIDS: The visualized paranasal sinuses and mastoid air cells are aerated. CALVARIUM: No depressed skull fracture. No destructive osseous lesion.   OTHER FINDINGS: None.   CERVICAL SPINE:   ALIGNMENT: Normal. VERTEBRAE: No acute fracture. SPINAL CANAL:  Degenerative changes facet and uncinate arthropathy, as well as disc space narrowing and end plate hypertrophy. No critical spinal canal stenosis. PREVERTEBRAL SOFT TISSUES: No prevertebral soft tissue swelling. LUNG APICES: Biapical scarring.   OTHER FINDINGS: 1.8 x 1.4 cm midthyroid cystic lesion       No acute intracranial abnormality.   No acute fracture or traumatic subluxation of the cervical spine.   1.8 x 1.4 cm midthyroid cystic lesion. Dedicated ultrasound imaging may be obtained on a non-emergent basis if not already performed.   MACRO: Incidental Finding:  There are few small hypoattenuating nodules measuring equal to or greater than 1.5 cm in the thyroid gland. (**-YCF-**)   Instructions:  Further evaluation with nonemergent thyroid ultrasound. (Managing Incidental Thyroid Nodules Detected on Imaging: White Paper of the ACR Incidental Thyroid Findings Committee. Gerri Nelson et al. Journal of the American College of Radiology,Volume 12, Issue 2, 143  150.) [email or web address]   Signed by: Lashaun Nixon 2/12/2025 2:51 AM Dictation workstation:   [id number]    XR femur left 2+ views    Result Date: 2/12/2025  Interpreted By:  Lashaun Nixon, STUDY: XR PELVIS 1-2 VIEWS; XR FEMUR LEFT 2+ VIEWS; ;  2/12/2025 1:30 am   INDICATION: Signs/Symptoms:Unwitnessed fall, left hip and buttock pain; Signs/Symptoms:Unwitnessed fall, left hip and leg pain.     COMPARISON: 12/02/2024   ACCESSION NUMBER(S): [id number]; [id number]   ORDERING CLINICIAN: JOAN SRIVASTAVA   FINDINGS: AP pelvis, 2 views of the left femur   Intramedullary nail and screw seen in the left hip. No acute fracture or dislocation. Scattered degenerative changes.       No acute osseous abnormality identified.   MACRO: None   Signed by: Lashaun Nixon 2/12/2025 2:25 AM Dictation workstation:   [id number]    XR pelvis 1-2 views    Result Date: 2/12/2025  Interpreted By:  Lashaun Nixon, STUDY: XR PELVIS 1-2 VIEWS; XR FEMUR LEFT 2+ VIEWS; ;   2/12/2025 1:30 am   INDICATION: Signs/Symptoms:Unwitnessed fall, left hip and buttock pain; Signs/Symptoms:Unwitnessed fall, left hip and leg pain.     COMPARISON: 12/02/2024   ACCESSION NUMBER(S): [id number]; [id number]   ORDERING CLINICIAN: JOAN SRIVASTAVA   FINDINGS: AP pelvis, 2 views of the left femur   Intramedullary nail and screw seen in the left hip. No acute fracture or dislocation. Scattered degenerative changes.       No acute osseous abnormality identified.   MACRO: None   Signed by: Lashaun Nixon 2/12/2025 2:25 AM Dictation workstation:   [id number]    XR chest 1 view    Result Date: 2/12/2025  Interpreted By:  Lashaun Nixon, STUDY: XR CHEST 1 VIEW;  2/12/2025 1:30 am   INDICATION: Signs/Symptoms:Unwitnessed fall, dementia.     COMPARISON: 09/02/2024   ACCESSION NUMBER(S): [id number]   ORDERING CLINICIAN: JOAN SRIVASTAVA   FINDINGS: AP radiograph of the chest was provided.       CARDIOMEDIASTINAL SILHOUETTE: Cardiomediastinal silhouette is stable in size and configuration.   LUNGS: Hyperinflated lung fields. No focal consolidation, pleural effusion or sizable pneumothorax seen.   ABDOMEN: No remarkable upper abdominal findings.   BONES: No acute osseous changes.       Emphysema/COPD. No focal consolidation.   MACRO: None   Signed by: Lashaun Nixon 2/12/2025 2:19 AM Dictation workstation:     [id number]     EEG    Result Date: 3/11/2025  IMPRESSION Impression This EEG is indicative of right hemisphere structural lesion. No epileptiform discharges or seizures are noted. EEG shows 10-15% awake state only. A full report will be scanned into the patient's chart at a later time. This report has been interpreted and electronically signed by    Assessment & Plan  Hypertensive encephalopathy    Vascular dementia    End-stage glaucoma    Seizure (Multi)    Lactic acidosis    Seizure like episode with possible postictal state.  Hypertensive emergency vs postictal HTN.  Encephalopathy, likely  multifactorial.  Hx of seizure like activity, not on anti-seizure medication prior to admission.     Plan:    Continue to monitor on Depakote 250mg BID- if she continues to be drowsy after one week, may require dose readjustment.  Seizure and fall precautions.  Neuro checks every 4 hours.  PT evaluation    Follow up with Jyoti TALAVERA NP in clinic in 4 to 6 weeks.    Case/plan discussed with Dr. Uribe.  No further needs from neurology; okay for transfer or discharge as per primary team. Please contact if condition changes for re-eval.    I spent 50 minutes in the professional and overall care of this patient.      Jyoti Odell, APRN-CNP

## 2025-03-13 NOTE — NURSING NOTE
RN Spoke with Bonnie who states she is the director of memory care unit and wanted to check in on patient.     Bonnie is bedside

## 2025-03-13 NOTE — DISCHARGE INSTRUCTIONS
- seizure precautions: no driving, operating heavy machinery, baths, swimming alone, or climbing ladders

## 2025-03-13 NOTE — NURSING NOTE
Report called to [phone] patient is a returning resident.  Spoke with Conchita, all questions and concerns were addressed during report.    Facility requested a fax for Depakote script be sent to fax [phone]. Jw CACERES sent

## 2025-03-13 NOTE — PROGRESS NOTES
03/13/25 6902   Discharge Planning   Who is requesting discharge planning? Provider   Home or Post Acute Services Post acute facilities (Rehab/SNF/etc)   Type of Post Acute Facility Services Assisted living   Expected Discharge Disposition Home   Does the patient need discharge transport arranged? Yes   RoundTrip coordination needed? Yes   Has discharge transport been arranged? No     Called and spoke with Deepali from [facility] unit to make sure that it is ok for patient to return to facility. Also, a representative from facility came to hospital to see patient. Per Deepali, patient is good to return to facility and also informed them that patient would be returning with [facility]. Numbers received for nurse to call report and will have unit secretary set up transport. Attending is aware of patient's discharge. CT team to follow patient.

## 2025-03-13 NOTE — PROGRESS NOTES
Gastric Intubation/Gastric Lavage Physical Therapy    Physical Therapy Evaluation    Patient Name: Fernanda Hughes  MRN: [id number]  Today's Date: 3/13/2025   Time Calculation  Start Time: 1404  Stop Time: 1419  Time Calculation (min): 15 min  2101/2101-A    Assessment/Plan   PT Assessment  PT Assessment Results: Decreased strength, Decreased endurance, Impaired balance, Decreased mobility  Rehab Prognosis: Good  Evaluation/Treatment Tolerance: Patient tolerated treatment well  Medical Staff Made Aware: Yes  End of Session Communication: Bedside nurse  Assessment Comment: Pt is a 84 y.o. female admitted for Seizure (Multi) [R56.9]  Edema of both legs [R60.0] on 3/10/2025. Pt below functional level and will benefit from skilled therapy during stay to improve overall functional mobility, strength, ROM, endurance and safety awareness. Upon discharge pt will benefit from low intensity therapy for continued improvement in functional mobility. Therapy will continue to follow and reassess each session.      End of Session Patient Position: Up in chair, Alarm on  IP OR SWING BED PT PLAN  Inpatient or Swing Bed: Inpatient  PT Plan  Treatment/Interventions: Bed mobility, Transfer training, Gait training, Stair training, Balance training, Endurance training, Strengthening, Therapeutic exercise, Therapeutic activity  PT Plan: Ongoing PT  PT Frequency: 3 times per week  PT Discharge Recommendations: Low intensity level of continued care  Equipment Recommended upon Discharge: Wheeled walker  PT Recommended Transfer Status: Assist x1  PT - OK to Discharge: Yes    Subjective     Current Problem:  1. Seizure (Multi)  EEG    divalproex (Depakote) 250 mg EC tablet      2. Edema of both legs  Vascular US Lower Extremity Venous Duplex Bilateral    Vascular US Lower Extremity Venous Duplex Bilateral        Patient Active Problem List   Diagnosis    Vision disturbance    UTI (urinary tract infection)    Vascular dementia    Confusion and disorientation    Hematoma of left  buttock    Falls, sequela    Acute on chronic anemia    Ambulatory dysfunction    End-stage glaucoma    Seizure (Multi)    Lactic acidosis    Hypertensive encephalopathy       General Visit Information:  General  Reason for Referral: impaired mobiltiy  Referred By: Dr. Rosales  Prior to Session Communication: Bedside nurse  Patient Position Received: Bed, 3 rail up  Preferred Learning Style: kinesthetic, verbal  General Comment: Pt agreeable to therapy.    Home Living:  Home Living  Type of Home: Assisted living    Prior Level of Function:  Prior Function Per Pt/Caregiver Report  Level of [place]: Independent with ADLs and functional transfers, Independent with homemaking with ambulation    Precautions:  Precautions  Medical Precautions: Fall precautions    Vital Signs:     Objective     Pain:  Pain Assessment  Pain Assessment: 0-10  0-10 (Numeric) Pain Score: 0 - No pain    Cognition:  Cognition  Overall Cognitive Status: Within Functional Limits  Orientation Level: Disoriented to situation, Disoriented to place, Disoriented to time    General Assessments:      Activity Tolerance  Endurance: Tolerates 10 - 20 min exercise with multiple rests                 Static Sitting Balance  Static Sitting-Comment/Number of Minutes: good  Dynamic Sitting Balance  Dynamic Sitting-Comments: good  Static Standing Balance  Static Standing-Comment/Number of Minutes: fair+  Dynamic Standing Balance  Dynamic Standing-Comments: fair+    Functional Assessments:     Bed Mobility  Bed Mobility: Yes  Bed Mobility 1  Bed Mobility 1: Supine to sitting  Level of Assistance 1: Close supervision  Transfers  Transfer: Yes  Transfer 1  Technique 1: Sit to stand, Stand to sit  Transfer Device 1: Walker, Gait belt  Transfer Level of Assistance 1: Contact guard  Ambulation/Gait Training  Ambulation/Gait Training Performed: Yes  Ambulation/Gait Training 1  Surface 1: Level tile  Device 1: Rolling walker  Assistance 1: Contact  guard  Comments/Distance (ft) 1: 150 ft, guidance with walker but no overt LOB          Extremity/Trunk Assessments:        RLE   RLE : Within Functional Limits  LLE   LLE : Within Functional Limits    Outcome Measures:     [facility] Basic Mobility  Turning from your back to your side while in a flat bed without using bedrails: A little  Moving from lying on your back to sitting on the side of a flat bed without using bedrails: A little  Moving to and from bed to chair (including a wheelchair): A little  Standing up from a chair using your arms (e.g. wheelchair or bedside chair): A little  To walk in hospital room: A little  Climbing 3-5 steps with railing: A little  Basic Mobility - Total Score: 18       Goals:  Encounter Problems       Encounter Problems (Active)       PT Problem       Pt will demonstrate mod I for all bed mobility         Start:  03/13/25    Expected End:  03/27/25            Pt will demonstrate mod I for all transfers with WW        Start:  03/13/25    Expected End:  03/27/25            Pt will ambulate 250 ft with WW and mod I.        Start:  03/13/25    Expected End:  03/27/25            Pt will demonstrate good dynamic standing balance while performing a functional task.         Start:  03/13/25    Expected End:  03/27/25                 Education Documentation  Body Mechanics, taught by Aishwarya Woodard PT at 3/13/2025  2:54 PM.  Learner: Patient  Readiness: Acceptance  Method: Explanation  Response: Verbalizes Understanding, Needs Reinforcement    Precautions, taught by Aishwarya Woodard, PT at 3/13/2025  2:54 PM.  Learner: Patient  Readiness: Acceptance  Method: Explanation  Response: Verbalizes Understanding, Needs Reinforcement    Mobility Training, taught by Aishwarya Woodard, PT at 3/13/2025  2:54 PM.  Learner: Patient  Readiness: Acceptance  Method: Explanation  Response: Verbalizes Understanding, Needs Reinforcement    Education Comments  No comments found.

## 2025-03-13 NOTE — PROGRESS NOTES
Spiritual Care Visit  Spiritual Care Request    Reason for Visit:  Routine Visit: Introduction     Request Received From:       Focus of Care:  Visited With: Patient         Refer to :          Spiritual Care Assessment    Spiritual Assessment:                      Care Provided:  Intended Effects: Promote sense of peace, Preserve dignity and respect, Meaning-making  Methods: Offer spiritual/Catholic support  Interventions: Share words of hope and inspiration, [place]    Sense of Community and or Sikh Affiliation:  Moravian         Addressed Needs/Concerns and/or [place] Through:          Outcome:        Advance Directives:         Spiritual Care Annotation    Annotation:   was a supportive presence and prayed.

## 2025-03-13 NOTE — NURSING NOTE
RN called Deepali, POA and updated on plan of care and  time to Facility.  All questions and concerns addressed at time of phone call

## 2025-03-13 NOTE — CARE PLAN
Patient remained free from injury throughout shift. Patient also remained confused however easy to redirect. Patient remained HDS throughout shift. Patient will be [facility] at 6pm.  Patient safety maintained.     The clinical goals for the shift include patient will remain HDS throughout shift      Problem: Skin  Goal: Decreased wound size/increased tissue granulation at next dressing change  Outcome: Met  Goal: Participates in plan/prevention/treatment measures  Outcome: Met  Goal: Prevent/manage excess moisture  Outcome: Met  Goal: Prevent/minimize sheer/friction injuries  Outcome: Met  Goal: Promote/optimize nutrition  Outcome: Met  Goal: Promote skin healing  Outcome: Met     Problem: Fall/Injury  Goal: Be free from injury by end of the shift  Outcome: Met  Goal: Verbalize understanding of personal risk factors for fall in the hospital  Outcome: Met  Goal: Verbalize understanding of risk factor reduction measures to prevent injury from fall in the home  Outcome: Met  Goal: Use assistive devices by end of the shift  Outcome: Met  Goal: Pace activities to prevent fatigue by end of the shift  Outcome: Met

## 2025-03-14 NOTE — DOCUMENTATION CLARIFICATION NOTE
"    PATIENT:               BABS KUMAR  ACCT #:                  [id number]  MRN:                       [id number]  :                       1940  ADMIT DATE:       3/10/2025 1:33 PM  DISCH DATE:        3/13/2025 7:30 PM  RESPONDING PROVIDER #:        [id number]          PROVIDER RESPONSE TEXT:    Non-infectious SIRS with acute organ dysfunction of Lactic Acidosis    CDI QUERY TEXT:    Clarification    Instruction:    Based on your assessment of the patient and the clinical information, please provide the requested documentation by clicking on the appropriate radio button and enter any additional information if prompted.    Question: Please further clarify if there is a diagnosis associated with the above clinical findings    When answering this query, please exercise your independent professional judgment. The fact that a question is being asked, does not imply that any particular answer is desired or expected.    The patient's clinical indicators include:  Clinical Information:  Admitted for Seizure.    Clinical Indicators:  VS 3/10: T 36.5-.37.5, HR , RR 15-34 w/pox  RA-4L, //95.    Labs 3/10: Serum lactate 2.2, 2.4, 1.8,  VBG lactate 5.9.  Troponin 14, 17.    HP by IM 3/11 0855 AM states \"Seizure\", \"Lactic acidosis\".    Neurology note 3/11 0921 AM states \"mild elevated troponin is likely demand ischemia vs underlying cardiac disease, her desaturation is possibly 2/2 seizure, aspiration or cardiac event.\"    Treatment: Neuro checks. Serum lactate trend.  Lactate on VBG.  Troponin trend x2.  EKG.  Telemetry.  Video EEG.  CT Head.  Neurology consult.  Depakote 250 mg PO on 3/11.  Keppra 1000 mg IV on 3/10.  Keppra 1500 mg IV on 3/10.  Keppra 500 mg IV Q12 hrs 3/11.  LR IVF continuous 3/10-3/11.    Risk Factors: Elderly female w/remote hx of Seizure, AMS presenting with elevated lactate and troponins.  Options provided:  -- Non-infectious SIRS with acute organ dysfunction of Lactic " Acidosis  -- Non-infectious SIRS with acute organ dysfunction of Type 2 MI  -- Non-infectious SIRS with acute organ dysfunction of Lactic Acidosis and Type 2 MI  -- Non-infectious SIRS without acute organ dysfunction  -- No Non-infectious SIRS  -- Other - I will add my own diagnosis  -- Refer to Clinical Documentation Reviewer    Query created by: Kati Shields on 3/12/2025 10:28 AM      Electronically signed by:  JAMEEL PYLE MD 3/14/2025 10:19 AM

## 2025-03-16 NOTE — DISCHARGE SUMMARY
Discharge Diagnosis  Seizure (Multi)  Lactic acidosis  Vascular dementia  End-stage glaucoma  Hypertensive encephalopathy      Discharge Meds     Medication List      START taking these medications     divalproex 250 mg EC tablet; Commonly known as: Depakote; Take 1 tablet   (250 mg) by mouth every 12 hours. Do not crush, chew, or split.     CONTINUE taking these medications     acetaminophen 325 mg tablet; Commonly known as: Tylenol   brimonidine 0.2 % ophthalmic solution; Commonly known as: AlphaGAN   dorzolamide-timoloL 22.3-6.8 mg/mL ophthalmic solution; Commonly known   as: Cosopt   hydrOXYzine HCL 25 mg tablet; Commonly known as: Atarax   latanoprost 0.005 % ophthalmic solution; Commonly known as: Xalatan   QUEtiapine 25 mg tablet; Commonly known as: SEROquel; Take 0.5 tablets   (12.5 mg) by mouth every 8 hours if needed (Agitation (hold for   drowsiness/lethargy)).   venlafaxine 37.5 mg tablet; Commonly known as: Effexor     STOP taking these medications     mirtazapine 15 mg tablet; Commonly known as: Remeron       Test Results Pending At Discharge  Pending Labs       No current pending labs.            Hospital Course  Patient is 84-year-old female admitted to the hospital with seizure-like activity, patient received Keppra, and she was evaluated by neurology, she had EEG and MRI, patient was switched to Depakote and was discharged to follow-up as outpatient      Pertinent Physical Exam At Time of Discharge  Physical Exam  Constitutional:       Appearance: She is normal weight.   HENT:      Head: Normocephalic and atraumatic.   Eyes:      Conjunctiva/sclera: Conjunctivae normal.      Pupils: Pupils are equal, round, and reactive to light.   Cardiovascular:      Rate and Rhythm: Normal rate and regular rhythm.      Pulses: Normal pulses.      Heart sounds: Normal heart sounds.   Pulmonary:      Effort: Pulmonary effort is normal.      Breath sounds: Normal breath sounds.   Abdominal:      General: Abdomen is  flat. Bowel sounds are normal.   Neurological:      General: No focal deficit present.      Mental Status: She is alert. Mental status is at baseline.         Outpatient Follow-Up  No future appointments.      Stef Rosales MD

## 2025-03-19 LAB
ATRIAL RATE: 100 BPM
ATRIAL RATE: 105 BPM
ATRIAL RATE: 116 BPM
P AXIS: 53 DEGREES
P AXIS: 66 DEGREES
P AXIS: 72 DEGREES
P OFFSET: 204 MS
P OFFSET: 204 MS
P OFFSET: 205 MS
P ONSET: 142 MS
P ONSET: 145 MS
P ONSET: 158 MS
PR INTERVAL: 122 MS
PR INTERVAL: 146 MS
PR INTERVAL: 154 MS
Q ONSET: 218 MS
Q ONSET: 219 MS
Q ONSET: 219 MS
QRS COUNT: 16 BEATS
QRS COUNT: 18 BEATS
QRS COUNT: 19 BEATS
QRS DURATION: 72 MS
QRS DURATION: 78 MS
QRS DURATION: 86 MS
QT INTERVAL: 320 MS
QT INTERVAL: 328 MS
QT INTERVAL: 334 MS
QTC CALCULATION(BAZETT): 430 MS
QTC CALCULATION(BAZETT): 433 MS
QTC CALCULATION(BAZETT): 444 MS
QTC FREDERICIA: 395 MS
QTC FREDERICIA: 396 MS
QTC FREDERICIA: 398 MS
R AXIS: 26 DEGREES
R AXIS: 78 DEGREES
R AXIS: 79 DEGREES
T AXIS: 80 DEGREES
T AXIS: 81 DEGREES
T AXIS: 81 DEGREES
T OFFSET: 378 MS
T OFFSET: 383 MS
T OFFSET: 386 MS
VENTRICULAR RATE: 100 BPM
VENTRICULAR RATE: 105 BPM
VENTRICULAR RATE: 116 BPM

## 2025-04-15 ENCOUNTER — APPOINTMENT (OUTPATIENT)
Dept: RADIOLOGY | Facility: HOSPITAL | Age: 85
DRG: 078 | End: 2025-04-15
Payer: MEDICARE

## 2025-04-15 ENCOUNTER — APPOINTMENT (OUTPATIENT)
Dept: CARDIOLOGY | Facility: HOSPITAL | Age: 85
DRG: 078 | End: 2025-04-15
Payer: MEDICARE

## 2025-04-15 ENCOUNTER — HOSPITAL ENCOUNTER (INPATIENT)
Facility: HOSPITAL | Age: 85
Discharge: NURSING, REHAB OR LONG-TERM CARE | DRG: 078 | End: 2025-04-15
Attending: EMERGENCY MEDICINE | Admitting: INTERNAL MEDICINE
Payer: MEDICARE

## 2025-04-15 DIAGNOSIS — I67.4 HYPERTENSIVE ENCEPHALOPATHY: ICD-10-CM

## 2025-04-15 DIAGNOSIS — R56.9 SEIZURE (MULTI): Primary | ICD-10-CM

## 2025-04-15 DIAGNOSIS — I10 PRIMARY HYPERTENSION: ICD-10-CM

## 2025-04-15 PROBLEM — D72.829 LEUKOCYTOSIS: Status: ACTIVE | Noted: 2025-04-15

## 2025-04-15 PROBLEM — E87.1 HYPONATREMIA: Status: ACTIVE | Noted: 2025-04-15

## 2025-04-15 LAB
ALBUMIN SERPL BCP-MCNC: 4.8 G/DL (ref 3.4–5)
ALP SERPL-CCNC: 69 U/L (ref 33–136)
ALT SERPL W P-5'-P-CCNC: 10 U/L (ref 7–45)
AMMONIA PLAS-SCNC: 19 UMOL/L (ref 16–53)
ANION GAP SERPL CALC-SCNC: 14 MMOL/L (ref 10–20)
AST SERPL W P-5'-P-CCNC: 45 U/L (ref 9–39)
BASOPHILS # BLD AUTO: 0.03 X10*3/UL (ref 0–0.1)
BASOPHILS NFR BLD AUTO: 0.2 %
BILIRUB SERPL-MCNC: 0.9 MG/DL (ref 0–1.2)
BNP SERPL-MCNC: 246 PG/ML (ref 0–99)
BUN SERPL-MCNC: 16 MG/DL (ref 6–23)
CALCIUM SERPL-MCNC: 9.8 MG/DL (ref 8.6–10.3)
CARDIAC TROPONIN I PNL SERPL HS: 20 NG/L (ref 0–13)
CARDIAC TROPONIN I PNL SERPL HS: 23 NG/L (ref 0–13)
CHLORIDE SERPL-SCNC: 90 MMOL/L (ref 98–107)
CO2 SERPL-SCNC: 26 MMOL/L (ref 21–32)
CREAT SERPL-MCNC: 0.77 MG/DL (ref 0.5–1.05)
EGFRCR SERPLBLD CKD-EPI 2021: 75 ML/MIN/1.73M*2
EOSINOPHIL # BLD AUTO: 0.07 X10*3/UL (ref 0–0.4)
EOSINOPHIL NFR BLD AUTO: 0.5 %
ERYTHROCYTE [DISTWIDTH] IN BLOOD BY AUTOMATED COUNT: 12.2 % (ref 11.5–14.5)
GLUCOSE BLD MANUAL STRIP-MCNC: 103 MG/DL (ref 74–99)
GLUCOSE SERPL-MCNC: 91 MG/DL (ref 74–99)
HCT VFR BLD AUTO: 45.5 % (ref 36–46)
HGB BLD-MCNC: 15.9 G/DL (ref 12–16)
HOLD SPECIMEN: NORMAL
IMM GRANULOCYTES # BLD AUTO: 0.04 X10*3/UL (ref 0–0.5)
IMM GRANULOCYTES NFR BLD AUTO: 0.3 % (ref 0–0.9)
LACTATE SERPL-SCNC: 1.6 MMOL/L (ref 0.4–2)
LYMPHOCYTES # BLD AUTO: 1.17 X10*3/UL (ref 0.8–3)
LYMPHOCYTES NFR BLD AUTO: 8.1 %
MAGNESIUM SERPL-MCNC: 2.19 MG/DL (ref 1.6–2.4)
MCH RBC QN AUTO: 29.6 PG (ref 26–34)
MCHC RBC AUTO-ENTMCNC: 34.9 G/DL (ref 32–36)
MCV RBC AUTO: 85 FL (ref 80–100)
MONOCYTES # BLD AUTO: 1.01 X10*3/UL (ref 0.05–0.8)
MONOCYTES NFR BLD AUTO: 7 %
NEUTROPHILS # BLD AUTO: 12.08 X10*3/UL (ref 1.6–5.5)
NEUTROPHILS NFR BLD AUTO: 83.9 %
NRBC BLD-RTO: 0 /100 WBCS (ref 0–0)
PLATELET # BLD AUTO: 237 X10*3/UL (ref 150–450)
POTASSIUM SERPL-SCNC: 4.1 MMOL/L (ref 3.5–5.3)
POTASSIUM SERPL-SCNC: 6.3 MMOL/L (ref 3.5–5.3)
PROT SERPL-MCNC: 7.8 G/DL (ref 6.4–8.2)
RBC # BLD AUTO: 5.38 X10*6/UL (ref 4–5.2)
SODIUM SERPL-SCNC: 124 MMOL/L (ref 136–145)
TSH SERPL-ACNC: 2.51 MIU/L (ref 0.44–3.98)
VALPROATE SERPL-MCNC: 95 UG/ML (ref 50–100)
WBC # BLD AUTO: 14.4 X10*3/UL (ref 4.4–11.3)

## 2025-04-15 PROCEDURE — 96366 THER/PROPH/DIAG IV INF ADDON: CPT

## 2025-04-15 PROCEDURE — 83735 ASSAY OF MAGNESIUM: CPT

## 2025-04-15 PROCEDURE — 84484 ASSAY OF TROPONIN QUANT: CPT

## 2025-04-15 PROCEDURE — 2500000001 HC RX 250 WO HCPCS SELF ADMINISTERED DRUGS (ALT 637 FOR MEDICARE OP): Performed by: NURSE PRACTITIONER

## 2025-04-15 PROCEDURE — 73552 X-RAY EXAM OF FEMUR 2/>: CPT | Mod: 50

## 2025-04-15 PROCEDURE — 99291 CRITICAL CARE FIRST HOUR: CPT | Mod: 25 | Performed by: EMERGENCY MEDICINE

## 2025-04-15 PROCEDURE — 96376 TX/PRO/DX INJ SAME DRUG ADON: CPT

## 2025-04-15 PROCEDURE — 2500000004 HC RX 250 GENERAL PHARMACY W/ HCPCS (ALT 636 FOR OP/ED)

## 2025-04-15 PROCEDURE — 84132 ASSAY OF SERUM POTASSIUM: CPT | Performed by: EMERGENCY MEDICINE

## 2025-04-15 PROCEDURE — 82947 ASSAY GLUCOSE BLOOD QUANT: CPT

## 2025-04-15 PROCEDURE — 99285 EMERGENCY DEPT VISIT HI MDM: CPT | Mod: 25 | Performed by: EMERGENCY MEDICINE

## 2025-04-15 PROCEDURE — 82140 ASSAY OF AMMONIA: CPT

## 2025-04-15 PROCEDURE — 93005 ELECTROCARDIOGRAM TRACING: CPT

## 2025-04-15 PROCEDURE — 70450 CT HEAD/BRAIN W/O DYE: CPT

## 2025-04-15 PROCEDURE — 36415 COLL VENOUS BLD VENIPUNCTURE: CPT

## 2025-04-15 PROCEDURE — 96375 TX/PRO/DX INJ NEW DRUG ADDON: CPT

## 2025-04-15 PROCEDURE — 72170 X-RAY EXAM OF PELVIS: CPT

## 2025-04-15 PROCEDURE — 84443 ASSAY THYROID STIM HORMONE: CPT

## 2025-04-15 PROCEDURE — 99291 CRITICAL CARE FIRST HOUR: CPT | Performed by: EMERGENCY MEDICINE

## 2025-04-15 PROCEDURE — 2060000001 HC INTERMEDIATE ICU ROOM DAILY

## 2025-04-15 PROCEDURE — 83880 ASSAY OF NATRIURETIC PEPTIDE: CPT

## 2025-04-15 PROCEDURE — 85025 COMPLETE CBC W/AUTO DIFF WBC: CPT

## 2025-04-15 PROCEDURE — 83605 ASSAY OF LACTIC ACID: CPT

## 2025-04-15 PROCEDURE — 71045 X-RAY EXAM CHEST 1 VIEW: CPT | Performed by: RADIOLOGY

## 2025-04-15 PROCEDURE — 2500000004 HC RX 250 GENERAL PHARMACY W/ HCPCS (ALT 636 FOR OP/ED): Performed by: NURSE PRACTITIONER

## 2025-04-15 PROCEDURE — 80053 COMPREHEN METABOLIC PANEL: CPT

## 2025-04-15 PROCEDURE — P9612 CATHETERIZE FOR URINE SPEC: HCPCS

## 2025-04-15 PROCEDURE — 2500000005 HC RX 250 GENERAL PHARMACY W/O HCPCS

## 2025-04-15 PROCEDURE — 73552 X-RAY EXAM OF FEMUR 2/>: CPT | Mod: BILATERAL PROCEDURE | Performed by: STUDENT IN AN ORGANIZED HEALTH CARE EDUCATION/TRAINING PROGRAM

## 2025-04-15 PROCEDURE — 72170 X-RAY EXAM OF PELVIS: CPT | Mod: FOREIGN READ | Performed by: RADIOLOGY

## 2025-04-15 PROCEDURE — 80164 ASSAY DIPROPYLACETIC ACD TOT: CPT

## 2025-04-15 PROCEDURE — 71045 X-RAY EXAM CHEST 1 VIEW: CPT

## 2025-04-15 PROCEDURE — 96365 THER/PROPH/DIAG IV INF INIT: CPT

## 2025-04-15 PROCEDURE — 70450 CT HEAD/BRAIN W/O DYE: CPT | Performed by: RADIOLOGY

## 2025-04-15 PROCEDURE — 72125 CT NECK SPINE W/O DYE: CPT | Performed by: RADIOLOGY

## 2025-04-15 PROCEDURE — 72125 CT NECK SPINE W/O DYE: CPT

## 2025-04-15 RX ORDER — DORZOLAMIDE HYDROCHLORIDE AND TIMOLOL MALEATE 20; 5 MG/ML; MG/ML
1 SOLUTION/ DROPS OPHTHALMIC 2 TIMES DAILY
Status: DISCONTINUED | OUTPATIENT
Start: 2025-04-15 | End: 2025-04-22 | Stop reason: HOSPADM

## 2025-04-15 RX ORDER — QUETIAPINE FUMARATE 25 MG/1
12.5 TABLET, FILM COATED ORAL EVERY 8 HOURS PRN
Status: DISCONTINUED | OUTPATIENT
Start: 2025-04-15 | End: 2025-04-22 | Stop reason: HOSPADM

## 2025-04-15 RX ORDER — PREDNISOLONE ACETATE 10 MG/ML
1 SUSPENSION/ DROPS OPHTHALMIC DAILY
COMMUNITY

## 2025-04-15 RX ORDER — HYDRALAZINE HYDROCHLORIDE 20 MG/ML
10 INJECTION INTRAMUSCULAR; INTRAVENOUS EVERY 4 HOURS PRN
Status: DISCONTINUED | OUTPATIENT
Start: 2025-04-15 | End: 2025-04-16

## 2025-04-15 RX ORDER — METOPROLOL TARTRATE 1 MG/ML
5 INJECTION, SOLUTION INTRAVENOUS EVERY 6 HOURS PRN
Status: DISCONTINUED | OUTPATIENT
Start: 2025-04-15 | End: 2025-04-16

## 2025-04-15 RX ORDER — VENLAFAXINE 37.5 MG/1
37.5 TABLET ORAL EVERY MORNING
Status: DISCONTINUED | OUTPATIENT
Start: 2025-04-16 | End: 2025-04-22 | Stop reason: HOSPADM

## 2025-04-15 RX ORDER — LATANOPROST 50 UG/ML
1 SOLUTION/ DROPS OPHTHALMIC NIGHTLY
Status: DISCONTINUED | OUTPATIENT
Start: 2025-04-15 | End: 2025-04-22 | Stop reason: HOSPADM

## 2025-04-15 RX ORDER — DIVALPROEX SODIUM 250 MG/1
250 TABLET, DELAYED RELEASE ORAL EVERY 12 HOURS SCHEDULED
Status: DISCONTINUED | OUTPATIENT
Start: 2025-04-15 | End: 2025-04-22 | Stop reason: HOSPADM

## 2025-04-15 RX ORDER — LORAZEPAM 2 MG/ML
1 INJECTION INTRAMUSCULAR EVERY 6 HOURS PRN
Status: DISCONTINUED | OUTPATIENT
Start: 2025-04-15 | End: 2025-04-22 | Stop reason: HOSPADM

## 2025-04-15 RX ORDER — LABETALOL HYDROCHLORIDE 5 MG/ML
10 INJECTION, SOLUTION INTRAVENOUS ONCE
Status: COMPLETED | OUTPATIENT
Start: 2025-04-15 | End: 2025-04-15

## 2025-04-15 RX ORDER — ENALAPRILAT 1.25 MG/ML
1.25 INJECTION INTRAVENOUS EVERY 6 HOURS PRN
Status: DISCONTINUED | OUTPATIENT
Start: 2025-04-15 | End: 2025-04-15

## 2025-04-15 RX ORDER — LOPERAMIDE HYDROCHLORIDE 2 MG/1
2 CAPSULE ORAL 3 TIMES DAILY PRN
COMMUNITY

## 2025-04-15 RX ORDER — BRIMONIDINE TARTRATE 2 MG/ML
1 SOLUTION/ DROPS OPHTHALMIC 2 TIMES DAILY
Status: DISCONTINUED | OUTPATIENT
Start: 2025-04-15 | End: 2025-04-22 | Stop reason: HOSPADM

## 2025-04-15 RX ORDER — PREDNISOLONE ACETATE 10 MG/ML
1 SUSPENSION/ DROPS OPHTHALMIC DAILY
Status: DISCONTINUED | OUTPATIENT
Start: 2025-04-16 | End: 2025-04-22 | Stop reason: HOSPADM

## 2025-04-15 RX ORDER — SODIUM CHLORIDE 9 MG/ML
60 INJECTION, SOLUTION INTRAVENOUS CONTINUOUS
Status: ACTIVE | OUTPATIENT
Start: 2025-04-15 | End: 2025-04-16

## 2025-04-15 RX ORDER — KETOROLAC TROMETHAMINE 5 MG/ML
1 SOLUTION OPHTHALMIC DAILY
COMMUNITY

## 2025-04-15 RX ORDER — NITROFURANTOIN 25; 75 MG/1; MG/1
1 CAPSULE ORAL 2 TIMES DAILY
COMMUNITY
Start: 2025-04-09 | End: 2025-04-22 | Stop reason: HOSPADM

## 2025-04-15 RX ORDER — HALOPERIDOL LACTATE 5 MG/ML
2 INJECTION, SOLUTION INTRAMUSCULAR ONCE
Status: COMPLETED | OUTPATIENT
Start: 2025-04-15 | End: 2025-04-15

## 2025-04-15 RX ORDER — ADHESIVE BANDAGE
30 BANDAGE TOPICAL DAILY PRN
COMMUNITY

## 2025-04-15 RX ORDER — KETOROLAC TROMETHAMINE 5 MG/ML
1 SOLUTION OPHTHALMIC DAILY
Status: DISCONTINUED | OUTPATIENT
Start: 2025-04-16 | End: 2025-04-22 | Stop reason: HOSPADM

## 2025-04-15 RX ADMIN — METOPROLOL TARTRATE 5 MG: 5 INJECTION INTRAVENOUS at 17:47

## 2025-04-15 RX ADMIN — LABETALOL HYDROCHLORIDE 10 MG: 5 INJECTION, SOLUTION INTRAVENOUS at 15:27

## 2025-04-15 RX ADMIN — HALOPERIDOL LACTATE 2 MG: 5 INJECTION, SOLUTION INTRAMUSCULAR at 22:21

## 2025-04-15 RX ADMIN — DORZOLAMIDE HYDROCHLORIDE AND TIMOLOL MALEATE 1 DROP: 20; 5 SOLUTION/ DROPS OPHTHALMIC at 23:34

## 2025-04-15 RX ADMIN — LABETALOL HYDROCHLORIDE 10 MG: 5 INJECTION, SOLUTION INTRAVENOUS at 14:16

## 2025-04-15 RX ADMIN — VALPROATE SODIUM 500 MG: 100 INJECTION, SOLUTION INTRAVENOUS at 15:57

## 2025-04-15 RX ADMIN — VALPROATE SODIUM 125 MG: 100 INJECTION, SOLUTION INTRAVENOUS at 23:33

## 2025-04-15 RX ADMIN — LATANOPROST 1 DROP: 50 SOLUTION OPHTHALMIC at 23:34

## 2025-04-15 RX ADMIN — BRIMONIDINE TARTRATE 1 DROP: 2 SOLUTION/ DROPS OPHTHALMIC at 23:34

## 2025-04-15 RX ADMIN — SODIUM CHLORIDE 60 ML/HR: 9 INJECTION, SOLUTION INTRAVENOUS at 18:34

## 2025-04-15 SDOH — SOCIAL STABILITY: SOCIAL INSECURITY: ARE YOU MARRIED, WIDOWED, DIVORCED, SEPARATED, NEVER MARRIED, OR LIVING WITH A PARTNER?: PATIENT DECLINED

## 2025-04-15 SDOH — SOCIAL STABILITY: SOCIAL NETWORK: IN A TYPICAL WEEK, HOW MANY TIMES DO YOU TALK ON THE PHONE WITH FAMILY, FRIENDS, OR NEIGHBORS?: PATIENT DECLINED

## 2025-04-15 SDOH — HEALTH STABILITY: PHYSICAL HEALTH
HOW OFTEN DO YOU NEED TO HAVE SOMEONE HELP YOU WHEN YOU READ INSTRUCTIONS, PAMPHLETS, OR OTHER WRITTEN MATERIAL FROM YOUR DOCTOR OR PHARMACY?: PATIENT UNABLE TO RESPOND

## 2025-04-15 SDOH — HEALTH STABILITY: MENTAL HEALTH: HOW OFTEN DO YOU HAVE A DRINK CONTAINING ALCOHOL?: PATIENT DECLINED

## 2025-04-15 SDOH — ECONOMIC STABILITY: FOOD INSECURITY: HOW HARD IS IT FOR YOU TO PAY FOR THE VERY BASICS LIKE FOOD, HOUSING, MEDICAL CARE, AND HEATING?: PATIENT DECLINED

## 2025-04-15 SDOH — SOCIAL STABILITY: SOCIAL INSECURITY
WITHIN THE LAST YEAR, HAVE YOU BEEN HUMILIATED OR EMOTIONALLY ABUSED IN OTHER WAYS BY YOUR PARTNER OR EX-PARTNER?: PATIENT DECLINED

## 2025-04-15 SDOH — SOCIAL STABILITY: SOCIAL NETWORK: HOW OFTEN DO YOU ATTEND MEETINGS OF THE CLUBS OR ORGANIZATIONS YOU BELONG TO?: PATIENT DECLINED

## 2025-04-15 SDOH — HEALTH STABILITY: MENTAL HEALTH: HOW OFTEN DO YOU HAVE SIX OR MORE DRINKS ON ONE OCCASION?: PATIENT DECLINED

## 2025-04-15 SDOH — SOCIAL STABILITY: SOCIAL NETWORK
DO YOU BELONG TO ANY CLUBS OR ORGANIZATIONS SUCH AS CHURCH GROUPS, UNIONS, FRATERNAL OR ATHLETIC GROUPS, OR SCHOOL GROUPS?: PATIENT DECLINED

## 2025-04-15 SDOH — ECONOMIC STABILITY: HOUSING INSECURITY: AT ANY TIME IN THE PAST 12 MONTHS, WERE YOU HOMELESS OR LIVING IN A SHELTER (INCLUDING NOW)?: PATIENT DECLINED

## 2025-04-15 SDOH — HEALTH STABILITY: MENTAL HEALTH
DO YOU FEEL STRESS - TENSE, RESTLESS, NERVOUS, OR ANXIOUS, OR UNABLE TO SLEEP AT NIGHT BECAUSE YOUR MIND IS TROUBLED ALL THE TIME - THESE DAYS?: PATIENT DECLINED

## 2025-04-15 SDOH — ECONOMIC STABILITY: FOOD INSECURITY
WITHIN THE PAST 12 MONTHS, YOU WORRIED THAT YOUR FOOD WOULD RUN OUT BEFORE YOU GOT THE MONEY TO BUY MORE.: PATIENT DECLINED

## 2025-04-15 SDOH — SOCIAL STABILITY: SOCIAL INSECURITY: HAS ANYONE EVER THREATENED TO HURT YOUR FAMILY OR YOUR PETS?: UNABLE TO ASSESS

## 2025-04-15 SDOH — ECONOMIC STABILITY: INCOME INSECURITY
IN THE PAST 12 MONTHS HAS THE ELECTRIC, GAS, OIL, OR WATER COMPANY THREATENED TO SHUT OFF SERVICES IN YOUR HOME?: PATIENT DECLINED

## 2025-04-15 SDOH — SOCIAL STABILITY: SOCIAL INSECURITY: HAVE YOU HAD THOUGHTS OF HARMING ANYONE ELSE?: UNABLE TO ASSESS

## 2025-04-15 SDOH — SOCIAL STABILITY: SOCIAL INSECURITY

## 2025-04-15 SDOH — ECONOMIC STABILITY: FOOD INSECURITY: WITHIN THE PAST 12 MONTHS, THE FOOD YOU BOUGHT JUST DIDN'T LAST AND YOU DIDN'T HAVE MONEY TO GET MORE.: PATIENT DECLINED

## 2025-04-15 SDOH — HEALTH STABILITY: MENTAL HEALTH: HOW MANY DRINKS CONTAINING ALCOHOL DO YOU HAVE ON A TYPICAL DAY WHEN YOU ARE DRINKING?: PATIENT DECLINED

## 2025-04-15 SDOH — ECONOMIC STABILITY: TRANSPORTATION INSECURITY
IN THE PAST 12 MONTHS, HAS LACK OF TRANSPORTATION KEPT YOU FROM MEDICAL APPOINTMENTS OR FROM GETTING MEDICATIONS?: PATIENT DECLINED

## 2025-04-15 SDOH — SOCIAL STABILITY: SOCIAL INSECURITY
WITHIN THE LAST YEAR, HAVE YOU BEEN RAPED OR FORCED TO HAVE ANY KIND OF SEXUAL ACTIVITY BY YOUR PARTNER OR EX-PARTNER?: PATIENT DECLINED

## 2025-04-15 SDOH — SOCIAL STABILITY: SOCIAL INSECURITY: ARE THERE ANY APPARENT SIGNS OF INJURIES/BEHAVIORS THAT COULD BE RELATED TO ABUSE/NEGLECT?: UNABLE TO ASSESS

## 2025-04-15 SDOH — SOCIAL STABILITY: SOCIAL INSECURITY
WITHIN THE LAST YEAR, HAVE YOU BEEN KICKED, HIT, SLAPPED, OR OTHERWISE PHYSICALLY HURT BY YOUR PARTNER OR EX-PARTNER?: PATIENT DECLINED

## 2025-04-15 SDOH — SOCIAL STABILITY: SOCIAL NETWORK: HOW OFTEN DO YOU GET TOGETHER WITH FRIENDS OR RELATIVES?: PATIENT DECLINED

## 2025-04-15 SDOH — SOCIAL STABILITY: SOCIAL NETWORK: HOW OFTEN DO YOU ATTEND CHURCH OR RELIGIOUS SERVICES?: PATIENT DECLINED

## 2025-04-15 SDOH — SOCIAL STABILITY: SOCIAL INSECURITY: WITHIN THE LAST YEAR, HAVE YOU BEEN AFRAID OF YOUR PARTNER OR EX-PARTNER?: PATIENT DECLINED

## 2025-04-15 SDOH — SOCIAL STABILITY: SOCIAL INSECURITY: DO YOU FEEL ANYONE HAS EXPLOITED OR TAKEN ADVANTAGE OF YOU FINANCIALLY OR OF YOUR PERSONAL PROPERTY?: UNABLE TO ASSESS

## 2025-04-15 SDOH — ECONOMIC STABILITY: HOUSING INSECURITY: IN THE LAST 12 MONTHS, WAS THERE A TIME WHEN YOU WERE NOT ABLE TO PAY THE MORTGAGE OR RENT ON TIME?: PATIENT DECLINED

## 2025-04-15 SDOH — HEALTH STABILITY: PHYSICAL HEALTH
ON AVERAGE, HOW MANY DAYS PER WEEK DO YOU ENGAGE IN MODERATE TO STRENUOUS EXERCISE (LIKE A BRISK WALK)?: PATIENT DECLINED

## 2025-04-15 SDOH — HEALTH STABILITY: PHYSICAL HEALTH: ON AVERAGE, HOW MANY MINUTES DO YOU ENGAGE IN EXERCISE AT THIS LEVEL?: PATIENT DECLINED

## 2025-04-15 SDOH — SOCIAL STABILITY: SOCIAL INSECURITY: ABUSE: ADULT

## 2025-04-15 SDOH — SOCIAL STABILITY: SOCIAL INSECURITY: DOES ANYONE TRY TO KEEP YOU FROM HAVING/CONTACTING OTHER FRIENDS OR DOING THINGS OUTSIDE YOUR HOME?: UNABLE TO ASSESS

## 2025-04-15 SDOH — SOCIAL STABILITY: SOCIAL INSECURITY: ARE YOU OR HAVE YOU BEEN THREATENED OR ABUSED PHYSICALLY, EMOTIONALLY, OR SEXUALLY BY ANYONE?: UNABLE TO ASSESS

## 2025-04-15 SDOH — SOCIAL STABILITY: SOCIAL INSECURITY: WERE YOU ABLE TO COMPLETE ALL THE BEHAVIORAL HEALTH SCREENINGS?: NO

## 2025-04-15 SDOH — SOCIAL STABILITY: SOCIAL INSECURITY: DO YOU FEEL UNSAFE GOING BACK TO THE PLACE WHERE YOU ARE LIVING?: UNABLE TO ASSESS

## 2025-04-15 ASSESSMENT — ACTIVITIES OF DAILY LIVING (ADL)
ADEQUATE_TO_COMPLETE_ADL: UNABLE TO ASSESS
HEARING - RIGHT EAR: UNABLE TO ASSESS
GROOMING: UNABLE TO ASSESS
WALKS IN HOME: UNABLE TO ASSESS
ASSISTIVE_DEVICE: OTHER (COMMENT)
TOILETING: UNABLE TO ASSESS
HEARING - LEFT EAR: UNABLE TO ASSESS
BATHING: UNABLE TO ASSESS
FEEDING YOURSELF: UNABLE TO ASSESS
LACK_OF_TRANSPORTATION: PATIENT DECLINED
PATIENT'S MEMORY ADEQUATE TO SAFELY COMPLETE DAILY ACTIVITIES?: UNABLE TO ASSESS
JUDGMENT_ADEQUATE_SAFELY_COMPLETE_DAILY_ACTIVITIES: UNABLE TO ASSESS
LACK_OF_TRANSPORTATION: PATIENT DECLINED
DRESSING YOURSELF: UNABLE TO ASSESS

## 2025-04-15 ASSESSMENT — LIFESTYLE VARIABLES
HOW OFTEN DO YOU HAVE A DRINK CONTAINING ALCOHOL: PATIENT UNABLE TO ANSWER
HOW MANY STANDARD DRINKS CONTAINING ALCOHOL DO YOU HAVE ON A TYPICAL DAY: PATIENT UNABLE TO ANSWER
AUDIT-C TOTAL SCORE: -1
SKIP TO QUESTIONS 9-10: 0
HOW OFTEN DO YOU HAVE 6 OR MORE DRINKS ON ONE OCCASION: PATIENT UNABLE TO ANSWER
SKIP TO QUESTIONS 9-10: 0

## 2025-04-15 ASSESSMENT — PAIN SCALES - GENERAL
PAINLEVEL_OUTOF10: 0 - NO PAIN

## 2025-04-15 ASSESSMENT — PAIN SCALES - PAIN ASSESSMENT IN ADVANCED DEMENTIA (PAINAD)
BREATHING: OCCASIONAL LABORED BREATHING, SHORT PERIOD OF HYPERVENTILATION
CONSOLABILITY: DISTRACTED OR REASSURED BY VOICE/TOUCH
TOTALSCORE: 5
FACIALEXPRESSION: SAD, FRIGHTENED, FROWN
BODYLANGUAGE: TENSE, DISTRESSED PACING, FIDGETING
NEGVOCALIZATION: OCCASIONAL MOAN/GROAN, LOW SPEECH, NEGATIVE/DISAPPROVING QUALITY

## 2025-04-15 ASSESSMENT — PAIN - FUNCTIONAL ASSESSMENT: PAIN_FUNCTIONAL_ASSESSMENT: PAINAD (PAIN ASSESSMENT IN ADVANCED DEMENTIA SCALE)

## 2025-04-15 ASSESSMENT — COGNITIVE AND FUNCTIONAL STATUS - GENERAL: PATIENT BASELINE BEDBOUND: UNABLE TO ASSESS AT THIS TIME

## 2025-04-15 NOTE — ASSESSMENT & PLAN NOTE
- Neurology consult  -Neurology requested video EEG, and prior admission patient was very difficult and noncompliant with  -Valproic acid 500 mg given in the emergency room x 1  -Valproic acid level drawn prior to valproic acid  -On Depakote 250 every 12 hours this is on hold as patient NPO.  Transitioned to IV valproic acid 125 mg every 6 hrs  - Seizure precautions  -Aspiration precautions  -Bedrest  -Fall precautions  -N.p.o. in postictal state

## 2025-04-15 NOTE — ASSESSMENT & PLAN NOTE
- History of end-stage glaucoma patient's home eyedrops have all been resumed  -On prior admissions patient complains of blurred vision.  Per POA patient has end-stage glaucoma and vision is blurred all the time.  Patient with dementia frequently forgets about her visual disturbances

## 2025-04-15 NOTE — ED PROVIDER NOTES
EMERGENCY DEPARTMENT ENCOUNTER      Pt Name: Fernanda Hughes  MRN: [id number]  Birthdate 1940  Date of evaluation: 4/15/2025  Provider: Olu Encarnacion DO    CHIEF COMPLAINT       Chief Complaint   Patient presents with    Altered Mental Status     Per staff was only responsive to painful stimuli.  Was recently Dc'd with hypertensive encephalopathy          HISTORY OF PRESENT ILLNESS    HPI    84-year-old female presenting to the emergency department from [facility] for evaluation after patient was found minimally responsive by nursing staff only responsive to pain.  On review of patient's chart she was recently discharged from [facility] on 3/13/2025 after admission for seizure-like activity placed on Depakote presumed secondary to multifocal encephalopathy.  Patient's POA who arrived at bedside states this is patient's third episode of seizure-like activity patient was switched from Keppra to Depakote during her recent hospitalization because she was on medication for agitation and neurology wanted her on 1 medication for both seizure prevention and agitation to prevent excessive sedation.  POA states patient's current presentation is not her baseline and she is more somnolent than her baseline.    Nursing Notes were reviewed.    PAST MEDICAL HISTORY     Past Medical History:   Diagnosis Date    Anxiety     Falls     Gastroesophageal reflux disease     Glaucoma     Hearing loss     History of breast cancer     History of closed head injury     History of seizure     Irritable bowel syndrome     Memory loss     Mild cognitive impairment     Osteoarthritis     Overactive bladder     Peripheral neuropathy     Underweight     Vascular dementia     Visual field loss          SURGICAL HISTORY       Past Surgical History:   Procedure Laterality Date    BREAST LUMPECTOMY Right     CATARACT EXTRACTION, BILATERAL      CHOLECYSTECTOMY      LYMPH NODE DISSECTION      ORIF FEMUR  FRACTURE      TOTAL ABDOMINAL HYSTERECTOMY W/ BILATERAL SALPINGOOPHORECTOMY           CURRENT MEDICATIONS       Current Discharge Medication List        CONTINUE these medications which have NOT CHANGED    Details   acetaminophen (Tylenol) 325 mg tablet Take 2 tablets (650 mg) by mouth every 6 hours if needed for mild pain (1 - 3).      brimonidine (AlphaGAN) 0.2 % ophthalmic solution Administer 1 drop into both eyes 2 times a day.      divalproex (Depakote) 250 mg EC tablet Take 1 tablet (250 mg) by mouth every 12 hours. Do not crush, chew, or split.  Qty: 60 tablet, Refills: 0    Associated Diagnoses: Seizure (Multi)      dorzolamide-timoloL (Cosopt) 22.3-6.8 mg/mL ophthalmic solution Administer 1 drop into both eyes 2 times a day.      ketorolac (Acular) 0.5 % ophthalmic solution Administer 1 drop into both eyes once daily.      latanoprost (Xalatan) 0.005 % ophthalmic solution Administer 1 drop into both eyes once daily at bedtime.      loperamide (Imodium) 2 mg capsule Take 1 capsule (2 mg) by mouth 3 times a day as needed for diarrhea.      magnesium hydroxide (Milk of Magnesia) 400 mg/5 mL suspension Take 30 mL by mouth once daily as needed for constipation.      nitrofurantoin, macrocrystal-monohydrate, (Macrobid) 100 mg capsule Take 1 capsule (100 mg) by mouth 2 times a day.      prednisoLONE acetate (Pred-Forte) 1 % ophthalmic suspension Administer 1 drop into both eyes once daily.      QUEtiapine (SEROquel) 25 mg tablet Take 0.5 tablets (12.5 mg) by mouth every 8 hours if needed (Agitation (hold for drowsiness/lethargy)).  Qty: 6 tablet, Refills: 0    Associated Diagnoses: Confusion and disorientation      venlafaxine (Effexor) 37.5 mg tablet Take 1 tablet (37.5 mg) by mouth once daily in the morning.             ALLERGIES     Vicodin [hydrocodone-acetaminophen]    FAMILY HISTORY       Family History   Problem Relation Name Age of Onset    Deep vein thrombosis Neg Hx      Sudden death Neg Hx             SOCIAL HISTORY       Social History     Socioeconomic History    Marital status:    Tobacco Use    Smoking status: Never    Smokeless tobacco: Never   Vaping Use    Vaping status: Never Used   Substance and Sexual Activity    Alcohol use: Never    Drug use: Never    Sexual activity: Defer     Social Drivers of Health     Financial Resource Strain: Patient Declined (4/15/2025)    Overall Financial Resource Strain (CARDIA)     Difficulty of Paying Living Expenses: Patient declined   Food Insecurity: Patient Declined (4/15/2025)    Hunger Vital Sign     Worried About Running Out of Food in the Last Year: Patient declined     Ran Out of Food in the Last Year: Patient declined   Transportation Needs: Patient Declined (4/15/2025)    PRAPARE - Transportation     Lack of Transportation (Medical): Patient declined     Lack of Transportation (Non-Medical): Patient declined   Physical Activity: Patient Declined (4/15/2025)    Exercise Vital Sign     Days of Exercise per Week: Patient declined     Minutes of Exercise per Session: Patient declined   Recent Concern: Physical Activity - Inactive (1/25/2025)    Exercise Vital Sign     Days of Exercise per Week: 0 days     Minutes of Exercise per Session: 0 min   Stress: Patient Declined (4/15/2025)    [place] [employer] - Occupational Stress Questionnaire     Feeling of Stress : Patient declined   Social Connections: Patient Declined (4/15/2025)    Social Connection and Isolation Panel [NHANES]     Frequency of Communication with Friends and Family: Patient declined     Frequency of Social Gatherings with Friends and Family: Patient declined     Attends Gnosticism Services: Patient declined     Active Member of Clubs or Organizations: Patient declined     Attends Club or Organization Meetings: Patient declined     Marital Status: Patient declined   Recent Concern: Social Connections - Moderately Isolated (1/25/2025)    Social Connection and  Isolation Panel [NHANES]     Frequency of Communication with Friends and Family: Once a week     Frequency of Social Gatherings with Friends and Family: More than three times a week     Attends Latter day Services: Never     Active Member of Clubs or Organizations: Yes     Attends Club or Organization Meetings: More than 4 times per year     Marital Status:    Intimate Partner Violence: Patient Declined (4/15/2025)    Humiliation, Afraid, Rape, and Kick questionnaire     Fear of Current or Ex-Partner: Patient declined     Emotionally Abused: Patient declined     Physically Abused: Patient declined     Sexually Abused: Patient declined   Housing Stability: Patient Declined (4/15/2025)    Housing Stability Vital Sign     Unable to Pay for Housing in the Last Year: Patient declined     Number of Times Moved in the Last Year: 1     Homeless in the Last Year: Patient declined       SCREENINGS                        PHYSICAL EXAM    (up to 7 for level 4, 8 or more for level 5)     ED Triage Vitals   Temp Pulse Resp BP   -- -- -- --      SpO2 Temp src Heart Rate Source Patient Position   -- -- -- --      BP Location FiO2 (%)     -- --       Physical Exam  Vitals and nursing note reviewed.   Constitutional:       Comments: Patient appears to be postictal, drowsy but arousable and can follow some basic commands.  Does not appear toxic or in acute distress.   HENT:      Mouth/Throat:      Mouth: Mucous membranes are moist.      Pharynx: Oropharynx is clear.   Eyes:      Extraocular Movements: Extraocular movements intact.      Pupils: Pupils are equal, round, and reactive to light.   Cardiovascular:      Rate and Rhythm: Normal rate and regular rhythm.      Heart sounds: Normal heart sounds.   Pulmonary:      Effort: Pulmonary effort is normal.      Breath sounds: Normal breath sounds.   Abdominal:      General: There is no distension.      Palpations: Abdomen is soft. There is no mass.      Tenderness: There is no  abdominal tenderness. There is no guarding.   Musculoskeletal:         General: Normal range of motion.      Cervical back: Normal range of motion and neck supple. No rigidity.   Lymphadenopathy:      Cervical: No cervical adenopathy.   Skin:     General: Skin is warm and dry.   Neurological:      Comments: Moving all extremities to command.  No verbal response to questioning but does follow some simple commands.          DIAGNOSTIC RESULTS     LABS:  Labs Reviewed   CBC WITH AUTO DIFFERENTIAL - Abnormal       Result Value    WBC 14.4 (*)     nRBC 0.0      RBC 5.38 (*)     Hemoglobin 15.9      Hematocrit 45.5      MCV 85      MCH 29.6      MCHC 34.9      RDW 12.2      Platelets 237      Neutrophils % 83.9      Immature Granulocytes %, Automated 0.3      Lymphocytes % 8.1      Monocytes % 7.0      Eosinophils % 0.5      Basophils % 0.2      Neutrophils Absolute 12.08 (*)     Immature Granulocytes Absolute, Automated 0.04      Lymphocytes Absolute 1.17      Monocytes Absolute 1.01 (*)     Eosinophils Absolute 0.07      Basophils Absolute 0.03     COMPREHENSIVE METABOLIC PANEL - Abnormal    Glucose 91      Sodium 124 (*)     Potassium 6.3 (*)     Chloride 90 (*)     Bicarbonate 26      Anion Gap 14      Urea Nitrogen 16      Creatinine 0.77      eGFR 75      Calcium 9.8      Albumin 4.8      Alkaline Phosphatase 69      Total Protein 7.8      AST 45 (*)     Bilirubin, Total 0.9      ALT 10     SERIAL TROPONIN-INITIAL - Abnormal    Troponin I, High Sensitivity 23 (*)     Narrative:     Less than 99th percentile of normal range cutoff-  Female and children under 18 years old <14 ng/L; Male <21 ng/L: Negative  Repeat testing should be performed if clinically indicated.     Female and children under 18 years old 14-50 ng/L; Male 21-50 ng/L:  Consistent with possible cardiac damage and possible increased clinical   risk. Serial measurements may help to assess extent of myocardial damage.     >50 ng/L: Consistent with  cardiac damage, increased clinical risk and  myocardial infarction. Serial measurements may help assess extent of   myocardial damage.      NOTE: Children less than 1 year old may have higher baseline troponin   levels and results should be interpreted in conjunction with the overall   clinical context.     NOTE: Troponin I testing is performed using a different   testing methodology at [facility] than at other   [facility]. Direct result comparisons should only   be made within the same method.   URINALYSIS WITH REFLEX CULTURE AND MICROSCOPIC - Abnormal    Color, Urine Yellow      Appearance, Urine Clear      Specific Gravity, Urine 1.014      pH, Urine 6.0      Protein, Urine NEGATIVE      Glucose, Urine Normal      Blood, Urine 0.1 (1+) (*)     Ketones, Urine 10 (1+) (*)     Bilirubin, Urine NEGATIVE      Urobilinogen, Urine 3 (1+) (*)     Nitrite, Urine NEGATIVE      Leukocyte Esterase, Urine 25 Christina/uL (*)    B-TYPE NATRIURETIC PEPTIDE - Abnormal     (*)     Narrative:        <100 pg/mL - Heart failure unlikely  100-299 pg/mL - Intermediate probability of acute heart                  failure exacerbation. Correlate with clinical                  context and patient history.    >=300 pg/mL - Heart Failure likely. Correlate with clinical                  context and patient history.    BNP testing is performed using different testing methodology at [facility] than at other [facility]. Direct result comparisons should only be made within the same method.      SERIAL TROPONIN, 1 HOUR - Abnormal    Troponin I, High Sensitivity 20 (*)     Narrative:     Less than 99th percentile of normal range cutoff-  Female and children under 18 years old <14 ng/L; Male <21 ng/L: Negative  Repeat testing should be performed if clinically indicated.     Female and children under 18 years old 14-50 ng/L; Male 21-50 ng/L:  Consistent with possible cardiac damage and possible increased  clinical   risk. Serial measurements may help to assess extent of myocardial damage.     >50 ng/L: Consistent with cardiac damage, increased clinical risk and  myocardial infarction. Serial measurements may help assess extent of   myocardial damage.      NOTE: Children less than 1 year old may have higher baseline troponin   levels and results should be interpreted in conjunction with the overall   clinical context.     NOTE: Troponin I testing is performed using a different   testing methodology at [facility] than at other   [facility]. Direct result comparisons should only   be made within the same method.   BASIC METABOLIC PANEL - Abnormal    Glucose 78      Sodium 130 (*)     Potassium 3.9      Chloride 98      Bicarbonate 21      Anion Gap 15      Urea Nitrogen 16      Creatinine 0.74      eGFR 79      Calcium 8.9     BASIC METABOLIC PANEL - Abnormal    Glucose 95      Sodium 127 (*)     Potassium 3.7      Chloride 94 (*)     Bicarbonate 23      Anion Gap 14      Urea Nitrogen 17      Creatinine 0.78      eGFR 74      Calcium 9.1     BASIC METABOLIC PANEL - Abnormal    Glucose 78      Sodium 130 (*)     Potassium 3.6      Chloride 97 (*)     Bicarbonate 22      Anion Gap 15      Urea Nitrogen 19      Creatinine 0.71      eGFR 83      Calcium 9.1     MICROSCOPIC ONLY, URINE - Abnormal    WBC, Urine 1-5      RBC, Urine 11-20 (*)     Squamous Epithelial Cells, Urine 1-9 (SPARSE)     BASIC METABOLIC PANEL - Abnormal    Glucose 76      Sodium 131 (*)     Potassium 4.1      Chloride 98      Bicarbonate 25      Anion Gap 12      Urea Nitrogen 21      Creatinine 0.68      eGFR 85      Calcium 9.1     CBC - Abnormal    WBC 9.2      nRBC 0.0      RBC 4.81      Hemoglobin 14.3      Hematocrit 43.5      MCV 90      MCH 29.7      MCHC 32.9      RDW 12.6      Platelets 60 (*)    POCT GLUCOSE - Abnormal    POCT Glucose 103 (*)    URINE CULTURE - Normal    Urine Culture        Value: Growth indicates  contamination with mixed bacterial rand. Repeat culture if clinically indicated.   MAGNESIUM - Normal    Magnesium 2.19     LACTATE - Normal    Lactate 1.6      Narrative:     Venipuncture immediately after or during the administration of Metamizole may lead to falsely low results. Testing should be performed immediately prior to Metamizole dosing.   TSH WITH REFLEX TO FREE T4 IF ABNORMAL - Normal    Thyroid Stimulating Hormone 2.51      Narrative:     TSH testing is performed using different testing methodology at [facility] than at other [facility]. Direct result comparisons should only be made within the same method.     VALPROIC ACID - Normal    Valproic Acid 95     POTASSIUM - Normal    Potassium 4.1     AMMONIA - Normal    Ammonia 19     CBC - Normal    WBC 10.4      nRBC 0.0      RBC 4.71      Hemoglobin 13.9      Hematocrit 41.7      MCV 89      MCH 29.5      MCHC 33.3      RDW 12.0      Platelets 179     CBC - Normal    WBC 10.0      nRBC 0.0      RBC 5.06      Hemoglobin 14.9      Hematocrit 44.7      MCV 88      MCH 29.4      MCHC 33.3      RDW 12.5      Platelets 211     POCT GLUCOSE - Normal    POCT Glucose 83     TROPONIN SERIES- (INITIAL, 1 HR)    Narrative:     The following orders were created for panel order Troponin I Series, High Sensitivity (0, 1 HR).  Procedure                               Abnormality         Status                     ---------                               -----------         ------                     Troponin I, High Sensiti...[[id number]]  Abnormal            Final result               Troponin, High Sensitivi...[[id number]]  Abnormal            Final result                 Please view results for these tests on the individual orders.   URINALYSIS WITH REFLEX CULTURE AND MICROSCOPIC    Narrative:     The following orders were created for panel order Urinalysis with Reflex Culture and Microscopic.  Procedure                               Abnormality          Status                     ---------                               -----------         ------                     Urinalysis with Reflex C...[[id number]]  Abnormal            Final result               Extra Urine Gray Tube[[id number]]                            Final result                 Please view results for these tests on the individual orders.   EXTRA URINE GRAY TUBE    Extra Tube Hold for add-ons.         All other labs were within normal range or not returned as of this dictation.    Imaging  XR femur 2 VW bilateral   Final Result   No acute osseous abnormality of the right or left femur.        Uncomplicated left intramedullary femoral nail.        MACRO:   None.        Signed by: Quentin Allan 4/15/2025 8:22 PM   Dictation workstation:   [id number]      XR pelvis 1-2 views   Final Result   Postop changes are again seen in the proximal left femur.  No acute   bony abnormalities are appreciated in this view of the pelvis and   there is no change when compared to the prior exam..   Signed by Collins Cline MD      XR chest 1 view   Final Result   1. Chronic appearing interstitial changes without acute process.        Signed by: Ynes Bose 4/15/2025 2:17 PM   Dictation workstation:   [id number]      CT head wo IV contrast   Final Result   1. No acute intracranial abnormality.        2. Mild periventricular white matter change nonspecific and likely   related to chronic small-vessel ischemic change.             MACRO:   None        Signed by: Ynes Bose 4/15/2025 2:11 PM   Dictation workstation:   [id number]      CT cervical spine wo IV contrast   Final Result   1. No acute osseous abnormality cervical spine.        2. Multilevel degenerative discogenic changes as described above with   moderate canal stenosis C4/5 and mild canal stenosis C5/6.             MACRO:   None        Signed by: Ynes Bose 4/15/2025 2:16 PM   Dictation workstation:   [id number]            Procedures  Critical Care    Performed by: Olu Encarnacion DO  Authorized by: Maulik Stewart MD    Critical care provider statement:     Critical care time (minutes):  35  Comments:      Critical care time for complex medical decision making, discussion with the power of  who is the patient's friend, discussion with the facility and how they found her, chart exploration to determine the patient has a history of seizure, and reviewing the fact that she had hypertensive encephalopathy last time she was here, utilizing IV labetalol for blood pressure control given the setting of altered mental status and confusion with hypertension and tachycardia.  Though this could be seizure related, hypertensive encephalopathy is also consideration as well.  In discussion with neurology and getting their expert consultation and advice and recommendations for the patient was a prolonged postictal period.       EMERGENCY DEPARTMENT COURSE/MDM:     Diagnoses as of 04/18/25 0856   Seizure (Multi)        Medical Decision Making    84-year-old female presenting to the emergency department from [facility] for evaluation after patient was found minimally responsive by nursing staff only responsive to pain.  On arrival patient was hypertensive with a blood pressure of 206/98, tachycardic with a heart rate of 104.  Vitals otherwise WNL and point-of-care glucose 103.  Given patient's urinary incontinence with recent mission back in March for possible seizure started on Depakote high clinical concern for breakthrough seizure however differential remains broad including cardiac syncope, intracranial hemorrhage, infection, hypertensive emergency.  Cardiac workup ordered as well as infectious workup, serum ammonia, VBG, TSH with reflex to T4, serum valproic acid, urinalysis, CT head and CT C-spine without IV contrast.  10 mg of IV labetalol ordered for hypertension.    EKG: Sinus rhythm with a ventricular  rate of 93 bpm, NV interval 126 ms, QRS 80 ms, QTc 427 ms.  No evidence of acute ST changes noted.    Serial troponins slightly elevated but flat at 20 and 23 and BNP slightly elevated 246.  Serum lactate normal at 1.6.  Hemolyzed potassium elevated 6.3 with repeat of 4.1 without evidence of EKG changes to suggest hyperkalemia.  Nursing staff were unsuccessful in straight cathing for urine sample and were instructed to collect a sample of the external device.  No evidence of acute pathology on CT head, CT C-spine or chest x-ray.  Patient remained persistently hypertensive so a second IV push of 10 mg of labetalol was ordered after which patient's blood pressure improved to the 170s.  Patient case discussed with Dr. Head with neurology who advised advised ordering a dose of 500 mg of IV Depacon and admitting patient to stepdown for video EEG.  Patient case discussed with internal medicine attending Dr. Rosales who agreed admit the patient to his service for further evaluation treatment with neurology on consult.    Patient and or family in agreement and understanding of treatment plan.  All questions answered.      I reviewed the case with the attending ED physician. The attending ED physician agrees with the plan. Patient and/or patient´s representative was counseled regarding labs, imaging, likely diagnosis, and plan. All questions were answered.    ED Medications administered this visit:    Medications   brimonidine (AlphaGAN) 0.2 % ophthalmic solution 1 drop (1 drop Both Eyes Given 4/17/25 2202)   divalproex (Depakote) delayed release tablet 250 mg (250 mg oral Given 4/17/25 2202)   dorzolamide-timoloL (Cosopt) 22.3-6.8 mg/mL ophthalmic solution 1 drop (1 drop Both Eyes Given 4/17/25 2202)   ketorolac (Acular) 0.5 % ophthalmic solution 1 drop (1 drop Both Eyes Given 4/17/25 0841)   latanoprost (Xalatan) 0.005 % ophthalmic solution 1 drop (1 drop Both Eyes Given 4/17/25 2202)   prednisoLONE acetate (Pred-Forte) 1 %  ophthalmic suspension 1 drop (1 drop Both Eyes Given 4/17/25 0842)   QUEtiapine (SEROquel) tablet 12.5 mg ( oral Held by provider 4/15/25 1725)   venlafaxine (Effexor) tablet 37.5 mg ( oral Dose Auto Held 4/24/25 0900)   LORazepam (Ativan) injection 1 mg (has no administration in time range)   sodium chloride 0.9% infusion (0 mL/hr intravenous Stopped 4/16/25 1307)   hydrALAZINE (Apresoline) injection 10 mg (10 mg intravenous Given 4/16/25 1727)   metoprolol tartrate (Lopressor) injection 5 mg (5 mg intravenous Given 4/16/25 1647)   amLODIPine (Norvasc) tablet 5 mg (5 mg oral Given 4/17/25 1011)   levETIRAcetam (Keppra) tablet 250 mg (250 mg oral Given 4/17/25 2203)   labetaloL (Normodyne,Trandate) injection 10 mg (10 mg intravenous Given 4/15/25 1416)   labetaloL (Normodyne,Trandate) injection 10 mg (10 mg intravenous Given 4/15/25 1527)   valproate (Depacon) 500 mg in dextrose 5% 50 mL IV (0 mg intravenous Stopped 4/15/25 1731)   haloperidol lactate (Haldol) injection 2 mg (2 mg intramuscular Given 4/15/25 2221)   potassium chloride CR (Klor-Con M20) ER tablet 20 mEq (20 mEq oral Given 4/17/25 0842)       New Prescriptions from this visit:    Current Discharge Medication List          Follow-up:  Michelle Klein MD  [id number] [address] [address] [phone]    Schedule an appointment as soon as possible for a visit in 2 day(s)      your usual neurologist    Schedule an appointment as soon as possible for a visit  please call and schedule hospital follow up appointment with  your usual neurologist        Final Impression:   1. Seizure (Multi)          (Please note that portions of this note were completed with a voice recognition program.  Efforts were made to edit the dictations but occasionally words are mis-transcribed.)     Shad Perez,   Resident  04/15/25 5334      The patient was seen by the resident/fellow.  I have personally performed a substantive  portion of the encounter.  I have seen and examined the patient; agree with the workup, evaluation, MDM, management and diagnosis.  The care plan has been discussed with the resident; I have reviewed the resident’s note and agree with the documented findings.                                                    Olu Encarnacion,   04/18/25 0858

## 2025-04-15 NOTE — ASSESSMENT & PLAN NOTE
- Frequent falls history of agitation while in the hospital  -Have requested bed near nursing station

## 2025-04-15 NOTE — ASSESSMENT & PLAN NOTE
- Chest x-ray negative for any acute cardiopulmonary process  - Was on Macrobid at [facility] nursing facility repeat UA ordered  - If UA remains positive should order IV agent

## 2025-04-15 NOTE — ASSESSMENT & PLAN NOTE
- Received labetalol in the emergency room  -On no antihypertensives at SNF if patient remains hypertensive suggest antihypertensive on discharge  -IV Lopressor every 6 hours as needed for systolic greater than 160.

## 2025-04-15 NOTE — PROGRESS NOTES
Advanced Practice Admission Note    History Of Present Illness  Fernanda Hughes is a 84 y.o. female with medical history of vascular dementia,  rend-stage glaucoma with severe visual impairment (blurred vision), GERD, falls, IBS, OA, emote breast cancer status post lumpectomy, and seizures.  Presenting to [facility] on 4/15/2025 from skilled nursing facility for concern for seizure.  Patient was found at in her room at skilled nursing facility on the floor.  Patient was disoriented and lethargic appeared to be postictal to nursing staff.  Patient urinated on self.  Fall and possible seizure were both unwitnessed.  Patient is lethargic she does answer simple questions at times.  Moving upper extremities equally.  POA is at the bedside in the emergency room and does assist with some history taking.  Patient was admitted approximately a month ago for seizure activity.  She was started on Depakote.  Review of med list from nursing home patient was on Macrobid presumably for treatment of UTI.  Blood pressure pretty elevated in the 200s over 100s while in the emergency room.  Patient unable to provide any meaningful HPI or ROS    ED course: CMP remarkable for a sodium of 124, potassium of 3.6 hemolyzed repeat 4.1.  Chloride 90.  Ammonia level 19, magnesium 2.9, , troponin 23, 20.  Lactate obtained about 2 hours after arrival is 1.6.  WBC 14.1.  No anemia noted.  CT head and C-spine negative for any acute intracranial process or cervical fracture.  Chest x-ray negative for any acute cardiopulmonary process.  EKG normal sinus rhythm no ST elevations or depressions noted..       Past Medical History  Past Medical History:   Diagnosis Date    Anxiety     Falls     Gastroesophageal reflux disease     Glaucoma     Hearing loss     History of breast cancer     History of closed head injury     History of seizure     Irritable bowel syndrome     Memory loss     Mild cognitive impairment     Osteoarthritis     Overactive bladder      Peripheral neuropathy     Underweight     Vascular dementia     Visual field loss        Surgical History  Past Surgical History:   Procedure Laterality Date    BREAST LUMPECTOMY Right     CATARACT EXTRACTION, BILATERAL      CHOLECYSTECTOMY      LYMPH NODE DISSECTION      ORIF FEMUR FRACTURE      TOTAL ABDOMINAL HYSTERECTOMY W/ BILATERAL SALPINGOOPHORECTOMY          Social History  She reports that she has never smoked. She has never used smokeless tobacco. She reports that she does not drink alcohol and does not use drugs.    Family History  Family History   Problem Relation Name Age of Onset    Deep vein thrombosis Neg Hx      Sudden death Neg Hx          Allergies  Vicodin [hydrocodone-acetaminophen]    Review of Systems   Unable to perform ROS: Dementia        Physical Exam  Constitutional:       General: She is not in acute distress.     Appearance: She is not ill-appearing.   Eyes:      Pupils: Pupils are equal, round, and reactive to light.   Cardiovascular:      Rate and Rhythm: Normal rate and regular rhythm.   Pulmonary:      Effort: Pulmonary effort is normal.      Breath sounds: Normal breath sounds.   Abdominal:      General: Abdomen is flat. Bowel sounds are normal. There is no distension.      Palpations: Abdomen is soft.      Tenderness: There is no abdominal tenderness. There is no guarding or rebound.   Musculoskeletal:      Comments: Complains of pain with lower extremity passive range of motion.  Unable to identify source pelvis versus femur.   Skin:     General: Skin is warm and dry.      Capillary Refill: Capillary refill takes less than 2 seconds.   Neurological:      Mental Status: She is disoriented.      Comments: Lethargic, upper extremities equally spontaneously.  Moves bilateral lower extremities weakly slowly to command.  At times answer simple yes/no questions          Last Recorded Vitals  Visit Vitals  /82 (BP Location: Left arm, Patient Position: Lying)   Pulse 92   Temp 37.4  "°C (99.3 °F) (Temporal)   Resp 20   Ht 1.702 m (5' 7\")   Wt 53.5 kg (118 lb)   SpO2 97%   BMI 18.48 kg/m²   OB Status Hysterectomy   Smoking Status Never   BSA 1.59 m²        Relevant Results  Results for orders placed or performed during the hospital encounter of 04/15/25 (from the past 24 hours)   POCT GLUCOSE   Result Value Ref Range    POCT Glucose 103 (H) 74 - 99 mg/dL   CBC and Auto Differential   Result Value Ref Range    WBC 14.4 (H) 4.4 - 11.3 x10*3/uL    nRBC 0.0 0.0 - 0.0 /100 WBCs    RBC 5.38 (H) 4.00 - 5.20 x10*6/uL    Hemoglobin 15.9 12.0 - 16.0 g/dL    Hematocrit 45.5 36.0 - 46.0 %    MCV 85 80 - 100 fL    MCH 29.6 26.0 - 34.0 pg    MCHC 34.9 32.0 - 36.0 g/dL    RDW 12.2 11.5 - 14.5 %    Platelets 237 150 - 450 x10*3/uL    Neutrophils % 83.9 40.0 - 80.0 %    Immature Granulocytes %, Automated 0.3 0.0 - 0.9 %    Lymphocytes % 8.1 13.0 - 44.0 %    Monocytes % 7.0 2.0 - 10.0 %    Eosinophils % 0.5 0.0 - 6.0 %    Basophils % 0.2 0.0 - 2.0 %    Neutrophils Absolute 12.08 (H) 1.60 - 5.50 x10*3/uL    Immature Granulocytes Absolute, Automated 0.04 0.00 - 0.50 x10*3/uL    Lymphocytes Absolute 1.17 0.80 - 3.00 x10*3/uL    Monocytes Absolute 1.01 (H) 0.05 - 0.80 x10*3/uL    Eosinophils Absolute 0.07 0.00 - 0.40 x10*3/uL    Basophils Absolute 0.03 0.00 - 0.10 x10*3/uL   Comprehensive Metabolic Panel   Result Value Ref Range    Glucose 91 74 - 99 mg/dL    Sodium 124 (L) 136 - 145 mmol/L    Potassium 6.3 (HH) 3.5 - 5.3 mmol/L    Chloride 90 (L) 98 - 107 mmol/L    Bicarbonate 26 21 - 32 mmol/L    Anion Gap 14 10 - 20 mmol/L    Urea Nitrogen 16 6 - 23 mg/dL    Creatinine 0.77 0.50 - 1.05 mg/dL    eGFR 75 >60 mL/min/1.73m*2    Calcium 9.8 8.6 - 10.3 mg/dL    Albumin 4.8 3.4 - 5.0 g/dL    Alkaline Phosphatase 69 33 - 136 U/L    Total Protein 7.8 6.4 - 8.2 g/dL    AST 45 (H) 9 - 39 U/L    Bilirubin, Total 0.9 0.0 - 1.2 mg/dL    ALT 10 7 - 45 U/L   Magnesium   Result Value Ref Range    Magnesium 2.19 1.60 - 2.40 mg/dL "   TSH with reflex to Free T4 if abnormal   Result Value Ref Range    Thyroid Stimulating Hormone 2.51 0.44 - 3.98 mIU/L   Troponin I, High Sensitivity, Initial   Result Value Ref Range    Troponin I, High Sensitivity 23 (H) 0 - 13 ng/L   B-type natriuretic peptide   Result Value Ref Range     (H) 0 - 99 pg/mL   Light Blue Top   Result Value Ref Range    Extra Tube Hold for add-ons.    ECG 12 lead   Result Value Ref Range    Ventricular Rate 93 BPM    Atrial Rate 93 BPM    MN Interval 126 ms    QRS Duration 80 ms    QT Interval 344 ms    QTC Calculation(Bazett) 427 ms    P Axis 69 degrees    R Axis 83 degrees    T Axis 84 degrees    QRS Count 15 beats    Q Onset 217 ms    P Onset 154 ms    P Offset 202 ms    T Offset 389 ms    QTC Fredericia 398 ms   Lactate   Result Value Ref Range    Lactate 1.6 0.4 - 2.0 mmol/L   Troponin, High Sensitivity, 1 Hour   Result Value Ref Range    Troponin I, High Sensitivity 20 (H) 0 - 13 ng/L   Potassium   Result Value Ref Range    Potassium 4.1 3.5 - 5.3 mmol/L   Ammonia   Result Value Ref Range    Ammonia 19 16 - 53 umol/L        Home Medications  Prior to Admission medications    Medication Sig Start Date End Date Taking? Authorizing Provider   acetaminophen (Tylenol) 325 mg tablet Take 2 tablets (650 mg) by mouth every 6 hours if needed for mild pain (1 - 3).   Yes Historical Provider, MD   brimonidine (AlphaGAN) 0.2 % ophthalmic solution Administer 1 drop into both eyes 2 times a day.   Yes Historical Provider, MD   divalproex (Depakote) 250 mg EC tablet Take 1 tablet (250 mg) by mouth every 12 hours. Do not crush, chew, or split. 3/13/25 4/15/25 Yes Enid Escalera, APRN-CNP   dorzolamide-timoloL (Cosopt) 22.3-6.8 mg/mL ophthalmic solution Administer 1 drop into both eyes 2 times a day.   Yes Historical Provider, MD   ketorolac (Acular) 0.5 % ophthalmic solution Administer 1 drop into both eyes once daily.   Yes Historical Provider, MD   latanoprost (Xalatan) 0.005 %  ophthalmic solution Administer 1 drop into both eyes once daily at bedtime.   Yes Historical Provider, MD   loperamide (Imodium) 2 mg capsule Take 1 capsule (2 mg) by mouth 3 times a day as needed for diarrhea.   Yes Historical Provider, MD   magnesium hydroxide (Milk of Magnesia) 400 mg/5 mL suspension Take 30 mL by mouth once daily as needed for constipation.   Yes Historical Provider, MD   nitrofurantoin, macrocrystal-monohydrate, (Macrobid) 100 mg capsule Take 1 capsule (100 mg) by mouth 2 times a day. 4/9/25 4/19/25 Yes Historical Provider, MD   prednisoLONE acetate (Pred-Forte) 1 % ophthalmic suspension Administer 1 drop into both eyes once daily.   Yes Historical Provider, MD   QUEtiapine (SEROquel) 25 mg tablet Take 0.5 tablets (12.5 mg) by mouth every 8 hours if needed (Agitation (hold for drowsiness/lethargy)). 1/27/25  Yes Lakshmi Reid, MORRIS-CNP   venlafaxine (Effexor) 37.5 mg tablet Take 1 tablet (37.5 mg) by mouth once daily in the morning. 2/12/25  Yes Historical Provider, MD   hydrOXYzine HCL (Atarax) 25 mg tablet Take 1 tablet (25 mg) by mouth 2 times a day as needed for itching.  4/15/25  Historical Provider, MD       Medications  Scheduled medications  brimonidine, 1 drop, Both Eyes, BID  [Held by provider] divalproex, 250 mg, oral, q12h JESSICA  dorzolamide-timoloL, 1 drop, Both Eyes, BID  [START ON 4/16/2025] ketorolac, 1 drop, Both Eyes, Daily  latanoprost, 1 drop, Both Eyes, Nightly  [START ON 4/16/2025] prednisoLONE acetate, 1 drop, Both Eyes, Daily  valproate sodium, 125 mg, intravenous, q6h  [Held by provider] venlafaxine, 37.5 mg, oral, q AM      Continuous medications     PRN medications  LORazepam, 1 mg, q6h PRN  metoprolol, 5 mg, q6h PRN  [Held by provider] QUEtiapine, 12.5 mg, q8h PRN        Imaging  Imaging  XR chest 1 view    Result Date: 4/15/2025  1. Chronic appearing interstitial changes without acute process.   Signed by: Ynes Bose 4/15/2025 2:17 PM Dictation  workstation:   [id number]    CT cervical spine wo IV contrast    Result Date: 4/15/2025  1. No acute osseous abnormality cervical spine.   2. Multilevel degenerative discogenic changes as described above with moderate canal stenosis C4/5 and mild canal stenosis C5/6.     MACRO: None   Signed by: Ynes Bose 4/15/2025 2:16 PM Dictation workstation:   [id number]    CT head wo IV contrast    Result Date: 4/15/2025  1. No acute intracranial abnormality.   2. Mild periventricular white matter change nonspecific and likely related to chronic small-vessel ischemic change.     MACRO: None   Signed by: Ynes Bose 4/15/2025 2:11 PM Dictation workstation:   [id number]     Cardiology, Vascular, and Other Imaging  ECG 12 lead    Result Date: 4/15/2025  Normal sinus rhythm Minimal voltage criteria for LVH, may be normal variant ( Sokolow-Saucedo ) Borderline ECG When compared with ECG of 10-MAR-2025 14:43, No significant change was found          Assessment/Plan   Assessment & Plan  Seizure (Multi)  - Neurology consult  -Neurology requested video EEG, and prior admission patient was very difficult and noncompliant with  -Valproic acid 500 mg given in the emergency room x 1  -Valproic acid level drawn prior to valproic acid  -On Depakote 250 every 12 hours this is on hold as patient NPO.  Transitioned to IV valproic acid 125 mg every 6 hrs  - Seizure precautions  -Aspiration precautions  -Bedrest  -Fall precautions  -N.p.o. in postictal state   Hypertensive encephalopathy  - Received labetalol in the emergency room  -On no antihypertensives at SNF if patient remains hypertensive suggest antihypertensive on discharge  -IV Lopressor every 6 hours as needed for systolic greater than 160.  End-stage glaucoma  - History of end-stage glaucoma patient's home eyedrops have all been resumed  -On prior admissions patient complains of blurred vision.  Per POA patient has end-stage glaucoma and vision is blurred all the time.   Patient with dementia frequently forgets about her visual disturbances  Vascular dementia  - Frequent falls history of agitation while in the hospital  -Have requested bed near nursing station  Leukocytosis  - Chest x-ray negative for any acute cardiopulmonary process  - Was on Macrobid at skilled nursing facility repeat UA ordered  - If UA remains positive should order IV agent  Fall  - CT head and C-spine negative for any acute fracture  - The range of motion to joints completed in the emergency room.  Patient complains of pain with passive range of motion to bilateral lower extremities.  Patient unable to state where pain is.  X-ray bilateral hip and femurs ordered.    Hyponatremia  - 124 will give gentle IV hydration and repeat BMP this evening.    VTE prophylaxis:   DVT prophylaxis: SCDs.  Heparin/Lovenox held due to history of high fall risk     Code Status  DNR and No Intubation-POA at the bedside in the ER confirmed CODE STATUS.    I spent 45 minutes in the professional and overall care of this patient.      Admission history and physical to follow by attending physician.  Oral home medications have been held Depakote changed to valproic acid.  Patient's POA at the bedside in the emergency room plan of care discussed.    MORRIS [facility]  Medical [place] [phone]  Attending physician Olu Encarnacion DO;*

## 2025-04-15 NOTE — ASSESSMENT & PLAN NOTE
- CT head and C-spine negative for any acute fracture  - The range of motion to joints completed in the emergency room.  Patient complains of pain with passive range of motion to bilateral lower extremities.  Patient unable to state where pain is.  X-ray bilateral hip and femurs ordered.

## 2025-04-15 NOTE — NURSING NOTE
Patient is not alert and oriented. Blood pressure is 205/95, heart rate 83. Respirations have been mid 80's. Patient is not alert or oriented. When she arrived to unit a few minutes ago patient had a bowel movement and was changed. Patient has been adjusted in bed. She did rip out her IV and is pulling at wires. Supervisor is aware, and has sent a sitter for safety. Will continue to monitor.

## 2025-04-16 ENCOUNTER — APPOINTMENT (OUTPATIENT)
Dept: NEUROLOGY | Facility: HOSPITAL | Age: 85
DRG: 078 | End: 2025-04-16
Payer: MEDICARE

## 2025-04-16 LAB
ANION GAP SERPL CALC-SCNC: 14 MMOL/L (ref 10–20)
ANION GAP SERPL CALC-SCNC: 15 MMOL/L (ref 10–20)
ATRIAL RATE: 93 BPM
BUN SERPL-MCNC: 16 MG/DL (ref 6–23)
BUN SERPL-MCNC: 17 MG/DL (ref 6–23)
CALCIUM SERPL-MCNC: 8.9 MG/DL (ref 8.6–10.3)
CALCIUM SERPL-MCNC: 9.1 MG/DL (ref 8.6–10.3)
CHLORIDE SERPL-SCNC: 94 MMOL/L (ref 98–107)
CHLORIDE SERPL-SCNC: 98 MMOL/L (ref 98–107)
CO2 SERPL-SCNC: 21 MMOL/L (ref 21–32)
CO2 SERPL-SCNC: 23 MMOL/L (ref 21–32)
CREAT SERPL-MCNC: 0.74 MG/DL (ref 0.5–1.05)
CREAT SERPL-MCNC: 0.78 MG/DL (ref 0.5–1.05)
EGFRCR SERPLBLD CKD-EPI 2021: 74 ML/MIN/1.73M*2
EGFRCR SERPLBLD CKD-EPI 2021: 79 ML/MIN/1.73M*2
ERYTHROCYTE [DISTWIDTH] IN BLOOD BY AUTOMATED COUNT: 12 % (ref 11.5–14.5)
GLUCOSE BLD MANUAL STRIP-MCNC: 83 MG/DL (ref 74–99)
GLUCOSE SERPL-MCNC: 78 MG/DL (ref 74–99)
GLUCOSE SERPL-MCNC: 95 MG/DL (ref 74–99)
HCT VFR BLD AUTO: 41.7 % (ref 36–46)
HGB BLD-MCNC: 13.9 G/DL (ref 12–16)
MCH RBC QN AUTO: 29.5 PG (ref 26–34)
MCHC RBC AUTO-ENTMCNC: 33.3 G/DL (ref 32–36)
MCV RBC AUTO: 89 FL (ref 80–100)
NRBC BLD-RTO: 0 /100 WBCS (ref 0–0)
P AXIS: 69 DEGREES
P OFFSET: 202 MS
P ONSET: 154 MS
PLATELET # BLD AUTO: 179 X10*3/UL (ref 150–450)
POTASSIUM SERPL-SCNC: 3.7 MMOL/L (ref 3.5–5.3)
POTASSIUM SERPL-SCNC: 3.9 MMOL/L (ref 3.5–5.3)
PR INTERVAL: 126 MS
Q ONSET: 217 MS
QRS COUNT: 15 BEATS
QRS DURATION: 80 MS
QT INTERVAL: 344 MS
QTC CALCULATION(BAZETT): 427 MS
QTC FREDERICIA: 398 MS
R AXIS: 83 DEGREES
RBC # BLD AUTO: 4.71 X10*6/UL (ref 4–5.2)
SODIUM SERPL-SCNC: 127 MMOL/L (ref 136–145)
SODIUM SERPL-SCNC: 130 MMOL/L (ref 136–145)
T AXIS: 84 DEGREES
T OFFSET: 389 MS
VENTRICULAR RATE: 93 BPM
WBC # BLD AUTO: 10.4 X10*3/UL (ref 4.4–11.3)

## 2025-04-16 PROCEDURE — 85027 COMPLETE CBC AUTOMATED: CPT | Performed by: NURSE PRACTITIONER

## 2025-04-16 PROCEDURE — 36415 COLL VENOUS BLD VENIPUNCTURE: CPT | Performed by: NURSE PRACTITIONER

## 2025-04-16 PROCEDURE — 2500000004 HC RX 250 GENERAL PHARMACY W/ HCPCS (ALT 636 FOR OP/ED): Mod: JZ | Performed by: NURSE PRACTITIONER

## 2025-04-16 PROCEDURE — 2060000001 HC INTERMEDIATE ICU ROOM DAILY

## 2025-04-16 PROCEDURE — 80048 BASIC METABOLIC PNL TOTAL CA: CPT | Performed by: NURSE PRACTITIONER

## 2025-04-16 PROCEDURE — 92610 EVALUATE SWALLOWING FUNCTION: CPT | Mod: GN | Performed by: SPEECH-LANGUAGE PATHOLOGIST

## 2025-04-16 PROCEDURE — 82947 ASSAY GLUCOSE BLOOD QUANT: CPT

## 2025-04-16 PROCEDURE — 2500000005 HC RX 250 GENERAL PHARMACY W/O HCPCS: Performed by: NURSE PRACTITIONER

## 2025-04-16 RX ORDER — METOPROLOL TARTRATE 1 MG/ML
5 INJECTION, SOLUTION INTRAVENOUS EVERY 6 HOURS PRN
Status: DISCONTINUED | OUTPATIENT
Start: 2025-04-16 | End: 2025-04-22 | Stop reason: HOSPADM

## 2025-04-16 RX ORDER — HYDRALAZINE HYDROCHLORIDE 20 MG/ML
10 INJECTION INTRAMUSCULAR; INTRAVENOUS EVERY 4 HOURS PRN
Status: DISCONTINUED | OUTPATIENT
Start: 2025-04-16 | End: 2025-04-22 | Stop reason: HOSPADM

## 2025-04-16 RX ADMIN — METOPROLOL TARTRATE 5 MG: 5 INJECTION INTRAVENOUS at 09:44

## 2025-04-16 RX ADMIN — BRIMONIDINE TARTRATE 1 DROP: 2 SOLUTION/ DROPS OPHTHALMIC at 20:24

## 2025-04-16 RX ADMIN — VALPROATE SODIUM 125 MG: 100 INJECTION, SOLUTION INTRAVENOUS at 05:53

## 2025-04-16 RX ADMIN — HYDRALAZINE HYDROCHLORIDE 10 MG: 20 INJECTION INTRAMUSCULAR; INTRAVENOUS at 17:27

## 2025-04-16 RX ADMIN — VALPROATE SODIUM 125 MG: 100 INJECTION, SOLUTION INTRAVENOUS at 13:08

## 2025-04-16 RX ADMIN — LEVETIRACETAM 250 MG: 100 INJECTION, SOLUTION INTRAVENOUS at 14:42

## 2025-04-16 RX ADMIN — METOPROLOL TARTRATE 5 MG: 5 INJECTION INTRAVENOUS at 16:47

## 2025-04-16 RX ADMIN — DORZOLAMIDE HYDROCHLORIDE AND TIMOLOL MALEATE 1 DROP: 20; 5 SOLUTION/ DROPS OPHTHALMIC at 09:45

## 2025-04-16 RX ADMIN — KETOROLAC TROMETHAMINE 1 DROP: 5 SOLUTION OPHTHALMIC at 09:44

## 2025-04-16 RX ADMIN — PREDNISOLONE ACETATE 1 DROP: 10 SUSPENSION/ DROPS OPHTHALMIC at 09:44

## 2025-04-16 RX ADMIN — VALPROATE SODIUM 125 MG: 100 INJECTION, SOLUTION INTRAVENOUS at 18:22

## 2025-04-16 RX ADMIN — DORZOLAMIDE HYDROCHLORIDE AND TIMOLOL MALEATE 1 DROP: 20; 5 SOLUTION/ DROPS OPHTHALMIC at 20:29

## 2025-04-16 RX ADMIN — LATANOPROST 1 DROP: 50 SOLUTION OPHTHALMIC at 20:24

## 2025-04-16 RX ADMIN — BRIMONIDINE TARTRATE 1 DROP: 2 SOLUTION/ DROPS OPHTHALMIC at 09:45

## 2025-04-16 ASSESSMENT — COGNITIVE AND FUNCTIONAL STATUS - GENERAL
STANDING UP FROM CHAIR USING ARMS: TOTAL
PERSONAL GROOMING: TOTAL
PERSONAL GROOMING: TOTAL
DRESSING REGULAR LOWER BODY CLOTHING: TOTAL
DRESSING REGULAR LOWER BODY CLOTHING: TOTAL
EATING MEALS: A LOT
PERSONAL GROOMING: TOTAL
HELP NEEDED FOR BATHING: TOTAL
STANDING UP FROM CHAIR USING ARMS: TOTAL
MOBILITY SCORE: 6
WALKING IN HOSPITAL ROOM: TOTAL
HELP NEEDED FOR BATHING: TOTAL
STANDING UP FROM CHAIR USING ARMS: TOTAL
PERSONAL GROOMING: TOTAL
CLIMB 3 TO 5 STEPS WITH RAILING: TOTAL
DRESSING REGULAR UPPER BODY CLOTHING: TOTAL
TURNING FROM BACK TO SIDE WHILE IN FLAT BAD: TOTAL
MOVING TO AND FROM BED TO CHAIR: TOTAL
MOVING TO AND FROM BED TO CHAIR: TOTAL
MOVING FROM LYING ON BACK TO SITTING ON SIDE OF FLAT BED WITH BEDRAILS: TOTAL
MOVING TO AND FROM BED TO CHAIR: TOTAL
TURNING FROM BACK TO SIDE WHILE IN FLAT BAD: TOTAL
CLIMB 3 TO 5 STEPS WITH RAILING: TOTAL
TOILETING: TOTAL
TOILETING: TOTAL
DRESSING REGULAR UPPER BODY CLOTHING: TOTAL
TOILETING: TOTAL
DAILY ACTIVITIY SCORE: 6
EATING MEALS: TOTAL
HELP NEEDED FOR BATHING: TOTAL
MOBILITY SCORE: 6
CLIMB 3 TO 5 STEPS WITH RAILING: TOTAL
DAILY ACTIVITIY SCORE: 6
WALKING IN HOSPITAL ROOM: TOTAL
MOVING TO AND FROM BED TO CHAIR: TOTAL
WALKING IN HOSPITAL ROOM: TOTAL
STANDING UP FROM CHAIR USING ARMS: TOTAL
DRESSING REGULAR UPPER BODY CLOTHING: TOTAL
MOVING FROM LYING ON BACK TO SITTING ON SIDE OF FLAT BED WITH BEDRAILS: TOTAL
CLIMB 3 TO 5 STEPS WITH RAILING: TOTAL
EATING MEALS: TOTAL
MOBILITY SCORE: 6
HELP NEEDED FOR BATHING: TOTAL
EATING MEALS: TOTAL
TURNING FROM BACK TO SIDE WHILE IN FLAT BAD: TOTAL
TOILETING: TOTAL
MOBILITY SCORE: 6
DRESSING REGULAR LOWER BODY CLOTHING: TOTAL
DAILY ACTIVITIY SCORE: 7
DRESSING REGULAR LOWER BODY CLOTHING: TOTAL
DRESSING REGULAR UPPER BODY CLOTHING: TOTAL
MOVING FROM LYING ON BACK TO SITTING ON SIDE OF FLAT BED WITH BEDRAILS: TOTAL
MOVING FROM LYING ON BACK TO SITTING ON SIDE OF FLAT BED WITH BEDRAILS: TOTAL
TURNING FROM BACK TO SIDE WHILE IN FLAT BAD: TOTAL
DAILY ACTIVITIY SCORE: 6
WALKING IN HOSPITAL ROOM: TOTAL

## 2025-04-16 ASSESSMENT — ACTIVITIES OF DAILY LIVING (ADL)
TOILETING: DEPENDENT
PATIENT'S MEMORY ADEQUATE TO SAFELY COMPLETE DAILY ACTIVITIES?: NO
HEARING - LEFT EAR: FUNCTIONAL
WALKS IN HOME: DEPENDENT
GROOMING: DEPENDENT
JUDGMENT_ADEQUATE_SAFELY_COMPLETE_DAILY_ACTIVITIES: NO
ASSISTIVE_DEVICE: WALKER
DRESSING YOURSELF: DEPENDENT
ADEQUATE_TO_COMPLETE_ADL: NO
FEEDING YOURSELF: DEPENDENT
HEARING - RIGHT EAR: FUNCTIONAL
BATHING: DEPENDENT

## 2025-04-16 ASSESSMENT — PAIN SCALES - PAIN ASSESSMENT IN ADVANCED DEMENTIA (PAINAD)
TOTALSCORE: 0
BODYLANGUAGE: RELAXED
BREATHING: NORMAL
CONSOLABILITY: NO NEED TO CONSOLE
FACIALEXPRESSION: SMILING OR INEXPRESSIVE

## 2025-04-16 ASSESSMENT — PAIN - FUNCTIONAL ASSESSMENT
PAIN_FUNCTIONAL_ASSESSMENT: 0-10

## 2025-04-16 ASSESSMENT — PAIN SCALES - GENERAL
PAINLEVEL_OUTOF10: 0 - NO PAIN

## 2025-04-16 NOTE — NURSING NOTE
Patient has been very lethargic today. She did have an elevated blood pressure earlier in the day above 160 systolic and was given metoprolol iv as ordered. Patient is currently resting. Will continue to monitor. Bed alarm on and audible. Hourly rounding performed. Patient turned as ordered.

## 2025-04-16 NOTE — NURSING NOTE
Patient hypertensive with SBP > 160 and HR > 60. Medicated with metoprolol at time of 1647. Patient remains with SBP > 160 mmHg at this time. Patient to receive second line PRN medication hydralazine

## 2025-04-16 NOTE — NURSING NOTE
Patient medicated with hydralazine for SBP > 160, second line at time of 1727. SBP now improved to < 160. See documented vitals

## 2025-04-16 NOTE — PROGRESS NOTES
Speech-Language Pathology                 Therapy Communication Note    Patient Name: Fernanda Hughes  MRN: [id number]  Department: [facility] NEURO  Room: 2100/2100-A  Today's Date: 4/16/2025     Discipline: Speech Language Pathology      Missed Visit Reason:  Test/procedure    Missed Time: Attempt at 1000    Comment: Patient undergoing bedside EEG at time of attempted visit. Will plan to re-attempt clinical swallow evaluation at next availability.

## 2025-04-16 NOTE — PROGRESS NOTES
Speech-Language Pathology    SLP Adult Inpatient Speech-Language Pathology Clinical Swallow Evaluation    Patient Name: Fernanda Hughes  MRN: [id number]  Today's Date: 4/16/2025   Time Calculation  Start Time: 1350  Stop Time: 1410  Time Calculation (min): 20 min         Current Problem:   1. Seizure (Multi)              Recommendations:  Risk for Aspiration: Yes  Solid Diet Recommendations : Regular (IDDSI Level 7)  Liquid Diet Recommendations: Thin (IDDSI Level 0)  Compensatory Swallowing Strategies: Upright 90 degrees as possible for all oral intake, Small bites/sips, Remain upright for 20-30 minutes after meals, Eat/feed slowly, Full supervision with meals  Medication Administration Recommendations: Other (Comment) (As best tolerated)      Assessment:  Assessment Results:     Patient presents with suspected functional oropharyngeal swallow upon completion of clinical swallow evaluation at bedside this date. Examination of oral mechanism could not be completed due to patient not following commands, but was remarkable for decreased lingual strength. Oral care was completed prior to PO trials. Patient did not pass Benkelman Swallow Protocol (3 oz water challenge) due to baseline of decreased cognitive status. Patient tolerated PO trials of ice chips, thin liquids via cup/straw, pureed solids, soft solids, and regular solids absent of overt s/s of aspiration. Patient did not demonstrate adequate mastication of first trials of soft solids, but demonstrated appropriate chewing/swallowing during second trial of soft solid. Patient noted some difficulty swallowing solid consistencies. Although no overt s/s of aspiration were noted at bedside, silent aspiration can only be ruled out with instrumental assessment, and modified barium swallow study (MBSS) vs. fiberoptic endoscopic evaluation of swallowing (FEES) should be considered if silent aspiration is suspected based on clinical presentation.    Per this assessment, patient is  appropriate to continue baseline diet of regular solids and thin liquids (full supervision/1:1 feed as needed) with standard aspiration precautions. Patient is at some risk for aspiration due to medical history of vascular dementia and GERD; however, aspiration is not highly suspected at this time. ST to sign off, but remains available upon re-consultation should new concerns arise.    Prognosis: Good  Treatment Provided: No  Medical Staff Made Aware: Yes  Strengths: Motivation  Barriers: Cognition      Plan:  Inpatient/Swing Bed or Outpatient: Inpatient  SLP Plan: No skilled SLP  No Skilled SLP: No acute SLP goals identified, At baseline function  SLP Discharge Recommendations: D/C as patient is functional for this level of care  Discussed POC: Patient, Nursing, Power of   Discussed Risks/Benefits: Yes  Patient/Caregiver Agreeable: Yes  SLP - OK to Discharge: Yes      Subjective   Patient alert in bed with POA present in room. Patient denied previous or current s/s of dysphagia.       General Visit Information:  Patient Class: Inpatient  Living Environment: Assissted living/independent living  Arrival: Family/caregiver present  Ordering Physician: Stef Rosales MD  Reason for Referral: Swallow evaluation  Past Medical History Relevant to Rehab: 84 y.o. female with medical history of vascular dementia,  rend-stage glaucoma with severe visual impairment (blurred vision), GERD, falls, IBS, OA, emote breast cancer status post lumpectomy, and seizures.  Presenting to [facility] on 4/15/2025 from AL facility for concern for seizure.  Patient Seen During This Visit: Yes  Total Number of Visits : 1  Prior to Session Communication: Bedside nurse  Reviewed Procedures and Risks: Yes  Date of Onset: 04/15/25  Date of Order: 04/15/25  [employer] Diet: Regular solids and thin liquids  Current Diet : NPO pending results of clinical swallow evaluation  Dysphagia Diagnosis: Other (Comment) (Suspected functional oropharyngeal  swallow)        Objective     Relevant Imaging:  Chest XR 4/15/2025:  IMPRESSION:  1. Chronic appearing interstitial changes without acute process.    Baseline Assessment:  Respiratory Status: Room air  Behavior/Cognition: Cooperative, Pleasant mood, Alert, Distractible, Requires cueing  Vision: Impaired for self-feeding  Patient Positioning: Upright in Bed  Baseline Vocal Quality: Normal  Volitional Cough: Strong  Volitional Swallow: Within Functional Limits      Pain:  Pain Assessment  Pain Assessment: 0-10  0-10 (Numeric) Pain Score: 0 - No pain    Oral/Motor Assessment:  Oral Hygiene: Good  Dentition: Some Missing Teeth  Oral Motor: Unable to Complete  Lingual Strength: Reduced  Apraxia: None present  Intelligibility: Intelligible  Breath Support: Adequate for speech      Consistencies Trialed:  Consistencies Trialed: Yes  Consistencies Trialed: Ice Chips, Thin (IDDSI Level 0) - Cup, Thin (IDDSI Level 0) - Straw, Pureed/extremely thick (IDDSI Level 4), Soft & bite sized/chopped (IDDSI Level 6), Regular (IDDSI Level 7)      Clinical Observations:  Patient Positioning: Upright in Bed  Management of Oral Secretions: Adequate  Was The 3 oz Swallow Protocol Completed: No  Impaired Mastication: Soft & Bite Sized/Chopped (IDDSI Level 6)        Education:  Learner patient; POA   Barriers to Learning cognitive limitations barrier   Method verbal   Education - Topic ST provided patient education regarding role of ST, purpose of assessment, and clinical impressions. Patient verbalized full comprehension, consistent with cognitive status. ST further coordinated with RN regarding recommendations and precautions per this assessment, with RN verbalizing understanding.     Outcome    Verbalized understanding and agreement

## 2025-04-16 NOTE — NURSING NOTE
Patients blood pressure ia 190./79. It was taken a second time and went down to 182/80. Informing physician now, and will continue to monitor.

## 2025-04-16 NOTE — PROGRESS NOTES
"Nutrition Initial Assessment:   Nutrition Assessment    Reason for Assessment: Admission nursing screening (MST 2 (unsure weight loss, no appetite change))    Nutrition Note:  Fernanda Hughes is a 84 y.o. female presenting 4/15 from AL facility as found minimally responsive; r/o seizure. Full work up in progress; ST [facility]ers 4/16 suggesting regular/thin liquids.    Past Medical History  Note recent UTI (started antibiotic 4/9/2025)  3/10-3/13/2025 [facility] s/p seizure.   has a past medical history of Anxiety, Falls, Gastroesophageal reflux disease, Glaucoma, Hearing loss, History of breast cancer, History of closed head injury, History of seizure, Irritable bowel syndrome, Memory loss, Mild cognitive impairment, Osteoarthritis, Overactive bladder, Peripheral neuropathy, Underweight, Vascular dementia, and Visual field loss.  Surgical History   has a past surgical history that includes Cholecystectomy; Cataract extraction, bilateral; ORIF femur fracture; Breast lumpectomy (Right); Lymph node dissection; and Total abdominal hysterectomy w/ bilateral salpingoophorectomy.       Nutrition History:  Food and Nutrient History: Pt from [facility]--for nurse managers [phone] or [phone]. Per staff, pt there on regular diet/thin liquids; assist pt with meals for reminders to eat. Pt can feed self and uses utensils or finger foods; pt with fair to good appetite. Pt utilizes walker however needs assistance getting around as she has severe glaucoma. Facility reports pt only on depakote otherwise no other anti-seizure meds.  Vitamin/Herbal Supplement Use: no  Food Allergy:  (NKFA)  Food Intolerance:  (No intolerances)       Anthropometrics:  Height: 170.2 cm (5' 7\")   Weight: 53.5 kg (118 lb)   BMI (Calculated): 18.48   Admit weight estimated 53.5kg                     0-10 (Numeric) Pain Score: 0 - No pain     Weight History:   3/2025: 54.9kg or 2.6% x1 month  1/2025: 52.2kg  12/2024: 53.2kg  9/2024: " "52.2kg  1/2024: 50.8kg  Weight Change %:  Significant Weight Loss: No    Nutrition Focused Physical Exam Findings:    Subcutaneous Fat Loss:   Defer Subcutaneous Fat Loss Assessment: Defer all  Defer All Reason: pt sleeping and left allowed to sleep; sitter at bedside  Muscle Wasting:  Defer Muscle Wasting Assessment: Defer all  Defer All Reason: pt sleeping and left allowed to sleep; sitter at bedside  Edema:  Edema: none  Physical Findings:  Skin: Negative  Respiratory : Negative    Nutrition Significant Labs:  BMP Trend:   Results from last 7 days   Lab Units 04/16/25  0529 04/16/25  0019 04/15/25  1427 04/15/25  1304   GLUCOSE mg/dL 78 95  --  91   CALCIUM mg/dL 8.9 9.1  --  9.8   SODIUM mmol/L 130* 127*  --  124*   POTASSIUM mmol/L 3.9 3.7   < > 6.3*   CO2 mmol/L 21 23  --  26   CHLORIDE mmol/L 98 94*  --  90*   BUN mg/dL 16 17  --  16   CREATININE mg/dL 0.74 0.78  --  0.77    < > = values in this interval not displayed.    , A1C:  Lab Results   Component Value Date    HGBA1C 5.3 02/02/2024   , BG POCT trend:   Results from last 7 days   Lab Units 04/15/25  1247   POCT GLUCOSE mg/dL 103*    , Liver Function Trend:   Results from last 7 days   Lab Units 04/15/25  1304   ALK PHOS U/L 69   AST U/L 45*   ALT U/L 10   BILIRUBIN TOTAL mg/dL 0.9    , Vit D: No results found for: \"VITD25\" , Vit B12: No results found for: \"OTLOUTEJ90\" , Folate: No results found for: \"FOLATE\"     Nutrition Specific Medications:  Scheduled medications  Scheduled Medications[1]  Continuous medications  Continuous Medications[2]  PRN medications  PRN Medications[3]     I/O:   Last BM Date: 04/15/25; Stool Appearance: Formed, Loose (04/15/25 2300)    Dietary Orders (From admission, onward)       Start     Ordered    04/16/25 1429  Adult diet Regular; Thin 0  Diet effective now        Comments: Upright for intake, small bites/sips, slow rate, supervision/assistance, meds in puree or as best tolerated, strict oral care   Question Answer " Comment   Diet type Regular    Fluid consistency Thin 0        04/16/25 1429    04/15/25 1825  May Not Participate in Room Service  ( ROOM SERVICE MAY NOT PARTICIPATE)  Once        Question:  .  Answer:  Yes    04/15/25 1824                     Estimated Needs:      Method for Estimating Needs: 1500-1720kcal or 28-32kcal/kg of 53.5kg     Method for Estimating 24 Hour Protein Needs: 54-64g pro (1-1.2g/kg of 53.5kg)     Method for Estimating 24 Hour Fluid Needs: 1mL/kcal/d or as per physician  Patient on Order Fluid Restriction: No        Nutrition Diagnosis   Malnutrition Diagnosis  Patient has Malnutrition Diagnosis: No    Nutrition Diagnosis  Patient has Nutrition Diagnosis: Yes  Diagnosis Status (1): New  Nutrition Diagnosis 1: Inadequate oral intake  Related to (1): altered mental status  As Evidenced by (1): pt found unresponsive with current NPO status with r/o seizure; ST eval, 4/16, suggesting regular/thin liquids.  Additional Assessment Information (1): 4/16: Case discussed with nurse; ST to re-evaluate later today.  Spoke with nurse manager at AL facility--states pt WILL have access to modified food/fluid textures at AL pending ST outcomes.       Nutrition Interventions/Recommendations   Nutrition prescription for oral nutrition, Nutrition prescription for enteral nutrition    Nutrition Recommendations:  Individualized Nutrition Prescription Provided for : PO vs EN    Nutrition Interventions/Goals:   Meals and Snacks: General healthful diet  Goal: As medically appropriate, continue regular diet  Enteral Intake: Insert enteral feeding tube  Goal: If oral intakes consistently <50%, may need to consider dobhoff placement and start tube feeds with Jevity 1.5 starting at 20mL/hr and increase every 4-6 hours until goal of 45mL/hr to provide 1620kcal, 69g pro, and 821mL formula free water or 30kcal/kg and 1.3g pro/kg.  Adjust water flush pending fluid needs--suggest start with 90mL 6x/day for total water of  1361mL water (formula + flush).  Medical Food Supplement: Commercial beverage medical food supplement therapy, Commercial food medical food supplement therapy  Goal: Ensure Plus 2x/d for additional 700kcal and 40g pro/day as well as Magic Cup 1x/day and PRN for 290kcal and 9g pro/4 oz.  Coordination of Care with Providers: Nursing (SYEDA Patel)      Education Documentation  No documentation found.     4/16:Pt presently not appropriate for education; from facility where all meals provided for pt. Per facility, modified food/fluid textures can be provided there; if pt needs ONS, family provides (no ONS on order per facility).        Nutrition Monitoring and Evaluation   Food/Nutrient Related History Monitoring  Monitoring and Evaluation Plan: Estimated Energy Intake  Estimated Energy Intake: Energy intake 50 -75% of estimated energy needs    Anthropometric Measurements  Monitoring and Evaluation Plan: Body weight  Body Weight: Body weight - Maintain stable weight    Biochemical Data, Medical Tests and Procedures  Monitoring and Evaluation Plan: Electrolyte/renal panel  Electrolyte and Renal Panel: Electrolytes within normal limits         Goal Status: New goal(s) identified    Time Spent (min): 60 minutes               [1] brimonidine, 1 drop, Both Eyes, BID  [Held by provider] divalproex, 250 mg, oral, q12h JESSICA  dorzolamide-timoloL, 1 drop, Both Eyes, BID  ketorolac, 1 drop, Both Eyes, Daily  latanoprost, 1 drop, Both Eyes, Nightly  levETIRAcetam, 250 mg, intravenous, q12h  prednisoLONE acetate, 1 drop, Both Eyes, Daily  valproate sodium, 125 mg, intravenous, q6h  [Held by provider] venlafaxine, 37.5 mg, oral, q AM     [2]    [3] PRN medications: hydrALAZINE, LORazepam, metoprolol, [Held by provider] QUEtiapine

## 2025-04-16 NOTE — PROGRESS NOTES
04/16/25 1001   Discharge Planning   Living Arrangements Alone  (Resident at [facility])   Support Systems Friends/neighbors   Assistance Needed uses a walker to ambulate   Type of Residence Nursing home/residential care   Do you have animals or pets at home? No   Who is requesting discharge planning? Provider   Home or Post Acute Services Post acute facilities (Rehab/SNF/etc)   Type of Post Acute Facility Services Assisted living  ([facility])   Expected Discharge Disposition SNF   Does the patient need discharge transport arranged? Yes   RoundTrip coordination needed? Yes   Has discharge transport been arranged? No   Patient Choice   Patient / Family choosing to utilize agency / facility established prior to hospitalization Yes   Stroke Family Assessment   Stroke Family Assessment Needed No   Intensity of Service   Intensity of Service 0-30 min     Spoke to POA Deepali via phone. Patient resides at [facility] in the Memory Care unit. Uses a walker to ambulate and therapy is available through facility. Facility manages all patient's medications and meals are provided. POA states that patient will return to [facility] on discharge unless patient will need further therapy. CT team to follow patient.

## 2025-04-16 NOTE — SIGNIFICANT EVENT
"Preliminary EEG Report     This vEEG is consistent with a mild diffuse encephalopathy. No epileptiform discharges or lateralizing signs seen.     This EEG was read from 0955 to 1017 on 04/16/25 .     The final impression will be available tomorrow under Chart Review in the Media tab. If the plan is to discontinue the video EEG, please place a \"Discontinue Continuous VEEG\" order in the EMR; otherwise the EEG tech will not receive the notification.      Stanley Evans DO  Adult Epilepsy Fellow  [facility] [place]     "

## 2025-04-16 NOTE — PROGRESS NOTES
Physical Therapy                 Therapy Communication Note    Patient Name: Fernanda Hughes  MRN: [id number]  Department: [facility] NEURO  Room: 2100/2100-A  Today's Date: 4/16/2025     Discipline: Physical Therapy    PT Missed Visit: Yes     Missed Visit Reason: Missed Visit Reason: Patient placed on medical hold (Orders received and chart review completed. Consulted with bedside nurse at 1012. Pt is currently on continuous vEEG monitoring. Will continue to follow up as appropriate.)    Missed Time: Attempt    Comment:

## 2025-04-16 NOTE — CONSULTS
Consults    84-year-old lady.    Apparently had possible seizure.  Found in her room at the skilled nursing facility.  She was on the floor.  Disoriented lethargic appeared to be postictal.  Urinated on herself.  Unwitnessed event.  She was lethargic and did answer simple questions at times.  Moving limbs okay.  Apparently was here a month ago for seizure activity.  She was started on Depakote.  She was on Macrobid for treatment of urinary tract infection.  Blood pressure was elevated 200 systolic when she came to the ER.    Other relevant history-  Anxiety falls glaucoma gastroesophageal reflux disorder breast cancer closed head injury history of seizures irritable bowel syndrome memory loss osteoarthrosis, vascular dementia visual field loss peripheral neuropathy.  She had had breast lumpectomy in the past.    Patient could not give me any useful information    No smoking.  No alcohol or substance abuse    Family history of deep vein thrombosis.  She was lethargic and disoriented in the ER.    She was on Depakote before she came in.  I discussed with the ER physician and requested them to give her 500 mg Depacon intravenously.  Looks like maintenance is 125 mg Depacon every 6 hours.    Apparently patient was on Depakote  mg twice a day prior to coming to the hospital.      Sodium is 124  Potassium 3.6.  Ammonia 19 magnesium 2.9 .  Lactate 1.6.  WBC 14K.    CT head unremarkable-I independently reviewed the images    Examination:    Neurologic exam:  Mental status : Poor informant.  Followed commands.  Kept her eyes closed but did open them to command.  She thought it was in May.  She agreed that she was in the hospital when I prompted her.  Cranial nerves: cranial nerves were examined.  Pupils were equal and reactive to light.  External ocular movements were normal.  Visual fields were normal.  When I showed her 4 fingers in front of her face she said she could see 10 fingers.  Face was symmetric.   Tongue was in the midline.  Soft palate move normally.  Facial sensations were normal.  Hearing was normal.  Neck : supple.  Gait and Station : Not evaluated  Strength : normal.  Muscle tone : normal.  Abnormal involuntary movements : none.  Atrophy/fasciculations : none.  Muscle stretch reflexes : normal.  In the upper limbs, absent in the lower limbs  Pathologic reflexes : absent.  Sensory : normal.  To pinprick  Coordination : Difficult to evaluate  Cardiovascular : normal first and second heart sounds.  No murmur.  No cranial or cervical bruits.    Chest : chest expansion was normal.  Breath sounds normal.  No adventitious sounds.    Impression discussion plan--  History of seizures  Possible breakthrough seizure, albeit it was unwitnessed.  She was found on the floor.    No evidence of intracranial bleeding or subdural hematoma.    She is already on Depakote.  She was on 500 mg twice a day before she came to the hospital.  Now she is on intravenous Depacon.  Once she is able to swallow safely-after speech evaluation-switch her back to oral Depakote  mg twice a day.  Also reasonable to initiate levetiracetam 250 mg twice a day-immediate release formulation.  This can be given intravenously for now and once able to swallow she can switch to oral route.  Seizure precautions  Her video EEG can be discontinued tomorrow morning around 9 AM.  So far no epileptiform activity seen on video EEG recording.  She has neurocognitive disorder probably vascular chair.    She can follow-up with her regular neurologist after discharge.    I communicated with the admitting physician

## 2025-04-16 NOTE — ASSESSMENT & PLAN NOTE
Admit patient to stepdown  With low videotaped EEG  Seizure precautions  Patient got loaded with Depacon  Neurology consult

## 2025-04-16 NOTE — CARE PLAN
The patient's goals for the shift include unable to assess    The clinical goals for the shift include Patient will remain hemodynamically stable throughout this shift.    Over the shift, the patient did not make progress toward the following goals. Barriers to progression include confusion. Recommendations to address these barriers include frequent reorientation.

## 2025-04-16 NOTE — CARE PLAN
Problem: Safety - Adult  Goal: Free from fall injury  Outcome: Progressing     Problem: Skin  Goal: Participates in plan/prevention/treatment measures  Outcome: Progressing  Flowsheets (Taken 4/16/2025 1753)  Participates in plan/prevention/treatment measures: Elevate heels  Goal: Prevent/manage excess moisture  Outcome: Progressing  Flowsheets (Taken 4/16/2025 1753)  Prevent/manage excess moisture: Cleanse incontinence/protect with barrier cream  Goal: Prevent/minimize sheer/friction injuries  Outcome: Progressing  Flowsheets (Taken 4/16/2025 1753)  Prevent/minimize sheer/friction injuries: Turn/reposition every 2 hours/use positioning/transfer devices  Goal: Promote/optimize nutrition  Outcome: Progressing  Flowsheets (Taken 4/16/2025 1753)  Promote/optimize nutrition: Assist with feeding     Problem: Pain - Adult  Goal: Verbalizes/displays adequate comfort level or baseline comfort level  Outcome: Progressing     Problem: Discharge Planning  Goal: Discharge to home or other facility with appropriate resources  Outcome: Progressing     Problem: Chronic Conditions and Co-morbidities  Goal: Patient's chronic conditions and co-morbidity symptoms are monitored and maintained or improved  Outcome: Progressing     Problem: Nutrition  Goal: Nutrient intake appropriate for maintaining nutritional needs  Outcome: Progressing     Problem: Fall/Injury  Goal: Verbalize understanding of personal risk factors for fall in the hospital  Outcome: Progressing  Goal: Verbalize understanding of risk factor reduction measures to prevent injury from fall in the home  Outcome: Progressing     Problem: Fall/Injury  Goal: Not fall by end of shift  Outcome: Met  Goal: Be free from injury by end of the shift  Outcome: Met   The patient's goals for the shift include unable to assess    The clinical goals for the shift include Patient will maintain SBP < 160 mmHg through the end of this shift

## 2025-04-16 NOTE — PROGRESS NOTES
Occupational Therapy                 Therapy Communication Note    Patient Name: Fernanda Hughes  MRN: [id number]  Department: [facility] NEURO  Room: 2100/2100-A  Today's Date: 4/16/2025     Discipline: Occupational Therapy    OT Missed Visit: Yes     Missed Visit Reason: Missed Visit Reason: Patient placed on medical hold: OT orders received and chart reviewed by this OT. Spoke to evaluating PT who had face to face communication w/ RN, reports pt is on continuous vEEG monitoring this date. Will hold OT evaluation and reattempt as able.    Missed Time: Attempt    Comment:

## 2025-04-16 NOTE — H&P
Internal Medicine History and Physical    PATIENT NAME:  Fernanda Hughes    MRN: [id number]  DATE of SERVICE: April 16, 2025    Primary Care Physician: Michelle Klein MD          Chief Complaint: Altered mental status    HPI: Patient is drowsy, history was obtained from the medical records  This is a 84 y.o. female with past medical history of vascular dementia, end-stage glaucoma with vision impairment, seizure disorder who presents from nursing facility with altered mental status, patient was found in her room disoriented and lethargic.  Patient had urinary incontinence.  In the emergency room patient CT scan of the head did not show any acute process, patient admitted for further evaluation and treatment    Past Medical History:  Medical History[1]    Past Surgical History:  Surgical History[2]    Family History:  Family History[3]    Social History:    Social History     Socioeconomic History    Marital status:      Spouse name: Not on file    Number of children: Not on file    Years of education: Not on file    Highest education level: Not on file   Occupational History    Not on file   Tobacco Use    Smoking status: Never    Smokeless tobacco: Never   Vaping Use    Vaping status: Never Used   Substance and Sexual Activity    Alcohol use: Never    Drug use: Never    Sexual activity: Defer   Other Topics Concern    Not on file   Social History Narrative    Not on file     Social Drivers of Health     Financial Resource Strain: Patient Declined (4/15/2025)    Overall Financial Resource Strain (CARDIA)     Difficulty of Paying Living Expenses: Patient declined   Food Insecurity: Patient Declined (4/15/2025)    Hunger Vital Sign     Worried About Running Out of Food in the Last Year: Patient declined     Ran Out of Food in the Last Year: Patient declined   Transportation Needs: Patient Declined (4/15/2025)    PRAPARE - Transportation     Lack of Transportation (Medical): Patient declined     Lack of  Transportation (Non-Medical): Patient declined   Physical Activity: Patient Declined (4/15/2025)    Exercise Vital Sign     Days of Exercise per Week: Patient declined     Minutes of Exercise per Session: Patient declined   Recent Concern: Physical Activity - Inactive (1/25/2025)    Exercise Vital Sign     Days of Exercise per Week: 0 days     Minutes of Exercise per Session: 0 min   Stress: Patient Declined (4/15/2025)    [employer] [place] [employer] - Occupational Stress Questionnaire     Feeling of Stress : Patient declined   Social Connections: Patient Declined (4/15/2025)    Social Connection and Isolation Panel [NHANES]     Frequency of Communication with Friends and Family: Patient declined     Frequency of Social Gatherings with Friends and Family: Patient declined     Attends Confucianist Services: Patient declined     Active Member of Clubs or Organizations: Patient declined     Attends Club or Organization Meetings: Patient declined     Marital Status: Patient declined   Recent Concern: Social Connections - Moderately Isolated (1/25/2025)    Social Connection and Isolation Panel [NHANES]     Frequency of Communication with Friends and Family: Once a week     Frequency of Social Gatherings with Friends and Family: More than three times a week     Attends Confucianist Services: Never     Active Member of Clubs or Organizations: Yes     Attends Club or Organization Meetings: More than 4 times per year     Marital Status:    Intimate Partner Violence: Patient Declined (4/15/2025)    Humiliation, Afraid, Rape, and Kick questionnaire     Fear of Current or Ex-Partner: Patient declined     Emotionally Abused: Patient declined     Physically Abused: Patient declined     Sexually Abused: Patient declined   Housing Stability: Patient Declined (4/15/2025)    Housing Stability Vital Sign     Unable to Pay for Housing in the Last Year: Patient declined     Number of Times Moved in the Last Year: 1      Homeless in the Last Year: Patient declined         Prior to Admission Medications:  Prescriptions Prior to Admission[4]    Active orders:  Active Orders   Lab    Basic metabolic panel     Frequency: AM draw     Number of Occurrences: 2 Occurrences    BLOOD GAS VENOUS FULL PANEL     Frequency: STAT     Number of Occurrences: 1 Occurrences    CBC     Frequency: AM draw     Number of Occurrences: 2 Occurrences    Extra Urine Gray Tube     Frequency: Once     Number of Occurrences: 1 Occurrences    Urinalysis with Reflex Culture and Microscopic     Frequency: STAT     Number of Occurrences: 1 Occurrences    Urinalysis with Reflex Culture and Microscopic     Frequency: Once     Number of Occurrences: 1 Occurrences   Diet    NPO Diet; Effective now     Frequency: Effective now     Number of Occurrences: Until Specified   Nursing    Activity (specify) Strict Bed Rest; 8 hours     Frequency: Until discontinued     Number of Occurrences: Until Specified    Cardiac Monitoring - ED/ICU/PACU Only     Frequency: Until discontinued     Number of Occurrences: Until Specified    Notify provider     Frequency: Until discontinued     Number of Occurrences: Until Specified    Pain Assessment     Frequency: q4h     Number of Occurrences: Until Specified    Vital Signs     Frequency: q4h     Number of Occurrences: Until Specified   Code Status    DNR and No Intubation     Frequency: Continuous     Number of Occurrences: Until Specified   Consult    Inpatient consult to Dietitian     Frequency: Once     Number of Occurrences: 1 Occurrences     Order Comments: Nursing Admission Screening      Inpatient consult to Neurology     Frequency: Once     Number of Occurrences: 1 Occurrences    Inpatient consult to Social Work and TCC     Frequency: Once     Number of Occurrences: 1 Occurrences   Nourishments    May Not Participate in Room Service     Frequency: Once     Number of Occurrences: 1 Occurrences   OT    OT eval and treat      Frequency: Until therapy completed     Number of Occurrences: 1 Occurrences     Order Comments: When okay with neuro     PT    PT eval and treat     Frequency: Until therapy completed     Number of Occurrences: 1 Occurrences     Order Comments: When okay with neuro     SLP    SLP eval and treat     Frequency: Until therapy completed     Number of Occurrences: 1 Occurrences   ECG    ECG 12 lead     Frequency: q1h     Number of Occurrences: 2 Occurrences    ECG 12 lead     Frequency: PRN     Number of Occurrences: Until Specified     Order Comments: Repeat ECG every 15-30 min for one hour with non-diagnostic ECG and ongoing symp.      Electrocardiogram, 12-lead PRN ACS symptoms     Frequency: PRN     Number of Occurrences: Until Specified     Order Comments: Notify provider if performed.     Neurology    EEG     Frequency: Once     Number of Occurrences: 1 Occurrences   Precaution    Aspiration precautions     Frequency: Until discontinued     Number of Occurrences: Until Specified    Fall precautions     Frequency: Until discontinued     Number of Occurrences: Until Specified    Seizure precautions     Frequency: Until discontinued     Number of Occurrences: Until Specified   Privilege Level    Sitter at bedside     Frequency: Continuous     Number of Occurrences: Until Specified   Telemetry    Telemetry monitoring - Floor only     Frequency: Until discontinued     Number of Occurrences: 3 Days   Medications    brimonidine (AlphaGAN) 0.2 % ophthalmic solution 1 drop     Frequency: BID     Dose: 1 drop     Route: Both Eyes    divalproex (Depakote) delayed release tablet 250 mg     Frequency: q12h JESSICA     Dose: 250 mg     Route: oral    dorzolamide-timoloL (Cosopt) 22.3-6.8 mg/mL ophthalmic solution 1 drop     Frequency: BID     Dose: 1 drop     Route: Both Eyes    hydrALAZINE (Apresoline) injection 10 mg     Frequency: q4h PRN     Dose: 10 mg     Route: intravenous    ketorolac (Acular) 0.5 % ophthalmic solution 1  drop     Frequency: Daily     Dose: 1 drop     Route: Both Eyes    latanoprost (Xalatan) 0.005 % ophthalmic solution 1 drop     Frequency: Nightly     Dose: 1 drop     Route: Both Eyes    LORazepam (Ativan) injection 1 mg     Frequency: q6h PRN     Dose: 1 mg     Route: intravenous    metoprolol tartrate (Lopressor) injection 5 mg     Frequency: q6h PRN     Dose: 5 mg     Route: intravenous    prednisoLONE acetate (Pred-Forte) 1 % ophthalmic suspension 1 drop     Frequency: Daily     Dose: 1 drop     Route: Both Eyes    QUEtiapine (SEROquel) tablet 12.5 mg     Frequency: q8h PRN     Dose: 12.5 mg     Route: oral    sodium chloride 0.9% infusion     Frequency: Continuous     Dose: 60 mL/hr     Route: intravenous    valproate (Depacon) 125 mg in dextrose 5% 50 mL IV     Frequency: q6h     Dose: 125 mg     Route: intravenous     Order Comments: Start 6 hours after ED dose    venlafaxine (Effexor) tablet 37.5 mg     Frequency: q AM     Dose: 37.5 mg     Route: oral           Allergies:   Allergies[5]    Review of Systems:  A 10 systems review of systems is negative except for HPI.      Vitals:  Patient Vitals for the past 24 hrs:   BP Temp Temp src Pulse Resp SpO2 Height Weight   04/16/25 0429 167/79 36.3 °C (97.3 °F) Temporal 80 (!) 42 99 % -- --   04/16/25 0000 -- -- -- 76 20 97 % -- --   04/15/25 2334 143/66 36.1 °C (97 °F) Temporal 74 14 97 % -- --   04/15/25 2000 -- -- -- 78 19 -- -- --   04/15/25 1913 174/76 36.3 °C (97.3 °F) Temporal 78 13 99 % -- --   04/15/25 1841 (!) 206/95 -- -- -- -- -- -- --   04/15/25 1800 -- -- -- -- -- 97 % -- --   04/15/25 1745 169/82 -- -- 92 20 98 % -- --   04/15/25 1715 172/82 -- -- 87 17 98 % -- --   04/15/25 1700 175/82 -- -- 83 (!) 23 98 % -- --   04/15/25 1645 180/86 -- -- 83 14 98 % -- --   04/15/25 1630 (!) 186/84 -- -- 88 15 99 % -- --   04/15/25 1615 (!) 189/78 -- -- 83 17 98 % -- --   04/15/25 1609 (!) 189/88 -- -- 83 16 99 % -- --   04/15/25 1600 (!) 189/88 -- -- 83 (!) 21  "99 % -- --   04/15/25 1545 (!) 203/146 -- -- 83 18 95 % -- --   04/15/25 1531 177/58 -- -- 87 18 97 % -- --   04/15/25 1530 (!) 201/93 -- -- 86 18 99 % -- --   04/15/25 1515 (!) 199/93 -- -- 85 18 97 % -- --   04/15/25 1500 (!) 201/81 -- -- 84 18 98 % -- --   04/15/25 1445 (!) 211/95 -- -- 82 17 95 % -- --   04/15/25 1443 (!) 205/97 -- -- 82 17 95 % -- --   04/15/25 1430 (!) 200/91 -- -- 80 15 99 % -- --   04/15/25 1421 (!) 183/84 -- -- 91 18 99 % -- --   04/15/25 1417 (!) 229/105 -- -- 88 15 100 % -- --   04/15/25 1415 -- -- -- 91 16 99 % -- --   04/15/25 1400 (!) 229/105 -- -- 91 17 98 % -- --   04/15/25 1351 (!) 205/98 -- -- 93 17 97 % -- --   04/15/25 1345 -- -- -- 91 18 96 % -- --   04/15/25 1315 -- -- -- 96 (!) 27 99 % -- --   04/15/25 1308 (!) 223/107 37.4 °C (99.3 °F) Temporal 97 20 100 % 1.702 m (5' 7\") 53.5 kg (118 lb)   04/15/25 1300 (!) 229/117 -- -- (!) 111 (!) 35 96 % -- --   04/15/25 1245 (!) 206/98 -- -- (!) 104 18 98 % -- --     Body mass index is 18.48 kg/m².         Physical Exam:  General appearance: Well-appearing alert, in no acute distress  Lungs: Clear to auscultation, no wheezing or rhonchi  Heart: RRR without murmur, gallop, or rubs.  No ectopy  Abdomen: Soft, non-tender. Bowel sounds normal.   Extremities: No deformity, no edema  Neuro: Deferred      Labs:  Results for orders placed or performed during the hospital encounter of 04/15/25 (from the past 24 hours)   POCT GLUCOSE   Result Value Ref Range    POCT Glucose 103 (H) 74 - 99 mg/dL   CBC and Auto Differential   Result Value Ref Range    WBC 14.4 (H) 4.4 - 11.3 x10*3/uL    nRBC 0.0 0.0 - 0.0 /100 WBCs    RBC 5.38 (H) 4.00 - 5.20 x10*6/uL    Hemoglobin 15.9 12.0 - 16.0 g/dL    Hematocrit 45.5 36.0 - 46.0 %    MCV 85 80 - 100 fL    MCH 29.6 26.0 - 34.0 pg    MCHC 34.9 32.0 - 36.0 g/dL    RDW 12.2 11.5 - 14.5 %    Platelets 237 150 - 450 x10*3/uL    Neutrophils % 83.9 40.0 - 80.0 %    Immature Granulocytes %, Automated 0.3 0.0 - 0.9 %    " Lymphocytes % 8.1 13.0 - 44.0 %    Monocytes % 7.0 2.0 - 10.0 %    Eosinophils % 0.5 0.0 - 6.0 %    Basophils % 0.2 0.0 - 2.0 %    Neutrophils Absolute 12.08 (H) 1.60 - 5.50 x10*3/uL    Immature Granulocytes Absolute, Automated 0.04 0.00 - 0.50 x10*3/uL    Lymphocytes Absolute 1.17 0.80 - 3.00 x10*3/uL    Monocytes Absolute 1.01 (H) 0.05 - 0.80 x10*3/uL    Eosinophils Absolute 0.07 0.00 - 0.40 x10*3/uL    Basophils Absolute 0.03 0.00 - 0.10 x10*3/uL   Comprehensive Metabolic Panel   Result Value Ref Range    Glucose 91 74 - 99 mg/dL    Sodium 124 (L) 136 - 145 mmol/L    Potassium 6.3 (HH) 3.5 - 5.3 mmol/L    Chloride 90 (L) 98 - 107 mmol/L    Bicarbonate 26 21 - 32 mmol/L    Anion Gap 14 10 - 20 mmol/L    Urea Nitrogen 16 6 - 23 mg/dL    Creatinine 0.77 0.50 - 1.05 mg/dL    eGFR 75 >60 mL/min/1.73m*2    Calcium 9.8 8.6 - 10.3 mg/dL    Albumin 4.8 3.4 - 5.0 g/dL    Alkaline Phosphatase 69 33 - 136 U/L    Total Protein 7.8 6.4 - 8.2 g/dL    AST 45 (H) 9 - 39 U/L    Bilirubin, Total 0.9 0.0 - 1.2 mg/dL    ALT 10 7 - 45 U/L   Magnesium   Result Value Ref Range    Magnesium 2.19 1.60 - 2.40 mg/dL   TSH with reflex to Free T4 if abnormal   Result Value Ref Range    Thyroid Stimulating Hormone 2.51 0.44 - 3.98 mIU/L   Valproic Acid   Result Value Ref Range    Valproic Acid 95 50 - 100 ug/mL   Troponin I, High Sensitivity, Initial   Result Value Ref Range    Troponin I, High Sensitivity 23 (H) 0 - 13 ng/L   B-type natriuretic peptide   Result Value Ref Range     (H) 0 - 99 pg/mL   Light Blue Top   Result Value Ref Range    Extra Tube Hold for add-ons.    ECG 12 lead   Result Value Ref Range    Ventricular Rate 93 BPM    Atrial Rate 93 BPM    MI Interval 126 ms    QRS Duration 80 ms    QT Interval 344 ms    QTC Calculation(Bazett) 427 ms    P Axis 69 degrees    R Axis 83 degrees    T Axis 84 degrees    QRS Count 15 beats    Q Onset 217 ms    P Onset 154 ms    P Offset 202 ms    T Offset 389 ms    QTC Fredericia 398 ms    Lactate   Result Value Ref Range    Lactate 1.6 0.4 - 2.0 mmol/L   Troponin, High Sensitivity, 1 Hour   Result Value Ref Range    Troponin I, High Sensitivity 20 (H) 0 - 13 ng/L   Potassium   Result Value Ref Range    Potassium 4.1 3.5 - 5.3 mmol/L   Ammonia   Result Value Ref Range    Ammonia 19 16 - 53 umol/L   Basic Metabolic Panel   Result Value Ref Range    Glucose 95 74 - 99 mg/dL    Sodium 127 (L) 136 - 145 mmol/L    Potassium 3.7 3.5 - 5.3 mmol/L    Chloride 94 (L) 98 - 107 mmol/L    Bicarbonate 23 21 - 32 mmol/L    Anion Gap 14 10 - 20 mmol/L    Urea Nitrogen 17 6 - 23 mg/dL    Creatinine 0.78 0.50 - 1.05 mg/dL    eGFR 74 >60 mL/min/1.73m*2    Calcium 9.1 8.6 - 10.3 mg/dL   Basic metabolic panel   Result Value Ref Range    Glucose 78 74 - 99 mg/dL    Sodium 130 (L) 136 - 145 mmol/L    Potassium 3.9 3.5 - 5.3 mmol/L    Chloride 98 98 - 107 mmol/L    Bicarbonate 21 21 - 32 mmol/L    Anion Gap 15 10 - 20 mmol/L    Urea Nitrogen 16 6 - 23 mg/dL    Creatinine 0.74 0.50 - 1.05 mg/dL    eGFR 79 >60 mL/min/1.73m*2    Calcium 8.9 8.6 - 10.3 mg/dL   CBC   Result Value Ref Range    WBC 10.4 4.4 - 11.3 x10*3/uL    nRBC 0.0 0.0 - 0.0 /100 WBCs    RBC 4.71 4.00 - 5.20 x10*6/uL    Hemoglobin 13.9 12.0 - 16.0 g/dL    Hematocrit 41.7 36.0 - 46.0 %    MCV 89 80 - 100 fL    MCH 29.5 26.0 - 34.0 pg    MCHC 33.3 32.0 - 36.0 g/dL    RDW 12.0 11.5 - 14.5 %    Platelets 179 150 - 450 x10*3/uL         Imaging:   Reviewed          Assessment/Plan:  Assessment & Plan  Seizure (Multi)  Admit patient to stepdown  With low videotaped EEG  Seizure precautions  Patient got loaded with Depacon  Neurology consult  Hypertensive encephalopathy  Will monitor blood pressure  End-stage glaucoma  Continue Cosopt eyedrops  Continue brimonidine  Vascular dementia  Supportive care  Leukocytosis  - Chest x-ray negative for any acute cardiopulmonary process  - Was on Macrobid at skilled nursing facility repeat UA ordered  - If UA remains positive  "should order IV agent  Fall  Monitor blood count  Hyponatremia  Initial serum sodium 124, improved to 130        DVT Prophylaxis- Addressed    Discussed with patient, RN    SIGNATURE: Stef Rosales MD  DATE: April 16, 2025  TIME: 9:21 AM      This note was partially created using voice recognition software and is inherently subject to errors including those of syntax and \"sound-alike\" substitutions which may escape proofreading. In such instances, original meaning may be extrapolated by contextual derivation         [1]   Past Medical History:  Diagnosis Date    Anxiety     Falls     Gastroesophageal reflux disease     Glaucoma     Hearing loss     History of breast cancer     History of closed head injury     History of seizure     Irritable bowel syndrome     Memory loss     Mild cognitive impairment     Osteoarthritis     Overactive bladder     Peripheral neuropathy     Underweight     Vascular dementia     Visual field loss    [2]   Past Surgical History:  Procedure Laterality Date    BREAST LUMPECTOMY Right     CATARACT EXTRACTION, BILATERAL      CHOLECYSTECTOMY      LYMPH NODE DISSECTION      ORIF FEMUR FRACTURE      TOTAL ABDOMINAL HYSTERECTOMY W/ BILATERAL SALPINGOOPHORECTOMY     [3]   Family History  Problem Relation Name Age of Onset    Deep vein thrombosis Neg Hx      Sudden death Neg Hx     [4]   Medications Prior to Admission   Medication Sig Dispense Refill Last Dose/Taking    acetaminophen (Tylenol) 325 mg tablet Take 2 tablets (650 mg) by mouth every 6 hours if needed for mild pain (1 - 3).   Past Month    brimonidine (AlphaGAN) 0.2 % ophthalmic solution Administer 1 drop into both eyes 2 times a day.   4/15/2025 Morning    divalproex (Depakote) 250 mg EC tablet Take 1 tablet (250 mg) by mouth every 12 hours. Do not crush, chew, or split. 60 tablet 0 4/15/2025 Morning    dorzolamide-timoloL (Cosopt) 22.3-6.8 mg/mL ophthalmic solution Administer 1 drop into both eyes 2 times a day.   4/15/2025 Morning "    ketorolac (Acular) 0.5 % ophthalmic solution Administer 1 drop into both eyes once daily.   4/15/2025 Morning    latanoprost (Xalatan) 0.005 % ophthalmic solution Administer 1 drop into both eyes once daily at bedtime.   4/14/2025 Bedtime    loperamide (Imodium) 2 mg capsule Take 1 capsule (2 mg) by mouth 3 times a day as needed for diarrhea.   Past Month    magnesium hydroxide (Milk of Magnesia) 400 mg/5 mL suspension Take 30 mL by mouth once daily as needed for constipation.   Past Month    nitrofurantoin, macrocrystal-monohydrate, (Macrobid) 100 mg capsule Take 1 capsule (100 mg) by mouth 2 times a day.   4/15/2025 Morning    prednisoLONE acetate (Pred-Forte) 1 % ophthalmic suspension Administer 1 drop into both eyes once daily.   4/15/2025 Morning    QUEtiapine (SEROquel) 25 mg tablet Take 0.5 tablets (12.5 mg) by mouth every 8 hours if needed (Agitation (hold for drowsiness/lethargy)). 6 tablet 0 Past Month    venlafaxine (Effexor) 37.5 mg tablet Take 1 tablet (37.5 mg) by mouth once daily in the morning.   4/15/2025 Morning   [5]   Allergies  Allergen Reactions    Vicodin [Hydrocodone-Acetaminophen] Other

## 2025-04-16 NOTE — ASSESSMENT & PLAN NOTE
- Chest x-ray negative for any acute cardiopulmonary process  - Was on Macrobid at [facility] nursing facility repeat UA ordered  - If UA remains positive should order IV agent

## 2025-04-17 LAB
ANION GAP SERPL CALC-SCNC: 15 MMOL/L (ref 10–20)
APPEARANCE UR: CLEAR
BILIRUB UR STRIP.AUTO-MCNC: NEGATIVE MG/DL
BUN SERPL-MCNC: 19 MG/DL (ref 6–23)
CALCIUM SERPL-MCNC: 9.1 MG/DL (ref 8.6–10.3)
CHLORIDE SERPL-SCNC: 97 MMOL/L (ref 98–107)
CO2 SERPL-SCNC: 22 MMOL/L (ref 21–32)
COLOR UR: YELLOW
CREAT SERPL-MCNC: 0.71 MG/DL (ref 0.5–1.05)
EGFRCR SERPLBLD CKD-EPI 2021: 83 ML/MIN/1.73M*2
ERYTHROCYTE [DISTWIDTH] IN BLOOD BY AUTOMATED COUNT: 12.5 % (ref 11.5–14.5)
GLUCOSE SERPL-MCNC: 78 MG/DL (ref 74–99)
GLUCOSE UR STRIP.AUTO-MCNC: NORMAL MG/DL
HCT VFR BLD AUTO: 44.7 % (ref 36–46)
HGB BLD-MCNC: 14.9 G/DL (ref 12–16)
HOLD SPECIMEN: NORMAL
KETONES UR STRIP.AUTO-MCNC: ABNORMAL MG/DL
LEUKOCYTE ESTERASE UR QL STRIP.AUTO: ABNORMAL
MCH RBC QN AUTO: 29.4 PG (ref 26–34)
MCHC RBC AUTO-ENTMCNC: 33.3 G/DL (ref 32–36)
MCV RBC AUTO: 88 FL (ref 80–100)
NITRITE UR QL STRIP.AUTO: NEGATIVE
NRBC BLD-RTO: 0 /100 WBCS (ref 0–0)
PH UR STRIP.AUTO: 6 [PH]
PLATELET # BLD AUTO: 211 X10*3/UL (ref 150–450)
POTASSIUM SERPL-SCNC: 3.6 MMOL/L (ref 3.5–5.3)
PROT UR STRIP.AUTO-MCNC: NEGATIVE MG/DL
RBC # BLD AUTO: 5.06 X10*6/UL (ref 4–5.2)
RBC # UR STRIP.AUTO: ABNORMAL MG/DL
RBC #/AREA URNS AUTO: ABNORMAL /HPF
SODIUM SERPL-SCNC: 130 MMOL/L (ref 136–145)
SP GR UR STRIP.AUTO: 1.01
SQUAMOUS #/AREA URNS AUTO: ABNORMAL /HPF
UROBILINOGEN UR STRIP.AUTO-MCNC: ABNORMAL MG/DL
WBC # BLD AUTO: 10 X10*3/UL (ref 4.4–11.3)
WBC #/AREA URNS AUTO: ABNORMAL /HPF

## 2025-04-17 PROCEDURE — 2500000004 HC RX 250 GENERAL PHARMACY W/ HCPCS (ALT 636 FOR OP/ED): Mod: JZ | Performed by: NURSE PRACTITIONER

## 2025-04-17 PROCEDURE — 2500000005 HC RX 250 GENERAL PHARMACY W/O HCPCS: Performed by: NURSE PRACTITIONER

## 2025-04-17 PROCEDURE — 80048 BASIC METABOLIC PNL TOTAL CA: CPT | Performed by: NURSE PRACTITIONER

## 2025-04-17 PROCEDURE — 87086 URINE CULTURE/COLONY COUNT: CPT | Mod: STJLAB

## 2025-04-17 PROCEDURE — 2500000002 HC RX 250 W HCPCS SELF ADMINISTERED DRUGS (ALT 637 FOR MEDICARE OP, ALT 636 FOR OP/ED): Performed by: NURSE PRACTITIONER

## 2025-04-17 PROCEDURE — 95720 EEG PHY/QHP EA INCR W/VEEG: CPT | Performed by: PSYCHIATRY & NEUROLOGY

## 2025-04-17 PROCEDURE — 1200000002 HC GENERAL ROOM WITH TELEMETRY DAILY

## 2025-04-17 PROCEDURE — 85027 COMPLETE CBC AUTOMATED: CPT | Performed by: NURSE PRACTITIONER

## 2025-04-17 PROCEDURE — 81001 URINALYSIS AUTO W/SCOPE: CPT

## 2025-04-17 PROCEDURE — 95700 EEG CONT REC W/VID EEG TECH: CPT

## 2025-04-17 PROCEDURE — 2500000001 HC RX 250 WO HCPCS SELF ADMINISTERED DRUGS (ALT 637 FOR MEDICARE OP): Performed by: NURSE PRACTITIONER

## 2025-04-17 PROCEDURE — 36415 COLL VENOUS BLD VENIPUNCTURE: CPT | Performed by: NURSE PRACTITIONER

## 2025-04-17 PROCEDURE — 95714 VEEG EA 12-26 HR UNMNTR: CPT

## 2025-04-17 PROCEDURE — 97165 OT EVAL LOW COMPLEX 30 MIN: CPT | Mod: GO | Performed by: OCCUPATIONAL THERAPIST

## 2025-04-17 RX ORDER — POTASSIUM CHLORIDE 20 MEQ/1
20 TABLET, EXTENDED RELEASE ORAL ONCE
Status: COMPLETED | OUTPATIENT
Start: 2025-04-17 | End: 2025-04-17

## 2025-04-17 RX ORDER — LEVETIRACETAM 500 MG/1
250 TABLET ORAL 2 TIMES DAILY
Status: DISCONTINUED | OUTPATIENT
Start: 2025-04-17 | End: 2025-04-22 | Stop reason: HOSPADM

## 2025-04-17 RX ORDER — AMLODIPINE BESYLATE 5 MG/1
5 TABLET ORAL DAILY
Status: DISCONTINUED | OUTPATIENT
Start: 2025-04-17 | End: 2025-04-22 | Stop reason: HOSPADM

## 2025-04-17 RX ADMIN — BRIMONIDINE TARTRATE 1 DROP: 2 SOLUTION/ DROPS OPHTHALMIC at 22:02

## 2025-04-17 RX ADMIN — AMLODIPINE BESYLATE 5 MG: 5 TABLET ORAL at 10:11

## 2025-04-17 RX ADMIN — VALPROATE SODIUM 125 MG: 100 INJECTION, SOLUTION INTRAVENOUS at 06:54

## 2025-04-17 RX ADMIN — POTASSIUM CHLORIDE 20 MEQ: 1500 TABLET, EXTENDED RELEASE ORAL at 08:42

## 2025-04-17 RX ADMIN — DIVALPROEX SODIUM 250 MG: 250 TABLET, DELAYED RELEASE ORAL at 22:02

## 2025-04-17 RX ADMIN — LEVETIRACETAM 250 MG: 100 INJECTION, SOLUTION INTRAVENOUS at 01:55

## 2025-04-17 RX ADMIN — VALPROATE SODIUM 125 MG: 100 INJECTION, SOLUTION INTRAVENOUS at 00:22

## 2025-04-17 RX ADMIN — KETOROLAC TROMETHAMINE 1 DROP: 5 SOLUTION OPHTHALMIC at 08:41

## 2025-04-17 RX ADMIN — PREDNISOLONE ACETATE 1 DROP: 10 SUSPENSION/ DROPS OPHTHALMIC at 08:42

## 2025-04-17 RX ADMIN — DORZOLAMIDE HYDROCHLORIDE AND TIMOLOL MALEATE 1 DROP: 20; 5 SOLUTION/ DROPS OPHTHALMIC at 08:42

## 2025-04-17 RX ADMIN — BRIMONIDINE TARTRATE 1 DROP: 2 SOLUTION/ DROPS OPHTHALMIC at 08:42

## 2025-04-17 RX ADMIN — LATANOPROST 1 DROP: 50 SOLUTION OPHTHALMIC at 22:02

## 2025-04-17 RX ADMIN — LEVETIRACETAM 250 MG: 500 TABLET, FILM COATED ORAL at 22:03

## 2025-04-17 RX ADMIN — DORZOLAMIDE HYDROCHLORIDE AND TIMOLOL MALEATE 1 DROP: 20; 5 SOLUTION/ DROPS OPHTHALMIC at 22:02

## 2025-04-17 RX ADMIN — LEVETIRACETAM 250 MG: 500 TABLET, FILM COATED ORAL at 10:22

## 2025-04-17 ASSESSMENT — PAIN SCALES - GENERAL
PAINLEVEL_OUTOF10: 0 - NO PAIN

## 2025-04-17 ASSESSMENT — COGNITIVE AND FUNCTIONAL STATUS - GENERAL
TURNING FROM BACK TO SIDE WHILE IN FLAT BAD: A LITTLE
PERSONAL GROOMING: A LITTLE
DRESSING REGULAR UPPER BODY CLOTHING: A LITTLE
WALKING IN HOSPITAL ROOM: A LITTLE
CLIMB 3 TO 5 STEPS WITH RAILING: A LITTLE
MOVING TO AND FROM BED TO CHAIR: A LITTLE
TOILETING: A LOT
MOBILITY SCORE: 11
WALKING IN HOSPITAL ROOM: TOTAL
CLIMB 3 TO 5 STEPS WITH RAILING: A LITTLE
PERSONAL GROOMING: A LITTLE
MOVING FROM LYING ON BACK TO SITTING ON SIDE OF FLAT BED WITH BEDRAILS: A LITTLE
DAILY ACTIVITIY SCORE: 18
HELP NEEDED FOR BATHING: A LITTLE
EATING MEALS: A LITTLE
CLIMB 3 TO 5 STEPS WITH RAILING: TOTAL
MOBILITY SCORE: 18
CLIMB 3 TO 5 STEPS WITH RAILING: A LITTLE
MOVING FROM LYING ON BACK TO SITTING ON SIDE OF FLAT BED WITH BEDRAILS: A LITTLE
EATING MEALS: A LITTLE
HELP NEEDED FOR BATHING: A LOT
MOVING TO AND FROM BED TO CHAIR: A LOT
PERSONAL GROOMING: A LOT
MOBILITY SCORE: 18
MOVING FROM LYING ON BACK TO SITTING ON SIDE OF FLAT BED WITH BEDRAILS: A LITTLE
DRESSING REGULAR LOWER BODY CLOTHING: A LITTLE
PERSONAL GROOMING: A LITTLE
DRESSING REGULAR LOWER BODY CLOTHING: A LITTLE
TOILETING: A LITTLE
DRESSING REGULAR UPPER BODY CLOTHING: A LITTLE
DRESSING REGULAR UPPER BODY CLOTHING: A LOT
STANDING UP FROM CHAIR USING ARMS: A LITTLE
DRESSING REGULAR LOWER BODY CLOTHING: A LOT
HELP NEEDED FOR BATHING: A LITTLE
HELP NEEDED FOR BATHING: A LITTLE
TURNING FROM BACK TO SIDE WHILE IN FLAT BAD: A LITTLE
TURNING FROM BACK TO SIDE WHILE IN FLAT BAD: A LITTLE
DAILY ACTIVITIY SCORE: 18
DAILY ACTIVITIY SCORE: 18
DRESSING REGULAR UPPER BODY CLOTHING: A LITTLE
STANDING UP FROM CHAIR USING ARMS: A LITTLE
MOVING TO AND FROM BED TO CHAIR: A LITTLE
MOVING TO AND FROM BED TO CHAIR: A LITTLE
TOILETING: A LITTLE
TOILETING: A LITTLE
STANDING UP FROM CHAIR USING ARMS: A LITTLE
WALKING IN HOSPITAL ROOM: A LITTLE
DAILY ACTIVITIY SCORE: 12
TURNING FROM BACK TO SIDE WHILE IN FLAT BAD: A LITTLE
WALKING IN HOSPITAL ROOM: A LITTLE
DRESSING REGULAR LOWER BODY CLOTHING: A LITTLE
EATING MEALS: A LOT
MOVING FROM LYING ON BACK TO SITTING ON SIDE OF FLAT BED WITH BEDRAILS: A LOT
STANDING UP FROM CHAIR USING ARMS: A LOT
EATING MEALS: A LITTLE
MOBILITY SCORE: 18

## 2025-04-17 ASSESSMENT — ACTIVITIES OF DAILY LIVING (ADL): ADL_ASSISTANCE: INDEPENDENT

## 2025-04-17 ASSESSMENT — PAIN - FUNCTIONAL ASSESSMENT
PAIN_FUNCTIONAL_ASSESSMENT: 0-10

## 2025-04-17 NOTE — PROGRESS NOTES
Occupational Therapy  Evaluation    Patient Name: Fernanda Hughes  MRN: [id number]  Today's Date: 4/17/2025  Time Calculation  Start Time: 1345  Stop Time: 1356  Time Calculation (min): 11 min  3027/3027-A    Assessment  IP OT Assessment  OT Assessment: Patient demonstrates decreased independence with self care, decreased independence with functional transfers, decreased endurance, decreased balance and decreased cognition.  Patient will benefit from skilled OT to address deficits. Anticipate patient will benefit from moderate to low intensity therapy pending progress.  Prognosis: Good  Barriers to Discharge Home: Cognition needs  Cognition Needs: 24hr supervision for safety awareness needed  Evaluation/Treatment Tolerance: Patient limited by fatigue  Medical Staff Made Aware: Yes  End of Session Communication: Bedside nurse  End of Session Patient Position: Bed, 4 rail up, Alarm on (seizure pads in place, sitter present)    Plan:  Treatment Interventions: ADL retraining, Functional transfer training, UE strengthening/ROM, Endurance training, Cognitive reorientation, Patient/family training  OT Frequency: 3 times per week  OT Discharge Recommendations: Moderate intensity level of continued care, Low intensity level of continued care, 24 hr supervision due to cognition  Equipment Recommended upon Discharge: Wheeled walker  OT Recommended Transfer Status: Moderate assist, Assist of 2  OT - OK to Discharge: Yes (to next level of care when medically cleared by physician)    Subjective     Current Problem:  1. Seizure (Multi)  EEG          General:  General  Reason for Referral: impaired self care s/p seizure  Referred By: Dr. Rosales  Past Medical History Relevant to Rehab: history of vascular dementia,  rend-stage glaucoma with severe visual impairment (blurred vision), GERD, falls, IBS, OA, emote breast cancer status post lumpectomy, and seizures.  Family/Caregiver Present: No  Co-Treatment: PT  Co-Treatment Reason: maximize  patient safety, dc plan  Prior to Session Communication: Bedside nurse  Patient Position Received: Bed, 4 rail up, Alarm on (sitter present)  Preferred Learning Style: verbal, visual  General Comment: Patient agreeable to therapy.  Lethargic throughout session    Precautions:  Medical Precautions: Fall precautions, Seizure precautions    Pain:  Pain Assessment  Pain Assessment: 0-10  0-10 (Numeric) Pain Score: 0 - No pain    Objective     Cognition:  Orientation Level:  (oriented to person, place, date)        Home Living:  Type of Home: Memory Care unit ([facility])     Prior Function:  Prior Function Comments: Patient reports using walker for mobility.  Assist as needed for self care      ADL:  LE Dressing Assistance: Maximal    Activity Tolerance:  Endurance: Tolerates 10 - 20 min exercise with multiple rests    Bed Mobility/Transfers:   Bed Mobility  Bed Mobility: Yes  Bed Mobility 1  Bed Mobility 1: Supine to sitting, Sitting to supine  Level of Assistance 1: Contact guard  Transfers  Transfer: Yes  Transfer 1  Transfer From 1: Sit to  Transfer to 1: Stand  Technique 1: Sit to stand, Stand to sit  Transfer Level of Assistance 1: Moderate assistance, +2  Trials/Comments 1: from bed, side stepped up side of bed with mod A x 2    Ambulation/Gait Training:  Functional Mobility  Functional Mobility Performed: No (patient lethargic)        Outcome Measures: [facility] Daily Activity  Putting on and taking off regular lower body clothing: A lot  Bathing (including washing, rinsing, drying): A lot  Putting on and taking off regular upper body clothing: A lot  Toileting, which includes using toilet, bedpan or urinal: A lot  Taking care of personal grooming such as brushing teeth: A lot  Eating Meals: A lot  Daily Activity - Total Score: 12           EDUCATION:  Education  Individual(s) Educated: Patient  Education Provided: Fall precautons, Risk and benefits of OT discussed with patient or other  Patient Response to Education:   (needs reinforcement)      Goals:   Encounter Problems       Encounter Problems (Active)       OT Goals       Patient will complete functional transfers with mod I. (Progressing)       Start:  04/17/25    Expected End:  05/01/25            Patient will complete toileting with superivision. (Progressing)       Start:  04/17/25    Expected End:  05/01/25            Patient will complete LE dressing with supervision. (Progressing)       Start:  04/17/25    Expected End:  05/01/25

## 2025-04-17 NOTE — PROGRESS NOTES
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  Presenting to [facility] on 4/15/2025 from skilled nursing facility for concern for seizure.  Patient was found at in her room at [facility] nursing [facility] on the floor.  Patient was disoriented and lethargic appeared to be postictal to nursing staff.  Patient urinated on self.  Fall and possible seizure were both unwitnessed.  Patient is lethargic she does answer simple questions at times.  Moving upper extremities equally.  POA is at the bedside in the emergency room and does assist with some history taking.  Patient was admitted approximately a month ago for seizure activity.  She was started on Depakote.  Review of med list from nursing home patient was on Macrobid presumably for treatment of UTI.  Blood pressure pretty elevated in the 200s over 100s while in the emergency room.  Patient unable to provide any meaningful HPI or ROS   PT Assessment Results: Decreased strength, Decreased endurance, Impaired balance, Decreased mobility, Decreased cognition  Rehab Prognosis: Fair  Barriers to Discharge Home: Caregiver assistance, Cognition needs, Physical needs  Evaluation/Treatment Tolerance: Patient limited by fatigue  Medical Staff Made Aware: Yes  End of Session Communication: Bedside nurse  End of Session Patient Position: Bed, 4 rail up (seizure precautions)  IP OR SWING BED PT PLAN  Inpatient or Swing Bed: Inpatient  PT Plan  Treatment/Interventions: Bed mobility, Transfer training  PT Plan: Ongoing PT  PT Frequency: 3 times per week  PT Discharge Recommendations: Moderate intensity level of continued  care  Equipment Recommended upon Discharge: Wheeled walker  PT Recommended Transfer Status: Assist x2  PT - OK to Discharge: Yes (once medically cleared)    Subjective     Current Problem:  1. Seizure (Multi)  EEG        Problem List[1]    General Visit Information:  General  Reason for Referral: seizure  Referred By: Connie Finch  Past Medical History Relevant to Rehab: history of vascular dementia,  rend-stage glaucoma with severe visual impairment (blurred vision), GERD, falls, IBS, OA, emote breast cancer status post lumpectomy, and seizures.  Co-Treatment: OT  Prior to Session Communication: Bedside nurse  Patient Position Received: Bed, 4 rail up, Alarm on  General Comment: pt very lethargic during session, but agreeable to work with    Home Living:  Home Living  Type of Home: Assisted living (memory care)  Home Adaptive Equipment: Walker rolling or standard    Prior Level of Function:  Prior Function Per Pt/Caregiver Report  Level of [place]: Independent with ADLs and functional transfers, Independent with homemaking with ambulation  ADL Assistance: Independent  Homemaking Assistance: Independent  Ambulatory Assistance: Independent    Precautions: seizures. falls  Precautions  Medical Precautions: Fall precautions, Seizure precautions    Objective     Cognition:  Cognition  Overall Cognitive Status: Within Functional Limits  Orientation Level: Oriented X4 (very groggy)  Attention: Within Functional Limits    General Assessments:  General Observation  General Observation: Pt agreeable to work with, however very sleepy with eyes half open/closed during most of session despite repeated VC to open eyes   Activity Tolerance  Endurance: Tolerates less than 10 min exercise with changes in vital signs (reported feeling dizzy which abolished prior to standing)                 Static Sitting Balance  Static Sitting-Balance Support: Bilateral upper extremity supported (pt wavered while seated, but may be d/t her  sleepiness)  Static Sitting-Level of Assistance: Contact guard  Static Sitting-Comment/Number of Minutes: 5  Static Standing Balance  Static Standing-Balance Support: Bilateral upper extremity supported  Static Standing-Level of Assistance: Moderate assistance (modAx2)  Static Standing-Comment/Number of Minutes: 3 minutes  Dynamic Standing Balance  Dynamic Standing-Balance Support: Bilateral upper extremity supported  Dynamic Standing-Level of Assistance: Moderate assistance (modAx2)  Dynamic Standing-Balance: Lateral lean (lateral lean that turned into lateral steps)    Functional Assessments:     Bed Mobility  Bed Mobility: Yes  Bed Mobility 1  Bed Mobility 1: Supine to sitting  Level of Assistance 1: Moderate verbal cues, Close supervision  Bed Mobility 2  Bed Mobility  2: Sitting to supine  Level of Assistance 2: Moderate verbal cues, Close supervision  Transfers  Transfer: Yes  Transfers 2  Transfer From 2: Bed to, Stand to  Transfer to 2: Stand, Bed  Transfer Level of Assistance 2: +2, Moderate verbal cues  Ambulation/Gait Training  Ambulation/Gait Training Performed: Yes  Ambulation/Gait Training 1  Surface 1: Level tile  Device 1: No device (HHA modAx2)  Quality of Gait 1: Narrow base of support, Shuffling gait  Comments/Distance (ft) 1: 2 feet  Stairs  Stairs: No       Extremity/Trunk Assessments:        RLE   RLE :  (3/5)  LLE   LLE :  (3/5)    Outcome Measures:     [facility] Basic Mobility  Turning from your back to your side while in a flat bed without using bedrails: A lot  Moving from lying on your back to sitting on the side of a flat bed without using bedrails: A little  Moving to and from bed to chair (including a wheelchair): A lot  Standing up from a chair using your arms (e.g. wheelchair or bedside chair): A lot  To walk in hospital room: Total  Climbing 3-5 steps with railing: Total  Basic Mobility - Total Score: 11           Goals:  Encounter Problems       Encounter Problems (Active)       Balance        LTG - Patient will tolerate standing for 5+ minutes with 50% decrease in fatigue.       Start:  04/17/25    Expected End:  05/01/25               Mobility       STG - Patient will ambulate 50+ feet with LRD in order to return to navigating her assistive living.        Start:  04/17/25    Expected End:  05/01/25               PT Transfers       STG - Transfer from bed to chair for ease of utilizing commode.        Start:  04/17/25    Expected End:  05/01/25            STG - Patient will perform bed mobility with Jose or better in order to pressure relief and mitigate risk of pressure wounds.        Start:  04/17/25    Expected End:  05/01/25               Pain - Adult            Education Documentation  Body Mechanics, taught by Marlen Pratt PT at 4/17/2025  2:24 PM.  Learner: Patient  Readiness: Acceptance  Method: Explanation, Demonstration  Response: Needs Reinforcement    Home Exercise Program, taught by Marlen Pratt PT at 4/17/2025  2:24 PM.  Learner: Patient  Readiness: Acceptance  Method: Explanation, Demonstration  Response: Needs Reinforcement    Precautions, taught by Marlen Pratt PT at 4/17/2025  2:24 PM.  Learner: Patient  Readiness: Acceptance  Method: Explanation, Demonstration  Response: Needs Reinforcement    Mobility Training, taught by Marlen Pratt PT at 4/17/2025  2:24 PM.  Learner: Patient  Readiness: Acceptance  Method: Explanation, Demonstration  Response: Needs Reinforcement    Education Comments  No comments found.               [1]   Patient Active Problem List  Diagnosis    Vision disturbance    UTI (urinary tract infection)    Vascular dementia    Fall    Confusion and disorientation    Hematoma of left buttock    Falls, sequela    Acute on chronic anemia    Ambulatory dysfunction    End-stage glaucoma    Seizure (Multi)    Lactic acidosis    Hypertensive encephalopathy    Leukocytosis    Hyponatremia

## 2025-04-17 NOTE — CARE PLAN
Problem: Safety - Adult  Goal: Free from fall injury  Outcome: Progressing     Problem: Pain - Adult  Goal: Verbalizes/displays adequate comfort level or baseline comfort level  Outcome: Progressing     Problem: Discharge Planning  Goal: Discharge to home or other facility with appropriate resources  Outcome: Progressing     Problem: Chronic Conditions and Co-morbidities  Goal: Patient's chronic conditions and co-morbidity symptoms are monitored and maintained or improved  Outcome: Progressing     Problem: Nutrition  Goal: Nutrient intake appropriate for maintaining nutritional needs  Outcome: Progressing     Problem: Skin  Goal: Participates in plan/prevention/treatment measures  Outcome: Progressing  Goal: Prevent/manage excess moisture  Outcome: Progressing  Goal: Prevent/minimize sheer/friction injuries  Outcome: Progressing  Goal: Promote/optimize nutrition  Outcome: Progressing   The patient's goals for the shift include patient remain HDS and complete EEG     The clinical goals for the shift include Patient will maintain SBP < 160 mmHg through the end of this shift    Over the shift, the patient did  make progress toward the following goals. Barriers to progression include fluctuating mental status . Recommendations to address these barriers include .

## 2025-04-17 NOTE — PROGRESS NOTES
Internal Medicine Progress Note    Patient Name: Fernanda Hughes          MRN: [id number]  Today's Date: April 17, 2025          Attending: Stef Rosales MD    Subjective:  Patient was seen and examined at bedside, patient is more awake today, in no distress.    Review Of Systems:  GENERAL: No malaise or fevers.  CARDIOVASCULAR: Negative for chest pain, leg swelling  RESPIRATORY: Negative for shortness of breath  GI: No nausea, vomiting, or diarrhea    Objective:  Vitals:    04/17/25 0500 04/17/25 0600 04/17/25 0654 04/17/25 0828   BP: 145/68   157/84   BP Location:    Right arm   Patient Position:    Sitting   Pulse: 80 74 78 88   Resp: 17 12 16 24   Temp:    36 °C (96.8 °F)   TempSrc:    Temporal   SpO2: 100%   98%   Weight:       Height:               Physical Exam:   General appearance: Alert, in no acute distress   Lungs: Clear to auscultation, no wheezing or rhonchi  Heart: RRR without murmur  Abdomen: Soft, non-tender. Bowel sounds normal.  Extremities: No deformity, no edema  Neuro: Awake but disoriented    Labs:  Results for orders placed or performed during the hospital encounter of 04/15/25 (from the past 24 hours)   POCT GLUCOSE   Result Value Ref Range    POCT Glucose 83 74 - 99 mg/dL   Urinalysis with Reflex Culture and Microscopic   Result Value Ref Range    Color, Urine Yellow Light-Yellow, Yellow, Dark-Yellow    Appearance, Urine Clear Clear    Specific Gravity, Urine 1.014 1.005 - 1.035    pH, Urine 6.0 5.0, 5.5, 6.0, 6.5, 7.0, 7.5, 8.0    Protein, Urine NEGATIVE NEGATIVE, 10 (TRACE), 20 (TRACE) mg/dL    Glucose, Urine Normal Normal mg/dL    Blood, Urine 0.1 (1+) (A) NEGATIVE mg/dL    Ketones, Urine 10 (1+) (A) NEGATIVE mg/dL    Bilirubin, Urine NEGATIVE NEGATIVE mg/dL    Urobilinogen, Urine 3 (1+) (A) Normal mg/dL    Nitrite, Urine NEGATIVE NEGATIVE    Leukocyte Esterase, Urine 25 Christina/uL (A) NEGATIVE   Microscopic Only, Urine   Result Value Ref Range    WBC, Urine 1-5 1-5, NONE /HPF    RBC, Urine 11-20  "(A) NONE, 1-2, 3-5 /HPF    Squamous Epithelial Cells, Urine 1-9 (SPARSE) Reference range not established. /HPF   Basic metabolic panel   Result Value Ref Range    Glucose 78 74 - 99 mg/dL    Sodium 130 (L) 136 - 145 mmol/L    Potassium 3.6 3.5 - 5.3 mmol/L    Chloride 97 (L) 98 - 107 mmol/L    Bicarbonate 22 21 - 32 mmol/L    Anion Gap 15 10 - 20 mmol/L    Urea Nitrogen 19 6 - 23 mg/dL    Creatinine 0.71 0.50 - 1.05 mg/dL    eGFR 83 >60 mL/min/1.73m*2    Calcium 9.1 8.6 - 10.3 mg/dL   CBC   Result Value Ref Range    WBC 10.0 4.4 - 11.3 x10*3/uL    nRBC 0.0 0.0 - 0.0 /100 WBCs    RBC 5.06 4.00 - 5.20 x10*6/uL    Hemoglobin 14.9 12.0 - 16.0 g/dL    Hematocrit 44.7 36.0 - 46.0 %    MCV 88 80 - 100 fL    MCH 29.4 26.0 - 34.0 pg    MCHC 33.3 32.0 - 36.0 g/dL    RDW 12.5 11.5 - 14.5 %    Platelets 211 150 - 450 x10*3/uL       Medications:  Scheduled medications  Scheduled Medications[1]  Continuous medications  Continuous Medications[2]  PRN medications  PRN Medications[3]      Assessment/Plan:  Assessment & Plan  Seizure (Multi)  EEG did not show signs of seizure  Continue Kera  Neurology consult  Hypertensive encephalopathy  Blood pressure is better controlled, patient is awake, at baseline  Will monitor blood pressure  End-stage glaucoma  Continue Cosopt eyedrops  Continue brimonidine  Vascular dementia  Supportive care  Fall  Monitor blood count  Hyponatremia  Serum sodium 130  Continue monitoring      Discussed with patient, RN    Stef Rosales MD   Date: 04/17/25  Time: 8:58 AM    This note was partially created using voice recognition software and is inherently subject to errors including those of syntax and \"sound-alike\" substitutions which may escape proofreading. In such instances, original meaning may be extrapolated by contextual derivation         [1] brimonidine, 1 drop, Both Eyes, BID  [Held by provider] divalproex, 250 mg, oral, q12h JESSICA  dorzolamide-timoloL, 1 drop, Both Eyes, BID  ketorolac, 1 drop, " Both Eyes, Daily  latanoprost, 1 drop, Both Eyes, Nightly  levETIRAcetam, 250 mg, intravenous, q12h  prednisoLONE acetate, 1 drop, Both Eyes, Daily  valproate sodium, 125 mg, intravenous, q6h  [Held by provider] venlafaxine, 37.5 mg, oral, q AM  [2]    [3] PRN medications: hydrALAZINE, LORazepam, metoprolol, [Held by provider] QUEtiapine

## 2025-04-17 NOTE — CARE PLAN
EOS Note Pt has remained HDS since transfer to  from CCU. She has been asleep, arousable for vitals and AO check. Her sitter was d/c'd at 1630 hours and so far, pt has remained calm and stable with no issues. She is nsr/tele, AO x 1, using a purewick. Pt is resting comfortably in bed, alarms on and call light within reach.      The patient's goals for the shift include unable to assess    The clinical goals for the shift include spo2 will remain 925 or greater t/o shift

## 2025-04-17 NOTE — CARE PLAN
The patient's goals for the shift include unable to assess    The clinical goals for the shift include Patient will maintain SBP < 160 mmHg through the end of this shift      Problem: Safety - Adult  Goal: Free from fall injury  Outcome: Progressing     Problem: Skin  Goal: Participates in plan/prevention/treatment measures  Outcome: Progressing  Goal: Prevent/manage excess moisture  Outcome: Progressing  Goal: Prevent/minimize sheer/friction injuries  Outcome: Progressing  Goal: Promote/optimize nutrition  Outcome: Progressing     Problem: Pain - Adult  Goal: Verbalizes/displays adequate comfort level or baseline comfort level  Outcome: Progressing     Problem: Discharge Planning  Goal: Discharge to home or other facility with appropriate resources  Outcome: Progressing     Problem: Chronic Conditions and Co-morbidities  Goal: Patient's chronic conditions and co-morbidity symptoms are monitored and maintained or improved  Outcome: Progressing     Problem: Nutrition  Goal: Nutrient intake appropriate for maintaining nutritional needs  Outcome: Progressing     Problem: Fall/Injury  Goal: Verbalize understanding of personal risk factors for fall in the hospital  Outcome: Progressing  Goal: Verbalize understanding of risk factor reduction measures to prevent injury from fall in the home  Outcome: Progressing

## 2025-04-18 LAB
ANION GAP SERPL CALC-SCNC: 12 MMOL/L (ref 10–20)
BACTERIA UR CULT: NORMAL
BUN SERPL-MCNC: 21 MG/DL (ref 6–23)
CALCIUM SERPL-MCNC: 9.1 MG/DL (ref 8.6–10.3)
CHLORIDE SERPL-SCNC: 98 MMOL/L (ref 98–107)
CO2 SERPL-SCNC: 25 MMOL/L (ref 21–32)
CREAT SERPL-MCNC: 0.68 MG/DL (ref 0.5–1.05)
EGFRCR SERPLBLD CKD-EPI 2021: 85 ML/MIN/1.73M*2
ERYTHROCYTE [DISTWIDTH] IN BLOOD BY AUTOMATED COUNT: 12.6 % (ref 11.5–14.5)
GLUCOSE SERPL-MCNC: 76 MG/DL (ref 74–99)
HCT VFR BLD AUTO: 43.5 % (ref 36–46)
HGB BLD-MCNC: 14.3 G/DL (ref 12–16)
MCH RBC QN AUTO: 29.7 PG (ref 26–34)
MCHC RBC AUTO-ENTMCNC: 32.9 G/DL (ref 32–36)
MCV RBC AUTO: 90 FL (ref 80–100)
NRBC BLD-RTO: 0 /100 WBCS (ref 0–0)
PLATELET # BLD AUTO: 60 X10*3/UL (ref 150–450)
POTASSIUM SERPL-SCNC: 4.1 MMOL/L (ref 3.5–5.3)
RBC # BLD AUTO: 4.81 X10*6/UL (ref 4–5.2)
SODIUM SERPL-SCNC: 131 MMOL/L (ref 136–145)
WBC # BLD AUTO: 9.2 X10*3/UL (ref 4.4–11.3)

## 2025-04-18 PROCEDURE — 1200000002 HC GENERAL ROOM WITH TELEMETRY DAILY

## 2025-04-18 PROCEDURE — 2500000001 HC RX 250 WO HCPCS SELF ADMINISTERED DRUGS (ALT 637 FOR MEDICARE OP): Performed by: NURSE PRACTITIONER

## 2025-04-18 PROCEDURE — 80048 BASIC METABOLIC PNL TOTAL CA: CPT | Performed by: NURSE PRACTITIONER

## 2025-04-18 PROCEDURE — 36415 COLL VENOUS BLD VENIPUNCTURE: CPT | Performed by: NURSE PRACTITIONER

## 2025-04-18 PROCEDURE — 85027 COMPLETE CBC AUTOMATED: CPT | Performed by: NURSE PRACTITIONER

## 2025-04-18 RX ORDER — LEVETIRACETAM 250 MG/1
250 TABLET ORAL 2 TIMES DAILY
Start: 2025-04-18

## 2025-04-18 RX ORDER — AMLODIPINE BESYLATE 5 MG/1
5 TABLET ORAL DAILY
Start: 2025-04-19

## 2025-04-18 RX ADMIN — DORZOLAMIDE HYDROCHLORIDE AND TIMOLOL MALEATE 1 DROP: 20; 5 SOLUTION/ DROPS OPHTHALMIC at 21:36

## 2025-04-18 RX ADMIN — KETOROLAC TROMETHAMINE 1 DROP: 5 SOLUTION OPHTHALMIC at 09:32

## 2025-04-18 RX ADMIN — LATANOPROST 1 DROP: 50 SOLUTION OPHTHALMIC at 21:36

## 2025-04-18 RX ADMIN — BRIMONIDINE TARTRATE 1 DROP: 2 SOLUTION/ DROPS OPHTHALMIC at 09:31

## 2025-04-18 RX ADMIN — DIVALPROEX SODIUM 250 MG: 250 TABLET, DELAYED RELEASE ORAL at 09:31

## 2025-04-18 RX ADMIN — LEVETIRACETAM 250 MG: 500 TABLET, FILM COATED ORAL at 21:35

## 2025-04-18 RX ADMIN — LEVETIRACETAM 250 MG: 500 TABLET, FILM COATED ORAL at 09:31

## 2025-04-18 RX ADMIN — DORZOLAMIDE HYDROCHLORIDE AND TIMOLOL MALEATE 1 DROP: 20; 5 SOLUTION/ DROPS OPHTHALMIC at 09:31

## 2025-04-18 RX ADMIN — PREDNISOLONE ACETATE 1 DROP: 10 SUSPENSION/ DROPS OPHTHALMIC at 09:31

## 2025-04-18 RX ADMIN — DIVALPROEX SODIUM 250 MG: 250 TABLET, DELAYED RELEASE ORAL at 21:35

## 2025-04-18 RX ADMIN — BRIMONIDINE TARTRATE 1 DROP: 2 SOLUTION/ DROPS OPHTHALMIC at 21:36

## 2025-04-18 RX ADMIN — AMLODIPINE BESYLATE 5 MG: 5 TABLET ORAL at 09:31

## 2025-04-18 ASSESSMENT — COGNITIVE AND FUNCTIONAL STATUS - GENERAL
WALKING IN HOSPITAL ROOM: A LITTLE
HELP NEEDED FOR BATHING: A LITTLE
TURNING FROM BACK TO SIDE WHILE IN FLAT BAD: A LITTLE
STANDING UP FROM CHAIR USING ARMS: A LITTLE
PERSONAL GROOMING: A LITTLE
TURNING FROM BACK TO SIDE WHILE IN FLAT BAD: A LITTLE
EATING MEALS: A LITTLE
WALKING IN HOSPITAL ROOM: A LITTLE
MOVING TO AND FROM BED TO CHAIR: A LITTLE
DRESSING REGULAR UPPER BODY CLOTHING: A LITTLE
DRESSING REGULAR LOWER BODY CLOTHING: A LITTLE
MOVING FROM LYING ON BACK TO SITTING ON SIDE OF FLAT BED WITH BEDRAILS: A LITTLE
TOILETING: A LITTLE
CLIMB 3 TO 5 STEPS WITH RAILING: A LITTLE
HELP NEEDED FOR BATHING: A LITTLE
STANDING UP FROM CHAIR USING ARMS: A LITTLE
CLIMB 3 TO 5 STEPS WITH RAILING: A LITTLE
EATING MEALS: A LITTLE
MOVING TO AND FROM BED TO CHAIR: A LITTLE
MOVING FROM LYING ON BACK TO SITTING ON SIDE OF FLAT BED WITH BEDRAILS: A LITTLE
MOBILITY SCORE: 18
PERSONAL GROOMING: A LITTLE
DAILY ACTIVITIY SCORE: 18
DRESSING REGULAR UPPER BODY CLOTHING: A LITTLE
TOILETING: A LITTLE
DAILY ACTIVITIY SCORE: 18
DRESSING REGULAR LOWER BODY CLOTHING: A LITTLE
MOBILITY SCORE: 18

## 2025-04-18 ASSESSMENT — PAIN SCALES - PAIN ASSESSMENT IN ADVANCED DEMENTIA (PAINAD)
TOTALSCORE: 0
CONSOLABILITY: NO NEED TO CONSOLE
BODYLANGUAGE: RELAXED
FACIALEXPRESSION: SMILING OR INEXPRESSIVE
BREATHING: NORMAL

## 2025-04-18 ASSESSMENT — PAIN SCALES - GENERAL
PAINLEVEL_OUTOF10: 0 - NO PAIN

## 2025-04-18 ASSESSMENT — PAIN - FUNCTIONAL ASSESSMENT: PAIN_FUNCTIONAL_ASSESSMENT: 0-10

## 2025-04-18 ASSESSMENT — PAIN SCALES - WONG BAKER
WONGBAKER_NUMERICALRESPONSE: NO HURT
WONGBAKER_NUMERICALRESPONSE: NO HURT

## 2025-04-18 NOTE — CARE PLAN
EOS Note Pt has remained HDS throughout shift. She remains AO x 1, up to bathroom with I assist and walker and refuses telemetry. She has remained sitter free since Thursday, April 17th at 430. She does get up without supervision so all alarms are on. Pt is resting comfortably in bed, alarms on and call light within reach.      The patient's goals for the shift include unable to assess    The clinical goals for the shift include see plan of care

## 2025-04-18 NOTE — PROGRESS NOTES
Internal Medicine Progress Note    Patient Name: Fernanda Hughes          MRN: [id number]  Today's Date: April 18, 2025          Attending: Stef Rosales MD    Subjective:  Patient was seen and examined at bedside, she was sleeping, did not wake up to verbal or light touch stimuli    Review Of Systems:  GENERAL: No malaise or fevers.  CARDIOVASCULAR: Negative for chest pain  RESPIRATORY: Negative for shortness of breath  GI: No nausea, vomiting, or diarrhea    Objective:  Vitals:    04/18/25 0400 04/18/25 0800 04/18/25 1200 04/18/25 1600   BP: 142/81 134/75 135/72 129/74   BP Location: Left arm      Patient Position: Lying      Pulse: 84 79 77 78   Resp: 18 18 18 18   Temp: 36.6 °C (97.9 °F) 36 °C (96.8 °F) 36.6 °C (97.9 °F) 36 °C (96.8 °F)   TempSrc: Temporal      SpO2: 96% 99% 98% 97%   Weight:       Height:               Physical Exam:   General appearance: In no acute distress   Lungs: Clear to auscultation, no wheezing or rhonchi  Heart: RRR without murmur  Abdomen: Soft, non-tender. Bowel sounds normal.  Extremities: No deformity, no edema  Neuro: Deferred    Labs:  Results for orders placed or performed during the hospital encounter of 04/15/25 (from the past 24 hours)   Basic metabolic panel   Result Value Ref Range    Glucose 76 74 - 99 mg/dL    Sodium 131 (L) 136 - 145 mmol/L    Potassium 4.1 3.5 - 5.3 mmol/L    Chloride 98 98 - 107 mmol/L    Bicarbonate 25 21 - 32 mmol/L    Anion Gap 12 10 - 20 mmol/L    Urea Nitrogen 21 6 - 23 mg/dL    Creatinine 0.68 0.50 - 1.05 mg/dL    eGFR 85 >60 mL/min/1.73m*2    Calcium 9.1 8.6 - 10.3 mg/dL   CBC   Result Value Ref Range    WBC 9.2 4.4 - 11.3 x10*3/uL    nRBC 0.0 0.0 - 0.0 /100 WBCs    RBC 4.81 4.00 - 5.20 x10*6/uL    Hemoglobin 14.3 12.0 - 16.0 g/dL    Hematocrit 43.5 36.0 - 46.0 %    MCV 90 80 - 100 fL    MCH 29.7 26.0 - 34.0 pg    MCHC 32.9 32.0 - 36.0 g/dL    RDW 12.6 11.5 - 14.5 %    Platelets 60 (L) 150 - 450 x10*3/uL       Medications:  Scheduled  "medications  Scheduled Medications[1]  Continuous medications  Continuous Medications[2]  PRN medications  PRN Medications[3]      Assessment/Plan:  Assessment & Plan  Seizure (Multi)  Continue Keppra  Neurology is following  Hypertensive encephalopathy  Blood pressure is better controlled, patient is awake, at baseline  Will monitor blood pressure  End-stage glaucoma  Continue Cosopt eyedrops  Continue brimonidine  Vascular dementia  Continue supportive care  Hyponatremia  Serum sodium 131  Continue monitoring    Discharge plan to SNF, patient was admitted    Discussed with patient, SYEDA Rosales MD   Date: 04/18/25  Time: 6:33 PM    This note was partially created using voice recognition software and is inherently subject to errors including those of syntax and \"sound-alike\" substitutions which may escape proofreading. In such instances, original meaning may be extrapolated by contextual derivation         [1] amLODIPine, 5 mg, oral, Daily  brimonidine, 1 drop, Both Eyes, BID  divalproex, 250 mg, oral, q12h JESSICA  dorzolamide-timoloL, 1 drop, Both Eyes, BID  ketorolac, 1 drop, Both Eyes, Daily  latanoprost, 1 drop, Both Eyes, Nightly  levETIRAcetam, 250 mg, oral, BID  prednisoLONE acetate, 1 drop, Both Eyes, Daily  venlafaxine, 37.5 mg, oral, q AM     [2]    [3] PRN medications: hydrALAZINE, LORazepam, metoprolol, [Held by provider] QUEtiapine    "

## 2025-04-18 NOTE — CARE PLAN
The patient's goals for the shift include unable to assess    The clinical goals for the shift include spo2 will remain 925 or greater t/o shift      Problem: Safety - Adult  Goal: Free from fall injury  Outcome: Progressing     Problem: Pain - Adult  Goal: Verbalizes/displays adequate comfort level or baseline comfort level  Outcome: Progressing     Problem: Discharge Planning  Goal: Discharge to home or other facility with appropriate resources  Outcome: Progressing     Problem: Chronic Conditions and Co-morbidities  Goal: Patient's chronic conditions and co-morbidity symptoms are monitored and maintained or improved  Outcome: Progressing

## 2025-04-18 NOTE — CARE PLAN
Sitter no longer at bedside.     The patient's goals for the shift include unable to assess    The clinical goals for the shift include see care plan

## 2025-04-18 NOTE — PROGRESS NOTES
Patient is from [facility] in [place]. Noted therapy notes, placed call to [facility] in [place]. Per nursing, they will have the director of nursing call back to discuss.   11: 45 am spoke to the director of nursing at [facility], they are currently unable to accommodate her needs after discussion of therapy evaluations and are requesting that she receive skilled nursing services. Notified patient's POA Deepali and emailed her a list of skilled facilities to look over. She will need insurance approval.   1:25 pm Spoke to the patient's POA Deepali, she gave the following choices of preference: 1 [facility] 2. [facility]. [facility] 4. [facility].  Requested discharge support to make referrals.   3:15 pm Discussed acceptance of [facility], both [facility] facilities with patient's POA Deepali. Her preference is for [facility]. Requested discharge support to submit for precert.

## 2025-04-19 LAB
ANION GAP SERPL CALC-SCNC: 12 MMOL/L (ref 10–20)
BUN SERPL-MCNC: 26 MG/DL (ref 6–23)
CALCIUM SERPL-MCNC: 9.1 MG/DL (ref 8.6–10.3)
CHLORIDE SERPL-SCNC: 98 MMOL/L (ref 98–107)
CO2 SERPL-SCNC: 27 MMOL/L (ref 21–32)
CREAT SERPL-MCNC: 0.76 MG/DL (ref 0.5–1.05)
EGFRCR SERPLBLD CKD-EPI 2021: 76 ML/MIN/1.73M*2
ERYTHROCYTE [DISTWIDTH] IN BLOOD BY AUTOMATED COUNT: 12.6 % (ref 11.5–14.5)
GLUCOSE SERPL-MCNC: 82 MG/DL (ref 74–99)
HCT VFR BLD AUTO: 39.6 % (ref 36–46)
HGB BLD-MCNC: 13.7 G/DL (ref 12–16)
MCH RBC QN AUTO: 30.2 PG (ref 26–34)
MCHC RBC AUTO-ENTMCNC: 34.6 G/DL (ref 32–36)
MCV RBC AUTO: 87 FL (ref 80–100)
NRBC BLD-RTO: 0 /100 WBCS (ref 0–0)
PLATELET # BLD AUTO: 242 X10*3/UL (ref 150–450)
POTASSIUM SERPL-SCNC: 4.6 MMOL/L (ref 3.5–5.3)
RBC # BLD AUTO: 4.54 X10*6/UL (ref 4–5.2)
SODIUM SERPL-SCNC: 132 MMOL/L (ref 136–145)
WBC # BLD AUTO: 10.3 X10*3/UL (ref 4.4–11.3)

## 2025-04-19 PROCEDURE — 1100000001 HC PRIVATE ROOM DAILY

## 2025-04-19 PROCEDURE — 85027 COMPLETE CBC AUTOMATED: CPT | Performed by: NURSE PRACTITIONER

## 2025-04-19 PROCEDURE — 80048 BASIC METABOLIC PNL TOTAL CA: CPT | Performed by: NURSE PRACTITIONER

## 2025-04-19 PROCEDURE — 2500000001 HC RX 250 WO HCPCS SELF ADMINISTERED DRUGS (ALT 637 FOR MEDICARE OP): Performed by: NURSE PRACTITIONER

## 2025-04-19 PROCEDURE — 36415 COLL VENOUS BLD VENIPUNCTURE: CPT | Performed by: NURSE PRACTITIONER

## 2025-04-19 RX ADMIN — PREDNISOLONE ACETATE 1 DROP: 10 SUSPENSION/ DROPS OPHTHALMIC at 08:35

## 2025-04-19 RX ADMIN — LATANOPROST 1 DROP: 50 SOLUTION OPHTHALMIC at 20:46

## 2025-04-19 RX ADMIN — BRIMONIDINE TARTRATE 1 DROP: 2 SOLUTION/ DROPS OPHTHALMIC at 20:46

## 2025-04-19 RX ADMIN — DORZOLAMIDE HYDROCHLORIDE AND TIMOLOL MALEATE 1 DROP: 20; 5 SOLUTION/ DROPS OPHTHALMIC at 08:35

## 2025-04-19 RX ADMIN — DORZOLAMIDE HYDROCHLORIDE AND TIMOLOL MALEATE 1 DROP: 20; 5 SOLUTION/ DROPS OPHTHALMIC at 20:46

## 2025-04-19 RX ADMIN — DIVALPROEX SODIUM 250 MG: 250 TABLET, DELAYED RELEASE ORAL at 08:35

## 2025-04-19 RX ADMIN — LEVETIRACETAM 250 MG: 500 TABLET, FILM COATED ORAL at 08:35

## 2025-04-19 RX ADMIN — LEVETIRACETAM 250 MG: 500 TABLET, FILM COATED ORAL at 20:48

## 2025-04-19 RX ADMIN — BRIMONIDINE TARTRATE 1 DROP: 2 SOLUTION/ DROPS OPHTHALMIC at 08:35

## 2025-04-19 RX ADMIN — DIVALPROEX SODIUM 250 MG: 250 TABLET, DELAYED RELEASE ORAL at 20:50

## 2025-04-19 RX ADMIN — VENLAFAXINE 37.5 MG: 37.5 TABLET ORAL at 08:35

## 2025-04-19 RX ADMIN — AMLODIPINE BESYLATE 5 MG: 5 TABLET ORAL at 08:35

## 2025-04-19 RX ADMIN — KETOROLAC TROMETHAMINE 1 DROP: 5 SOLUTION OPHTHALMIC at 08:35

## 2025-04-19 ASSESSMENT — COGNITIVE AND FUNCTIONAL STATUS - GENERAL
TURNING FROM BACK TO SIDE WHILE IN FLAT BAD: A LITTLE
EATING MEALS: A LITTLE
HELP NEEDED FOR BATHING: A LITTLE
PERSONAL GROOMING: A LITTLE
DRESSING REGULAR UPPER BODY CLOTHING: A LITTLE
MOBILITY SCORE: 18
MOVING TO AND FROM BED TO CHAIR: A LITTLE
TURNING FROM BACK TO SIDE WHILE IN FLAT BAD: A LITTLE
DAILY ACTIVITIY SCORE: 18
TOILETING: A LITTLE
WALKING IN HOSPITAL ROOM: A LITTLE
EATING MEALS: A LITTLE
MOVING FROM LYING ON BACK TO SITTING ON SIDE OF FLAT BED WITH BEDRAILS: A LITTLE
MOVING TO AND FROM BED TO CHAIR: A LITTLE
MOVING FROM LYING ON BACK TO SITTING ON SIDE OF FLAT BED WITH BEDRAILS: A LITTLE
DAILY ACTIVITIY SCORE: 18
DRESSING REGULAR LOWER BODY CLOTHING: A LITTLE
STANDING UP FROM CHAIR USING ARMS: A LITTLE
WALKING IN HOSPITAL ROOM: A LITTLE
MOBILITY SCORE: 18
PERSONAL GROOMING: A LITTLE
DRESSING REGULAR UPPER BODY CLOTHING: A LITTLE
DRESSING REGULAR LOWER BODY CLOTHING: A LITTLE
TOILETING: A LITTLE
CLIMB 3 TO 5 STEPS WITH RAILING: A LITTLE
CLIMB 3 TO 5 STEPS WITH RAILING: A LITTLE
HELP NEEDED FOR BATHING: A LITTLE
STANDING UP FROM CHAIR USING ARMS: A LITTLE

## 2025-04-19 ASSESSMENT — PAIN - FUNCTIONAL ASSESSMENT: PAIN_FUNCTIONAL_ASSESSMENT: 0-10

## 2025-04-19 ASSESSMENT — PAIN SCALES - WONG BAKER: WONGBAKER_NUMERICALRESPONSE: NO HURT

## 2025-04-19 ASSESSMENT — PAIN SCALES - GENERAL
PAINLEVEL_OUTOF10: 0 - NO PAIN

## 2025-04-19 NOTE — NURSING NOTE
Pt calm and cooperative through shift. Pt rested through shift and repositioned every two hours. Pt tolerated diet. Pt did not complain of pain.

## 2025-04-19 NOTE — CARE PLAN
The patient's goals for the shift include unable to assess    The clinical goals for the shift include see plan of care    Over the shift, the patient did not make progress toward the following goals. Barriers to progression include     Problem: Safety - Adult  Goal: Free from fall injury  Outcome: Progressing     Problem: Pain - Adult  Goal: Verbalizes/displays adequate comfort level or baseline comfort level  Outcome: Progressing     Problem: Discharge Planning  Goal: Discharge to home or other facility with appropriate resources  Outcome: Progressing     Problem: Chronic Conditions and Co-morbidities  Goal: Patient's chronic conditions and co-morbidity symptoms are monitored and maintained or improved  Outcome: Progressing     Problem: Nutrition  Goal: Nutrient intake appropriate for maintaining nutritional needs  Outcome: Progressing     Problem: Skin  Goal: Participates in plan/prevention/treatment measures  Outcome: Progressing  Goal: Prevent/manage excess moisture  Outcome: Progressing  Goal: Prevent/minimize sheer/friction injuries  Outcome: Progressing  Goal: Promote/optimize nutrition  Outcome: Progressing     Problem: Fall/Injury  Goal: Verbalize understanding of personal risk factors for fall in the hospital  Outcome: Progressing  Goal: Verbalize understanding of risk factor reduction measures to prevent injury from fall in the home  Outcome: Progressing

## 2025-04-19 NOTE — PROGRESS NOTES
"Fernanda Hughes is a 84 y.o. female on day 4 of admission presenting with Seizure (Multi), was started on Keppra, doing better    Subjective   No CP or SOB       Objective   Lying in bed, no acute distress    Current Medications[1]       Physical Exam  Constitutional:       Comments: Sleepy but arousable   HENT:      Head: Normocephalic.   Eyes:      Conjunctiva/sclera: Conjunctivae normal.   Cardiovascular:      Rate and Rhythm: Normal rate.      Heart sounds: Normal heart sounds.   Pulmonary:      Breath sounds: Normal breath sounds.   Abdominal:      General: Abdomen is flat.      Palpations: Abdomen is soft.   Musculoskeletal:      Comments: No pedal edema   Skin:     General: Skin is warm and dry.   Neurological:      Comments: Sleepy but arousable           Last Recorded Vitals  Blood pressure 120/61, pulse 82, temperature 36.6 °C (97.9 °F), temperature source Temporal, resp. rate 16, height 1.702 m (5' 7\"), weight 53.5 kg (118 lb), SpO2 97%.  Intake/Output last 3 Shifts:  I/O last 3 completed shifts:  In: 220 (4.1 mL/kg) [P.O.:220]  Out: - (0 mL/kg)   Weight: 53.5 kg           Labs:       Results for orders placed or performed during the hospital encounter of 04/15/25 (from the past 24 hours)   Basic metabolic panel   Result Value Ref Range    Glucose 82 74 - 99 mg/dL    Sodium 132 (L) 136 - 145 mmol/L    Potassium 4.6 3.5 - 5.3 mmol/L    Chloride 98 98 - 107 mmol/L    Bicarbonate 27 21 - 32 mmol/L    Anion Gap 12 10 - 20 mmol/L    Urea Nitrogen 26 (H) 6 - 23 mg/dL    Creatinine 0.76 0.50 - 1.05 mg/dL    eGFR 76 >60 mL/min/1.73m*2    Calcium 9.1 8.6 - 10.3 mg/dL   CBC   Result Value Ref Range    WBC 10.3 4.4 - 11.3 x10*3/uL    nRBC 0.0 0.0 - 0.0 /100 WBCs    RBC 4.54 4.00 - 5.20 x10*6/uL    Hemoglobin 13.7 12.0 - 16.0 g/dL    Hematocrit 39.6 36.0 - 46.0 %    MCV 87 80 - 100 fL    MCH 30.2 26.0 - 34.0 pg    MCHC 34.6 32.0 - 36.0 g/dL    RDW 12.6 11.5 - 14.5 %    Platelets 242 150 - 450 x10*3/uL    "     Radiology  EEG  Result Date: 4/17/2025  IMPRESSION Impression This vEEG is indicative of a moderate diffuse encephalopathy. No epileptiform discharges or lateralizing signs are recorded. A full report will be scanned into the patient's chart at a later time. This report has been interpreted and electronically signed by    XR femur 2 VW bilateral  Result Date: 4/15/2025  No acute osseous abnormality of the right or left femur.   Uncomplicated left intramedullary femoral nail.   MACRO: None.   Signed by: Quentin Allan 4/15/2025 8:22 PM Dictation workstation:   [id number]    XR pelvis 1-2 views  Result Date: 4/15/2025  Postop changes are again seen in the proximal left femur.  No acute bony abnormalities are appreciated in this view of the pelvis and there is no change when compared to the prior exam.. Signed by Collins Cline MD    XR chest 1 view  Result Date: 4/15/2025  1. Chronic appearing interstitial changes without acute process.   Signed by: Ynes Bose 4/15/2025 2:17 PM Dictation workstation:   [id number]    CT cervical spine wo IV contrast  Result Date: 4/15/2025  1. No acute osseous abnormality cervical spine.   2. Multilevel degenerative discogenic changes as described above with moderate canal stenosis C4/5 and mild canal stenosis C5/6.     MACRO: None   Signed by: Ynes Bose 4/15/2025 2:16 PM Dictation workstation:   [id number]    CT head wo IV contrast  Result Date: 4/15/2025  1. No acute intracranial abnormality.   2. Mild periventricular white matter change nonspecific and likely related to chronic small-vessel ischemic change.     MACRO: None   Signed by: Ynes Bose 4/15/2025 2:11 PM Dictation workstation:   [id number]        Assessment/Plan   Assessment & Plan  Seizure (Multi)     Hypertensive encephalopathy     End-stage glaucoma     Vascular dementia     Hyponatremia       Fall     Leukocytosis         PLAN: Pt is slowly improving, c/w Keppra, seizure precaution,  monitor blood pressure, adjust medications, med list and labs reviewed, for now c/w current Rx, monitor and correct electrolytes, SS is working on a DC plan            Matt Hirsch MD           [1]   Current Facility-Administered Medications:     amLODIPine (Norvasc) tablet 5 mg, 5 mg, oral, Daily, ELLEN Zamarripa, 5 mg at 04/19/25 0835    brimonidine (AlphaGAN) 0.2 % ophthalmic solution 1 drop, 1 drop, Both Eyes, BID, MORRIS Perez-CNP, 1 drop at 04/19/25 0835    divalproex (Depakote) delayed release tablet 250 mg, 250 mg, oral, q12h JESSICA, MORRIS Zamarripa-CNP, 250 mg at 04/19/25 0835    dorzolamide-timoloL (Cosopt) 22.3-6.8 mg/mL ophthalmic solution 1 drop, 1 drop, Both Eyes, BID, MORRIS Perez-CNP, 1 drop at 04/19/25 0835    hydrALAZINE (Apresoline) injection 10 mg, 10 mg, intravenous, q4h PRN, MORRIS Zamarripa-CNP, 10 mg at 04/16/25 1727    ketorolac (Acular) 0.5 % ophthalmic solution 1 drop, 1 drop, Both Eyes, Daily, MORRIS Perez-CNP, 1 drop at 04/19/25 0835    latanoprost (Xalatan) 0.005 % ophthalmic solution 1 drop, 1 drop, Both Eyes, Nightly, MORRIS Perez-CNP, 1 drop at 04/18/25 2136    levETIRAcetam (Keppra) tablet 250 mg, 250 mg, oral, BID, MORRIS Zamarripa-CNP, 250 mg at 04/19/25 0835    LORazepam (Ativan) injection 1 mg, 1 mg, intravenous, q6h PRN, MORRIS Perez-CNP    metoprolol tartrate (Lopressor) injection 5 mg, 5 mg, intravenous, q6h PRN, ELLEN Zamarripa, 5 mg at 04/16/25 1647    prednisoLONE acetate (Pred-Forte) 1 % ophthalmic suspension 1 drop, 1 drop, Both Eyes, Daily, ELLEN Perez, 1 drop at 04/19/25 0835    [Held by provider] QUEtiapine (SEROquel) tablet 12.5 mg, 12.5 mg, oral, q8h PRN, ELLEN Perez    venlafaxine (Effexor) tablet 37.5 mg, 37.5 mg, oral, q AM, ELLEN Ordoñez, 37.5 mg at 04/19/25 0835

## 2025-04-19 NOTE — CARE PLAN
The patient's goals for the shift include unable to assess    The clinical goals for the shift include see care plan      Problem: Skin  Goal: Participates in plan/prevention/treatment measures  Flowsheets (Taken 4/19/2025 0753)  Participates in plan/prevention/treatment measures: Elevate heels  Goal: Prevent/manage excess moisture  Flowsheets (Taken 4/19/2025 0753)  Prevent/manage excess moisture: Cleanse incontinence/protect with barrier cream  Goal: Prevent/minimize sheer/friction injuries  Flowsheets (Taken 4/19/2025 0753)  Prevent/minimize sheer/friction injuries: Turn/reposition every 2 hours/use positioning/transfer devices  Goal: Promote/optimize nutrition  Flowsheets (Taken 4/19/2025 0753)  Promote/optimize nutrition: Consume > 50% meals/supplements

## 2025-04-20 VITALS
WEIGHT: 118 LBS | HEIGHT: 67 IN | SYSTOLIC BLOOD PRESSURE: 124 MMHG | BODY MASS INDEX: 18.52 KG/M2 | TEMPERATURE: 97 F | RESPIRATION RATE: 18 BRPM | OXYGEN SATURATION: 96 % | DIASTOLIC BLOOD PRESSURE: 65 MMHG | HEART RATE: 78 BPM

## 2025-04-20 PROCEDURE — 1100000001 HC PRIVATE ROOM DAILY

## 2025-04-20 PROCEDURE — 2500000001 HC RX 250 WO HCPCS SELF ADMINISTERED DRUGS (ALT 637 FOR MEDICARE OP): Performed by: NURSE PRACTITIONER

## 2025-04-20 RX ADMIN — AMLODIPINE BESYLATE 5 MG: 5 TABLET ORAL at 08:59

## 2025-04-20 RX ADMIN — PREDNISOLONE ACETATE 1 DROP: 10 SUSPENSION/ DROPS OPHTHALMIC at 09:00

## 2025-04-20 RX ADMIN — VENLAFAXINE 37.5 MG: 37.5 TABLET ORAL at 08:59

## 2025-04-20 RX ADMIN — DORZOLAMIDE HYDROCHLORIDE AND TIMOLOL MALEATE 1 DROP: 20; 5 SOLUTION/ DROPS OPHTHALMIC at 21:11

## 2025-04-20 RX ADMIN — DIVALPROEX SODIUM 250 MG: 250 TABLET, DELAYED RELEASE ORAL at 21:11

## 2025-04-20 RX ADMIN — BRIMONIDINE TARTRATE 1 DROP: 2 SOLUTION/ DROPS OPHTHALMIC at 21:10

## 2025-04-20 RX ADMIN — DORZOLAMIDE HYDROCHLORIDE AND TIMOLOL MALEATE 1 DROP: 20; 5 SOLUTION/ DROPS OPHTHALMIC at 09:00

## 2025-04-20 RX ADMIN — KETOROLAC TROMETHAMINE 1 DROP: 5 SOLUTION OPHTHALMIC at 09:00

## 2025-04-20 RX ADMIN — LEVETIRACETAM 250 MG: 500 TABLET, FILM COATED ORAL at 21:11

## 2025-04-20 RX ADMIN — DIVALPROEX SODIUM 250 MG: 250 TABLET, DELAYED RELEASE ORAL at 08:59

## 2025-04-20 RX ADMIN — LATANOPROST 1 DROP: 50 SOLUTION OPHTHALMIC at 21:11

## 2025-04-20 RX ADMIN — BRIMONIDINE TARTRATE 1 DROP: 2 SOLUTION/ DROPS OPHTHALMIC at 09:00

## 2025-04-20 RX ADMIN — LEVETIRACETAM 250 MG: 500 TABLET, FILM COATED ORAL at 08:59

## 2025-04-20 ASSESSMENT — PAIN SCALES - GENERAL
PAINLEVEL_OUTOF10: 0 - NO PAIN
PAINLEVEL_OUTOF10: 0 - NO PAIN

## 2025-04-20 ASSESSMENT — COGNITIVE AND FUNCTIONAL STATUS - GENERAL
CLIMB 3 TO 5 STEPS WITH RAILING: A LITTLE
MOBILITY SCORE: 18
STANDING UP FROM CHAIR USING ARMS: A LITTLE
EATING MEALS: A LITTLE
HELP NEEDED FOR BATHING: A LITTLE
PERSONAL GROOMING: A LITTLE
TOILETING: A LITTLE
MOVING TO AND FROM BED TO CHAIR: A LITTLE
DRESSING REGULAR UPPER BODY CLOTHING: A LITTLE
DAILY ACTIVITIY SCORE: 18
TURNING FROM BACK TO SIDE WHILE IN FLAT BAD: A LITTLE
DRESSING REGULAR LOWER BODY CLOTHING: A LITTLE
MOVING FROM LYING ON BACK TO SITTING ON SIDE OF FLAT BED WITH BEDRAILS: A LITTLE
WALKING IN HOSPITAL ROOM: A LITTLE

## 2025-04-20 ASSESSMENT — PAIN - FUNCTIONAL ASSESSMENT: PAIN_FUNCTIONAL_ASSESSMENT: 0-10

## 2025-04-20 NOTE — NURSING NOTE
EOS:  Slept well, remains confused to situation and time, oriented to self and place.  Does not understand limits, tried to get OOB independently, bed alarm remains on.  No IV access, patient refuses and provider aware.

## 2025-04-20 NOTE — CARE PLAN
The patient's goals for the shift include sleep    The clinical goals for the shift include see care plan

## 2025-04-20 NOTE — CARE PLAN
Problem: Safety - Adult  Goal: Free from fall injury  Outcome: Progressing     Problem: Skin  Goal: Participates in plan/prevention/treatment measures  Outcome: Progressing  Goal: Prevent/manage excess moisture  Outcome: Progressing  Goal: Prevent/minimize sheer/friction injuries  Outcome: Progressing  Goal: Promote/optimize nutrition  Outcome: Progressing     Problem: Pain - Adult  Goal: Verbalizes/displays adequate comfort level or baseline comfort level  Outcome: Progressing     Problem: Discharge Planning  Goal: Discharge to home or other facility with appropriate resources  Outcome: Progressing     Problem: Chronic Conditions and Co-morbidities  Goal: Patient's chronic conditions and co-morbidity symptoms are monitored and maintained or improved  Outcome: Progressing     Problem: Nutrition  Goal: Nutrient intake appropriate for maintaining nutritional needs  Outcome: Progressing     Problem: Fall/Injury  Goal: Verbalize understanding of personal risk factors for fall in the hospital  Outcome: Progressing  Goal: Verbalize understanding of risk factor reduction measures to prevent injury from fall in the home  Outcome: Progressing   The patient's goals for the shift include unable to assess    The clinical goals for the shift include see plan of care

## 2025-04-20 NOTE — PROGRESS NOTES
"Fernanda Hughes is a 84 y.o. female on day 5 of admission presenting with Seizure (Multi), on Keppra, no further seizure noted.    Subjective   No acute distress       Objective   Lying in bed no acute distress  Current Medications[1]       Physical Exam  Constitutional:       Comments: Sleepy   HENT:      Head: Normocephalic.   Cardiovascular:      Rate and Rhythm: Normal rate and regular rhythm.   Pulmonary:      Breath sounds: Normal breath sounds.   Abdominal:      General: Bowel sounds are normal.      Palpations: Abdomen is soft.   Skin:     General: Skin is warm and dry.   Neurological:      General: No focal deficit present.           Last Recorded Vitals  Blood pressure 116/66, pulse 79, temperature 36.2 °C (97.2 °F), resp. rate 16, height 1.702 m (5' 7\"), weight 53.5 kg (118 lb), SpO2 95%.  Intake/Output last 3 Shifts:  I/O last 3 completed shifts:  In: 360 (6.7 mL/kg) [P.O.:360]  Out: - (0 mL/kg)   Weight: 53.5 kg           Labs:       No results found for this or any previous visit (from the past 24 hours).     Radiology  EEG  Result Date: 4/17/2025  IMPRESSION Impression This vEEG is indicative of a moderate diffuse encephalopathy. No epileptiform discharges or lateralizing signs are recorded. A full report will be scanned into the patient's chart at a later time. This report has been interpreted and electronically signed by    TSERING femur 2 VW bilateral  Result Date: 4/15/2025  No acute osseous abnormality of the right or left femur.   Uncomplicated left intramedullary femoral nail.   MACRO: None.   Signed by: Quentin Allan 4/15/2025 8:22 PM Dictation workstation:   [id number]    XR pelvis 1-2 views  Result Date: 4/15/2025  Postop changes are again seen in the proximal left femur.  No acute bony abnormalities are appreciated in this view of the pelvis and there is no change when compared to the prior exam.. Signed by Collins Cline MD    XR chest 1 view  Result Date: 4/15/2025  1. Chronic appearing " interstitial changes without acute process.   Signed by: Ynes Bose 4/15/2025 2:17 PM Dictation workstation:   [id number]    CT cervical spine wo IV contrast  Result Date: 4/15/2025  1. No acute osseous abnormality cervical spine.   2. Multilevel degenerative discogenic changes as described above with moderate canal stenosis C4/5 and mild canal stenosis C5/6.     MACRO: None   Signed by: Ynes Bose 4/15/2025 2:16 PM Dictation workstation:   [id number]    CT head wo IV contrast  Result Date: 4/15/2025  1. No acute intracranial abnormality.   2. Mild periventricular white matter change nonspecific and likely related to chronic small-vessel ischemic change.     MACRO: None   Signed by: Ynes Bose 4/15/2025 2:11 PM Dictation workstation:   [id number]        Assessment/Plan   Assessment & Plan  Seizure (Multi)     Hypertensive encephalopathy     End-stage glaucoma     Vascular dementia     Hyponatremia       Fall     Leukocytosis         PLAN: Patient remains about the same, continue with the Keppra, seizure precaution, med list and labs reviewed, for now continue with current Rx, discussed with nursing, SS is working on DC to [facility], pre-CERT is awaited, follow closely            Matt Hirsch MD           [1]   Current Facility-Administered Medications:     amLODIPine (Norvasc) tablet 5 mg, 5 mg, oral, Daily, MORRIS Zamarripa-CNP, 5 mg at 04/20/25 0859    brimonidine (AlphaGAN) 0.2 % ophthalmic solution 1 drop, 1 drop, Both Eyes, BID, ELLEN Perez, 1 drop at 04/20/25 0900    divalproex (Depakote) delayed release tablet 250 mg, 250 mg, oral, q12h JESSICA, ELLEN Zamarripa, 250 mg at 04/20/25 0859    dorzolamide-timoloL (Cosopt) 22.3-6.8 mg/mL ophthalmic solution 1 drop, 1 drop, Both Eyes, BID, KAREN PerezCNP, 1 drop at 04/20/25 0900    hydrALAZINE (Apresoline) injection 10 mg, 10 mg, intravenous, q4h PRN, Enid Escalera APRN-CNP, 10 mg at 04/16/25 0686     ketorolac (Acular) 0.5 % ophthalmic solution 1 drop, 1 drop, Both Eyes, Daily, MORRIS Perez-CNP, 1 drop at 04/20/25 0900    latanoprost (Xalatan) 0.005 % ophthalmic solution 1 drop, 1 drop, Both Eyes, Nightly, ELLEN Perez, 1 drop at 04/19/25 2046    levETIRAcetam (Keppra) tablet 250 mg, 250 mg, oral, BID, ELLEN Zamarripa, 250 mg at 04/20/25 0859    LORazepam (Ativan) injection 1 mg, 1 mg, intravenous, q6h PRN, MORRIS Perez-CNP    metoprolol tartrate (Lopressor) injection 5 mg, 5 mg, intravenous, q6h PRN, ELLEN Zamarripa, 5 mg at 04/16/25 1647    prednisoLONE acetate (Pred-Forte) 1 % ophthalmic suspension 1 drop, 1 drop, Both Eyes, Daily, ELLEN Perez, 1 drop at 04/20/25 0900    [Held by provider] QUEtiapine (SEROquel) tablet 12.5 mg, 12.5 mg, oral, q8h PRN, ELLEN Perez    venlafaxine (Effexor) tablet 37.5 mg, 37.5 mg, oral, q AM, ELLEN Ordoñez, 37.5 mg at 04/20/25 0859

## 2025-04-20 NOTE — NURSING NOTE
Pt ax1-2 confused and forgetful, but pleasant. VSS on RA, pt denies pain. Pt has been continent with urine.      Pt does not understand limits, follows commands. Pt turns self in bed. Pt refuses IV access and SCDS, team is aware.     0945- Pt up in chair x2 assist with walker. Pt chair alarm activated with call light in reach.     1640- pt back into bed ax2 with walker. Pt bed alarm activated with call light in reach

## 2025-04-21 PROCEDURE — 2500000001 HC RX 250 WO HCPCS SELF ADMINISTERED DRUGS (ALT 637 FOR MEDICARE OP): Performed by: NURSE PRACTITIONER

## 2025-04-21 PROCEDURE — 1100000001 HC PRIVATE ROOM DAILY

## 2025-04-21 PROCEDURE — 97116 GAIT TRAINING THERAPY: CPT | Mod: GP,CQ

## 2025-04-21 PROCEDURE — 97535 SELF CARE MNGMENT TRAINING: CPT | Mod: GO,CO

## 2025-04-21 PROCEDURE — 97110 THERAPEUTIC EXERCISES: CPT | Mod: GP,CQ

## 2025-04-21 RX ORDER — ACETAMINOPHEN 325 MG/1
650 TABLET ORAL EVERY 4 HOURS PRN
Status: DISCONTINUED | OUTPATIENT
Start: 2025-04-21 | End: 2025-04-22 | Stop reason: HOSPADM

## 2025-04-21 RX ADMIN — LEVETIRACETAM 250 MG: 500 TABLET, FILM COATED ORAL at 20:46

## 2025-04-21 RX ADMIN — KETOROLAC TROMETHAMINE 1 DROP: 5 SOLUTION OPHTHALMIC at 09:10

## 2025-04-21 RX ADMIN — DORZOLAMIDE HYDROCHLORIDE AND TIMOLOL MALEATE 1 DROP: 20; 5 SOLUTION/ DROPS OPHTHALMIC at 09:09

## 2025-04-21 RX ADMIN — DIVALPROEX SODIUM 250 MG: 250 TABLET, DELAYED RELEASE ORAL at 20:46

## 2025-04-21 RX ADMIN — LEVETIRACETAM 250 MG: 500 TABLET, FILM COATED ORAL at 09:08

## 2025-04-21 RX ADMIN — VENLAFAXINE 37.5 MG: 37.5 TABLET ORAL at 09:08

## 2025-04-21 RX ADMIN — BRIMONIDINE TARTRATE 1 DROP: 2 SOLUTION/ DROPS OPHTHALMIC at 20:47

## 2025-04-21 RX ADMIN — ACETAMINOPHEN 650 MG: 325 TABLET, FILM COATED ORAL at 09:34

## 2025-04-21 RX ADMIN — AMLODIPINE BESYLATE 5 MG: 5 TABLET ORAL at 09:08

## 2025-04-21 RX ADMIN — LATANOPROST 1 DROP: 50 SOLUTION OPHTHALMIC at 20:47

## 2025-04-21 RX ADMIN — DIVALPROEX SODIUM 250 MG: 250 TABLET, DELAYED RELEASE ORAL at 09:08

## 2025-04-21 RX ADMIN — BRIMONIDINE TARTRATE 1 DROP: 2 SOLUTION/ DROPS OPHTHALMIC at 09:09

## 2025-04-21 RX ADMIN — PREDNISOLONE ACETATE 1 DROP: 10 SUSPENSION/ DROPS OPHTHALMIC at 09:09

## 2025-04-21 RX ADMIN — DORZOLAMIDE HYDROCHLORIDE AND TIMOLOL MALEATE 1 DROP: 20; 5 SOLUTION/ DROPS OPHTHALMIC at 20:47

## 2025-04-21 ASSESSMENT — COGNITIVE AND FUNCTIONAL STATUS - GENERAL
TOILETING: A LOT
TURNING FROM BACK TO SIDE WHILE IN FLAT BAD: A LITTLE
MOVING TO AND FROM BED TO CHAIR: A LITTLE
CLIMB 3 TO 5 STEPS WITH RAILING: A LITTLE
WALKING IN HOSPITAL ROOM: A LITTLE
STANDING UP FROM CHAIR USING ARMS: A LITTLE
MOVING TO AND FROM BED TO CHAIR: A LITTLE
DRESSING REGULAR LOWER BODY CLOTHING: A LITTLE
EATING MEALS: A LITTLE
DRESSING REGULAR LOWER BODY CLOTHING: A LITTLE
CLIMB 3 TO 5 STEPS WITH RAILING: A LOT
WALKING IN HOSPITAL ROOM: A LITTLE
DAILY ACTIVITIY SCORE: 15
TOILETING: A LITTLE
TURNING FROM BACK TO SIDE WHILE IN FLAT BAD: A LITTLE
TOILETING: A LITTLE
MOBILITY SCORE: 18
PERSONAL GROOMING: A LITTLE
CLIMB 3 TO 5 STEPS WITH RAILING: A LITTLE
EATING MEALS: A LITTLE
DRESSING REGULAR LOWER BODY CLOTHING: A LOT
STANDING UP FROM CHAIR USING ARMS: A LITTLE
MOVING FROM LYING ON BACK TO SITTING ON SIDE OF FLAT BED WITH BEDRAILS: A LOT
STANDING UP FROM CHAIR USING ARMS: A LITTLE
HELP NEEDED FOR BATHING: A LOT
MOBILITY SCORE: 15
DAILY ACTIVITIY SCORE: 18
HELP NEEDED FOR BATHING: A LITTLE
MOVING FROM LYING ON BACK TO SITTING ON SIDE OF FLAT BED WITH BEDRAILS: A LITTLE
PERSONAL GROOMING: A LITTLE
DRESSING REGULAR UPPER BODY CLOTHING: A LITTLE
PERSONAL GROOMING: A LITTLE
WALKING IN HOSPITAL ROOM: A LITTLE
TURNING FROM BACK TO SIDE WHILE IN FLAT BAD: A LOT
MOVING TO AND FROM BED TO CHAIR: A LITTLE
HELP NEEDED FOR BATHING: A LITTLE
DRESSING REGULAR UPPER BODY CLOTHING: A LITTLE
MOVING FROM LYING ON BACK TO SITTING ON SIDE OF FLAT BED WITH BEDRAILS: A LITTLE
DAILY ACTIVITIY SCORE: 19
DRESSING REGULAR UPPER BODY CLOTHING: A LITTLE
MOBILITY SCORE: 18

## 2025-04-21 ASSESSMENT — PAIN SCALES - GENERAL
PAINLEVEL_OUTOF10: 6
PAINLEVEL_OUTOF10: 4
PAINLEVEL_OUTOF10: 4
PAINLEVEL_OUTOF10: 0 - NO PAIN

## 2025-04-21 ASSESSMENT — PAIN - FUNCTIONAL ASSESSMENT
PAIN_FUNCTIONAL_ASSESSMENT: 0-10

## 2025-04-21 ASSESSMENT — PAIN DESCRIPTION - LOCATION: LOCATION: NECK

## 2025-04-21 ASSESSMENT — ACTIVITIES OF DAILY LIVING (ADL): HOME_MANAGEMENT_TIME_ENTRY: 24

## 2025-04-21 NOTE — PROGRESS NOTES
"Fernanda Hughes is a 84 y.o. female on day 6 of admission presenting with Seizure (Multi), was started on Keppra, overall much improved, however failed all week, awaiting pre-CERT to go to [facility].    Subjective   Sitting in chair, no acute distress       Objective   No CP or SOB    Current Medications[1]       Physical Exam  Constitutional:       Comments: Awake and alert, no acute distress   Cardiovascular:      Rate and Rhythm: Normal rate and regular rhythm.      Pulses: Normal pulses.      Heart sounds: Normal heart sounds.   Pulmonary:      Breath sounds: Normal breath sounds.   Abdominal:      General: Bowel sounds are normal.      Palpations: Abdomen is soft.   Musculoskeletal:      Comments: No edema   Skin:     General: Skin is warm and dry.           Last Recorded Vitals  Blood pressure 93/57, pulse 74, temperature 36.8 °C (98.2 °F), resp. rate 18, height 1.702 m (5' 7\"), weight 53.5 kg (118 lb), SpO2 100%.  Intake/Output last 3 Shifts:  I/O last 3 completed shifts:  In: 837 (15.6 mL/kg) [P.O.:837]  Out: 200 (3.7 mL/kg) [Urine:200 (0.1 mL/kg/hr)]  Weight: 53.5 kg           Labs:       No results found for this or any previous visit (from the past 24 hours).     Radiology  EEG  Result Date: 4/17/2025  IMPRESSION Impression This vEEG is indicative of a moderate diffuse encephalopathy. No epileptiform discharges or lateralizing signs are recorded. A full report will be scanned into the patient's chart at a later time. This report has been interpreted and electronically signed by    XR femur 2 VW bilateral  Result Date: 4/15/2025  No acute osseous abnormality of the right or left femur.   Uncomplicated left intramedullary femoral nail.   MACRO: None.   Signed by: Quentin Allan 4/15/2025 8:22 PM Dictation workstation:   [id number]    XR pelvis 1-2 views  Result Date: 4/15/2025  Postop changes are again seen in the proximal left femur.  No acute bony abnormalities are appreciated in this view of the pelvis and " there is no change when compared to the prior exam.. Signed by Collins Cline MD    XR chest 1 view  Result Date: 4/15/2025  1. Chronic appearing interstitial changes without acute process.   Signed by: Ynes Bose 4/15/2025 2:17 PM Dictation workstation:   [id number]    CT cervical spine wo IV contrast  Result Date: 4/15/2025  1. No acute osseous abnormality cervical spine.   2. Multilevel degenerative discogenic changes as described above with moderate canal stenosis C4/5 and mild canal stenosis C5/6.     MACRO: None   Signed by: Ynes Bose 4/15/2025 2:16 PM Dictation workstation:   [id number]    CT head wo IV contrast  Result Date: 4/15/2025  1. No acute intracranial abnormality.   2. Mild periventricular white matter change nonspecific and likely related to chronic small-vessel ischemic change.     MACRO: None   Signed by: Ynes Bose 4/15/2025 2:11 PM Dictation workstation:   [id number]        Assessment/Plan   Assessment & Plan  Seizure (Multi)     Hypertensive encephalopathy     End-stage glaucoma     Vascular dementia     Hyponatremia       Fall     Leukocytosis         PLAN: Patient is slowly improving, c/w Mateusz, PT OT eval and Rx, med list, radiology and labs reviewed, for now continue with current Rx, discussed with CNP, santa to DC when bed available, follow closely.          Matt Hirsch MD           [1]   Current Facility-Administered Medications:     acetaminophen (Tylenol) tablet 650 mg, 650 mg, oral, q4h PRN, ELLEN Ordoñez, 650 mg at 04/21/25 0934    amLODIPine (Norvasc) tablet 5 mg, 5 mg, oral, Daily, ELLEN Zamarripa, 5 mg at 04/21/25 0908    brimonidine (AlphaGAN) 0.2 % ophthalmic solution 1 drop, 1 drop, Both Eyes, BID, ELLEN Perez, 1 drop at 04/21/25 0909    divalproex (Depakote) delayed release tablet 250 mg, 250 mg, oral, q12h JESSICA, ELLEN Zamarripa, 250 mg at 04/21/25 0908    dorzolamide-timoloL (Cosopt) 22.3-6.8  mg/mL ophthalmic solution 1 drop, 1 drop, Both Eyes, BID, Danette Logan APRN-CNP, 1 drop at 04/21/25 0909    hydrALAZINE (Apresoline) injection 10 mg, 10 mg, intravenous, q4h PRN, MORRIS Zamarripa-CNP, 10 mg at 04/16/25 1727    ketorolac (Acular) 0.5 % ophthalmic solution 1 drop, 1 drop, Both Eyes, Daily, MORRIS Perez-CNP, 1 drop at 04/21/25 0910    latanoprost (Xalatan) 0.005 % ophthalmic solution 1 drop, 1 drop, Both Eyes, Nightly, MORRIS Perez-CNP, 1 drop at 04/20/25 2111    levETIRAcetam (Keppra) tablet 250 mg, 250 mg, oral, BID, MORRIS Zamarripa-CNP, 250 mg at 04/21/25 0908    LORazepam (Ativan) injection 1 mg, 1 mg, intravenous, q6h PRN, Danette Logan APRN-CNP    metoprolol tartrate (Lopressor) injection 5 mg, 5 mg, intravenous, q6h PRN, MORRIS Zamarripa-CNP, 5 mg at 04/16/25 1647    prednisoLONE acetate (Pred-Forte) 1 % ophthalmic suspension 1 drop, 1 drop, Both Eyes, Daily, MORRIS Perez-CNP, 1 drop at 04/21/25 0909    [Held by provider] QUEtiapine (SEROquel) tablet 12.5 mg, 12.5 mg, oral, q8h PRN, Danette Logan APRN-CNP    venlafaxine (Effexor) tablet 37.5 mg, 37.5 mg, oral, q AM, MORRIS Ordoñez-CNP, 37.5 mg at 04/21/25 0908

## 2025-04-21 NOTE — CARE PLAN
The patient's goals for the shift include unable to assess    The clinical goals for the shift include see POC

## 2025-04-21 NOTE — NURSING NOTE
0911 pt repositioned for safety and comfort. Pt c/o neck pain. Medical team notified for order for pain medication. Pt A&O x1. Calm and cooperative. Bed alarm maintained. Call light w/I reach.   0910 pt assisted up to bedside commode, heavy 1 assist. Pt c/o dizziness when getting up. Pt repositioned for safety and comfort. Tray set up. Bed alarm active. Call light w/I reach.   0988 pt medicated for 6/10 neck pain with tylenol.  0952 pt ambulating in alves with gate bel and walker with PT.  1055 complete bedding change. Pt up to chair. Call light w/I reach.

## 2025-04-21 NOTE — PROGRESS NOTES
Occupational Therapy    OT Treatment    Patient Name: Fernanda Hughes  MRN: [id number]  Today's Date: 4/21/2025  Time Calculation  Start Time: 1246  Stop Time: 1310  Time Calculation (min): 24 min       3028/3028-A    Assessment:  End of Session Communication: Bedside nurse  End of Session Patient Position: Up in chair, Alarm on       Plan:  OT Frequency: 3 times per week  OT Discharge Recommendations: Moderate intensity level of continued care, Low intensity level of continued care, 24 hr supervision due to cognition     Subjective        04/21/25 1246   OT Last Visit   OT Received On 04/21/25   General   Prior to Session Communication Bedside nurse   Patient Position Received Alarm on;Up in chair   Preferred Learning Style verbal;visual   General Comment Pt pleasant and cooperative, RN cleared for therapy.   Grooming   Grooming Level of Assistance Minimum assistance, cues for initiation to tasks, rest breaks in between tasks   Grooming Where Assessed Chair   Transfers   Transfer Yes   Transfer 1   Transfer From 1 Sit to   Transfer to 1 Stand   Technique 1 Sit to stand;Stand to sit   Transfer Device 1 Walker;Gait belt   Transfer Level of Assistance 1 Contact guard;Minimal assistance   Trials/Comments 1 cues for UE placement, multiple sit<>stands performed from chair level to promote strength, balance, and technique   IP OT Assessment   End of Session Communication Bedside nurse   End of Session Patient Position Up in chair;Alarm on   Inpatient Plan   OT Frequency 3 times per week   OT Discharge Recommendations Moderate intensity level of continued care;Low intensity level of continued care;24 hr supervision due to cognition       Outcome Measures:[facility] Daily Activity  Putting on and taking off regular lower body clothing: A lot  Bathing (including washing, rinsing, drying): A lot  Putting on and taking off regular upper body clothing: A little  Toileting, which includes using toilet, bedpan or urinal: A lot  Taking care  of personal grooming such as brushing teeth: A little  Eating Meals: A little  Daily Activity - Total Score: 15  Education Documentation  Body Mechanics, taught by KRYSTIN Boles at 4/21/2025  1:15 PM.  Learner: Patient  Readiness: Acceptance  Method: Explanation, Demonstration  Response: Verbalizes Understanding, Demonstrated Understanding, Needs Reinforcement    Precautions, taught by KRYSTIN Boles at 4/21/2025  1:15 PM.  Learner: Patient  Readiness: Acceptance  Method: Explanation, Demonstration  Response: Verbalizes Understanding, Demonstrated Understanding, Needs Reinforcement    ADL Training, taught by KRYSTIN Boles at 4/21/2025  1:15 PM.  Learner: Patient  Readiness: Acceptance  Method: Explanation, Demonstration  Response: Verbalizes Understanding, Demonstrated Understanding, Needs Reinforcement    Education Comments  No comments found.            Goals:  Encounter Problems       Encounter Problems (Active)       OT Goals       Patient will complete functional transfers with mod I. (Progressing)       Start:  04/17/25    Expected End:  05/01/25            Patient will complete toileting with superivision. (Progressing)       Start:  04/17/25    Expected End:  05/01/25            Patient will complete LE dressing with supervision. (Progressing)       Start:  04/17/25    Expected End:  05/01/25

## 2025-04-21 NOTE — PROGRESS NOTES
Physical Therapy    Physical Therapy Treatment    Patient Name: Fernanda Hughes  MRN: [id number]  Today's Date: 4/21/2025  Time Calculation  Start Time: 0947  Stop Time: 1015  Time Calculation (min): 28 min     3028/3028-A       04/21/25 0947   PT  Visit   PT Received On 04/21/25   Response to Previous Treatment Patient with no complaints from previous session.   General   Reason for Referral impaired self care s/p seizure   Referred By Dr. 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   Prior to Session Communication Bedside nurse   Patient Position Received Bed, 4 rail up;Alarm on   Preferred Learning Style verbal;visual   General Comment Patient agreeable to therapy at this date.   Precautions   Medical Precautions Fall precautions;Seizure precautions   Pain Assessment   Pain Assessment 0-10   0-10 (Numeric) Pain Score 4   Pain Type Acute pain   Pain Location Neck   Pain Interventions Repositioned   Response to Interventions Decrease in pain   Cognition   Overall Cognitive Status Impaired at baseline   Orientation Level Disoriented to place;Disoriented to time;Disoriented to situation   Coordination   Movements are Fluid and Coordinated Yes   Postural Control   Postural Control WFL   Therapeutic Exercise   Therapeutic Exercise Performed Yes   Therapeutic Exercise Activity 1 PF/DF X15   Therapeutic Exercise Activity 2 Marching X15   Therapeutic Exercise Activity 3 LAQ X15   Therapeutic Exercise Activity 4 Pillow Squeezes, X10 3sec hold   Therapeutic Exercise Activity 5 Hip ABD X15   Bed Mobility   Bed Mobility Yes   Bed Mobility 1   Bed Mobility 1 Supine to sitting   Level of Assistance 1 Close supervision   Ambulation/Gait Training   Ambulation/Gait Training Performed Yes   Ambulation/Gait Training 1   Surface 1 Level tile   Device 1 Rolling  walker   Gait Support Devices Gait belt   Assistance 1 Contact guard   Quality of Gait 1 NBOS;Shuffling gait;Diminished heel strike   Comments/Distance (ft) 1 60' X2, Pt. required minimal verbal cues for general safety.   Transfers   Transfer Yes   Transfer 1   Transfer From 1 Bed to;Sit to   Transfer to 1 Stand   Technique 1 Sit to stand   Transfer Device 1 Walker;Gait belt   Transfer Level of Assistance 1 Contact guard;Minimal verbal cues   Trials/Comments 1 Pt. required minimal verbal cues for hand placment.   Transfers 2   Transfer From 2 Stand to   Transfer to 2 Chair with arms   Technique 2 Stand to sit   Transfer Device 2 Walker;Gait belt   Transfer Level of Assistance 2 Contact guard;Minimal verbal cues   Trials/Comments 2 Pt. required minimal verbal cues for general safety.   Activity Tolerance   Endurance Tolerates 10 - 20 min exercise with multiple rests   PT Assessment   PT Assessment Results Decreased strength;Decreased endurance;Impaired balance;Decreased mobility;Decreased cognition   Rehab Prognosis Fair   Barriers to Discharge Home Caregiver assistance;Cognition needs;Physical needs   Evaluation/Treatment Tolerance Patient tolerated treatment well   Medical Staff Made Aware Yes   End of Session Communication Bedside nurse   Assessment Comment Pt. tolerated treatment well, pt. required minimal verbal cues for general safety.   End of Session Patient Position Up in chair;Alarm on   Outpatient Education   Individual(s) Educated Patient   Education Provided Body Mechanics;Fall Risk   Risk and Benefits Discussed with Patient/Caregiver/Other yes   Patient/Caregiver Demonstrated Understanding yes   Plan of Care Discussed and Agreed Upon yes   Patient Response to Education Patient/Caregiver Verbalized Understanding of Information   Education Comment Pt. required education on general safety.   PT Plan   Treatment/Interventions Bed mobility;Transfer training;Gait training;Balance  training;Strengthening;Therapeutic exercise;Therapeutic activity   PT Plan Ongoing PT   PT Frequency 3 times per week   PT Discharge Recommendations Moderate intensity level of continued care   Equipment Recommended upon Discharge Wheeled walker   PT Recommended Transfer Status Assist x1;Assistive device         Outcome Measures:  [facility] Basic Mobility  Turning from your back to your side while in a flat bed without using bedrails: A lot  Moving from lying on your back to sitting on the side of a flat bed without using bedrails: A lot  Moving to and from bed to chair (including a wheelchair): A little  Standing up from a chair using your arms (e.g. wheelchair or bedside chair): A little  To walk in hospital room: A little  Climbing 3-5 steps with railing: A lot  Basic Mobility - Total Score: 15     EDUCATION:  Outpatient Education  Individual(s) Educated: Patient  Education Provided: Body Mechanics, Fall Risk  Risk and Benefits Discussed with Patient/Caregiver/Other: yes  Patient/Caregiver Demonstrated Understanding: yes  Plan of Care Discussed and Agreed Upon: yes  Patient Response to Education: Patient/Caregiver Verbalized Understanding of Information  Education Comment: Pt. required education on general safety.      GOALS:  Encounter Problems       Encounter Problems (Active)       Balance       LTG - Patient will tolerate standing for 5+ minutes with 50% decrease in fatigue.       Start:  04/17/25    Expected End:  05/01/25               Mobility       STG - Patient will ambulate 50+ feet with LRD in order to return to navigating her assistive living.        Start:  04/17/25    Expected End:  05/01/25               PT Transfers       STG - Transfer from bed to chair for ease of utilizing commode.        Start:  04/17/25    Expected End:  05/01/25            STG - Patient will perform bed mobility with Jose or better in order to pressure relief and mitigate risk of pressure wounds.        Start:  04/17/25     Expected End:  05/01/25               Pain - Adult            I personally have reviewed data entered by the student into the flowsheet and agree with this treatment session of this patient.    Dank Baez, PTA

## 2025-04-21 NOTE — NURSING NOTE
EOS:  Slept well.  Oriented to self and place, cooperative but forgetful of ability, attempts to get OOB independently when needs to void, bed alarm remains on.  VSS.

## 2025-04-21 NOTE — CARE PLAN
The patient's goals for the shift include unable to assess    The clinical goals for the shift include pt will remain safe this shift    Over the shift, the patient did make progress toward the following goals.

## 2025-04-21 NOTE — PROGRESS NOTES
04/21/25 1055   Discharge Planning   Home or Post Acute Services Post acute facilities (Rehab/SNF/etc)   Type of Post Acute Facility Services Skilled nursing   Expected Discharge Disposition SNF  ([facility])   Intensity of Service   Intensity of Service 0-30 min     Pre-cert is still pending for [facility]. SW sent a request to escalate the pre-cert. SW to follow.

## 2025-04-22 VITALS
RESPIRATION RATE: 18 BRPM | TEMPERATURE: 98.2 F | DIASTOLIC BLOOD PRESSURE: 64 MMHG | HEART RATE: 79 BPM | WEIGHT: 118 LBS | BODY MASS INDEX: 18.52 KG/M2 | SYSTOLIC BLOOD PRESSURE: 127 MMHG | HEIGHT: 67 IN | OXYGEN SATURATION: 96 %

## 2025-04-22 PROCEDURE — 2500000001 HC RX 250 WO HCPCS SELF ADMINISTERED DRUGS (ALT 637 FOR MEDICARE OP): Performed by: NURSE PRACTITIONER

## 2025-04-22 RX ADMIN — BRIMONIDINE TARTRATE 1 DROP: 2 SOLUTION/ DROPS OPHTHALMIC at 09:18

## 2025-04-22 RX ADMIN — PREDNISOLONE ACETATE 1 DROP: 10 SUSPENSION/ DROPS OPHTHALMIC at 09:19

## 2025-04-22 RX ADMIN — VENLAFAXINE 37.5 MG: 37.5 TABLET ORAL at 09:18

## 2025-04-22 RX ADMIN — AMLODIPINE BESYLATE 5 MG: 5 TABLET ORAL at 09:18

## 2025-04-22 RX ADMIN — DORZOLAMIDE HYDROCHLORIDE AND TIMOLOL MALEATE 1 DROP: 20; 5 SOLUTION/ DROPS OPHTHALMIC at 09:18

## 2025-04-22 RX ADMIN — LEVETIRACETAM 250 MG: 500 TABLET, FILM COATED ORAL at 09:18

## 2025-04-22 RX ADMIN — DIVALPROEX SODIUM 250 MG: 250 TABLET, DELAYED RELEASE ORAL at 09:18

## 2025-04-22 RX ADMIN — KETOROLAC TROMETHAMINE 1 DROP: 5 SOLUTION OPHTHALMIC at 09:19

## 2025-04-22 ASSESSMENT — COGNITIVE AND FUNCTIONAL STATUS - GENERAL
TOILETING: A LITTLE
WALKING IN HOSPITAL ROOM: A LITTLE
HELP NEEDED FOR BATHING: A LITTLE
MOVING TO AND FROM BED TO CHAIR: A LITTLE
DRESSING REGULAR UPPER BODY CLOTHING: A LITTLE
MOVING FROM LYING ON BACK TO SITTING ON SIDE OF FLAT BED WITH BEDRAILS: A LITTLE
CLIMB 3 TO 5 STEPS WITH RAILING: A LITTLE
STANDING UP FROM CHAIR USING ARMS: A LITTLE
PERSONAL GROOMING: A LITTLE
DRESSING REGULAR LOWER BODY CLOTHING: A LITTLE
TURNING FROM BACK TO SIDE WHILE IN FLAT BAD: A LITTLE
MOBILITY SCORE: 18
EATING MEALS: A LITTLE
DAILY ACTIVITIY SCORE: 18

## 2025-04-22 ASSESSMENT — PAIN SCALES - GENERAL: PAINLEVEL_OUTOF10: 4

## 2025-04-22 ASSESSMENT — PAIN - FUNCTIONAL ASSESSMENT: PAIN_FUNCTIONAL_ASSESSMENT: 0-10

## 2025-04-22 NOTE — NURSING NOTE
Patient alert and oriented with confusion. No seizure like activity this shift. No c/o pain. Patient able to reposition self for comfort. No acute changes this shift

## 2025-04-22 NOTE — CARE PLAN
The patient's goals for the shift include unable to assess    The clinical goals for the shift include see poc      Problem: Safety - Adult  Goal: Free from fall injury  4/22/2025 0856 by Beena Cervantes LPN  Outcome: Progressing  4/21/2025 2357 by Beena Cervantes LPN  Outcome: Progressing     Problem: Pain - Adult  Goal: Verbalizes/displays adequate comfort level or baseline comfort level  4/22/2025 0856 by Beena Cervantes LPN  Outcome: Progressing  4/21/2025 2357 by Beena Cervantes LPN  Outcome: Progressing     Problem: Discharge Planning  Goal: Discharge to home or other facility with appropriate resources  4/22/2025 0856 by Beena Cervantes LPN  Outcome: Progressing  4/21/2025 2357 by Beena Cervantes LPN  Outcome: Progressing     Problem: Chronic Conditions and Co-morbidities  Goal: Patient's chronic conditions and co-morbidity symptoms are monitored and maintained or improved  4/22/2025 0856 by Beena Cervantes LPN  Outcome: Progressing  4/21/2025 2357 by Beena Cervantes LPN  Outcome: Progressing     Problem: Nutrition  Goal: Nutrient intake appropriate for maintaining nutritional needs  4/22/2025 0856 by Beena Cervantes LPN  Outcome: Progressing  4/21/2025 2357 by Beena Cervantes LPN  Outcome: Progressing     Problem: Skin  Goal: Participates in plan/prevention/treatment measures  4/22/2025 0856 by Beena Cervantes LPN  Outcome: Progressing  4/21/2025 2357 by Beena Cervantes LPN  Outcome: Progressing  Goal: Prevent/manage excess moisture  4/22/2025 0856 by Beena Cervantes LPN  Outcome: Progressing  4/21/2025 2357 by Beena Cervantes LPN  Outcome: Progressing  Goal: Prevent/minimize sheer/friction injuries  4/22/2025 0856 by Beena Cervantes LPN  Outcome: Progressing  4/21/2025 2357 by Beena Cervantes LPN  Outcome: Progressing  Goal: Promote/optimize nutrition  4/22/2025 0856 by Beena Cervantes LPN  Outcome: Progressing  Flowsheets (Taken 4/22/2025 0856)  Promote/optimize nutrition: Monitor/record intake including meals  4/21/2025 2357 by Beena  KASI Cervantes  Outcome: Progressing  Flowsheets (Taken 4/21/2025 2357)  Promote/optimize nutrition: Monitor/record intake including meals     Problem: Fall/Injury  Goal: Verbalize understanding of personal risk factors for fall in the hospital  4/22/2025 0856 by Beena Cervantes LPN  Outcome: Progressing  4/21/2025 2357 by Beena Cervantes LPN  Outcome: Progressing  Goal: Verbalize understanding of risk factor reduction measures to prevent injury from fall in the home  4/22/2025 0856 by Beena Cervantes LPN  Outcome: Progressing  4/21/2025 2357 by Beena Cervantes LPN  Outcome: Progressing

## 2025-04-22 NOTE — PROGRESS NOTES
Physical Therapy                 Therapy Communication Note    Patient Name: Fernanda Hughes  MRN: [id number]  Department: [facility] 3 [address]  Room: 3028/3028-A  Today's Date: 4/22/2025     Discipline: Physical Therapy    PT Missed Visit: Yes     Missed Visit Reason: Missed Visit Reason: Parent/caregiver refused (despite encouragement to participate, patient adamantly refused to be seen for therapy this date.)    Missed Time: Attempt    Comment:

## 2025-04-22 NOTE — PROGRESS NOTES
Spiritual Care Visit  Spiritual Care Request    Reason for Visit:  Routine Visit: Introduction     Request Received From:       Focus of Care:  Visited With: Patient         Refer to :          Spiritual Care Assessment    Spiritual Assessment:                      Care Provided:  Intended Effects: Promote sense of peace, Preserve dignity and respect, Meaning-making  Methods: Offer spiritual/Restorationist support  Interventions: Share words of hope and inspiration, [place]    Sense of Community and or Christian Affiliation:  Mandaen         Addressed Needs/Concerns and/or Nubia Through:          Outcome:        Advance Directives:         Spiritual Care Annotation    Annotation:   was a supportive presence and prayed.

## 2025-04-22 NOTE — PROGRESS NOTES
04/22/25 1337   Discharge Planning   Home or Post Acute Services Post acute facilities (Rehab/SNF/etc)   Type of Post Acute Facility Services Skilled nursing   Expected Discharge Disposition SNF  ([facility])   What day is the transport expected? 04/22/25   What time is the transport expected? 0330   Intensity of Service   Intensity of Service 0-30 min     Pre-cert received for [facility]. Pt was cleared for discharge today at 3:30. Pt and DPOA, ARIADNE. notified of discharge and time. Facility was updated and discharge paperwork was provided. Nursing will call report prior to pt .

## 2025-04-22 NOTE — CARE PLAN
The patient's goals for the shift include unable to assess    The clinical goals for the shift include pt will remain safe this shift      Problem: Safety - Adult  Goal: Free from fall injury  Outcome: Progressing     Problem: Pain - Adult  Goal: Verbalizes/displays adequate comfort level or baseline comfort level  Outcome: Progressing     Problem: Discharge Planning  Goal: Discharge to home or other facility with appropriate resources  Outcome: Progressing     Problem: Chronic Conditions and Co-morbidities  Goal: Patient's chronic conditions and co-morbidity symptoms are monitored and maintained or improved  Outcome: Progressing     Problem: Nutrition  Goal: Nutrient intake appropriate for maintaining nutritional needs  Outcome: Progressing     Problem: Skin  Goal: Participates in plan/prevention/treatment measures  Outcome: Progressing  Goal: Prevent/manage excess moisture  Outcome: Progressing  Goal: Prevent/minimize sheer/friction injuries  Outcome: Progressing  Goal: Promote/optimize nutrition  Outcome: Progressing  Flowsheets (Taken 4/21/2025 2357)  Promote/optimize nutrition: Monitor/record intake including meals     Problem: Fall/Injury  Goal: Verbalize understanding of personal risk factors for fall in the hospital  Outcome: Progressing  Goal: Verbalize understanding of risk factor reduction measures to prevent injury from fall in the home  Outcome: Progressing

## 2025-04-22 NOTE — NURSING NOTE
0900 pt more alert this morning A&O x2. Pt repositioned for safety and comfort. Tray set up. Bed alarm active Call light w/I reach.   1347 report given to Luciano at [place] [facility].   1638 report given to transport team.

## 2025-06-16 ENCOUNTER — APPOINTMENT (OUTPATIENT)
Dept: CARDIOLOGY | Facility: HOSPITAL | Age: 85
End: 2025-06-16
Payer: MEDICARE

## 2025-06-16 ENCOUNTER — APPOINTMENT (OUTPATIENT)
Dept: RADIOLOGY | Facility: HOSPITAL | Age: 85
End: 2025-06-16
Payer: MEDICARE

## 2025-06-16 ENCOUNTER — HOSPITAL ENCOUNTER (EMERGENCY)
Facility: HOSPITAL | Age: 85
Discharge: HOME | End: 2025-06-16
Attending: EMERGENCY MEDICINE
Payer: MEDICARE

## 2025-06-16 VITALS
BODY MASS INDEX: 18.36 KG/M2 | HEART RATE: 72 BPM | DIASTOLIC BLOOD PRESSURE: 60 MMHG | TEMPERATURE: 97.3 F | RESPIRATION RATE: 20 BRPM | SYSTOLIC BLOOD PRESSURE: 122 MMHG | OXYGEN SATURATION: 96 % | WEIGHT: 117 LBS | HEIGHT: 67 IN

## 2025-06-16 DIAGNOSIS — R25.1 TREMOR: Primary | ICD-10-CM

## 2025-06-16 LAB
ALBUMIN SERPL BCP-MCNC: 3.9 G/DL (ref 3.4–5)
ALP SERPL-CCNC: 58 U/L (ref 33–136)
ALT SERPL W P-5'-P-CCNC: 8 U/L (ref 7–45)
ANION GAP SERPL CALC-SCNC: 12 MMOL/L (ref 10–20)
APPEARANCE UR: CLEAR
AST SERPL W P-5'-P-CCNC: 19 U/L (ref 9–39)
BASOPHILS # BLD AUTO: 0.04 X10*3/UL (ref 0–0.1)
BASOPHILS NFR BLD AUTO: 0.6 %
BILIRUB SERPL-MCNC: 1 MG/DL (ref 0–1.2)
BILIRUB UR STRIP.AUTO-MCNC: NEGATIVE MG/DL
BNP SERPL-MCNC: 195 PG/ML (ref 0–99)
BUN SERPL-MCNC: 24 MG/DL (ref 6–23)
CALCIUM SERPL-MCNC: 9.1 MG/DL (ref 8.6–10.3)
CARDIAC TROPONIN I PNL SERPL HS: 8 NG/L (ref 0–13)
CHLORIDE SERPL-SCNC: 104 MMOL/L (ref 98–107)
CO2 SERPL-SCNC: 29 MMOL/L (ref 21–32)
COLOR UR: YELLOW
CREAT SERPL-MCNC: 0.95 MG/DL (ref 0.5–1.05)
EGFRCR SERPLBLD CKD-EPI 2021: 58 ML/MIN/1.73M*2
EOSINOPHIL # BLD AUTO: 0.2 X10*3/UL (ref 0–0.4)
EOSINOPHIL NFR BLD AUTO: 3 %
ERYTHROCYTE [DISTWIDTH] IN BLOOD BY AUTOMATED COUNT: 14.8 % (ref 11.5–14.5)
FLUAV RNA RESP QL NAA+PROBE: NOT DETECTED
FLUBV RNA RESP QL NAA+PROBE: NOT DETECTED
GLUCOSE SERPL-MCNC: 82 MG/DL (ref 74–99)
GLUCOSE UR STRIP.AUTO-MCNC: NORMAL MG/DL
HCT VFR BLD AUTO: 41.8 % (ref 36–46)
HGB BLD-MCNC: 13.7 G/DL (ref 12–16)
IMM GRANULOCYTES # BLD AUTO: 0.02 X10*3/UL (ref 0–0.5)
IMM GRANULOCYTES NFR BLD AUTO: 0.3 % (ref 0–0.9)
KETONES UR STRIP.AUTO-MCNC: NEGATIVE MG/DL
LACTATE SERPL-SCNC: 1.3 MMOL/L (ref 0.4–2)
LEUKOCYTE ESTERASE UR QL STRIP.AUTO: NEGATIVE
LYMPHOCYTES # BLD AUTO: 1.46 X10*3/UL (ref 0.8–3)
LYMPHOCYTES NFR BLD AUTO: 22.2 %
MAGNESIUM SERPL-MCNC: 2.14 MG/DL (ref 1.6–2.4)
MCH RBC QN AUTO: 29.5 PG (ref 26–34)
MCHC RBC AUTO-ENTMCNC: 32.8 G/DL (ref 32–36)
MCV RBC AUTO: 90 FL (ref 80–100)
MONOCYTES # BLD AUTO: 0.73 X10*3/UL (ref 0.05–0.8)
MONOCYTES NFR BLD AUTO: 11.1 %
NEUTROPHILS # BLD AUTO: 4.12 X10*3/UL (ref 1.6–5.5)
NEUTROPHILS NFR BLD AUTO: 62.8 %
NITRITE UR QL STRIP.AUTO: NEGATIVE
NRBC BLD-RTO: 0 /100 WBCS (ref 0–0)
PH UR STRIP.AUTO: 6.5 [PH]
PHOSPHATE SERPL-MCNC: 2.8 MG/DL (ref 2.5–4.9)
PLATELET # BLD AUTO: 224 X10*3/UL (ref 150–450)
POTASSIUM SERPL-SCNC: 3.7 MMOL/L (ref 3.5–5.3)
PROT SERPL-MCNC: 6.5 G/DL (ref 6.4–8.2)
PROT UR STRIP.AUTO-MCNC: NEGATIVE MG/DL
RBC # BLD AUTO: 4.64 X10*6/UL (ref 4–5.2)
RBC # UR STRIP.AUTO: NEGATIVE MG/DL
RSV RNA RESP QL NAA+PROBE: NOT DETECTED
SARS-COV-2 RNA RESP QL NAA+PROBE: NOT DETECTED
SODIUM SERPL-SCNC: 141 MMOL/L (ref 136–145)
SP GR UR STRIP.AUTO: 1.01
UROBILINOGEN UR STRIP.AUTO-MCNC: ABNORMAL MG/DL
VALPROATE SERPL-MCNC: 83 UG/ML (ref 50–100)
WBC # BLD AUTO: 6.6 X10*3/UL (ref 4.4–11.3)

## 2025-06-16 PROCEDURE — 80164 ASSAY DIPROPYLACETIC ACD TOT: CPT

## 2025-06-16 PROCEDURE — 83735 ASSAY OF MAGNESIUM: CPT

## 2025-06-16 PROCEDURE — 93005 ELECTROCARDIOGRAM TRACING: CPT

## 2025-06-16 PROCEDURE — 71045 X-RAY EXAM CHEST 1 VIEW: CPT | Performed by: RADIOLOGY

## 2025-06-16 PROCEDURE — 83605 ASSAY OF LACTIC ACID: CPT

## 2025-06-16 PROCEDURE — 71045 X-RAY EXAM CHEST 1 VIEW: CPT

## 2025-06-16 PROCEDURE — 84100 ASSAY OF PHOSPHORUS: CPT

## 2025-06-16 PROCEDURE — 99285 EMERGENCY DEPT VISIT HI MDM: CPT | Performed by: EMERGENCY MEDICINE

## 2025-06-16 PROCEDURE — 83880 ASSAY OF NATRIURETIC PEPTIDE: CPT

## 2025-06-16 PROCEDURE — 81003 URINALYSIS AUTO W/O SCOPE: CPT

## 2025-06-16 PROCEDURE — 36415 COLL VENOUS BLD VENIPUNCTURE: CPT

## 2025-06-16 PROCEDURE — 80053 COMPREHEN METABOLIC PANEL: CPT

## 2025-06-16 PROCEDURE — 80177 DRUG SCRN QUAN LEVETIRACETAM: CPT | Mod: STJLAB

## 2025-06-16 PROCEDURE — 85025 COMPLETE CBC W/AUTO DIFF WBC: CPT

## 2025-06-16 PROCEDURE — 84484 ASSAY OF TROPONIN QUANT: CPT

## 2025-06-16 PROCEDURE — 87637 SARSCOV2&INF A&B&RSV AMP PRB: CPT

## 2025-06-16 ASSESSMENT — LIFESTYLE VARIABLES
HAVE PEOPLE ANNOYED YOU BY CRITICIZING YOUR DRINKING: NO
EVER FELT BAD OR GUILTY ABOUT YOUR DRINKING: NO
HAVE YOU EVER FELT YOU SHOULD CUT DOWN ON YOUR DRINKING: NO
EVER HAD A DRINK FIRST THING IN THE MORNING TO STEADY YOUR NERVES TO GET RID OF A HANGOVER: NO
TOTAL SCORE: 0

## 2025-06-16 ASSESSMENT — PAIN - FUNCTIONAL ASSESSMENT: PAIN_FUNCTIONAL_ASSESSMENT: 0-10

## 2025-06-16 ASSESSMENT — PAIN SCALES - GENERAL
PAINLEVEL_OUTOF10: 0 - NO PAIN
PAINLEVEL_OUTOF10: 0 - NO PAIN

## 2025-06-16 NOTE — ED PROVIDER NOTES
Emergency Department Provider Note        History of Present Illness     History provided by: Patient  Limitations to History: None  External Records Reviewed with Brief Summary: EMS, nursing report, medical chart, nursing home record    HPI:  Fernanda Hughes is a 85 y.o. female arrives via EMS from [facility] nursing facility for chief complaint of having increased tremors today with history of seizures in the past.  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  Last ED visit for seizure activity was from ER with subsequent admission to [facility] on 4/15/2025 from [facility] nursing [facility] for concern for seizure, after the patient was found at in her room at [facility] on the floor with unwitnessed fall versus seizure.  She was subsequently admitted to this facility and discharged on 4/22/2025 with diagnosis of hypertensive encephalopathy end-stage well, vascular dementia and hyponatremia with subsequent follow-up with primary care provider Dr. Klein, and Dr. Green of neurology on 5/18/2025, medication list includes levetiracetam divalproex sodium, amlodipine, quetiapine and venlafaxine.  Patient is currently a DNR comfort care only measures, currently on hospice, the patient confirms this at bedside.  Per EMS and nursing report as well as patient the patient was appearing tremulous to the staff subsequently received Ativan 0.5 mg p.o. approximately an hour prior to arrival which resolved her tremulousness.  The [facility] nursing [facility] called EMS to have her transferred to this facility.  Patient states she remembers the entire event, did not have actual seizure activity, denies striking her head or neck, she states that it did not feel like prior seizures to her.  She denies chest pain denies shortness of breath, however she did state that approximately a  "week ago she had \"urinary symptoms\" when questioned about this she states that she had urgency and frequency.  She does have urgency and frequency when palpating over the suprapubic area however no tenderness.  Additionally the patient is not incontinent of urine or feces, no tongue lacerations, no nasal septal hematoma or Pappas sign.    Physical Exam   Triage vitals:  T    HR    BP    RR    O2        General: Awake, alert, in no acute distress  Eyes: Gaze conjugate.  No scleral icterus or injection  HENT: Normo-cephalic, atraumatic. No stridor  CV: Regular rate, regular rhythm. Radial pulses 2+ bilaterally  Resp: Breathing non-labored, speaking in full sentences.  Clear to auscultation bilaterally  GI: Soft, non-distended, non-tender. No rebound or guarding.  : Deferred  MSK/Extremities: No gross bony deformities. Moving all extremities  Skin: Warm. Appropriate color  Neuro: Alert. Oriented. Face symmetric. Speech is fluent.  Gross strength and sensation intact in b/l UE and LEs  Psych: Appropriate mood and affect    Medical Decision Making & ED Course   Medical Decision Makin y.o. female arrives via EMS from the [facility] for reported seizure-like activity which were \"tremors\".  Patient states that she is a DNR CC there is a signed copy of her DO NOT RESUSCITATE order in her chart from 2025 signed by Shayne Carranza MD at [phone].  Additionally we have information for the patient's power of , Deepali Regula reachable at [phone].  CODE STATUS is confirmed.  Therefore, we will do any excessively intrusive measurements, will not obtain head CT or cervical CT as the patient has no complaint of head injury or head pain or neck pain, there was no reported full tonic-clonic activity from the nursing home facility, the patient states that she had no loss of consciousness and no  incontinence of urine or feces, has no intraoral lesions or oral bleeding.  Will pursue evaluation for infectious " causes that can lower seizure threshold or organic causes that can be treated with minimal invasion.  Urinalysis to evaluate for urinary tract infection, chest x-ray to evaluate for signs of walking pneumonia, CBC CMP, magnesium, phosphorus to evaluate for electrolyte dyscrasia that can lower seizure threshold, patient swabbed for influenza flu and COVID, Keppra and Depakote levels ordered to evaluate for therapeutic levels.  Please see ED course for further medical decision making.  ----      Differential diagnoses considered include but are not limited to: Hyponatremia, convulsions, urinary tract infection, upper respiratory infection, electrolyte dyscrasia, breakthrough seizure due to lowered seizure threshold, inadequate medication administration     Social Determinants of Health which Significantly Impact Care: None identified     EKG Independent Interpretation: EKG interpreted by myself. Please see ED Course for full interpretation.    Independent Result Review and Interpretation: Relevant laboratory and radiographic results were reviewed and independently interpreted by myself.  As necessary, they are commented on in the ED Course.    Chronic conditions affecting the patient's care: As documented above in [facility]    The patient was discussed with the following consultants/services: None    Care Considerations: As documented above in [facility]    ED Course:  ED Course as of 06/16/25 1657   Mon Jun 16, 2025   1612 Coronavirus 2019, PCR: Not Detected [PV]   1613 Flu A Result: Not Detected [PV]   1613 RSV PCR: Not Detected [PV]   1613 Flu B Result: Not Detected [PV]   1613 Lactate: 1.3 [PV]   1613 Troponin I, High Sensitivity: 8 [PV]   1613 Valproic Acid: 83 [PV]   1613 XR chest 1 view  FINDINGS:  No consolidation, effusion, edema, or pneumothorax. Heart size within  normal limits.      IMPRESSION:  No evidence of acute intrathoracic abnormality.   [PV]   1613 WBC: 6.6 [PV]   1655 I was able to speak with the patient's  power of  after all results other than Keppra level which will be a send out from this facility.  Deepali, POA and friend, states that the patient is a confirmed DNR comfort care measures only on hospice, is glad that there was no urinary tract infection or electrolyte imbalance.  She agrees with sending the patient back to her home at her facility.  Arrangements were made for the patient to be safely transported home.  Patient discharged home, instructed follow-up with primary care provider, given return precautions [PV]      ED Course User Index  [PV] Luciano Sorto DO         Diagnoses as of 06/16/25 1657   Tremor     Disposition   As a result of the work-up, the patient was discharged home.  she was informed of her diagnosis and instructed to come back with any concerns or worsening of condition.  she and was agreeable to the plan as discussed above.  she was given the opportunity to ask questions.  All of the patient's questions were answered.    Procedures   Procedures    Patient seen and discussed with ED attending physician.    Luciano Sorto DO  Emergency Medicine    The patient was seen by the resident/fellow.  I have personally performed a substantive portion of the encounter.  I have seen and examined the patient; agree with the workup, evaluation, MDM,   management and diagnosis.  The care plan has been discussed with the resident; I have reviewed the resident’s note and agree with the documented findings.      This is a 85 y.o. female who presents to the ER with reported tremors from her nursing facility.  She does not remember any abnormal activity or behaviors today.  She is not member having any tremors.  She is awake and conversant.  She does not seem to be the most reliable historian.  She cannot give me any significant history of any recent events in her life.  She was given 0.5 mg of oral Ativan around 1 PM for this tremorous activity.  She denied any history of seizures, she does have a  history of seizures and she is currently on Depakote.  She denies any nausea or vomiting or diarrhea.  No fevers or chills.  No chest pain or shortness of breath.  She does have a history of seizures and hyponatremia.  She is DNR comfort care and hospice.    Heart is regular.  Lungs are clear.  Abdomen is soft and nontender.  No active tremors or seizures at this time.  Head is atraumatic.  No distress.      EKG: Normal sinus rhythm with a ventricular rate of 75.  KY interval 120.  QTc 439.  No ST changes.    CBC unremarkable.  Chemistry with mild elevated BUN.  Urinalysis unremarkable.  BNP minimally elevated.  Lactate normal.    Chest x-ray negative    We were  able to contact the patient's power of .  We did confirm CODE STATUS is DNR comfort care.  No heroic measures.  We did discuss why the patient came to the ER.  Power of  stated they are concerned the patient may have a seizure because they are having this tremoring.  Patient had no actual seizure.    Patient is discharged back to her nursing facility.  She is to follow-up with her doctors.       Luciano Sorto,   Resident  06/16/25 2701

## 2025-06-16 NOTE — DISCHARGE INSTRUCTIONS
Continue your current medications.    Seek immediate medical attention if Fernanda develops:  Headache, seizure, fever, chest pain, shortness of breath, nausea, vomiting, diarrhea, or any new or worsening symptoms.

## 2025-06-17 LAB
ATRIAL RATE: 75 BPM
HOLD SPECIMEN: NORMAL
LEVETIRACETAM SERPL-MCNC: 22 UG/ML (ref 10–40)
P AXIS: 59 DEGREES
P OFFSET: 206 MS
P ONSET: 158 MS
PR INTERVAL: 120 MS
Q ONSET: 218 MS
QRS COUNT: 13 BEATS
QRS DURATION: 90 MS
QT INTERVAL: 394 MS
QTC CALCULATION(BAZETT): 439 MS
QTC FREDERICIA: 424 MS
R AXIS: 73 DEGREES
T AXIS: 81 DEGREES
T OFFSET: 415 MS
VENTRICULAR RATE: 75 BPM

## 2025-06-25 ENCOUNTER — HOSPITAL ENCOUNTER (EMERGENCY)
Facility: HOSPITAL | Age: 85
Discharge: HOME | End: 2025-06-25
Attending: EMERGENCY MEDICINE
Payer: MEDICARE

## 2025-06-25 VITALS
HEIGHT: 67 IN | DIASTOLIC BLOOD PRESSURE: 63 MMHG | HEART RATE: 82 BPM | SYSTOLIC BLOOD PRESSURE: 119 MMHG | RESPIRATION RATE: 16 BRPM | TEMPERATURE: 98.1 F | BODY MASS INDEX: 18.83 KG/M2 | WEIGHT: 120 LBS | OXYGEN SATURATION: 100 %

## 2025-06-25 DIAGNOSIS — S01.81XA FACIAL LACERATION, INITIAL ENCOUNTER: ICD-10-CM

## 2025-06-25 DIAGNOSIS — S09.90XA CLOSED HEAD INJURY, INITIAL ENCOUNTER: ICD-10-CM
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
## 2025-06-25 PROCEDURE — 99283 EMERGENCY DEPT VISIT LOW MDM: CPT | Performed by: EMERGENCY MEDICINE

## 2025-06-25 PROCEDURE — 12011 RPR F/E/E/N/L/M 2.5 CM/<: CPT

## 2025-06-25 PROCEDURE — 2500000004 HC RX 250 GENERAL PHARMACY W/ HCPCS (ALT 636 FOR OP/ED)

## 2025-06-25 RX ORDER — LIDOCAINE HYDROCHLORIDE 10 MG/ML
10 INJECTION, SOLUTION INFILTRATION; PERINEURAL ONCE
Status: COMPLETED | OUTPATIENT
Start: 2025-06-25 | End: 2025-06-25

## 2025-06-25 RX ORDER — LIDOCAINE HYDROCHLORIDE 10 MG/ML
10 INJECTION, SOLUTION INFILTRATION; PERINEURAL ONCE
Status: DISCONTINUED | OUTPATIENT
Start: 2025-06-25 | End: 2025-06-25

## 2025-06-25 RX ADMIN — LIDOCAINE HYDROCHLORIDE 10 ML: 10 INJECTION, SOLUTION INFILTRATION; PERINEURAL at 05:14

## 2025-06-25 ASSESSMENT — LIFESTYLE VARIABLES
EVER HAD A DRINK FIRST THING IN THE MORNING TO STEADY YOUR NERVES TO GET RID OF A HANGOVER: NO
HAVE PEOPLE ANNOYED YOU BY CRITICIZING YOUR DRINKING: NO
HAVE YOU EVER FELT YOU SHOULD CUT DOWN ON YOUR DRINKING: NO
EVER FELT BAD OR GUILTY ABOUT YOUR DRINKING: NO
TOTAL SCORE: 0

## 2025-06-25 ASSESSMENT — PAIN SCALES - GENERAL
PAINLEVEL_OUTOF10: 2
PAINLEVEL_OUTOF10: 2
PAINLEVEL_OUTOF10: 0 - NO PAIN

## 2025-06-25 ASSESSMENT — PAIN - FUNCTIONAL ASSESSMENT: PAIN_FUNCTIONAL_ASSESSMENT: 0-10

## 2025-06-25 NOTE — DISCHARGE INSTRUCTIONS
Follow-up with your primary care physician.  Seek immediate medical attention with any worsening symptoms, should you change your mind regarding workup, difficulty breathing or for any reason.  Have sutures removed in 5 days.

## 2025-06-25 NOTE — ED PROVIDER NOTES
Emergency Department Provider Note        History of Present Illness     History provided by: Patient  Limitations to History: None  External Records Reviewed with Brief Summary: Nursing home paperwork which showed CODE STATUS, medical history, listed medication. and EMS Run Sheet chief complaint, normal vital sign    HPI:  Fernanda Hughes is a  85-year-old female with a medical history of vascular dementia, seizures, and hypertensive encephalopathy who presents via squad from a nursing home to the Emergency Department following a fall. She sustained a 2 cm laceration above the right eyebrow with minimal bleeding.  The patient is up-to-date on tetanus shot on arrival, the patient was placed in a cervical collar as a precaution. There was no reported loss of consciousness, vomiting, or other concerning neurologic symptoms. The patient is currently enrolled in hospice care. Per the family, her goals of care are comfort-focused, and they declined further diagnostics or interventions, including CT imaging or laboratory testing. Family requested that management in the ED be limited to wound care in alignment with the their wishes.    Physical Exam   Triage vitals:  T 36.2 °C (97.2 °F)  HR 92  /69  RR 18  O2 100 % None (Room air)    General: Awake, alert, in no acute distress  Eyes: Gaze conjugate.  No scleral icterus or injection. 2 cm laceration above the right eyebrow.  HENT: Normo-cephalic, atraumatic. No stridor  CV: Regular rate, regular rhythm. Radial pulses 2+ bilaterally  Resp: Breathing non-labored, speaking in full sentences.  Clear to auscultation bilaterally  GI: Soft, non-distended, non-tender. No rebound or guarding.  : Not examined.  MSK/Extremities: No gross bony deformities. Moving all extremities  Skin: Warm. Appropriate color  Neuro: Alert. Oriented. Face symmetric. Speech is fluent.  Gross strength and sensation intact in b/l UE and LEs  Psych: Appropriate mood and affect    Medical Decision  Making & ED Course   Medical Decision Makin y.o. female with a history of vascular dementia, seizures, and hypertensive encephalopathy, presenting after a fall with a 2 cm laceration above the right eyebrow. Differential diagnosis included traumatic brain injury, cervical spine injury, and facial soft tissue trauma. However, the patient is enrolled in hospice care, and her family explicitly declined extensive workup, including CT imaging and laboratory testing, in alignment with her goals of care.     The case was discussed with the ED attending, Dr. Guerin, who saw and evaluated the patient at the bedside. On examination, the patient was hemodynamically stable, without vomiting, loss of consciousness, or focal neurological deficits. A cervical collar had been placed preemptively but was removed following a careful clinical assessment, which revealed no signs of cervical spine injury.     The patient had a forehead laceration above the right eyebrow, which was cleansed with chlorhexidine and irrigated with normal saline. Local anesthesia was achieved using 1% lidocaine without epinephrine. The laceration was repaired using three simple interrupted sutures with 6-0 fast-absorbing suture material. The patient tolerated the procedure well. No additional injuries were identified. The patient was discharged in stable condition back to hospice care, and detailed wound care instructions.  ----  Differential diagnoses considered include but are not limited to:  traumatic brain injury, cervical spine injury, and facial soft tissue trauma     Social Determinants of Health which Significantly Impact Care: None identified     EKG Independent Interpretation: EKG not obtained    Independent Result Review and Interpretation: None obtained    Chronic conditions affecting the patient's care: As documented above in MDM    The patient was discussed with the following consultants/services: None    Care Considerations: As  documented above in [facility]    ED Course:  ED Course as of 06/25/25 0616   Wed Jun 25, 2025 0441 [facility]  Patient is a hospice patient.  She was not supposed to be sent to the emergency department.  Family would not want any workup or imaging performed.  As there is a laceration, they would be okay with repair of said laceration. [PS]      ED Course User Index  [PS] Donald Guerin, DO         Diagnoses as of 06/25/25 0616   Fall, initial encounter   Closed head injury, initial encounter   Facial laceration, initial encounter     Disposition   As a result of the work-up, the patient was discharged home.  she was informed of her diagnosis and instructed to come back with any concerns or worsening of condition.  she and was agreeable to the plan as discussed above.  she was given the opportunity to ask questions.  All of the patient's questions were answered.    Procedures   Procedures    This was a shared visit with an ED attending.  The patient was seen and discussed with the ED attending Dr. Guerin.    Berhane Sawyer MD  Emergency Medicine, PGY-2     Berhane Sawyer MD  Resident  06/27/25 [id number]

## 2025-06-27 NOTE — ED PROCEDURE NOTE
Procedure  Laceration Repair    Performed by: Berhane Sawyer MD  Authorized by: Donald Guerin DO    Consent:     Consent obtained:  Verbal    Consent given by:  Patient    Risks, benefits, and alternatives were discussed: yes      Risks discussed:  Infection, pain and nerve damage    Alternatives discussed:  No treatment, observation and delayed treatment  Universal protocol:     Patient identity confirmed:  Verbally with patient, arm band, hospital-assigned identification number and provided demographic data  Anesthesia:     Anesthesia method:  Local infiltration    Local anesthetic:  Lidocaine 1% w/o epi  Laceration details:     Location:  Face    Face location:  R eyebrow    Length (cm):  2    Depth (mm):  1.5  Pre-procedure details:     Preparation:  Patient was prepped and draped in usual sterile fashion and imaging obtained to evaluate for foreign bodies  Exploration:     Limited defect created (wound extended): no      Hemostasis achieved with:  Direct pressure    Imaging outcome: foreign body not noted      Wound exploration: wound explored through full range of motion and entire depth of wound visualized      Wound extent: no foreign body, no signs of injury and no nerve damage      Contaminated: no    Treatment:     Area cleansed with:  Chlorhexidine    Amount of cleaning:  Standard    Irrigation solution:  Sterile saline    Irrigation method:  Syringe    Visualized foreign bodies/material removed: no      Debridement:  None    Undermining:  None    Scar revision: no    Skin repair:     Repair method:  Sutures    Suture size:  5-0    Suture material:  Fast-absorbing gut    Suture technique:  Simple interrupted    Number of sutures:  3  Approximation:     Approximation:  Close  Repair type:     Repair type:  Simple  Post-procedure details:     Dressing:  Adhesive bandage    Procedure completion:  Tolerated               Berhane Sawyer MD  Resident  06/27/25 0635       Berhane Sawyer  MD  Resident  06/27/25 [id number]

## 2025-08-10 LAB
ATRIAL RATE: 75 BPM
P AXIS: 59 DEGREES
P OFFSET: 206 MS
P ONSET: 158 MS
PR INTERVAL: 120 MS
Q ONSET: 218 MS
QRS COUNT: 13 BEATS
QRS DURATION: 90 MS
QT INTERVAL: 394 MS
QTC CALCULATION(BAZETT): 439 MS
QTC FREDERICIA: 424 MS
R AXIS: 73 DEGREES
T AXIS: 81 DEGREES
T OFFSET: 415 MS
VENTRICULAR RATE: 75 BPM